# Patient Record
Sex: MALE | Race: WHITE | NOT HISPANIC OR LATINO | Employment: OTHER | URBAN - METROPOLITAN AREA
[De-identification: names, ages, dates, MRNs, and addresses within clinical notes are randomized per-mention and may not be internally consistent; named-entity substitution may affect disease eponyms.]

---

## 2022-03-01 ENCOUNTER — HOSPITAL ENCOUNTER (INPATIENT)
Facility: MEDICAL CENTER | Age: 72
LOS: 4 days | DRG: 309 | End: 2022-03-05
Attending: EMERGENCY MEDICINE | Admitting: STUDENT IN AN ORGANIZED HEALTH CARE EDUCATION/TRAINING PROGRAM
Payer: MEDICARE

## 2022-03-01 DIAGNOSIS — R00.1 BRADYCARDIA: ICD-10-CM

## 2022-03-01 DIAGNOSIS — R42 DIZZINESS: ICD-10-CM

## 2022-03-01 DIAGNOSIS — I10 PRIMARY HYPERTENSION: ICD-10-CM

## 2022-03-01 DIAGNOSIS — I10 HYPERTENSION, UNSPECIFIED TYPE: ICD-10-CM

## 2022-03-01 PROBLEM — N18.30 CKD (CHRONIC KIDNEY DISEASE) STAGE 3, GFR 30-59 ML/MIN: Status: ACTIVE | Noted: 2022-03-01

## 2022-03-01 PROBLEM — E78.5 HLD (HYPERLIPIDEMIA): Status: ACTIVE | Noted: 2022-03-01

## 2022-03-01 PROBLEM — E11.9 DM2 (DIABETES MELLITUS, TYPE 2) (HCC): Status: ACTIVE | Noted: 2022-03-01

## 2022-03-01 LAB
ALBUMIN SERPL BCP-MCNC: 4.5 G/DL (ref 3.2–4.9)
ALBUMIN/GLOB SERPL: 1.7 G/DL
ALP SERPL-CCNC: 97 U/L (ref 30–99)
ALT SERPL-CCNC: 22 U/L (ref 2–50)
ANION GAP SERPL CALC-SCNC: 14 MMOL/L (ref 7–16)
AST SERPL-CCNC: 21 U/L (ref 12–45)
BASOPHILS # BLD AUTO: 1 % (ref 0–1.8)
BASOPHILS # BLD: 0.07 K/UL (ref 0–0.12)
BILIRUB SERPL-MCNC: 0.4 MG/DL (ref 0.1–1.5)
BUN SERPL-MCNC: 34 MG/DL (ref 8–22)
CALCIUM SERPL-MCNC: 10.1 MG/DL (ref 8.5–10.5)
CHLORIDE SERPL-SCNC: 108 MMOL/L (ref 96–112)
CO2 SERPL-SCNC: 19 MMOL/L (ref 20–33)
CREAT SERPL-MCNC: 1.89 MG/DL (ref 0.5–1.4)
EOSINOPHIL # BLD AUTO: 0.6 K/UL (ref 0–0.51)
EOSINOPHIL NFR BLD: 8.9 % (ref 0–6.9)
ERYTHROCYTE [DISTWIDTH] IN BLOOD BY AUTOMATED COUNT: 47.5 FL (ref 35.9–50)
GLOBULIN SER CALC-MCNC: 2.7 G/DL (ref 1.9–3.5)
GLUCOSE SERPL-MCNC: 162 MG/DL (ref 65–99)
HCT VFR BLD AUTO: 37.3 % (ref 42–52)
HGB BLD-MCNC: 12.5 G/DL (ref 14–18)
IMM GRANULOCYTES # BLD AUTO: 0.02 K/UL (ref 0–0.11)
IMM GRANULOCYTES NFR BLD AUTO: 0.3 % (ref 0–0.9)
LYMPHOCYTES # BLD AUTO: 1.54 K/UL (ref 1–4.8)
LYMPHOCYTES NFR BLD: 22.9 % (ref 22–41)
MAGNESIUM SERPL-MCNC: 1.6 MG/DL (ref 1.5–2.5)
MCH RBC QN AUTO: 29.8 PG (ref 27–33)
MCHC RBC AUTO-ENTMCNC: 33.5 G/DL (ref 33.7–35.3)
MCV RBC AUTO: 88.8 FL (ref 81.4–97.8)
MONOCYTES # BLD AUTO: 0.66 K/UL (ref 0–0.85)
MONOCYTES NFR BLD AUTO: 9.8 % (ref 0–13.4)
NEUTROPHILS # BLD AUTO: 3.83 K/UL (ref 1.82–7.42)
NEUTROPHILS NFR BLD: 57.1 % (ref 44–72)
NRBC # BLD AUTO: 0 K/UL
NRBC BLD-RTO: 0 /100 WBC
PLATELET # BLD AUTO: 172 K/UL (ref 164–446)
PMV BLD AUTO: 12.2 FL (ref 9–12.9)
POTASSIUM SERPL-SCNC: 5.5 MMOL/L (ref 3.6–5.5)
PROT SERPL-MCNC: 7.2 G/DL (ref 6–8.2)
RBC # BLD AUTO: 4.2 M/UL (ref 4.7–6.1)
SODIUM SERPL-SCNC: 141 MMOL/L (ref 135–145)
TROPONIN T SERPL-MCNC: 34 NG/L (ref 6–19)
WBC # BLD AUTO: 6.7 K/UL (ref 4.8–10.8)

## 2022-03-01 PROCEDURE — 80053 COMPREHEN METABOLIC PANEL: CPT

## 2022-03-01 PROCEDURE — 84484 ASSAY OF TROPONIN QUANT: CPT

## 2022-03-01 PROCEDURE — 700111 HCHG RX REV CODE 636 W/ 250 OVERRIDE (IP): Performed by: EMERGENCY MEDICINE

## 2022-03-01 PROCEDURE — 36415 COLL VENOUS BLD VENIPUNCTURE: CPT

## 2022-03-01 PROCEDURE — 96374 THER/PROPH/DIAG INJ IV PUSH: CPT

## 2022-03-01 PROCEDURE — 93005 ELECTROCARDIOGRAM TRACING: CPT | Performed by: EMERGENCY MEDICINE

## 2022-03-01 PROCEDURE — 99285 EMERGENCY DEPT VISIT HI MDM: CPT

## 2022-03-01 PROCEDURE — 85025 COMPLETE CBC W/AUTO DIFF WBC: CPT

## 2022-03-01 PROCEDURE — 93005 ELECTROCARDIOGRAM TRACING: CPT

## 2022-03-01 PROCEDURE — 770020 HCHG ROOM/CARE - TELE (206)

## 2022-03-01 PROCEDURE — 99223 1ST HOSP IP/OBS HIGH 75: CPT | Performed by: INTERNAL MEDICINE

## 2022-03-01 PROCEDURE — 99223 1ST HOSP IP/OBS HIGH 75: CPT | Performed by: STUDENT IN AN ORGANIZED HEALTH CARE EDUCATION/TRAINING PROGRAM

## 2022-03-01 PROCEDURE — 83735 ASSAY OF MAGNESIUM: CPT

## 2022-03-01 RX ORDER — DEXTROSE MONOHYDRATE 25 G/50ML
50 INJECTION, SOLUTION INTRAVENOUS
Status: DISCONTINUED | OUTPATIENT
Start: 2022-03-01 | End: 2022-03-04

## 2022-03-01 RX ORDER — HYDRALAZINE HYDROCHLORIDE 20 MG/ML
10 INJECTION INTRAMUSCULAR; INTRAVENOUS ONCE
Status: COMPLETED | OUTPATIENT
Start: 2022-03-01 | End: 2022-03-01

## 2022-03-01 RX ORDER — ONDANSETRON 4 MG/1
4 TABLET, ORALLY DISINTEGRATING ORAL EVERY 4 HOURS PRN
Status: DISCONTINUED | OUTPATIENT
Start: 2022-03-01 | End: 2022-03-05 | Stop reason: HOSPADM

## 2022-03-01 RX ORDER — ONDANSETRON 2 MG/ML
4 INJECTION INTRAMUSCULAR; INTRAVENOUS EVERY 4 HOURS PRN
Status: DISCONTINUED | OUTPATIENT
Start: 2022-03-01 | End: 2022-03-05 | Stop reason: HOSPADM

## 2022-03-01 RX ORDER — POLYETHYLENE GLYCOL 3350 17 G/17G
1 POWDER, FOR SOLUTION ORAL
Status: DISCONTINUED | OUTPATIENT
Start: 2022-03-01 | End: 2022-03-05 | Stop reason: HOSPADM

## 2022-03-01 RX ORDER — ACETAMINOPHEN 325 MG/1
650 TABLET ORAL EVERY 6 HOURS PRN
Status: DISCONTINUED | OUTPATIENT
Start: 2022-03-01 | End: 2022-03-05 | Stop reason: HOSPADM

## 2022-03-01 RX ORDER — AMOXICILLIN 250 MG
2 CAPSULE ORAL 2 TIMES DAILY
Status: DISCONTINUED | OUTPATIENT
Start: 2022-03-02 | End: 2022-03-05 | Stop reason: HOSPADM

## 2022-03-01 RX ORDER — BISACODYL 10 MG
10 SUPPOSITORY, RECTAL RECTAL
Status: DISCONTINUED | OUTPATIENT
Start: 2022-03-01 | End: 2022-03-05 | Stop reason: HOSPADM

## 2022-03-01 RX ORDER — HYDRALAZINE HYDROCHLORIDE 20 MG/ML
10 INJECTION INTRAMUSCULAR; INTRAVENOUS EVERY 4 HOURS PRN
Status: DISCONTINUED | OUTPATIENT
Start: 2022-03-01 | End: 2022-03-05 | Stop reason: HOSPADM

## 2022-03-01 RX ADMIN — HYDRALAZINE HYDROCHLORIDE 10 MG: 20 INJECTION INTRAMUSCULAR; INTRAVENOUS at 22:23

## 2022-03-01 ASSESSMENT — ENCOUNTER SYMPTOMS
FEVER: 0
PALPITATIONS: 1
HEADACHES: 1
DIZZINESS: 1

## 2022-03-02 ENCOUNTER — APPOINTMENT (OUTPATIENT)
Dept: CARDIOLOGY | Facility: MEDICAL CENTER | Age: 72
DRG: 309 | End: 2022-03-02
Attending: STUDENT IN AN ORGANIZED HEALTH CARE EDUCATION/TRAINING PROGRAM
Payer: MEDICARE

## 2022-03-02 PROBLEM — Z76.89: Status: ACTIVE | Noted: 2022-03-02

## 2022-03-02 PROBLEM — E11.40 DIABETIC NEUROPATHY (HCC): Status: ACTIVE | Noted: 2022-03-02

## 2022-03-02 PROBLEM — I25.10 CORONARY ARTERY DISEASE: Status: ACTIVE | Noted: 2022-03-02

## 2022-03-02 PROBLEM — J44.9 COPD (CHRONIC OBSTRUCTIVE PULMONARY DISEASE) (HCC): Status: ACTIVE | Noted: 2022-03-02

## 2022-03-02 PROBLEM — E03.9 HYPOTHYROIDISM: Status: ACTIVE | Noted: 2022-03-02

## 2022-03-02 PROBLEM — F03.90 DEMENTIA (HCC): Status: ACTIVE | Noted: 2022-03-02

## 2022-03-02 PROBLEM — F32.A DEPRESSION: Status: ACTIVE | Noted: 2022-03-02

## 2022-03-02 PROBLEM — Z79.4 INSULIN DEPENDENT TYPE 2 DIABETES MELLITUS (HCC): Status: ACTIVE | Noted: 2022-03-01

## 2022-03-02 PROBLEM — D64.9 NORMOCYTIC ANEMIA: Status: ACTIVE | Noted: 2022-03-02

## 2022-03-02 LAB
ALBUMIN SERPL BCP-MCNC: 4.1 G/DL (ref 3.2–4.9)
ALBUMIN/GLOB SERPL: 1.5 G/DL
ALP SERPL-CCNC: 95 U/L (ref 30–99)
ALT SERPL-CCNC: 19 U/L (ref 2–50)
ANION GAP SERPL CALC-SCNC: 13 MMOL/L (ref 7–16)
AST SERPL-CCNC: 16 U/L (ref 12–45)
BASOPHILS # BLD AUTO: 1.3 % (ref 0–1.8)
BASOPHILS # BLD: 0.09 K/UL (ref 0–0.12)
BILIRUB SERPL-MCNC: 0.3 MG/DL (ref 0.1–1.5)
BUN SERPL-MCNC: 35 MG/DL (ref 8–22)
CALCIUM SERPL-MCNC: 10.1 MG/DL (ref 8.5–10.5)
CHLORIDE SERPL-SCNC: 106 MMOL/L (ref 96–112)
CHOLEST SERPL-MCNC: 180 MG/DL (ref 100–199)
CO2 SERPL-SCNC: 18 MMOL/L (ref 20–33)
CREAT SERPL-MCNC: 1.78 MG/DL (ref 0.5–1.4)
EKG IMPRESSION: NORMAL
EKG IMPRESSION: NORMAL
EOSINOPHIL # BLD AUTO: 0.59 K/UL (ref 0–0.51)
EOSINOPHIL NFR BLD: 8.8 % (ref 0–6.9)
ERYTHROCYTE [DISTWIDTH] IN BLOOD BY AUTOMATED COUNT: 47.6 FL (ref 35.9–50)
GLOBULIN SER CALC-MCNC: 2.7 G/DL (ref 1.9–3.5)
GLUCOSE BLD STRIP.AUTO-MCNC: 239 MG/DL (ref 65–99)
GLUCOSE BLD STRIP.AUTO-MCNC: 281 MG/DL (ref 65–99)
GLUCOSE BLD STRIP.AUTO-MCNC: 286 MG/DL (ref 65–99)
GLUCOSE BLD STRIP.AUTO-MCNC: 338 MG/DL (ref 65–99)
GLUCOSE SERPL-MCNC: 252 MG/DL (ref 65–99)
HCT VFR BLD AUTO: 37.6 % (ref 42–52)
HDLC SERPL-MCNC: 33 MG/DL
HGB BLD-MCNC: 12.3 G/DL (ref 14–18)
IMM GRANULOCYTES # BLD AUTO: 0.02 K/UL (ref 0–0.11)
IMM GRANULOCYTES NFR BLD AUTO: 0.3 % (ref 0–0.9)
LDLC SERPL CALC-MCNC: 91 MG/DL
LV EJECT FRACT  99904: 65
LV EJECT FRACT MOD 2C 99903: 56.57
LV EJECT FRACT MOD 4C 99902: 66.86
LV EJECT FRACT MOD BP 99901: 65.37
LYMPHOCYTES # BLD AUTO: 1.55 K/UL (ref 1–4.8)
LYMPHOCYTES NFR BLD: 23.1 % (ref 22–41)
MCH RBC QN AUTO: 29.1 PG (ref 27–33)
MCHC RBC AUTO-ENTMCNC: 32.7 G/DL (ref 33.7–35.3)
MCV RBC AUTO: 89.1 FL (ref 81.4–97.8)
MONOCYTES # BLD AUTO: 0.66 K/UL (ref 0–0.85)
MONOCYTES NFR BLD AUTO: 9.9 % (ref 0–13.4)
NEUTROPHILS # BLD AUTO: 3.79 K/UL (ref 1.82–7.42)
NEUTROPHILS NFR BLD: 56.6 % (ref 44–72)
NRBC # BLD AUTO: 0 K/UL
NRBC BLD-RTO: 0 /100 WBC
PLATELET # BLD AUTO: 178 K/UL (ref 164–446)
PMV BLD AUTO: 12.2 FL (ref 9–12.9)
POTASSIUM SERPL-SCNC: 4.6 MMOL/L (ref 3.6–5.5)
PROT SERPL-MCNC: 6.8 G/DL (ref 6–8.2)
RBC # BLD AUTO: 4.22 M/UL (ref 4.7–6.1)
SODIUM SERPL-SCNC: 137 MMOL/L (ref 135–145)
T4 FREE SERPL-MCNC: 0.66 NG/DL (ref 0.93–1.7)
TRIGL SERPL-MCNC: 281 MG/DL (ref 0–149)
TROPONIN T SERPL-MCNC: 32 NG/L (ref 6–19)
TSH SERPL DL<=0.005 MIU/L-ACNC: 18 UIU/ML (ref 0.38–5.33)
WBC # BLD AUTO: 6.7 K/UL (ref 4.8–10.8)

## 2022-03-02 PROCEDURE — 80061 LIPID PANEL: CPT

## 2022-03-02 PROCEDURE — 700102 HCHG RX REV CODE 250 W/ 637 OVERRIDE(OP): Performed by: STUDENT IN AN ORGANIZED HEALTH CARE EDUCATION/TRAINING PROGRAM

## 2022-03-02 PROCEDURE — 93010 ELECTROCARDIOGRAM REPORT: CPT | Performed by: INTERNAL MEDICINE

## 2022-03-02 PROCEDURE — 93306 TTE W/DOPPLER COMPLETE: CPT | Mod: 26 | Performed by: INTERNAL MEDICINE

## 2022-03-02 PROCEDURE — 36415 COLL VENOUS BLD VENIPUNCTURE: CPT

## 2022-03-02 PROCEDURE — 99233 SBSQ HOSP IP/OBS HIGH 50: CPT | Performed by: INTERNAL MEDICINE

## 2022-03-02 PROCEDURE — 85025 COMPLETE CBC W/AUTO DIFF WBC: CPT

## 2022-03-02 PROCEDURE — A9270 NON-COVERED ITEM OR SERVICE: HCPCS | Performed by: STUDENT IN AN ORGANIZED HEALTH CARE EDUCATION/TRAINING PROGRAM

## 2022-03-02 PROCEDURE — 93005 ELECTROCARDIOGRAM TRACING: CPT | Performed by: NURSE PRACTITIONER

## 2022-03-02 PROCEDURE — 99233 SBSQ HOSP IP/OBS HIGH 50: CPT | Mod: GC | Performed by: HOSPITALIST

## 2022-03-02 PROCEDURE — 82962 GLUCOSE BLOOD TEST: CPT

## 2022-03-02 PROCEDURE — 84439 ASSAY OF FREE THYROXINE: CPT

## 2022-03-02 PROCEDURE — 770020 HCHG ROOM/CARE - TELE (206)

## 2022-03-02 PROCEDURE — 93306 TTE W/DOPPLER COMPLETE: CPT

## 2022-03-02 PROCEDURE — A9270 NON-COVERED ITEM OR SERVICE: HCPCS | Performed by: HOSPITALIST

## 2022-03-02 PROCEDURE — A9270 NON-COVERED ITEM OR SERVICE: HCPCS | Performed by: INTERNAL MEDICINE

## 2022-03-02 PROCEDURE — 700102 HCHG RX REV CODE 250 W/ 637 OVERRIDE(OP): Performed by: HOSPITALIST

## 2022-03-02 PROCEDURE — 700102 HCHG RX REV CODE 250 W/ 637 OVERRIDE(OP): Performed by: INTERNAL MEDICINE

## 2022-03-02 PROCEDURE — 84484 ASSAY OF TROPONIN QUANT: CPT

## 2022-03-02 PROCEDURE — 80053 COMPREHEN METABOLIC PANEL: CPT

## 2022-03-02 PROCEDURE — 84443 ASSAY THYROID STIM HORMONE: CPT

## 2022-03-02 RX ORDER — ESCITALOPRAM OXALATE 10 MG/1
20 TABLET ORAL DAILY
Status: DISCONTINUED | OUTPATIENT
Start: 2022-03-03 | End: 2022-03-05 | Stop reason: HOSPADM

## 2022-03-02 RX ORDER — CARVEDILOL 6.25 MG/1
6.25 TABLET ORAL 2 TIMES DAILY WITH MEALS
Status: DISCONTINUED | OUTPATIENT
Start: 2022-03-02 | End: 2022-03-03

## 2022-03-02 RX ORDER — ATORVASTATIN CALCIUM 20 MG/1
20 TABLET, FILM COATED ORAL NIGHTLY
Status: ON HOLD | COMMUNITY
End: 2022-03-05

## 2022-03-02 RX ORDER — CHLORTHALIDONE 25 MG/1
25-50 TABLET ORAL DAILY
Status: ON HOLD | COMMUNITY
End: 2022-03-05

## 2022-03-02 RX ORDER — CELECOXIB 200 MG/1
200 CAPSULE ORAL 2 TIMES DAILY
COMMUNITY
End: 2022-09-28

## 2022-03-02 RX ORDER — GABAPENTIN 800 MG/1
400 TABLET ORAL 3 TIMES DAILY
Status: ON HOLD | COMMUNITY
End: 2022-10-06 | Stop reason: SDUPTHER

## 2022-03-02 RX ORDER — ESCITALOPRAM OXALATE 10 MG/1
20 TABLET ORAL DAILY
Status: ON HOLD | COMMUNITY
End: 2022-03-02

## 2022-03-02 RX ORDER — SULFAMETHOXAZOLE AND TRIMETHOPRIM 800; 160 MG/1; MG/1
1 TABLET ORAL 2 TIMES DAILY
Status: ON HOLD | COMMUNITY
Start: 2022-02-02 | End: 2022-03-05

## 2022-03-02 RX ORDER — ESCITALOPRAM OXALATE 20 MG/1
20 TABLET ORAL DAILY
Status: ON HOLD | COMMUNITY
End: 2022-10-06 | Stop reason: SDUPTHER

## 2022-03-02 RX ORDER — GABAPENTIN 400 MG/1
800 CAPSULE ORAL 4 TIMES DAILY
Status: DISCONTINUED | OUTPATIENT
Start: 2022-03-02 | End: 2022-03-05 | Stop reason: HOSPADM

## 2022-03-02 RX ORDER — HYDRALAZINE HYDROCHLORIDE 25 MG/1
25 TABLET, FILM COATED ORAL EVERY 8 HOURS
Status: DISCONTINUED | OUTPATIENT
Start: 2022-03-02 | End: 2022-03-02

## 2022-03-02 RX ORDER — CLONIDINE HYDROCHLORIDE 0.1 MG/1
0.1 TABLET ORAL 2 TIMES DAILY
Status: ON HOLD | COMMUNITY
End: 2022-03-05

## 2022-03-02 RX ORDER — OMEPRAZOLE 20 MG/1
20 CAPSULE, DELAYED RELEASE ORAL DAILY
COMMUNITY
End: 2024-01-04

## 2022-03-02 RX ORDER — HYDRALAZINE HYDROCHLORIDE 25 MG/1
37.5 TABLET, FILM COATED ORAL EVERY 8 HOURS
Status: DISCONTINUED | OUTPATIENT
Start: 2022-03-02 | End: 2022-03-03

## 2022-03-02 RX ORDER — LEVOTHYROXINE SODIUM 0.1 MG/1
100 TABLET ORAL
Status: DISCONTINUED | OUTPATIENT
Start: 2022-03-03 | End: 2022-03-02

## 2022-03-02 RX ORDER — CHOLECALCIFEROL (VITAMIN D3) 125 MCG
5 CAPSULE ORAL ONCE
Status: COMPLETED | OUTPATIENT
Start: 2022-03-02 | End: 2022-03-02

## 2022-03-02 RX ORDER — AMLODIPINE BESYLATE 5 MG/1
2.5 TABLET ORAL
Status: DISCONTINUED | OUTPATIENT
Start: 2022-03-02 | End: 2022-03-03

## 2022-03-02 RX ORDER — NIFEDIPINE 90 MG/1
90 TABLET, EXTENDED RELEASE ORAL DAILY
Status: ON HOLD | COMMUNITY
End: 2022-03-05

## 2022-03-02 RX ORDER — OXYCODONE AND ACETAMINOPHEN 10; 325 MG/1; MG/1
1 TABLET ORAL 3 TIMES DAILY PRN
COMMUNITY
End: 2022-09-28

## 2022-03-02 RX ORDER — LEVOTHYROXINE SODIUM 0.1 MG/1
100 TABLET ORAL
Status: ON HOLD | COMMUNITY
End: 2022-03-05

## 2022-03-02 RX ORDER — LEVOTHYROXINE SODIUM 0.07 MG/1
150 TABLET ORAL
Status: DISCONTINUED | OUTPATIENT
Start: 2022-03-03 | End: 2022-03-05 | Stop reason: HOSPADM

## 2022-03-02 RX ORDER — ATORVASTATIN CALCIUM 80 MG/1
80 TABLET, FILM COATED ORAL EVERY EVENING
Status: DISCONTINUED | OUTPATIENT
Start: 2022-03-02 | End: 2022-03-02

## 2022-03-02 RX ORDER — ATORVASTATIN CALCIUM 40 MG/1
40 TABLET, FILM COATED ORAL EVERY EVENING
Status: DISCONTINUED | OUTPATIENT
Start: 2022-03-02 | End: 2022-03-05 | Stop reason: HOSPADM

## 2022-03-02 RX ORDER — GABAPENTIN 400 MG/1
800 CAPSULE ORAL 3 TIMES DAILY
Status: ON HOLD | COMMUNITY
End: 2022-03-02

## 2022-03-02 RX ORDER — CARVEDILOL 6.25 MG/1
6.25 TABLET ORAL 2 TIMES DAILY WITH MEALS
Status: ON HOLD | COMMUNITY
End: 2022-03-05

## 2022-03-02 RX ORDER — GABAPENTIN 400 MG/1
800 CAPSULE ORAL ONCE
Status: COMPLETED | OUTPATIENT
Start: 2022-03-02 | End: 2022-03-02

## 2022-03-02 RX ADMIN — INSULIN HUMAN 2 UNITS: 100 INJECTION, SOLUTION PARENTERAL at 08:01

## 2022-03-02 RX ADMIN — GABAPENTIN 800 MG: 400 CAPSULE ORAL at 21:56

## 2022-03-02 RX ADMIN — HYDRALAZINE HYDROCHLORIDE 37.5 MG: 25 TABLET, FILM COATED ORAL at 21:56

## 2022-03-02 RX ADMIN — INSULIN HUMAN 3 UNITS: 100 INJECTION, SOLUTION PARENTERAL at 21:54

## 2022-03-02 RX ADMIN — Medication 5 MG: at 22:43

## 2022-03-02 RX ADMIN — ASPIRIN 81 MG: 81 TABLET, COATED ORAL at 10:53

## 2022-03-02 RX ADMIN — HYDRALAZINE HYDROCHLORIDE 25 MG: 25 TABLET, FILM COATED ORAL at 07:46

## 2022-03-02 RX ADMIN — GABAPENTIN 800 MG: 400 CAPSULE ORAL at 00:43

## 2022-03-02 RX ADMIN — INSULIN GLARGINE 35 UNITS: 100 INJECTION, SOLUTION SUBCUTANEOUS at 17:09

## 2022-03-02 RX ADMIN — CARVEDILOL 6.25 MG: 6.25 TABLET, FILM COATED ORAL at 17:07

## 2022-03-02 RX ADMIN — INSULIN HUMAN 4 UNITS: 100 INJECTION, SOLUTION PARENTERAL at 11:49

## 2022-03-02 RX ADMIN — HYDRALAZINE HYDROCHLORIDE 37.5 MG: 25 TABLET, FILM COATED ORAL at 15:19

## 2022-03-02 RX ADMIN — ATORVASTATIN CALCIUM 40 MG: 40 TABLET, FILM COATED ORAL at 17:07

## 2022-03-02 RX ADMIN — CARVEDILOL 6.25 MG: 6.25 TABLET, FILM COATED ORAL at 10:53

## 2022-03-02 RX ADMIN — INSULIN HUMAN 3 UNITS: 100 INJECTION, SOLUTION PARENTERAL at 17:09

## 2022-03-02 RX ADMIN — AMLODIPINE BESYLATE 2.5 MG: 5 TABLET ORAL at 10:53

## 2022-03-02 ASSESSMENT — COGNITIVE AND FUNCTIONAL STATUS - GENERAL
SUGGESTED CMS G CODE MODIFIER MOBILITY: CH
DAILY ACTIVITIY SCORE: 22
MOBILITY SCORE: 24
TOILETING: A LITTLE
SUGGESTED CMS G CODE MODIFIER DAILY ACTIVITY: CJ
HELP NEEDED FOR BATHING: A LITTLE

## 2022-03-02 ASSESSMENT — LIFESTYLE VARIABLES
ON A TYPICAL DAY WHEN YOU DRINK ALCOHOL HOW MANY DRINKS DO YOU HAVE: 0
TOTAL SCORE: 0
HAVE YOU EVER FELT YOU SHOULD CUT DOWN ON YOUR DRINKING: NO
EVER FELT BAD OR GUILTY ABOUT YOUR DRINKING: NO
CONSUMPTION TOTAL: NEGATIVE
AVERAGE NUMBER OF DAYS PER WEEK YOU HAVE A DRINK CONTAINING ALCOHOL: 0
SUBSTANCE_ABUSE: 0
TOTAL SCORE: 0
HOW MANY TIMES IN THE PAST YEAR HAVE YOU HAD 5 OR MORE DRINKS IN A DAY: 0
EVER HAD A DRINK FIRST THING IN THE MORNING TO STEADY YOUR NERVES TO GET RID OF A HANGOVER: NO
HAVE PEOPLE ANNOYED YOU BY CRITICIZING YOUR DRINKING: NO
ALCOHOL_USE: NO
TOTAL SCORE: 0
DOES PATIENT WANT TO STOP DRINKING: NO

## 2022-03-02 ASSESSMENT — PATIENT HEALTH QUESTIONNAIRE - PHQ9
3. TROUBLE FALLING OR STAYING ASLEEP OR SLEEPING TOO MUCH: NEARLY EVERY DAY
SUM OF ALL RESPONSES TO PHQ9 QUESTIONS 1 AND 2: 3
9. THOUGHTS THAT YOU WOULD BE BETTER OFF DEAD, OR OF HURTING YOURSELF: NOT AT ALL
2. FEELING DOWN, DEPRESSED, IRRITABLE, OR HOPELESS: NEARLY EVERY DAY
7. TROUBLE CONCENTRATING ON THINGS, SUCH AS READING THE NEWSPAPER OR WATCHING TELEVISION: MORE THAN HALF THE DAYS
1. LITTLE INTEREST OR PLEASURE IN DOING THINGS: NOT AT ALL
4. FEELING TIRED OR HAVING LITTLE ENERGY: NEARLY EVERY DAY
8. MOVING OR SPEAKING SO SLOWLY THAT OTHER PEOPLE COULD HAVE NOTICED. OR THE OPPOSITE, BEING SO FIGETY OR RESTLESS THAT YOU HAVE BEEN MOVING AROUND A LOT MORE THAN USUAL: NOT AT ALL
SUM OF ALL RESPONSES TO PHQ QUESTIONS 1-9: 11
6. FEELING BAD ABOUT YOURSELF - OR THAT YOU ARE A FAILURE OR HAVE LET YOURSELF OR YOUR FAMILY DOWN: NOT AL ALL
5. POOR APPETITE OR OVEREATING: NOT AT ALL

## 2022-03-02 ASSESSMENT — ENCOUNTER SYMPTOMS
HEADACHES: 1
PALPITATIONS: 0
ABDOMINAL PAIN: 0
DIZZINESS: 1
CHILLS: 0
FEVER: 0
COUGH: 0
NAUSEA: 0
VOMITING: 0
SHORTNESS OF BREATH: 0

## 2022-03-02 ASSESSMENT — PAIN DESCRIPTION - PAIN TYPE
TYPE: ACUTE PAIN

## 2022-03-02 ASSESSMENT — FIBROSIS 4 INDEX
FIB4 SCORE: 1.85
FIB4 SCORE: 1.85

## 2022-03-02 NOTE — PROGRESS NOTES
Assumed care of patient. Bedside report received from Iva SANDOVAL. Updated POC, call light within reach, fall precautions in place. Bed locked, and in lowest position. Patient instructed to call for assistance. Pt is A&Ox4. All questions answered, no further needs at this time.

## 2022-03-02 NOTE — ED PROVIDER NOTES
"ED Provider Note    Scribed for Jessy Galaviz M.D. by Madhu Barber. 3/1/2022, 7:44 PM.    Primary care provider: None noted  Means of arrival: Med Flight  History obtained from: Patient  History limited by: None    CHIEF COMPLAINT  Chief Complaint   Patient presents with   • Bradycardia   • Abnormal Labs       HPI  Sulaiman Ceja is a 71 y.o. male who presents to the Emergency Department for evaluation of bradycardia and abnormal labs. Patient states he has had dizziness over the last week. States the dizziness became severe enough that he was unable to stand up and therefore went to outside facility for further evaluation with his dizziness. He reports having headaches that feels like 5-second migraines. States he has history of chronic headaches for \"years.\"       Per review of outside medical records, patient presented to [facility] for dizziness for 3 days. At the hospital, EKG was notable for bradycardia. They were concerned about possible heart block causing his symptoms vs patient taking too much of his medication. He was also found to be hyperkalemic with potassium of 5.7. He was treated with calcium chloride, calcium gluconate, and kayexalate. Labs reviewed and troponin within normal limits. BNP mildly high at 221. Creatinine of 2, BUN of 35, potassium 5.7, LFTs within normal limits, hemoglobin 11.9, white count 6.5. Past medical history includes hypertension, COPD, mild dementia, CABG. Medications include carvedilol nifedipine, synthroid, atorvastatin, clonidine, gabapentin, lexapro, and omeprazole.     Patient reports after receiving medications at outside facilities his dizziness is now improved. States his heart rate is usually not as low as his heart rate today.  He does not believe he took any excess medication.  He reports that he does occasionally feel his heart skip a beat.  No active fever. He reports prior CABG in the 1990s. He still sees cardiology. He notes being on " "aspirin.      REVIEW OF SYSTEMS  Review of Systems   Constitutional: Negative for fever.   Respiratory: Negative for shortness of breath.    Cardiovascular: Positive for palpitations.   Gastrointestinal: Negative for abdominal pain, nausea and vomiting.   Neurological: Positive for dizziness and headaches.   All other systems reviewed and are negative.    PAST MEDICAL HISTORY   has a past medical history of CAD (coronary artery disease).    SURGICAL HISTORY  patient denies any surgical history    SOCIAL HISTORY  Social History     Tobacco Use   • Smoking status: Former Smoker     Start date: 3/1/1996   Vaping Use   • Vaping Use: Never used   Substance Use Topics   • Alcohol use: Never      Social History     Substance and Sexual Activity   Drug Use Not on file       FAMILY HISTORY  No pertinent family history reported.     CURRENT MEDICATIONS  Home Medications     Reviewed by Heather Dow (Pharmacy Tech) on 03/01/22 at 2217  Med List Status: Unable to Obtain   Medication Last Dose Status        Patient Sean Taking any Medications                       ALLERGIES  No Known Allergies    PHYSICAL EXAM  VITAL SIGNS: BP (!) 192/91   Pulse (!) 50   Temp 36.9 °C (98.5 °F) (Temporal)   Resp (!) 24   Ht 1.778 m (5' 10\")   Wt 84.4 kg (186 lb)   SpO2 95%   BMI 26.69 kg/m²   Vitals reviewed by myself.  Physical Exam  Nursing note and vitals reviewed.  Constitutional: Well-developed and well-nourished. No acute distress.   HENT: Head is normocephalic and atraumatic.  Eyes: extra-ocular movements intact  Cardiovascular: Bradycardia. Regular rhythm. No murmur heard.  Pulmonary/Chest: Breath sounds normal. No wheezes or rales.   Abdominal: Soft and non-tender. No distention.    Musculoskeletal: Extremities exhibit normal range of motion without edema or tenderness.   Neurological: Awake and alert  Skin: Skin is warm and dry. No rash.        DIAGNOSTIC STUDIES /  LABS  Labs Reviewed   CBC WITH DIFFERENTIAL - " Abnormal; Notable for the following components:       Result Value    RBC 4.20 (*)     Hemoglobin 12.5 (*)     Hematocrit 37.3 (*)     MCHC 33.5 (*)     Eosinophils 8.90 (*)     Eos (Absolute) 0.60 (*)     All other components within normal limits   COMP METABOLIC PANEL - Abnormal; Notable for the following components:    Co2 19 (*)     Glucose 162 (*)     Bun 34 (*)     Creatinine 1.89 (*)     All other components within normal limits   TROPONIN - Abnormal; Notable for the following components:    Troponin T 34 (*)     All other components within normal limits   ESTIMATED GFR - Abnormal; Notable for the following components:    GFR If  43 (*)     GFR If Non  35 (*)     All other components within normal limits   MAGNESIUM   LIPID PROFILE   COMP METABOLIC PANEL   CBC WITH DIFFERENTIAL       All labs reviewed by me.    EKG Interpretation:  Interpreted by myself    12 Lead EKG interpreted by me to show:  EKG at 7:15 PM: Sinus bradycardia, heart rate 57, occasional PVC, normal axis, intervals notable for prolonged DE of 228, , QTc 396, no acute ST-T segment changes, no evidence of acute arrhythmia or ischemia  My impression of this EKG: Does not indicate ischemia or arrhythmia at this time.      REASSESSMENT  7:44 PM Patient seen and examined at bedside. The patient presents with bradycardia and abnormal labs. Ordered EKG and labs to evaluate.    8:13 PM - I discussed the patient's case and the above findings with Dr. Manning (cardiology) who will evaluate the patient.         COURSE & MEDICAL DECISION MAKING  Nursing notes, VS, PMSFHx reviewed in chart.    Patient is a 71-year-old male who comes in for evaluation of dizziness.  At outside facility they were concerned about symptomatic bradycardia and therefore he was sent here for higher level of care.  I reviewed his labs from outside facility and EKG.  EKG appears to demonstrate some sort of junctional bigemy.  He does have some  form of chronic kidney disease and his creatinine at outside facility was 2, potassium was 5.7.  Currently he is asymptomatic and EKG demonstrates sinus bradycardia.  Current heart rate on the monitor is in the 50s.  At this time I do not believe he requires emergent pacemaker as he is asymptomatic, but given his prior arrhythmia causing his symptoms he may require medication adjustment versus possible pacemaker.  I discussed the case with cardiologist Dr. Manning who will consult on the patient.  Obtain repeat labs and repeat potassium is within normal limits.  Patient will be hospitalized for ongoing work-up and management.  Discussed the case with Dr. Byrd who has accepted patient for hospitalization.  Patient is in guarded condition.  Of note he  did become significantly hypertensive with blood pressure in the 200s systolic, likely rebound from not taking his nightly clonidine, he was treated with hydralazine to which he had good response.    DISPOSITION:  Patient will be hospitalized by Dr. Byrd in guarded condition.     FINAL IMPRESSION  1. Bradycardia    2. Dizziness    3. Hypertension, unspecified type          I, Madhu Barber (Scribe), am scribing for, and in the presence of, Jessy Galaviz M.D..    Electronically signed by: Madhu Barber (Bettyibedgar), 3/1/2022    I, Jessy Galaviz M.D. personally performed the services described in this documentation, as scribed by Madhu Barber in my presence, and it is both accurate and complete.    The note accurately reflects work and decisions made by me.  Jessy Galaviz M.D.  3/2/2022  12:24 AM

## 2022-03-02 NOTE — ASSESSMENT & PLAN NOTE
CMP (03/02/22): BUN/creatinine/EGFR 35/1.78/38  Baseline unknown, stable    -Avoid nephrotoxins  -Renally dose drugs  -Continue to monitor

## 2022-03-02 NOTE — PROGRESS NOTES
Assumed care of patient, bedside report received from Deepti ARMSTRONG RN. Updated on plan of care, call light within reach and fall precautions are in place and bed lock and in lowest position. Patient instructed to call for assistance before getting out of bed. All questions answered at this time. No other needs.

## 2022-03-02 NOTE — ASSESSMENT & PLAN NOTE
BP (03/02/22): (152-204)/() 176/71, Pulse:  [48-75] 60  Secondary to clonidine withdrawal, rebound hypertension usually last 2 to 4 days, today is day 3    -Permanently discontinued nifedipine 90 mg daily  -Permanently discontinued clonidine 0.1 mg twice daily  -Restarted carvedilol 6.25 mg twice daily  -Carvedilol 6.25 mg decreased to 3.125 mg daily (3/3/22)  -Started hydralazine 37.5 mg every 8 hours, hold for SBP less than 110  -Hydralazine 37.5 mg every 8 hours increased to 50 mg every 8 hours (3/3/22)  -Started amlodipine to 2.5 mg daily, hold for SBP less than 110  -Amlodipine 2.5 mg increased to 5 mg daily (3/3/22)  -Hydralazine 10 mg IV every 4 hours as needed for SBP greater than 180 or DBP greater than 110  -sodium restriction in diet

## 2022-03-02 NOTE — ASSESSMENT & PLAN NOTE
Lipid panel (03/02/22): Total cholesterol 180, triglycerides 281, HDL 33, LDL 91    -Changed home atorvastatin 20 mg to 40 mg daily (03/02/22)

## 2022-03-02 NOTE — ASSESSMENT & PLAN NOTE
A1c (3/3/22): 9.8  Fasting glucose 259, 12 units of SSI, uncontrolled    -Hold home meds of insulin detemir 50 units every morning, 15 units every night  -Held home insulin regular  -Started insulin glargine 35 units every evening  -Increased glargine from 35 units to 50 units (3/3/22)  -Increased glargine from 50 units to 30mg qAM AND qPM  -Started 5u aspart TID  schd   -SSI  -Hypoglycemia protocol

## 2022-03-02 NOTE — PROGRESS NOTES
CARDIOLOGY PROGRESS NOTE     Attending:  Abdiaziz Mc MD     Consulting Provider:   Jessy Galaviz MD    Resident:   Magdaleno Logan MD    PATIENT:   Sulaiman Ceja; [id number]; 1950    ID:   Sulaiman Ceja is a 71 year-old male with a past medical history of coronary artery disease (s/p CABG in the 1990s), insulin-dependent type 2 diabetes, hyperlipidemia, hypertension, chronic kidney disease (unknown stage), COPD, urinary incontinence (s/p artificial sphincter with recent replacement), and mild dementia. Presented to outside hospital with worsening exertional fatigue and dizziness over the past 2 months. Found to be in ventricular bigeminy in the outside facility and here a repeat EKG showed sinus bradycardia. Cardiology consult for bradycardia and consideration for pacemaker placement.     SUBJECTIVE:   No acute events overnight, patient denies any current chest pain or SOB. Denies any current lightheadedness at rest. States that he walked to the bathroom earlier this morning without any lightheadedness/dizziness.     OBJECTIVE:  Vitals:    03/02/22 0100 03/02/22 0128 03/02/22 0450 03/02/22 0706   BP: (!) 192/81 (!) 163/64 (!) 179/68 (!) 176/71   Pulse:   (!) 58 60   Resp:   18 16   Temp:   36.4 °C (97.6 °F) 36.8 °C (98.2 °F)   TempSrc:   Temporal Temporal   SpO2: 96% 94% 95% 94%   Weight:       Height:         No intake or output data in the 24 hours ending 03/02/22 0837    PHYSICAL EXAM:  General: No acute distress, afebrile, resting comfortably, conversational   HEENT: NC/AT. EOMI.   Cardiovascular: RRR without murmurs, rubs, heaves. Normal capillary refill   Respiratory: CTAB, no tachypnea or retractions   Abdomen: normal bowel sounds, soft, nontender, nondistended, no masses, no organomegaly   EXT:  MOSQUEDA, no edema  Skin: No erythema/lesions   Neuro: Non-focal, alert and orientated     LABS:  Recent Labs     03/01/22  1915 03/02/22  0154   WBC 6.7 6.7   RBC 4.20* 4.22*   HEMOGLOBIN 12.5* 12.3*   HEMATOCRIT  37.3* 37.6*   MCV 88.8 89.1   MCH 29.8 29.1   RDW 47.5 47.6   PLATELETCT 172 178   MPV 12.2 12.2   NEUTSPOLYS 57.10 56.60   LYMPHOCYTES 22.90 23.10   MONOCYTES 9.80 9.90   EOSINOPHILS 8.90* 8.80*   BASOPHILS 1.00 1.30     Recent Labs     03/01/22 1915 03/02/22  0154   SODIUM 141 137   POTASSIUM 5.5 4.6   CHLORIDE 108 106   CO2 19* 18*   BUN 34* 35*   CREATININE 1.89* 1.78*   CALCIUM 10.1 10.1   MAGNESIUM 1.6  --    ALBUMIN 4.5 4.1     Estimated GFR/CRCL = Estimated Creatinine Clearance: 39.3 mL/min (A) (by C-G formula based on SCr of 1.78 mg/dL (H)).  Recent Labs     03/01/22 1915 03/02/22  0154   GLUCOSE 162* 252*     Recent Labs     03/01/22 1915 03/02/22  0154   ASTSGOT 21 16   ALTSGPT 22 19   TBILIRUBIN 0.4 0.3   ALKPHOSPHAT 97 95   GLOBULIN 2.7 2.7             No results for input(s): INR, APTT, FIBRINOGEN in the last 72 hours.    Invalid input(s): DIMER      IMAGING:  EC-ECHOCARDIOGRAM COMPLETE W/O CONT    (Results Pending)       MEDS:  Current Facility-Administered Medications   Medication Last Admin   • hydrALAZINE (APRESOLINE) tablet 25 mg 25 mg at 03/02/22 0746   • atorvastatin (LIPITOR) tablet 80 mg     • aspirin EC (ECOTRIN) tablet 81 mg     • senna-docusate (PERICOLACE or SENOKOT S) 8.6-50 MG per tablet 2 Tablet      And   • polyethylene glycol/lytes (MIRALAX) PACKET 1 Packet      And   • magnesium hydroxide (MILK OF MAGNESIA) suspension 30 mL      And   • bisacodyl (DULCOLAX) suppository 10 mg     • acetaminophen (Tylenol) tablet 650 mg     • ondansetron (ZOFRAN) syringe/vial injection 4 mg     • ondansetron (ZOFRAN ODT) dispertab 4 mg     • hydrALAZINE (APRESOLINE) injection 10 mg     • insulin regular (HumuLIN R,NovoLIN R) injection 2 Units at 03/02/22 0801    And   • dextrose 50% (D50W) injection 50 mL       Studies:   EKG 3/1: sinus bradycardia, left axis deviation, prolonged ND interval at 228. Normal QTc. No ST/t-wave changes concerning for ischemia. Type 1 AV block.       Assessments:    #Sinus bradycardia   #Hyperkalemia, resolved   #CAD s/p CABG in the 1990s  #T2DM, insulin dependent   #HTN   #HLD   #CKD  #COPD    Recommendations:   -- Continue to follow patient symptoms. If continues to have symptomatic bradycardia, this could be an indication for PPM. Heart rate has improved since stopping the AV negro blocking agents. Patient does not appear to have any other reversible causes for bradycardia. Could also consider an EP study to ensure this isn't an intranodal block.   -- Continue to hold carvedilol, nifedipine, and clonidine.   -- Keep pacer pads nearby and give 0.5mg IV atropine q5 minutes if patient develops symptomatic bradycardia via ACLS protocol   -- Follow up on echo  -- Given BP of 176/71 this morning, resume home BP meds with hypertensive protocol in place   -- Monitor electrolytes closely with goal K>4 and Mg>2    Thank you for your consult. Cardiology will continue to follow. Please contact us with any questions.       Magdaleno Logan MD   PGY-2 Family Medicine Resident   [facility]     Discussed with my attending cardiologist, Dr Freed To.

## 2022-03-02 NOTE — CONSULTS
CARDIOLOGY CONSULTATION NOTE      Date of Consultation: 3/1/2022  Consulting Provider: Jessy Galaviz MD    Patient Name: Sulaiman Ceja    YOB: 1950  MRN: [id number]    Reason for Consultation:   Bradycardia    History of Present Illness:   Sulaiman Ceja is a 71 year-old gentleman with a past medical history of coronary artery disease (s/p CABG in the 1990s), insulin-dependent type 2 diabetes, hyperlipidemia, hypertension, chronic kidney disease (unknown stage), COPD, urinary incontinence (s/p artificial sphincter with recent replacement), and mild dementia.  Briefly, he presented to [facility] with worsening exertional fatigue and dizziness over the past 2 months.  There, he was found to be significantly bradycardic, although his reported blood pressures were in the hypertensive range, and mildly hyperkalemic at 5.7.  He was treated with calcium chloride and Kayexalate reportedly with conversion then to sinus bradycardia with PVCs.  He was subsequently transferred to [facility] for further evaluation and management.  His laboratories were otherwise remarkable for an undetectable troponin, a BNP of 221, and a creatinine of 2.0.  Upon transfer, his ECG only demonstrated sinus bradycardia with a PVC.  His laboratories showed an improvement in his creatinine to 1.89 and his potassium to 5.5.    On my review, he reports doing much better after converting back to sinus rhythm.  He notes that he has felt intermittently very poorly for approximately 2 months.  He has had significant exertional fatigue and dizziness as well as some resting symptoms.  The symptoms became so bad that he started using a walker.  He states that prior to about 2 months ago he was not requiring a walker for ambulation.  He reports some baseline shortness of breath that he attributes to COPD, but states this has not significantly worsened.  He denies any significant chest pain.  With regard to his coronary artery  disease, he reports undergoing a bypass surgery in the 1990s, but states that he has never required another surgery or stenting since then.  He also denies ever being told that he has heart failure, however, he does report knowing that he has some degree of kidney disease.  He denies taking a potassium supplement or using a salt substitute with potassium.  He also reports significant incontinence and denies any sensation of urinary retention.  He did recently undergo a replacement artificial sphincter surgery, but states that the sphincter has not been activated yet and currently he continues to have incontinence.    I reviewed his ECG from [facility].  This appeared to demonstrate atrial fibrillation with a slow ventricular response as there was clear irregularity in the QRS complexes.  One QRS complex appears to be a PVC, however, the other complexes appear supraventricular and are not in a clear regular pattern and there are no clear P-waves. He did also have a telemetry strip that appeared to show a junctional rhythm with PVCs in a pattern of bigeminy.  His ECG at [facility] demonstrated sinus bradycardia with a PVC and his telemetry here has shown only sinus bradycardia with frequent PVCs in a pattern of bigeminy.    Medical History:   See HPI above for relevant past medical history.    Surgical History:   1.  CABG in 1990s  2.  Recent artifical sphincter replacement    Family History:   Reviewed, noncontributory to current consultation.    Social History:   The patient is a former smoker.    Medications and Allergies:   1.  Levothyroxine 100 mcg daily  2.  Atorvastatin 20 mg daily  3.  Carvedilol 6.25 mg twice daily  4.  Celecoxib 200 mg daily  5.  Chlorthalidone 12.5 mg daily  6.  Clonidine 0.1 mg twice daily  7.  Gabapentin 800 mg 4 times daily  8.  Nifedipine 90 mg daily  9.  Omeprazole 40 mg daily  10.  Percocet as needed  11.  Levemir insulin    Review of Systems:   A pertinent review of systems was  "performed and was unremarkable except as per HPI above.    Vital Signs:   BP (!) 192/81   Pulse 60   Temp 36.9 °C (98.5 °F) (Temporal)   Resp 14   Ht 1.778 m (5' 10\")   Wt 84.4 kg (186 lb)   SpO2 91%   BMI 26.69 kg/m²   Vitals:    03/01/22 2015 03/01/22 2030 03/01/22 2045 03/01/22 2100   BP: (!) 179/88 152/75 153/73 (!) 192/81   Pulse: (!) 49 (!) 59 (!) 54 60   Resp: 17 18 15 14   Temp:       TempSrc:       SpO2: 95% 95% 93% 91%   Weight:       Height:         Body mass index is 26.69 kg/m².  Oxygen Therapy:  Pulse Oximetry: 91 %, O2 Delivery Device: None - Room Air    Physical Examination:   General: Chronically ill-appearing, but in no acute distress  Eyes: Extraocular movements intact, anicteric  HEENT: Neck full range of motion, no jugular venous distension  Pulmonary: Normal respiratory effort, clear to auscultation bilaterally  Cardiovascular: Bradycardic, intermittent ectopy, 1/6 systolic murmur  Gastrointestinal: Soft, non-tender, non-distended  Extremities: Warm and well perfused, trace bilateral lower extremity edema  Neurological: Alert and oriented, no gross focal motor deficits    Laboratories:   Estimated Creatinine Clearance: 37 mL/min (A) (by C-G formula based on SCr of 1.89 mg/dL (H)).  Recent Labs     03/01/22 1915   CREATININE 1.89*   BUN 34*   POTASSIUM 5.5   SODIUM 141   CALCIUM 10.1   CO2 19*   ALBUMIN 4.5     Recent Labs     03/01/22 1915   GLUCOSE 162*     Recent Labs     03/01/22 1915   ASTSGOT 21   ALTSGPT 22   ALKPHOSPHAT 97     Recent Labs     03/01/22 1915   WBC 6.7   HEMOGLOBIN 12.5*   PLATELETCT 172     Recent Labs     03/01/22 1915   TROPONINT 34*     Studies:   See ECG and telemetry interpretation in HPI above    Assessment and Recommendations:   # Symptomatic bradycardia, slow atrial fibrillation: His ECG appeared to show atrial fibrillation with a slow ventricular response given the significant irregularity present in the rhythm.  However, his telemetry showed a more " regular rhythm with ventricular bigeminy.  It is unclear if this was a junctional rhythm or just a very slow atrial fibrillation.  Regardless, there is a high likelihood that he will require a pacemaker.  While his abnormal rhythms may resolve with holding carvedilol and clonidine, given his history of CABG he potentially has a strong indication for a beta-blocker.    For now, he does not have an indication for an urgent temporary pacemaker.  Would continue to hold carvedilol, nifedipine, and clonidine.  He can remain with pacer pads in place and atropine on hand if he were to have unstable resting bradycardia recur.  Continue to monitor on telemetry and obtain an echocardiogram in the morning to assess his left ventricular systolic function prior to consideration for a pacemaker.  The duration of what appeared be slow atrial fibrillation is not clear at this time.  Unfortunately we do not have a longer strip to clearly confirm atrial fibrillation.  He ultimately may need to be considered for anticoagulation given an elevated [id number] score.  However, as he is in sinus rhythm now this does not appear urgent and there is a possibility he may require other urgent procedures.    # Coronary artery disease, hyperlipidemia, hypertension: He does not describe any active anginal symptoms, but he should have an echocardiogram performed prior to consideration for pacemaker to evaluate his left ventricular systolic function.  His troponin was only very mildly elevated and may simply reflect some cardiac demand from bradycardia in the setting of chronic kidney disease, but will trend overnight to ensure no significant rising pattern.  Would hold his antihypertensive medications for now unless he becomes severely hypertensive.  He may certainly have some rebound from holding clonidine in particular.  He should be continued on aspirin and atorvastatin.    # Chronic kidney disease, hyperkalemia: He seemed to improve with  treatment of hyperkalemia, although his potassium was not at the level typically associated with significant arrhythmias.  He also does not have a clear reason for becoming hyperkalemic as this would be unusual on its own given his creatinine level and he was not taking any medications clearly associated with potassium retention and he denies taking any obvious potassium supplements.  Defer further treatment of his hyperkalemia to the primary service, but would try to obtain his outside records to determine his baseline creatinine.        Andrew Manning MD, [facility]  Interventional Cardiology  [facility]  [address] [address]  Phone: [phone]  Fax: [phone]

## 2022-03-02 NOTE — PROGRESS NOTES
4 Eyes Skin Assessment Completed by LROI Enrique and LORI Jimenez.    Head WDL  Ears Redness and Blanching  Nose WDL  Mouth WDL  Neck WDL  Breast/Chest Scar  Shoulder Blades WDL  Spine WDL  (R) Arm/Elbow/Hand Scab  (L) Arm/Elbow/Hand WDL  Abdomen WDL  Groin WDL  Scrotum/Coccyx/Buttocks Redness and Blanching  (R) Leg WDL  (L) Leg WDL  (R) Heel/Foot/Toe WDL  (L) Heel/Foot/Toe WDL          Devices In Places Tele Box, Blood Pressure Cuff and Pulse Ox      Interventions In Place Pressure Redistribution Mattress    Possible Skin Injury No    Pictures Uploaded Into Epic N/A  Wound Consult Placed N/A  RN Wound Prevention Protocol Ordered No

## 2022-03-02 NOTE — H&P
Hospital Medicine History & Physical Note    Date of Service  3/1/2022    Primary Care Physician  No primary care provider on file.    Consultants      Code Status  No Order    Chief Complaint  Chief Complaint   Patient presents with   • Bradycardia   • Abnormal Labs       History of Presenting Illness  Sulaiman Ceja is a 71 y.o. male with a past medical history of coronary artery disease (s/p CABG in the 1990s), insulin-dependent type 2 diabetes, hyperlipidemia, hypothyroidism, hypertension, chronic kidney disease (unknown stage), COPD, urinary incontinence (s/p artificial sphincter with recent replacement), and mild dementia who presented 3/1/2022  for evaluation of bradycardia and abnormal labs. Patient states he has had severe dizziness over the past week causing him inability to walk as well as sharp sudden headaches.     Patient presented to [facility] with worsening exertional fatigue and dizziness over the past 2 months.  There, he was found to be significantly bradycardic, although his reported blood pressures were in the hypertensive range, and mildly hyperkalemic at 5.7.  He was treated with calcium chloride and Kayexalate reportedly with conversion then to sinus bradycardia with PVCs.  He was subsequently transferred to [facility] for further evaluation and management.  Upon transfer, his ECG only demonstrated sinus bradycardia with a PVC.  His laboratories showed an improvement in his creatinine to 1.89 and his potassium to 5.5.     Notable findings include: /139, HR 49, RR 24, Trop 34, GFR 35, glucose 162, Bun/Creatinine 34/1.89    EKG with sinus bradycardia and PVCs    I discussed the plan of care with patient.    Review of Systems  Review of Systems   Constitutional: Positive for malaise/fatigue.   Neurological: Positive for dizziness.   All other systems reviewed and are negative.      Past Medical History   has a past medical history of CAD (coronary artery disease). DM2, HLD,  HTN, COPD    Surgical History  CABG in 1990's    Family History  family history is not on file.   Family history reviewed with patient. There is no family history that is pertinent to the chief complaint.     Social History   reports that he has quit smoking. He started smoking about 26 years ago. He does not have any smokeless tobacco history on file. He reports that he does not drink alcohol.    Allergies  No Known Allergies    Medications  None       Physical Exam  Temp:  [36.9 °C (98.5 °F)] 36.9 °C (98.5 °F)  Pulse:  [48-75] 49  Resp:  [14-24] 18  BP: (152-204)/() 204/86  SpO2:  [91 %-97 %] 91 %  Blood Pressure : (!) 199/91   Temperature: 36.9 °C (98.5 °F)   Pulse: (!) 49   Respiration: 14   Pulse Oximetry: 93 %       Physical Exam     Constitutional: Resting comfortably in NAD   HENT: Normocephalic, no obvious evidence of acute trauma.  Eyes: No scleral icterus. Normal conjunctiva   Neck: Comfortable movement without any obvious restriction in the range of motion.  Cardiovascular: bradycardia  Thorax & Lungs: No respiratory distress. No wheezing, rales or rhonchi heard on ausculation.  there is no obvious chest wall tenderness. I appreciate normal air movement throughout.   Abdomen: The abdomen is not visibly distended. Upon palpation, I find it to be without tenderness.  No mass appreciated.  Skin: The exposed portions of skin reveal no obvious rash or other abnormalities.  Extremities/Musculoskeletal: no lower extremity edema with no asymmetry.  Neurologic: Alert & oriented. No focal deficits observed.   Psychiatric: Normal affect appropriate for the clinical situation.    Laboratory:  Recent Labs     03/01/22 1915   WBC 6.7   RBC 4.20*   HEMOGLOBIN 12.5*   HEMATOCRIT 37.3*   MCV 88.8   MCH 29.8   MCHC 33.5*   RDW 47.5   PLATELETCT 172   MPV 12.2     Recent Labs     03/01/22 1915   SODIUM 141   POTASSIUM 5.5   CHLORIDE 108   CO2 19*   GLUCOSE 162*   BUN 34*   CREATININE 1.89*   CALCIUM 10.1     Recent  Labs     03/01/22 1915   ALTSGPT 22   ASTSGOT 21   ALKPHOSPHAT 97   TBILIRUBIN 0.4   GLUCOSE 162*         No results for input(s): NTPROBNP in the last 72 hours.      Recent Labs     03/01/22 1915   TROPONINT 34*       Imaging:  No orders to display         Assessment/Plan:  I anticipate this patient is appropriate for observation status at this time.    Bradycardia- (present on admission)  Assessment & Plan  Admitted with telemetry  Cardiology following      HTN (hypertension)- (present on admission)  Assessment & Plan  Continue home meds  Antihypertensive prn  Admitted with telemetry    HLD (hyperlipidemia)- (present on admission)  Assessment & Plan  Lipid panel ordered to assess  Continue home meds    DM2 (diabetes mellitus, type 2) ([facility])- (present on admission)  Assessment & Plan  ISS  Accuchecks  A1C    CKD (chronic kidney disease) stage 3, GFR 30-59 ml/min ([facility])- (present on admission)  Assessment & Plan   GFR 35,  Bun/Creatinine 34/1.89  Continue to monitor      VTE prophylaxis: SCDs/TEDs

## 2022-03-02 NOTE — THERAPY
Physical Therapy Contact Note    Patient Name: Sulaiman Ceja  Age:  71 y.o., Sex:  male  Medical Record #: [id number]  Today's Date: 3/2/2022       03/02/22 0819   Interdisciplinary Plan of Care Collaboration   Collaboration Comments PT consult received.  Per chart review pt with 24/24 6 clicks score.  RN reported pt with steady mobility in room.  Prior to order cancellation spoke with pt & he stated he has no mobility concerns at home.  Acute PT eval not indicated.     Lakisha Nolasco, PT, DPT

## 2022-03-02 NOTE — CARE PLAN
The patient is Stable - Low risk of patient condition declining or worsening    Shift Goals  Clinical Goals: Monitor BP  Patient Goals: Rest/Safety  Family Goals: n/a      Problem: Knowledge Deficit - Standard  Goal: Patient and family/care givers will demonstrate understanding of plan of care, disease process/condition, diagnostic tests and medications  Outcome: Progressing  Note: Pt and family educated on POC, verbalize understanding.     Problem: Fall Risk  Goal: Patient will remain free from falls  Outcome: Progressing  Note: Pt educated on calling for assistance. All fall precautions in place including bed/chair alarm, bed locked & in lowest position. Non-skid sock in place. Call light within reach.        Progress made toward(s) clinical / shift goals:  Progressing

## 2022-03-02 NOTE — ED NOTES
Unable to obtain medrec at this time  Patient is unsure of his medications  He stated his daughter, Sadaf knows his meds  Attempted to call Sadaf, ( [phone] ) with neg answer  Patients home pharmacy ([facility]) is closed at the moment  Allergies were assessed

## 2022-03-02 NOTE — ED TRIAGE NOTES
.  Chief Complaint   Patient presents with   • Bradycardia   • Abnormal Labs       Patient presents as a Care Flight from [facility]. Original complaint dizziness. Patient had a heart rate of 35-37, which was reported as being in a 3rd degree heart block at their facility. Potassium was found to be 5.7, and hospital treated with kayexcelate and calcium gluconate. Normal troponins, normal head CT, COVID negative per [facility].     Patient arrives to me with no symptoms, bradycardic in the high 40's - 50's with frequent PVC's.

## 2022-03-02 NOTE — ASSESSMENT & PLAN NOTE
Pulse (03/02/22):  [48-75] 60  EKG (03/01/22): 57, , PVCs  TSH (03/02/22): 18 (H)  Echocardiogram (03/02/22): EF 65%, mildly dilated left atrium, mildly concentric left ventricular hypertrophy  Likely secondary to polypharmacy and hypothyroidism    -Restarted carvedilol 6.25 mg twice daily  -Carvedilol 6.25 mg decreased to 3.125 mg twice daily (3/3/22)  -Carvedilol 3.125mg decreased to 1.5625mg twice daily (3/4/22)  -Started hydralazine 37.5 mg every 8 hours, hold for SBP less than 110  -Hydralazine 37.5 mg every 8 hours increased to 50 mg every 8 hours (3/3/22)  -Hydralazine 50 mg increased to 100mg q8h  -Started amlodipine to 2.5 mg daily, hold for SBP less than 110  -Amlodipine 2.5 mg increased to 5 mg daily (3/3/22)  -Hydralazine 10 mg IV every 4 hours as needed for SBP greater than 180 or DBP greater than 110  -Increased levothyroxine 100 mcg to 150 mcg  -We will continue to manage

## 2022-03-03 LAB
ALBUMIN SERPL BCP-MCNC: 4.1 G/DL (ref 3.2–4.9)
ALBUMIN/GLOB SERPL: 1.5 G/DL
ALP SERPL-CCNC: 89 U/L (ref 30–99)
ALT SERPL-CCNC: 15 U/L (ref 2–50)
ANION GAP SERPL CALC-SCNC: 12 MMOL/L (ref 7–16)
AST SERPL-CCNC: 14 U/L (ref 12–45)
BASOPHILS # BLD AUTO: 1.2 % (ref 0–1.8)
BASOPHILS # BLD: 0.08 K/UL (ref 0–0.12)
BILIRUB SERPL-MCNC: 0.2 MG/DL (ref 0.1–1.5)
BUN SERPL-MCNC: 39 MG/DL (ref 8–22)
CALCIUM SERPL-MCNC: 9.7 MG/DL (ref 8.5–10.5)
CHLORIDE SERPL-SCNC: 106 MMOL/L (ref 96–112)
CO2 SERPL-SCNC: 23 MMOL/L (ref 20–33)
CREAT SERPL-MCNC: 1.86 MG/DL (ref 0.5–1.4)
EOSINOPHIL # BLD AUTO: 0.47 K/UL (ref 0–0.51)
EOSINOPHIL NFR BLD: 6.8 % (ref 0–6.9)
ERYTHROCYTE [DISTWIDTH] IN BLOOD BY AUTOMATED COUNT: 47.1 FL (ref 35.9–50)
EST. AVERAGE GLUCOSE BLD GHB EST-MCNC: 235 MG/DL
GLOBULIN SER CALC-MCNC: 2.7 G/DL (ref 1.9–3.5)
GLUCOSE BLD STRIP.AUTO-MCNC: 229 MG/DL (ref 65–99)
GLUCOSE BLD STRIP.AUTO-MCNC: 290 MG/DL (ref 65–99)
GLUCOSE SERPL-MCNC: 259 MG/DL (ref 65–99)
HBA1C MFR BLD: 9.8 % (ref 4–5.6)
HCT VFR BLD AUTO: 36.8 % (ref 42–52)
HGB BLD-MCNC: 12.2 G/DL (ref 14–18)
IMM GRANULOCYTES # BLD AUTO: 0.03 K/UL (ref 0–0.11)
IMM GRANULOCYTES NFR BLD AUTO: 0.4 % (ref 0–0.9)
LYMPHOCYTES # BLD AUTO: 1.68 K/UL (ref 1–4.8)
LYMPHOCYTES NFR BLD: 24.3 % (ref 22–41)
MCH RBC QN AUTO: 29.3 PG (ref 27–33)
MCHC RBC AUTO-ENTMCNC: 33.2 G/DL (ref 33.7–35.3)
MCV RBC AUTO: 88.2 FL (ref 81.4–97.8)
MONOCYTES # BLD AUTO: 0.94 K/UL (ref 0–0.85)
MONOCYTES NFR BLD AUTO: 13.6 % (ref 0–13.4)
NEUTROPHILS # BLD AUTO: 3.7 K/UL (ref 1.82–7.42)
NEUTROPHILS NFR BLD: 53.7 % (ref 44–72)
NRBC # BLD AUTO: 0 K/UL
NRBC BLD-RTO: 0 /100 WBC
PLATELET # BLD AUTO: 185 K/UL (ref 164–446)
PMV BLD AUTO: 12 FL (ref 9–12.9)
POTASSIUM SERPL-SCNC: 4.6 MMOL/L (ref 3.6–5.5)
PROT SERPL-MCNC: 6.8 G/DL (ref 6–8.2)
RBC # BLD AUTO: 4.17 M/UL (ref 4.7–6.1)
SODIUM SERPL-SCNC: 141 MMOL/L (ref 135–145)
WBC # BLD AUTO: 6.9 K/UL (ref 4.8–10.8)

## 2022-03-03 PROCEDURE — 700111 HCHG RX REV CODE 636 W/ 250 OVERRIDE (IP): Performed by: STUDENT IN AN ORGANIZED HEALTH CARE EDUCATION/TRAINING PROGRAM

## 2022-03-03 PROCEDURE — A9270 NON-COVERED ITEM OR SERVICE: HCPCS | Performed by: STUDENT IN AN ORGANIZED HEALTH CARE EDUCATION/TRAINING PROGRAM

## 2022-03-03 PROCEDURE — 36415 COLL VENOUS BLD VENIPUNCTURE: CPT

## 2022-03-03 PROCEDURE — 85025 COMPLETE CBC W/AUTO DIFF WBC: CPT

## 2022-03-03 PROCEDURE — 99233 SBSQ HOSP IP/OBS HIGH 50: CPT | Mod: GC | Performed by: HOSPITALIST

## 2022-03-03 PROCEDURE — 82962 GLUCOSE BLOOD TEST: CPT

## 2022-03-03 PROCEDURE — 700102 HCHG RX REV CODE 250 W/ 637 OVERRIDE(OP): Performed by: INTERNAL MEDICINE

## 2022-03-03 PROCEDURE — 700102 HCHG RX REV CODE 250 W/ 637 OVERRIDE(OP): Performed by: STUDENT IN AN ORGANIZED HEALTH CARE EDUCATION/TRAINING PROGRAM

## 2022-03-03 PROCEDURE — 80053 COMPREHEN METABOLIC PANEL: CPT

## 2022-03-03 PROCEDURE — A9270 NON-COVERED ITEM OR SERVICE: HCPCS | Performed by: INTERNAL MEDICINE

## 2022-03-03 PROCEDURE — 700102 HCHG RX REV CODE 250 W/ 637 OVERRIDE(OP): Performed by: HOSPITALIST

## 2022-03-03 PROCEDURE — 770020 HCHG ROOM/CARE - TELE (206)

## 2022-03-03 PROCEDURE — 99233 SBSQ HOSP IP/OBS HIGH 50: CPT | Performed by: INTERNAL MEDICINE

## 2022-03-03 PROCEDURE — A9270 NON-COVERED ITEM OR SERVICE: HCPCS | Performed by: HOSPITALIST

## 2022-03-03 PROCEDURE — 83036 HEMOGLOBIN GLYCOSYLATED A1C: CPT

## 2022-03-03 RX ORDER — HYDRALAZINE HYDROCHLORIDE 50 MG/1
50 TABLET, FILM COATED ORAL EVERY 8 HOURS
Status: DISCONTINUED | OUTPATIENT
Start: 2022-03-03 | End: 2022-03-04

## 2022-03-03 RX ORDER — HEPARIN SODIUM 5000 [USP'U]/ML
INJECTION, SOLUTION INTRAVENOUS; SUBCUTANEOUS
Status: COMPLETED
Start: 2022-03-03 | End: 2022-03-04

## 2022-03-03 RX ORDER — AMLODIPINE BESYLATE 5 MG/1
2.5 TABLET ORAL ONCE
Status: COMPLETED | OUTPATIENT
Start: 2022-03-03 | End: 2022-03-03

## 2022-03-03 RX ORDER — CARVEDILOL 3.12 MG/1
3.12 TABLET ORAL 2 TIMES DAILY WITH MEALS
Status: DISCONTINUED | OUTPATIENT
Start: 2022-03-03 | End: 2022-03-04

## 2022-03-03 RX ORDER — AMLODIPINE BESYLATE 5 MG/1
5 TABLET ORAL
Status: DISCONTINUED | OUTPATIENT
Start: 2022-03-04 | End: 2022-03-05 | Stop reason: HOSPADM

## 2022-03-03 RX ORDER — CARVEDILOL 3.12 MG/1
1.62 TABLET ORAL 2 TIMES DAILY WITH MEALS
Status: DISCONTINUED | OUTPATIENT
Start: 2022-03-03 | End: 2022-03-03

## 2022-03-03 RX ORDER — HEPARIN SODIUM 5000 [USP'U]/ML
5000 INJECTION, SOLUTION INTRAVENOUS; SUBCUTANEOUS EVERY 12 HOURS
Status: DISCONTINUED | OUTPATIENT
Start: 2022-03-03 | End: 2022-03-05 | Stop reason: HOSPADM

## 2022-03-03 RX ADMIN — AMLODIPINE BESYLATE 2.5 MG: 5 TABLET ORAL at 11:14

## 2022-03-03 RX ADMIN — HYDRALAZINE HYDROCHLORIDE 10 MG: 20 INJECTION INTRAMUSCULAR; INTRAVENOUS at 16:55

## 2022-03-03 RX ADMIN — HEPARIN SODIUM 5000 UNITS: 5000 INJECTION, SOLUTION INTRAVENOUS; SUBCUTANEOUS at 16:46

## 2022-03-03 RX ADMIN — INSULIN HUMAN 3 UNITS: 100 INJECTION, SOLUTION PARENTERAL at 12:35

## 2022-03-03 RX ADMIN — GABAPENTIN 800 MG: 400 CAPSULE ORAL at 12:38

## 2022-03-03 RX ADMIN — LEVOTHYROXINE SODIUM 150 MCG: 75 TABLET ORAL at 05:10

## 2022-03-03 RX ADMIN — AMLODIPINE BESYLATE 2.5 MG: 5 TABLET ORAL at 05:10

## 2022-03-03 RX ADMIN — INSULIN HUMAN 5 UNITS: 100 INJECTION, SOLUTION PARENTERAL at 21:16

## 2022-03-03 RX ADMIN — HYDRALAZINE HYDROCHLORIDE 37.5 MG: 25 TABLET, FILM COATED ORAL at 05:10

## 2022-03-03 RX ADMIN — ATORVASTATIN CALCIUM 40 MG: 40 TABLET, FILM COATED ORAL at 16:46

## 2022-03-03 RX ADMIN — GABAPENTIN 800 MG: 400 CAPSULE ORAL at 16:46

## 2022-03-03 RX ADMIN — INSULIN HUMAN 2 UNITS: 100 INJECTION, SOLUTION PARENTERAL at 09:10

## 2022-03-03 RX ADMIN — HYDRALAZINE HYDROCHLORIDE 50 MG: 50 TABLET, FILM COATED ORAL at 15:39

## 2022-03-03 RX ADMIN — HYDRALAZINE HYDROCHLORIDE 50 MG: 50 TABLET, FILM COATED ORAL at 21:13

## 2022-03-03 RX ADMIN — GABAPENTIN 800 MG: 400 CAPSULE ORAL at 09:22

## 2022-03-03 RX ADMIN — CARVEDILOL 6.25 MG: 6.25 TABLET, FILM COATED ORAL at 09:00

## 2022-03-03 RX ADMIN — CARVEDILOL 3.12 MG: 3.12 TABLET, FILM COATED ORAL at 16:46

## 2022-03-03 RX ADMIN — ASPIRIN 81 MG: 81 TABLET, COATED ORAL at 05:10

## 2022-03-03 RX ADMIN — ESCITALOPRAM OXALATE 20 MG: 10 TABLET ORAL at 05:10

## 2022-03-03 RX ADMIN — INSULIN HUMAN 5 UNITS: 100 INJECTION, SOLUTION PARENTERAL at 16:58

## 2022-03-03 RX ADMIN — GABAPENTIN 800 MG: 400 CAPSULE ORAL at 21:13

## 2022-03-03 ASSESSMENT — ENCOUNTER SYMPTOMS
FEVER: 0
NAUSEA: 0
SHORTNESS OF BREATH: 0
DIZZINESS: 0
ABDOMINAL PAIN: 0
CHILLS: 0
COUGH: 0
VOMITING: 0
HEADACHES: 0
PALPITATIONS: 0

## 2022-03-03 ASSESSMENT — PAIN DESCRIPTION - PAIN TYPE
TYPE: ACUTE PAIN
TYPE: ACUTE PAIN

## 2022-03-03 ASSESSMENT — FIBROSIS 4 INDEX: FIB4 SCORE: 1.39

## 2022-03-03 ASSESSMENT — LIFESTYLE VARIABLES: SUBSTANCE_ABUSE: 0

## 2022-03-03 NOTE — CARE PLAN
Problem: Knowledge Deficit - Standard  Goal: Patient and family/care givers will demonstrate understanding of plan of care, disease process/condition, diagnostic tests and medications  Outcome: Progressing     Problem: Skin Integrity  Goal: Skin integrity is maintained or improved  Outcome: Progressing     Problem: Fall Risk  Goal: Patient will remain free from falls  Outcome: Progressing  Note: Patient is a moderate fall risk. Patient has been educated but may need to be reminded. Bed alarm is on.   The patient is Stable - Low risk of patient condition declining or worsening    Shift Goals  Clinical Goals: monitor BP  Patient Goals: sleep/safety  Family Goals: n/a    Progress made toward(s) clinical / shift goals:      Patient is not progressing towards the following goals:

## 2022-03-03 NOTE — ASSESSMENT & PLAN NOTE
CBC (03/02/22): Hgb 12.3, MCV 89.1, absolute eosinophils elevated  8.80  Likely secondary to chronic kidney disease    -We will continue to monitor, consider outpatient follow-up

## 2022-03-03 NOTE — ASSESSMENT & PLAN NOTE
EKG from [facility] (3/1/22): Atrial fibrillation with slow ventricular response, per Dr. Manning, cardiology opinion  [id number] score (03/02/22): 4 (age, HTN, vascular disease, DM)  No atrial fibrillation on telemetry monitoring  Per Dr. Mc from cardiology, there is no evidence of afib    -No anticoagulation is indicated at this time  -Consider Zio patch outpatient

## 2022-03-03 NOTE — PROGRESS NOTES
Monitor Summary:   Rhythm: SB/SR; First degree, BBB  Rate: 52-71  Measurement: .20/.10/.34  Ectopy: OPVC, Trig, Big

## 2022-03-03 NOTE — PROGRESS NOTES
CARDIOLOGY PROGRESS NOTE     Attending:   Abdiaziz Mc MD     Consulting Provider:   Jessy Galaviz MD    Resident:   Magdaleno Logan MD    PATIENT:   Sulaiman Ceja; [id number]; 1950    ID:   Sulaiman Ceja is a 71 year-old male with a past medical history of coronary artery disease (s/p CABG in the 1990s), insulin-dependent type 2 diabetes, hyperlipidemia, hypertension, chronic kidney disease (unknown stage), COPD, urinary incontinence (s/p artificial sphincter with recent replacement), and mild dementia. Presented to outside hospital with worsening exertional fatigue and dizziness over the past 2 months. Found to be in ventricular bigeminy in the outside facility and here a repeat EKG showed sinus bradycardia. Cardiology consult for bradycardia and consideration for pacemaker placement.     SUBJECTIVE:   No acute events overnight, patient states he feels at his baseline. Denies any current chest pain or SOB. Patient states he took a walk around the wards today and denied any chest pain or lightheadedness while walking.    OBJECTIVE:  Vitals:    03/02/22 2350 03/03/22 0427 03/03/22 0510 03/03/22 0833   BP: (!) 175/63 (!) 173/73 (!) 171/105 (!) 179/69   Pulse: (!) 52 (!) 55  (!) 46   Resp: 16 16  15   Temp: 36.2 °C (97.1 °F) 36.9 °C (98.5 °F)  36.4 °C (97.6 °F)   TempSrc: Temporal Temporal  Temporal   SpO2: 93% 95%  96%   Weight:       Height:           Intake/Output Summary (Last 24 hours) at 3/3/2022 0847  Last data filed at 3/2/2022 2200  Gross per 24 hour   Intake 1210 ml   Output 2100 ml   Net -890 ml       PHYSICAL EXAM:  General: No acute distress, afebrile, resting comfortably, conversational   HEENT: NC/AT. EOMI.   Cardiovascular: RRR without murmurs, rubs, heaves. Normal capillary refill. Bradycardic.   Respiratory: CTAB, no tachypnea or retractions   Abdomen: normal bowel sounds, soft, nontender, nondistended, no masses, no organomegaly   EXT:  MOSQUEDA, no edema  Skin: No erythema/lesions   Neuro: Non-focal,  alert and orientated     LABS:  Recent Labs     03/01/22 1915 03/02/22 0154 03/03/22  0309   WBC 6.7 6.7 6.9   RBC 4.20* 4.22* 4.17*   HEMOGLOBIN 12.5* 12.3* 12.2*   HEMATOCRIT 37.3* 37.6* 36.8*   MCV 88.8 89.1 88.2   MCH 29.8 29.1 29.3   RDW 47.5 47.6 47.1   PLATELETCT 172 178 185   MPV 12.2 12.2 12.0   NEUTSPOLYS 57.10 56.60 53.70   LYMPHOCYTES 22.90 23.10 24.30   MONOCYTES 9.80 9.90 13.60*   EOSINOPHILS 8.90* 8.80* 6.80   BASOPHILS 1.00 1.30 1.20     Recent Labs     03/01/22 1915 03/02/22 0154 03/03/22  0309   SODIUM 141 137 141   POTASSIUM 5.5 4.6 4.6   CHLORIDE 108 106 106   CO2 19* 18* 23   BUN 34* 35* 39*   CREATININE 1.89* 1.78* 1.86*   CALCIUM 10.1 10.1 9.7   MAGNESIUM 1.6  --   --    ALBUMIN 4.5 4.1 4.1     Estimated GFR/CRCL = Estimated Creatinine Clearance: 37.6 mL/min (A) (by C-G formula based on SCr of 1.86 mg/dL (H)).  Recent Labs     03/01/22 1915 03/02/22 0154 03/03/22  0309   GLUCOSE 162* 252* 259*     Recent Labs     03/01/22 1915 03/02/22  0154 03/03/22  0309   ASTSGOT 21 16 14   ALTSGPT 22 19 15   TBILIRUBIN 0.4 0.3 0.2   ALKPHOSPHAT 97 95 89   GLOBULIN 2.7 2.7 2.7             No results for input(s): INR, APTT, FIBRINOGEN in the last 72 hours.    Invalid input(s): DIMER      IMAGING:  EC-ECHOCARDIOGRAM COMPLETE W/O CONT   Final Result          MEDS:  Current Facility-Administered Medications   Medication Last Admin   • aspirin EC (ECOTRIN) tablet 81 mg 81 mg at 03/03/22 0510   • hydrALAZINE (APRESOLINE) tablet 37.5 mg 37.5 mg at 03/03/22 0510   • amLODIPine (NORVASC) tablet 2.5 mg 2.5 mg at 03/03/22 0510   • carvedilol (COREG) tablet 6.25 mg 6.25 mg at 03/02/22 1707   • atorvastatin (LIPITOR) tablet 40 mg 40 mg at 03/02/22 1707   • insulin GLARGINE (Lantus,Semglee) injection 35 Units at 03/02/22 1709   • escitalopram (Lexapro) tablet 20 mg 20 mg at 03/03/22 0510   • gabapentin (NEURONTIN) capsule 800 mg 800 mg at 03/02/22 2156   • levothyroxine (SYNTHROID) tablet 150 mcg 150 mcg at  03/03/22 0510   • senna-docusate (PERICOLACE or SENOKOT S) 8.6-50 MG per tablet 2 Tablet      And   • polyethylene glycol/lytes (MIRALAX) PACKET 1 Packet      And   • magnesium hydroxide (MILK OF MAGNESIA) suspension 30 mL      And   • bisacodyl (DULCOLAX) suppository 10 mg     • acetaminophen (Tylenol) tablet 650 mg     • ondansetron (ZOFRAN) syringe/vial injection 4 mg     • ondansetron (ZOFRAN ODT) dispertab 4 mg     • hydrALAZINE (APRESOLINE) injection 10 mg     • insulin regular (HumuLIN R,NovoLIN R) injection 3 Units at 03/02/22 2154    And   • dextrose 50% (D50W) injection 50 mL       Assessments:   #Sinus bradycardia with trigeminy and frequent PVCs   #Hypertensive emergency   #Hyperkalemia, resolved   #CAD s/p CABG in the 1990s  #T2DM, insulin dependent   #HTN   #HLD   #CKD  #COPD     Recommendations:   -- Continue to follow patient symptoms. If continues to have symptomatic bradycardia, this could be an indication for PPM. Noted to have trigeminy overnight, no decision on PPM until patient medically stabilized.  -- Hypertensive emergency likely secondary to acute clonidine withdrawal. Rebound hypertension has been seen to peak 2-4 days after abrupt cessation of medication. Today is D3 since holding medication. Continue to HOLD clonidine.   -- BP still poorly controlled, recommend increase hydralazine to 50mg TID and monitor BP closely. Be ready to titrate down as clonidine withdrawal resolves.   -- Recommend increase norvasc to 5mg daily and continue coreg 6.25mg daily if HR allows.   -- Keep pacer pads nearby and give 0.5mg IV atropine q5 minutes if patient develops symptomatic bradycardia via ACLS protocol   -- Echo completed on 3/2 and shows EF of 65% and normal systolic function. No valvular pathology.   -- Hold ACE/ARB/HCTZ given renal dysfunction  -- Monitor electrolytes closely with goal K>4 and Mg>2     Thank you for your consult. Cardiology will continue to follow. Please contact us with any  questions.     Magdaleno Logan MD   PGY-2 Family Medicine Resident   [facility]       Discussed with my attending cardiologist, Dr Freed To.

## 2022-03-03 NOTE — PROGRESS NOTES
Date of Service: 3/3/2022  Primary Team: [facility] IM Blue Team   Attending: YOSHI Fairbanks M.D.   Senior Resident: Magayls Rossi M.D.  Intern: Artemio Feng D.O.  Contact:  [phone]    Chief Complaint:   Bradycardia    ID:  Patient is a 71 y.o. male with a past medical history of coronary artery disease (s/p CABG in the 1990s), insulin-dependent type 2 diabetes, hyperlipidemia, hypothyroidism, hypertension, chronic kidney disease (unknown stage), COPD, urinary incontinence (s/p artificial sphincter with recent replacement), and mild dementia  who was sent from [facility] presented 3/1/2022  for evaluation of bradycardia and abnormal labs who was admitted for management of bradycardia.    Interval Events:    No acute events overnight    Subjective:   Encountered patient lying in bed.  Updated patient on hospital course.  Patient states that he has been compliant with his medication and has been taking his levothyroxine every day. He states that his weakness has significantly improved. He has no acute complaints at this time.    Interval Events:  -Amlodipine 2.5 mg increased to 5 mg daily  -Hydralazine 37.5 mg every 8 hours increased to 50 mg every 8 hours  -Carvedilol 6.25 mg decreased to 3.125 mg daily  -We will continue to manage clonidine withdrawal and will down titrate as necessary as clonidine withdrawal resolves  -Glargine 35 units increased to 50 units every evening  -SCDs changed to heparin 5000 units twice daily      Consultants/Specialty:    Interventional cardiology, Dr. Andrew Manning (03/01/22): EKG from [facility] appears to show atrial fibrillation with slow ventricular response, telemetry shows more regular rhythm with ventricular bigeminy.  High likelihood of requiring pacemaker, however no urgent pacemaker is required at this time.  May resolve with holding carvedilol and clonidine.  However history of CABG is strong indicator for beta-blocker.  Continue holding nifedipine,  carvedilol, clonidine.  Continue monitoring on telemetry, echocardiogram for left ventricular systolic function prior to consideration for pacemaker.  Elevated Eren vas score, may need anticoagulation      Review of Systems:    Review of Systems   Constitutional: Negative for chills, fever and malaise/fatigue.   Respiratory: Negative for cough and shortness of breath.    Cardiovascular: Negative for chest pain and palpitations.   Gastrointestinal: Negative for abdominal pain, nausea and vomiting.   Genitourinary: Negative for dysuria.   Neurological: Negative for dizziness and headaches.   Psychiatric/Behavioral: Negative for substance abuse.   All other systems reviewed and are negative.      Objective Data:   Physical Exam:   Vitals:   Temp:  [36.1 °C (96.9 °F)-36.9 °C (98.5 °F)] 36.6 °C (97.9 °F)  Pulse:  [46-76] 48  Resp:  [15-17] 16  BP: (138-179)/() 178/85  SpO2:  [93 %-96 %] 95 %     Physical Exam  Vitals and nursing note reviewed.   Constitutional:       Appearance: Normal appearance.   HENT:      Head: Normocephalic and atraumatic.      Right Ear: External ear normal.      Left Ear: External ear normal.      Nose: Nose normal.      Mouth/Throat:      Mouth: Mucous membranes are moist.      Pharynx: Oropharynx is clear.   Eyes:      Extraocular Movements: Extraocular movements intact.      Pupils: Pupils are equal, round, and reactive to light.   Cardiovascular:      Rate and Rhythm: Regular rhythm. Bradycardia present.      Pulses: Normal pulses.      Heart sounds: Normal heart sounds.   Pulmonary:      Effort: Pulmonary effort is normal.      Breath sounds: Normal breath sounds.   Abdominal:      General: There is no distension.      Palpations: Abdomen is soft.      Tenderness: There is no abdominal tenderness. There is no guarding or rebound.   Musculoskeletal:         General: Normal range of motion.      Cervical back: Normal range of motion.   Skin:     General: Skin is warm.      Capillary  Refill: Capillary refill takes less than 2 seconds.   Neurological:      General: No focal deficit present.      Mental Status: He is alert and oriented to person, place, and time.   Psychiatric:         Mood and Affect: Mood normal.         Behavior: Behavior normal.         Thought Content: Thought content normal.         Judgment: Judgment normal.           Labs:   Recent Labs     03/01/22 1915 03/02/22 0154 03/03/22  0309   WBC 6.7 6.7 6.9   RBC 4.20* 4.22* 4.17*   HEMOGLOBIN 12.5* 12.3* 12.2*   HEMATOCRIT 37.3* 37.6* 36.8*   MCV 88.8 89.1 88.2   MCH 29.8 29.1 29.3   RDW 47.5 47.6 47.1   PLATELETCT 172 178 185   MPV 12.2 12.2 12.0   NEUTSPOLYS 57.10 56.60 53.70   LYMPHOCYTES 22.90 23.10 24.30   MONOCYTES 9.80 9.90 13.60*   EOSINOPHILS 8.90* 8.80* 6.80   BASOPHILS 1.00 1.30 1.20     Recent Labs     03/01/22 1915 03/02/22  0154 03/03/22  0309   SODIUM 141 137 141   POTASSIUM 5.5 4.6 4.6   CHLORIDE 108 106 106   CO2 19* 18* 23   GLUCOSE 162* 252* 259*   BUN 34* 35* 39*      Recent Labs     03/01/22 1915 03/02/22  0154 03/03/22  0309   ALBUMIN 4.5 4.1 4.1   TBILIRUBIN 0.4 0.3 0.2   ALKPHOSPHAT 97 95 89   TOTPROTEIN 7.2 6.8 6.8   ALTSGPT 22 19 15   ASTSGOT 21 16 14   CREATININE 1.89* 1.78* 1.86*        Imaging:   EC-ECHOCARDIOGRAM COMPLETE W/O CONT   Final Result           Assessment and Plan:    Patient is a 71 y.o. male with a past medical history of coronary artery disease (s/p CABG in the 1990s), insulin-dependent type 2 diabetes, hyperlipidemia, hypothyroidism, hypertension, chronic kidney disease (unknown stage), COPD, urinary incontinence (s/p artificial sphincter with recent replacement), and mild dementia  who presented 3/1/2022  for evaluation of bradycardia and abnormal labs who was admitted for management of bradycardia.    Problem Representation:     * Bradycardia- (present on admission)  Assessment & Plan  Pulse (03/02/22):  [48-75] 60  EKG (03/01/22): 57, , PVCs  TSH (03/02/22): 18  (H)  Echocardiogram (03/02/22): EF 65%, mildly dilated left atrium, mildly concentric left ventricular hypertrophy  Likely secondary to polypharmacy and hypothyroidism    -Restarted carvedilol 6.25 mg twice daily  -Carvedilol 6.25 mg decreased to 3.125 mg daily (3/3/22)  -Started hydralazine 37.5 mg every 8 hours, hold for SBP less than 110  -Hydralazine 37.5 mg every 8 hours increased to 50 mg every 8 hours (3/3/22)  -Started amlodipine to 2.5 mg daily, hold for SBP less than 110  -Amlodipine 2.5 mg increased to 5 mg daily (3/3/22)  -Hydralazine 10 mg IV every 4 hours as needed for SBP greater than 180 or DBP greater than 110  -Increased levothyroxine 100 mcg to 150 mcg  -We will continue to manage      Encounter for assessment of atrial fibrillation  Assessment & Plan  EKG from [facility] (3/1/22): Atrial fibrillation with slow ventricular response  [id number] score (03/02/22): 4 (age, HTN, vascular disease, DM)  No atrial fibrillation on telemetry monitoring    -No anticoagulation is indicated at this time  -Consider Zio patch outpatient    Hypothyroidism  Assessment & Plan  TSH (03/02/22): 18 (H)    -Increased levothyroxine 100 mcg to 150 mcg (3/2/22)    HTN (hypertension)- (present on admission)  Assessment & Plan  BP (03/02/22): (152-204)/() 176/71, Pulse:  [48-75] 60  Secondary to clonidine withdrawal, rebound hypertension usually last 2 to 4 days, today is day 3    -Permanently discontinued nifedipine 90 mg daily  -Permanently discontinued clonidine 0.1 mg twice daily  -Restarted carvedilol 6.25 mg twice daily  -Carvedilol 6.25 mg decreased to 3.125 mg daily (3/3/22)  -Started hydralazine 37.5 mg every 8 hours, hold for SBP less than 110  -Hydralazine 37.5 mg every 8 hours increased to 50 mg every 8 hours (3/3/22)  -Started amlodipine to 2.5 mg daily, hold for SBP less than 110  -Amlodipine 2.5 mg increased to 5 mg daily (3/3/22)  -Hydralazine 10 mg IV every 4 hours as needed for SBP greater than  180 or DBP greater than 110    Insulin dependent type 2 diabetes mellitus (HCC)- (present on admission)  Assessment & Plan  A1c (3/3/22): 9.8  Fasting glucose 259, 12 units of SSI, uncontrolled    -Hold home meds of insulin detemir 50 units every morning, 15 units every night  -Held home insulin regular  -Started insulin glargine 35 units every evening  -Increased glargine from 35 units to 50 units (3/3/22)  -SSI  -Hypoglycemia protocol    COPD (chronic obstructive pulmonary disease) ([facility])  Assessment & Plan  No PFTs on file, on room air    -Supplemental O2 for O2 saturations 88 to 92%  -Albuterol every 4 hours as needed    Depression  Assessment & Plan  Denies SI/HI    -Continue escitalopram 20 mg daily    Diabetic neuropathy ([facility])  Assessment & Plan    -Continue gabapentin 800 mg 4 times daily  -Stable    CKD (chronic kidney disease) stage 3, GFR 30-59 ml/min ([facility])- (present on admission)  Assessment & Plan  CMP (03/02/22): BUN/creatinine/EGFR 35/1.78/38  Baseline unknown, stable    -Avoid nephrotoxins  -Renally dose drugs  -Continue to monitor    Dementia ([facility])  Assessment & Plan    -Continue to monitor    Coronary artery disease  Assessment & Plan  4V CABG in 1990s  Troponin (03/02/22): 34 and 32    -Continue aspirin 81 mg daily  -Continue atorvastatin 80 mg daily  -Stable    Normocytic anemia  Assessment & Plan  CBC (03/02/22): Hgb 12.3, MCV 89.1, absolute eosinophils elevated  8.80  Likely secondary to chronic kidney disease    -We will continue to monitor, consider outpatient follow-up    HLD (hyperlipidemia)- (present on admission)  Assessment & Plan  Lipid panel (03/02/22): Total cholesterol 180, triglycerides 281, HDL 33, LDL 91    -Changed home atorvastatin 20 mg to 40 mg daily (03/02/22)      Core Measures:    Code Status: Full Code  Diet: Cardiac and consistent carb diet  IVF: None  Mullen Catheter: None  IV Lines: pIV  Antibiotics:None  GI Prophylaxis: PEG + Bisacodyl PRN  DVT Prophylaxis: Heparin 5000  units twice daily  Disposition: Inpatient    Please note that this dictation was created using voice recognition software. I have made every reasonable attempt to correct obvious errors, but there may be errors of grammar and possibly content that I did not discover before finalizing the note. If the error changes the accuracy of the document, I would appreciate it being brought to my attention.

## 2022-03-03 NOTE — PROGRESS NOTES
Assumed care of pt. Bedside report received from Iva SANDOVAL. Pt was updated on plan of care. Call light, phone and personal belongings in reach. Bed alarm on and working properly, bed in lowest position, and locked.

## 2022-03-03 NOTE — PROGRESS NOTES
Assumed care of patient, bedside report received from [place]ie day shift RN. Updated on plan of care, call light within reach and fall precautions are in place and bed lock and in lowest position. Patient instructed to call for assistance before getting out of bed. All questions answered at this time. No other needs.

## 2022-03-03 NOTE — ASSESSMENT & PLAN NOTE
4V CABG in 1990s  Troponin (03/02/22): 34 and 32    -Continue aspirin 81 mg daily  -Continue atorvastatin 80 mg daily  -Stable

## 2022-03-03 NOTE — ASSESSMENT & PLAN NOTE
No PFTs on file, on room air    -Supplemental O2 for O2 saturations 88 to 92%  -Albuterol every 4 hours as needed

## 2022-03-03 NOTE — CARE PLAN
The patient is Stable - Low risk of patient condition declining or worsening    Shift Goals  Clinical Goals: monitor BP  Patient Goals: sleep/safety  Family Goals: n/a    Progress made toward(s) clinical / shift goals:    Problem: Knowledge Deficit - Standard  Goal: Patient and family/care givers will demonstrate understanding of plan of care, disease process/condition, diagnostic tests and medications  Outcome: Progressing     Problem: Skin Integrity  Goal: Skin integrity is maintained or improved  Outcome: Progressing       Patient is not progressing towards the following goals:

## 2022-03-04 LAB
ALBUMIN SERPL BCP-MCNC: 4 G/DL (ref 3.2–4.9)
ALBUMIN/GLOB SERPL: 1.5 G/DL
ALP SERPL-CCNC: 94 U/L (ref 30–99)
ALT SERPL-CCNC: 20 U/L (ref 2–50)
ANION GAP SERPL CALC-SCNC: 16 MMOL/L (ref 7–16)
AST SERPL-CCNC: 26 U/L (ref 12–45)
BILIRUB SERPL-MCNC: 0.2 MG/DL (ref 0.1–1.5)
BUN SERPL-MCNC: 38 MG/DL (ref 8–22)
CALCIUM SERPL-MCNC: 9.7 MG/DL (ref 8.5–10.5)
CHLORIDE SERPL-SCNC: 104 MMOL/L (ref 96–112)
CO2 SERPL-SCNC: 19 MMOL/L (ref 20–33)
CREAT SERPL-MCNC: 1.63 MG/DL (ref 0.5–1.4)
GLOBULIN SER CALC-MCNC: 2.7 G/DL (ref 1.9–3.5)
GLUCOSE BLD STRIP.AUTO-MCNC: 148 MG/DL (ref 65–99)
GLUCOSE BLD STRIP.AUTO-MCNC: 190 MG/DL (ref 65–99)
GLUCOSE BLD STRIP.AUTO-MCNC: 214 MG/DL (ref 65–99)
GLUCOSE BLD STRIP.AUTO-MCNC: 338 MG/DL (ref 65–99)
GLUCOSE BLD STRIP.AUTO-MCNC: 353 MG/DL (ref 65–99)
GLUCOSE BLD STRIP.AUTO-MCNC: 366 MG/DL (ref 65–99)
GLUCOSE BLD STRIP.AUTO-MCNC: 368 MG/DL (ref 65–99)
GLUCOSE SERPL-MCNC: 313 MG/DL (ref 65–99)
POTASSIUM SERPL-SCNC: 4.1 MMOL/L (ref 3.6–5.5)
PROT SERPL-MCNC: 6.7 G/DL (ref 6–8.2)
SODIUM SERPL-SCNC: 139 MMOL/L (ref 135–145)

## 2022-03-04 PROCEDURE — 770020 HCHG ROOM/CARE - TELE (206)

## 2022-03-04 PROCEDURE — 700111 HCHG RX REV CODE 636 W/ 250 OVERRIDE (IP): Performed by: STUDENT IN AN ORGANIZED HEALTH CARE EDUCATION/TRAINING PROGRAM

## 2022-03-04 PROCEDURE — 700102 HCHG RX REV CODE 250 W/ 637 OVERRIDE(OP): Performed by: STUDENT IN AN ORGANIZED HEALTH CARE EDUCATION/TRAINING PROGRAM

## 2022-03-04 PROCEDURE — 700102 HCHG RX REV CODE 250 W/ 637 OVERRIDE(OP): Performed by: HOSPITALIST

## 2022-03-04 PROCEDURE — 99232 SBSQ HOSP IP/OBS MODERATE 35: CPT | Mod: GC | Performed by: HOSPITALIST

## 2022-03-04 PROCEDURE — A9270 NON-COVERED ITEM OR SERVICE: HCPCS | Performed by: INTERNAL MEDICINE

## 2022-03-04 PROCEDURE — 99232 SBSQ HOSP IP/OBS MODERATE 35: CPT | Performed by: INTERNAL MEDICINE

## 2022-03-04 PROCEDURE — A9270 NON-COVERED ITEM OR SERVICE: HCPCS | Performed by: STUDENT IN AN ORGANIZED HEALTH CARE EDUCATION/TRAINING PROGRAM

## 2022-03-04 PROCEDURE — A9270 NON-COVERED ITEM OR SERVICE: HCPCS | Performed by: HOSPITALIST

## 2022-03-04 PROCEDURE — 82962 GLUCOSE BLOOD TEST: CPT | Mod: 91

## 2022-03-04 PROCEDURE — 36415 COLL VENOUS BLD VENIPUNCTURE: CPT

## 2022-03-04 PROCEDURE — 80053 COMPREHEN METABOLIC PANEL: CPT

## 2022-03-04 PROCEDURE — 700102 HCHG RX REV CODE 250 W/ 637 OVERRIDE(OP): Performed by: INTERNAL MEDICINE

## 2022-03-04 PROCEDURE — 700111 HCHG RX REV CODE 636 W/ 250 OVERRIDE (IP)

## 2022-03-04 RX ORDER — HYDRALAZINE HYDROCHLORIDE 50 MG/1
100 TABLET, FILM COATED ORAL EVERY 8 HOURS
Status: DISCONTINUED | OUTPATIENT
Start: 2022-03-04 | End: 2022-03-05 | Stop reason: HOSPADM

## 2022-03-04 RX ORDER — CARVEDILOL 3.12 MG/1
1.67 TABLET ORAL 2 TIMES DAILY WITH MEALS
Status: DISCONTINUED | OUTPATIENT
Start: 2022-03-04 | End: 2022-03-05

## 2022-03-04 RX ORDER — INSULIN LISPRO 100 [IU]/ML
0.2 INJECTION, SOLUTION INTRAVENOUS; SUBCUTANEOUS
Status: DISCONTINUED | OUTPATIENT
Start: 2022-03-04 | End: 2022-03-05

## 2022-03-04 RX ORDER — HYDRALAZINE HYDROCHLORIDE 50 MG/1
50 TABLET, FILM COATED ORAL ONCE
Status: COMPLETED | OUTPATIENT
Start: 2022-03-04 | End: 2022-03-04

## 2022-03-04 RX ORDER — INSULIN LISPRO 100 [IU]/ML
2-9 INJECTION, SOLUTION INTRAVENOUS; SUBCUTANEOUS
Status: DISCONTINUED | OUTPATIENT
Start: 2022-03-04 | End: 2022-03-05

## 2022-03-04 RX ADMIN — ATORVASTATIN CALCIUM 40 MG: 40 TABLET, FILM COATED ORAL at 17:24

## 2022-03-04 RX ADMIN — GABAPENTIN 800 MG: 400 CAPSULE ORAL at 08:02

## 2022-03-04 RX ADMIN — HEPARIN SODIUM 5000 UNITS: 5000 INJECTION, SOLUTION INTRAVENOUS; SUBCUTANEOUS at 17:22

## 2022-03-04 RX ADMIN — ASPIRIN 81 MG: 81 TABLET, COATED ORAL at 05:35

## 2022-03-04 RX ADMIN — INSULIN LISPRO 5 UNITS: 100 INJECTION, SOLUTION INTRAVENOUS; SUBCUTANEOUS at 17:17

## 2022-03-04 RX ADMIN — HEPARIN SODIUM 5000 UNITS: 5000 INJECTION, SOLUTION INTRAVENOUS; SUBCUTANEOUS at 05:37

## 2022-03-04 RX ADMIN — GABAPENTIN 800 MG: 400 CAPSULE ORAL at 13:05

## 2022-03-04 RX ADMIN — INSULIN LISPRO 6 UNITS: 100 INJECTION, SOLUTION INTRAVENOUS; SUBCUTANEOUS at 13:23

## 2022-03-04 RX ADMIN — CARVEDILOL 3.12 MG: 3.12 TABLET, FILM COATED ORAL at 05:45

## 2022-03-04 RX ADMIN — HYDRALAZINE HYDROCHLORIDE 10 MG: 20 INJECTION INTRAMUSCULAR; INTRAVENOUS at 08:01

## 2022-03-04 RX ADMIN — HYDRALAZINE HYDROCHLORIDE 50 MG: 50 TABLET, FILM COATED ORAL at 05:36

## 2022-03-04 RX ADMIN — GABAPENTIN 800 MG: 400 CAPSULE ORAL at 17:24

## 2022-03-04 RX ADMIN — HYDRALAZINE HYDROCHLORIDE 50 MG: 50 TABLET, FILM COATED ORAL at 10:03

## 2022-03-04 RX ADMIN — AMLODIPINE BESYLATE 5 MG: 5 TABLET ORAL at 05:36

## 2022-03-04 RX ADMIN — INSULIN LISPRO 5 UNITS: 100 INJECTION, SOLUTION INTRAVENOUS; SUBCUTANEOUS at 13:22

## 2022-03-04 RX ADMIN — ACETAMINOPHEN 650 MG: 325 TABLET, FILM COATED ORAL at 09:15

## 2022-03-04 RX ADMIN — HYDRALAZINE HYDROCHLORIDE 100 MG: 50 TABLET, FILM COATED ORAL at 22:13

## 2022-03-04 RX ADMIN — INSULIN HUMAN 2 UNITS: 100 INJECTION, SOLUTION PARENTERAL at 08:00

## 2022-03-04 RX ADMIN — CARVEDILOL 1.56 MG: 3.12 TABLET, FILM COATED ORAL at 17:22

## 2022-03-04 RX ADMIN — HYDRALAZINE HYDROCHLORIDE 100 MG: 50 TABLET, FILM COATED ORAL at 13:04

## 2022-03-04 RX ADMIN — LEVOTHYROXINE SODIUM 150 MCG: 75 TABLET ORAL at 05:35

## 2022-03-04 RX ADMIN — INSULIN LISPRO 2 UNITS: 100 INJECTION, SOLUTION INTRAVENOUS; SUBCUTANEOUS at 22:15

## 2022-03-04 RX ADMIN — SENNOSIDES AND DOCUSATE SODIUM 2 TABLET: 50; 8.6 TABLET ORAL at 17:24

## 2022-03-04 RX ADMIN — ESCITALOPRAM OXALATE 20 MG: 10 TABLET ORAL at 05:36

## 2022-03-04 RX ADMIN — GABAPENTIN 800 MG: 400 CAPSULE ORAL at 22:13

## 2022-03-04 ASSESSMENT — ENCOUNTER SYMPTOMS
DIZZINESS: 0
ABDOMINAL PAIN: 0
HEADACHES: 0
COUGH: 0
PALPITATIONS: 0
FEVER: 0
NAUSEA: 0
SHORTNESS OF BREATH: 0
CHILLS: 0
VOMITING: 0

## 2022-03-04 ASSESSMENT — FIBROSIS 4 INDEX
FIB4 SCORE: 2.23
FIB4 SCORE: 2.23

## 2022-03-04 ASSESSMENT — LIFESTYLE VARIABLES: SUBSTANCE_ABUSE: 0

## 2022-03-04 ASSESSMENT — PAIN DESCRIPTION - PAIN TYPE: TYPE: ACUTE PAIN

## 2022-03-04 NOTE — PROGRESS NOTES
Assumed care of patient at 1905. Patient is Aox 4. Patient is resting in bed watching TV at this time. Bed alarm is on, call light within reach, hourly rounding in place, personal belongings in reach, upper bed rails up, bed in lowest and locked position. No further needs at this time.

## 2022-03-04 NOTE — PROGRESS NOTES
[facility] PROGRESS NOTE     Attending:   Abdiaziz Mc MD     Consulting Provider:   Jessy Galaviz MD    Resident:   Magdaleno Logan MD    PATIENT:   Sulaiman Ceja; [id number]; 1950    ID:   Sulaiman Ceja is a 71 year-old male with a past medical history of coronary artery disease (s/p CABG in the 1990s), insulin-dependent type 2 diabetes, hyperlipidemia, hypertension, chronic kidney disease (unknown stage), COPD, urinary incontinence (s/p artificial sphincter with recent replacement), and mild dementia. Presented to outside hospital with worsening exertional fatigue and dizziness over the past 2 months. Found to be in ventricular bigeminy in the outside facility and here a repeat EKG showed sinus bradycardia. Cardiology consult for bradycardia and consideration for pacemaker placement.    SUBJECTIVE:   No acute events overnight, patient feels at his baseline. Denies any current chest pain or SOB. Denies any lightheadedness at rest or when he stands. Mild SOB on exertion which is at the patient's baseline.     OBJECTIVE:  Vitals:    03/03/22 2342 03/04/22 0424 03/04/22 0731 03/04/22 0800   BP: 132/60 158/69 (!) 184/75    Pulse: 77 64 (!) 54 (!) 52   Resp: 16 16 16    Temp: 36.4 °C (97.5 °F) 36.4 °C (97.5 °F) 36.7 °C (98 °F)    TempSrc: Temporal Temporal Temporal    SpO2: 95% 97% 94%    Weight:       Height:           Intake/Output Summary (Last 24 hours) at 3/4/2022 0911  Last data filed at 3/4/2022 0424  Gross per 24 hour   Intake 1220 ml   Output 2225 ml   Net -1005 ml       PHYSICAL EXAM:  General: No acute distress, afebrile, resting comfortably, conversational   HEENT: NC/AT. EOMI.   Cardiovascular: RRR without murmurs, rubs, heaves. Normal capillary refill   Respiratory: CTAB, no tachypnea or retractions   Abdomen: normal bowel sounds, soft, nontender, nondistended, no masses, no organomegaly   EXT:  MOSQUEDA, no edema  Skin: No erythema/lesions   Neuro: Non-focal, alert and orientated      LABS:  Recent Labs     03/01/22 1915 03/02/22 0154 03/03/22  0309   WBC 6.7 6.7 6.9   RBC 4.20* 4.22* 4.17*   HEMOGLOBIN 12.5* 12.3* 12.2*   HEMATOCRIT 37.3* 37.6* 36.8*   MCV 88.8 89.1 88.2   MCH 29.8 29.1 29.3   RDW 47.5 47.6 47.1   PLATELETCT 172 178 185   MPV 12.2 12.2 12.0   NEUTSPOLYS 57.10 56.60 53.70   LYMPHOCYTES 22.90 23.10 24.30   MONOCYTES 9.80 9.90 13.60*   EOSINOPHILS 8.90* 8.80* 6.80   BASOPHILS 1.00 1.30 1.20     Recent Labs     03/01/22 1915 03/02/22 0154 03/03/22 0309 03/04/22  0236   SODIUM 141 137 141 139   POTASSIUM 5.5 4.6 4.6 4.1   CHLORIDE 108 106 106 104   CO2 19* 18* 23 19*   BUN 34* 35* 39* 38*   CREATININE 1.89* 1.78* 1.86* 1.63*   CALCIUM 10.1 10.1 9.7 9.7   MAGNESIUM 1.6  --   --   --    ALBUMIN 4.5 4.1 4.1 4.0     Estimated GFR/CRCL = Estimated Creatinine Clearance: 42.9 mL/min (A) (by C-G formula based on SCr of 1.63 mg/dL (H)).  Recent Labs     03/02/22 0154 03/03/22 0309 03/04/22  0236   GLUCOSE 252* 259* 313*     Recent Labs     03/02/22 0154 03/03/22 0309 03/04/22  0236   ASTSGOT 16 14 26   ALTSGPT 19 15 20   TBILIRUBIN 0.3 0.2 0.2   ALKPHOSPHAT 95 89 94   GLOBULIN 2.7 2.7 2.7             No results for input(s): INR, APTT, FIBRINOGEN in the last 72 hours.    Invalid input(s): DIMER      IMAGING:  EC-ECHOCARDIOGRAM COMPLETE W/O CONT   Final Result          MEDS:  Current Facility-Administered Medications   Medication Last Admin   • insulin LISPRO (AdmeLOG,HumaLOG) injection      And   • insulin LISPRO (AdmeLOG,HumaLOG) injection      And   • dextrose 10 % BOLUS 250 mL     • insulin GLARGINE (Lantus,Semglee) injection     • insulin GLARGINE (Lantus,Semglee) injection     • amLODIPine (NORVASC) tablet 5 mg 5 mg at 03/04/22 0536   • hydrALAZINE (APRESOLINE) tablet 50 mg 50 mg at 03/04/22 0536   • carvedilol (COREG) tablet 3.125 mg 3.125 mg at 03/04/22 0545   • heparin injection 5,000 Units 5,000 Units at 03/04/22 0537   • aspirin EC (ECOTRIN) tablet 81 mg 81 mg at  03/04/22 0535   • atorvastatin (LIPITOR) tablet 40 mg 40 mg at 03/03/22 1646   • escitalopram (Lexapro) tablet 20 mg 20 mg at 03/04/22 0536   • gabapentin (NEURONTIN) capsule 800 mg 800 mg at 03/04/22 0802   • levothyroxine (SYNTHROID) tablet 150 mcg 150 mcg at 03/04/22 0535   • senna-docusate (PERICOLACE or SENOKOT S) 8.6-50 MG per tablet 2 Tablet      And   • polyethylene glycol/lytes (MIRALAX) PACKET 1 Packet      And   • magnesium hydroxide (MILK OF MAGNESIA) suspension 30 mL      And   • bisacodyl (DULCOLAX) suppository 10 mg     • acetaminophen (Tylenol) tablet 650 mg     • ondansetron (ZOFRAN) syringe/vial injection 4 mg     • ondansetron (ZOFRAN ODT) dispertab 4 mg     • hydrALAZINE (APRESOLINE) injection 10 mg 10 mg at 03/04/22 0801       Assessments:   #Sinus bradycardia with trigeminy and frequent PVCs   #Hypertensive emergency   #Hyperkalemia, resolved   #CAD s/p CABG in the 1990s  #T2DM, insulin dependent   #HTN   #HLD   #CKD  #COPD     Recommendations:   -- Continue to follow patient symptoms. If continues to have symptomatic bradycardia, this could be an indication for PPM. Noted to have trigeminy overnight, no decision on PPM until patient medically stabilized.  -- Hypertensive emergency likely secondary to acute clonidine withdrawal. Rebound hypertension has been seen to peak 2-4 days after abrupt cessation of medication. Today is D4 since holding medication. Continue to HOLD clonidine.   -- BP still poorly controlled, recommend change hydralazine 50mg three times daily to 4 times daily and monitor BP closely. Be ready to titrate down as clonidine withdrawal resolves.   -- Recommend continue norvasc 5mg daily and coreg 3.125mg daily.   -- Keep pacer pads nearby and give 0.5mg IV atropine q5 minutes if patient develops symptomatic bradycardia via ACLS protocol   -- Echo completed on 3/2 and shows EF of 65% and normal systolic function. No valvular pathology.   -- Hold ACE/ARB/HCTZ given renal  dysfunction  -- Monitor electrolytes closely with goal K>4 and Mg>2     Thank you for your consult. Cardiology will continue to follow. Please contact us with any questions.      Magdaleno Logan MD   PGY-2 Family Medicine Resident   [facility]         Discussed with my attending cardiologist, Dr Abdiaziz Mc.

## 2022-03-04 NOTE — CARE PLAN
The patient is Watcher - Medium risk of patient condition declining or worsening    Shift Goals  Clinical Goals: monitor BP; rest      Progress made toward(s) clinical / shift goals:  Patient sleeping through night; BP trending down with last value 132/60    Patient is not progressing towards the following goals:      Problem: Knowledge Deficit - Standard  Goal: Patient and family/care givers will demonstrate understanding of plan of care, disease process/condition, diagnostic tests and medications  Outcome: Progressing     Problem: Skin Integrity  Goal: Skin integrity is maintained or improved  Outcome: Progressing     Problem: Fall Risk  Goal: Patient will remain free from falls  Outcome: Progressing

## 2022-03-05 ENCOUNTER — PHARMACY VISIT (OUTPATIENT)
Dept: PHARMACY | Facility: MEDICAL CENTER | Age: 72
End: 2022-03-05
Payer: MEDICARE

## 2022-03-05 VITALS
OXYGEN SATURATION: 94 % | DIASTOLIC BLOOD PRESSURE: 73 MMHG | WEIGHT: 179.68 LBS | SYSTOLIC BLOOD PRESSURE: 162 MMHG | RESPIRATION RATE: 18 BRPM | HEART RATE: 55 BPM | HEIGHT: 70 IN | BODY MASS INDEX: 25.72 KG/M2 | TEMPERATURE: 97.6 F

## 2022-03-05 LAB
ANION GAP SERPL CALC-SCNC: 17 MMOL/L (ref 7–16)
BUN SERPL-MCNC: 35 MG/DL (ref 8–22)
CALCIUM SERPL-MCNC: 9.9 MG/DL (ref 8.5–10.5)
CHLORIDE SERPL-SCNC: 106 MMOL/L (ref 96–112)
CO2 SERPL-SCNC: 20 MMOL/L (ref 20–33)
CREAT SERPL-MCNC: 1.61 MG/DL (ref 0.5–1.4)
GLUCOSE BLD STRIP.AUTO-MCNC: 148 MG/DL (ref 65–99)
GLUCOSE BLD STRIP.AUTO-MCNC: 165 MG/DL (ref 65–99)
GLUCOSE BLD STRIP.AUTO-MCNC: 258 MG/DL (ref 65–99)
GLUCOSE SERPL-MCNC: 94 MG/DL (ref 65–99)
POTASSIUM SERPL-SCNC: 3.9 MMOL/L (ref 3.6–5.5)
SODIUM SERPL-SCNC: 143 MMOL/L (ref 135–145)

## 2022-03-05 PROCEDURE — 700111 HCHG RX REV CODE 636 W/ 250 OVERRIDE (IP): Performed by: STUDENT IN AN ORGANIZED HEALTH CARE EDUCATION/TRAINING PROGRAM

## 2022-03-05 PROCEDURE — 700102 HCHG RX REV CODE 250 W/ 637 OVERRIDE(OP): Performed by: HOSPITALIST

## 2022-03-05 PROCEDURE — A9270 NON-COVERED ITEM OR SERVICE: HCPCS | Performed by: STUDENT IN AN ORGANIZED HEALTH CARE EDUCATION/TRAINING PROGRAM

## 2022-03-05 PROCEDURE — 36415 COLL VENOUS BLD VENIPUNCTURE: CPT

## 2022-03-05 PROCEDURE — A9270 NON-COVERED ITEM OR SERVICE: HCPCS | Performed by: HOSPITALIST

## 2022-03-05 PROCEDURE — 80048 BASIC METABOLIC PNL TOTAL CA: CPT

## 2022-03-05 PROCEDURE — 99239 HOSP IP/OBS DSCHRG MGMT >30: CPT | Mod: GC | Performed by: HOSPITALIST

## 2022-03-05 PROCEDURE — A9270 NON-COVERED ITEM OR SERVICE: HCPCS | Performed by: INTERNAL MEDICINE

## 2022-03-05 PROCEDURE — 700102 HCHG RX REV CODE 250 W/ 637 OVERRIDE(OP): Performed by: INTERNAL MEDICINE

## 2022-03-05 PROCEDURE — 82962 GLUCOSE BLOOD TEST: CPT

## 2022-03-05 PROCEDURE — 700102 HCHG RX REV CODE 250 W/ 637 OVERRIDE(OP): Performed by: STUDENT IN AN ORGANIZED HEALTH CARE EDUCATION/TRAINING PROGRAM

## 2022-03-05 PROCEDURE — RXMED WILLOW AMBULATORY MEDICATION CHARGE: Performed by: STUDENT IN AN ORGANIZED HEALTH CARE EDUCATION/TRAINING PROGRAM

## 2022-03-05 RX ORDER — CARVEDILOL 3.12 MG/1
3.12 TABLET ORAL 2 TIMES DAILY WITH MEALS
Status: DISCONTINUED | OUTPATIENT
Start: 2022-03-05 | End: 2022-03-05 | Stop reason: HOSPADM

## 2022-03-05 RX ORDER — ASPIRIN 81 MG/1
81 TABLET ORAL DAILY
Qty: 30 TABLET | Refills: 0 | Status: ON HOLD | OUTPATIENT
Start: 2022-03-06 | End: 2022-10-06 | Stop reason: SDUPTHER

## 2022-03-05 RX ORDER — INSULIN LISPRO 100 [IU]/ML
0.2 INJECTION, SOLUTION INTRAVENOUS; SUBCUTANEOUS
Status: DISCONTINUED | OUTPATIENT
Start: 2022-03-05 | End: 2022-03-05 | Stop reason: HOSPADM

## 2022-03-05 RX ORDER — LEVOTHYROXINE SODIUM 0.15 MG/1
150 TABLET ORAL
Qty: 30 TABLET | Refills: 0 | Status: SHIPPED | OUTPATIENT
Start: 2022-03-06 | End: 2022-09-28

## 2022-03-05 RX ORDER — ATORVASTATIN CALCIUM 40 MG/1
40 TABLET, FILM COATED ORAL EVERY EVENING
Qty: 30 TABLET | Refills: 0 | Status: ON HOLD | OUTPATIENT
Start: 2022-03-05 | End: 2022-10-06 | Stop reason: SDUPTHER

## 2022-03-05 RX ORDER — INSULIN LISPRO 100 [IU]/ML
2-9 INJECTION, SOLUTION INTRAVENOUS; SUBCUTANEOUS
Status: DISCONTINUED | OUTPATIENT
Start: 2022-03-05 | End: 2022-03-05 | Stop reason: HOSPADM

## 2022-03-05 RX ORDER — HYDRALAZINE HYDROCHLORIDE 100 MG/1
100 TABLET, FILM COATED ORAL 3 TIMES DAILY
Qty: 90 TABLET | Refills: 0 | Status: SHIPPED | OUTPATIENT
Start: 2022-03-05 | End: 2022-09-28

## 2022-03-05 RX ORDER — CARVEDILOL 3.12 MG/1
3.12 TABLET ORAL 2 TIMES DAILY WITH MEALS
Qty: 60 TABLET | Refills: 0 | Status: SHIPPED | OUTPATIENT
Start: 2022-03-05 | End: 2022-09-28

## 2022-03-05 RX ORDER — AMLODIPINE BESYLATE 5 MG/1
5 TABLET ORAL DAILY
Qty: 30 TABLET | Refills: 0 | Status: SHIPPED | OUTPATIENT
Start: 2022-03-06 | End: 2022-09-28

## 2022-03-05 RX ORDER — CARVEDILOL 3.12 MG/1
TABLET ORAL
Status: DISCONTINUED
Start: 2022-03-05 | End: 2022-03-05 | Stop reason: HOSPADM

## 2022-03-05 RX ADMIN — GABAPENTIN 800 MG: 400 CAPSULE ORAL at 07:45

## 2022-03-05 RX ADMIN — INSULIN LISPRO 2 UNITS: 100 INJECTION, SOLUTION INTRAVENOUS; SUBCUTANEOUS at 16:21

## 2022-03-05 RX ADMIN — INSULIN LISPRO 5 UNITS: 100 INJECTION, SOLUTION INTRAVENOUS; SUBCUTANEOUS at 07:52

## 2022-03-05 RX ADMIN — AMLODIPINE BESYLATE 5 MG: 5 TABLET ORAL at 05:39

## 2022-03-05 RX ADMIN — ESCITALOPRAM OXALATE 20 MG: 10 TABLET ORAL at 05:37

## 2022-03-05 RX ADMIN — GABAPENTIN 800 MG: 400 CAPSULE ORAL at 12:28

## 2022-03-05 RX ADMIN — INSULIN LISPRO 5 UNITS: 100 INJECTION, SOLUTION INTRAVENOUS; SUBCUTANEOUS at 11:36

## 2022-03-05 RX ADMIN — LEVOTHYROXINE SODIUM 150 MCG: 75 TABLET ORAL at 05:36

## 2022-03-05 RX ADMIN — GABAPENTIN 800 MG: 400 CAPSULE ORAL at 16:14

## 2022-03-05 RX ADMIN — ASPIRIN 81 MG: 81 TABLET, COATED ORAL at 05:37

## 2022-03-05 RX ADMIN — HEPARIN SODIUM 5000 UNITS: 5000 INJECTION, SOLUTION INTRAVENOUS; SUBCUTANEOUS at 05:35

## 2022-03-05 RX ADMIN — INSULIN LISPRO 5 UNITS: 100 INJECTION, SOLUTION INTRAVENOUS; SUBCUTANEOUS at 16:24

## 2022-03-05 RX ADMIN — INSULIN LISPRO 5 UNITS: 100 INJECTION, SOLUTION INTRAVENOUS; SUBCUTANEOUS at 11:35

## 2022-03-05 RX ADMIN — CARVEDILOL 1.56 MG: 3.12 TABLET, FILM COATED ORAL at 07:45

## 2022-03-05 RX ADMIN — HYDRALAZINE HYDROCHLORIDE 100 MG: 50 TABLET, FILM COATED ORAL at 12:28

## 2022-03-05 RX ADMIN — ATORVASTATIN CALCIUM 40 MG: 40 TABLET, FILM COATED ORAL at 16:15

## 2022-03-05 RX ADMIN — HYDRALAZINE HYDROCHLORIDE 100 MG: 50 TABLET, FILM COATED ORAL at 05:38

## 2022-03-05 ASSESSMENT — ENCOUNTER SYMPTOMS
NECK PAIN: 0
FOCAL WEAKNESS: 0
DIAPHORESIS: 0
SORE THROAT: 0
LOSS OF CONSCIOUSNESS: 0
COUGH: 0
DOUBLE VISION: 0
DEPRESSION: 0
VOMITING: 0
BLURRED VISION: 0
DIZZINESS: 0
NAUSEA: 0
FEVER: 0
PND: 0
SHORTNESS OF BREATH: 0
ABDOMINAL PAIN: 0
HEADACHES: 0

## 2022-03-05 ASSESSMENT — PAIN DESCRIPTION - PAIN TYPE: TYPE: ACUTE PAIN

## 2022-03-05 NOTE — PROGRESS NOTES
Date of Service: 3/4/2022  Primary Team: [facility] IM Blue Team   Attending: YOSHI Fairbanks M.D.   Senior Resident: Magalys Rossi M.D.  Intern: Artemio Feng D.O.  Contact:  [phone]    Chief Complaint:   Bradycardia    ID:  Patient is a 71 y.o. male with a past medical history of coronary artery disease (s/p CABG in the 1990s), insulin-dependent type 2 diabetes, hyperlipidemia, hypothyroidism, hypertension, chronic kidney disease (unknown stage), COPD, urinary incontinence (s/p artificial sphincter with recent replacement), and mild dementia  who was sent from [facility] presented 3/1/2022  for evaluation of bradycardia and abnormal labs who was admitted for management of bradycardia.    Interval Events:  Pt received dose doses of 10mg IV hydralazine overnight for high blood pressures. Still fighting high Bps systolic 170s-180s.  Some bradycardia w/ HR in the 50s predominantly    Subjective:   Sitting in chair. States dizziness has completely resolved. Feeling well. Enjoying his cheez-its...some doubt as to whether this is compliant w/ salt restriction.    Interval Events:  -Amlodipine 2.5 mg increased to 5 mg daily  -Hydralazine 37.5 mg every 8 hours increased to 50 mg every 8 hours  -Carvedilol 6.25 mg decreased to 3.125 mg decreased to 1.5625mg BID  -We will continue to manage clonidine withdrawal and will down titrate as necessary as clonidine withdrawal resolves  -Glargine 35 units increased to 50 units increased to 30u and morning and night  -SCDs changed to heparin 5000 units twice daily      Consultants/Specialty:    Interventional cardiology, Dr. Andrew Manning (03/01/22): EKG from [facility] appears to show atrial fibrillation with slow ventricular response, telemetry shows more regular rhythm with ventricular bigeminy.  High likelihood of requiring pacemaker, however no urgent pacemaker is required at this time.  May resolve with holding carvedilol and clonidine.  However history of CABG  is strong indicator for beta-blocker.  Holding nifedipine, carvedilol, clonidine.  Continue monitoring on telemetry, echocardiogram for left ventricular systolic function prior to consideration for pacemaker.  Elevated Eren vas score, may need anticoagulation    Cardiology, Dr. Mc (3/2/22): Different opinion, does not see any evidence of afib, feels Bradycardia was due to clonidine; recommendations are to restart beta blocker, discontinue clonidine permanently, and does not believe PPM is indicated at this time.       Review of Systems:    Review of Systems   Constitutional: Negative for chills, fever and malaise/fatigue.   Respiratory: Negative for cough and shortness of breath.    Cardiovascular: Negative for chest pain and palpitations.   Gastrointestinal: Negative for abdominal pain, nausea and vomiting.   Genitourinary: Negative for dysuria.   Neurological: Negative for dizziness and headaches.   Psychiatric/Behavioral: Negative for substance abuse.   All other systems reviewed and are negative.      Objective Data:   Physical Exam:   Vitals:   Temp:  [36 °C (96.8 °F)-36.7 °C (98 °F)] 36 °C (96.8 °F)  Pulse:  [48-82] 74  Resp:  [16] 16  BP: (132-184)/(60-87) 148/87  SpO2:  [94 %-97 %] 95 %     Physical Exam  Vitals and nursing note reviewed.   Constitutional:       Appearance: Normal appearance.   HENT:      Head: Normocephalic and atraumatic.      Right Ear: External ear normal.      Left Ear: External ear normal.      Nose: Nose normal.      Mouth/Throat:      Mouth: Mucous membranes are moist.      Pharynx: Oropharynx is clear.   Eyes:      Extraocular Movements: Extraocular movements intact.      Pupils: Pupils are equal, round, and reactive to light.   Cardiovascular:      Rate and Rhythm: Regular rhythm. Bradycardia present.      Pulses: Normal pulses.      Heart sounds: Normal heart sounds.   Pulmonary:      Effort: Pulmonary effort is normal.      Breath sounds: Normal breath sounds.   Abdominal:       General: There is no distension.      Palpations: Abdomen is soft.      Tenderness: There is no abdominal tenderness. There is no guarding or rebound.   Musculoskeletal:         General: Normal range of motion.      Cervical back: Normal range of motion.   Skin:     General: Skin is warm.      Capillary Refill: Capillary refill takes less than 2 seconds.   Neurological:      General: No focal deficit present.      Mental Status: He is alert and oriented to person, place, and time.   Psychiatric:         Mood and Affect: Mood normal.         Behavior: Behavior normal.         Thought Content: Thought content normal.         Judgment: Judgment normal.           Labs:   Recent Labs     03/01/22 1915 03/02/22 0154 03/03/22  0309   WBC 6.7 6.7 6.9   RBC 4.20* 4.22* 4.17*   HEMOGLOBIN 12.5* 12.3* 12.2*   HEMATOCRIT 37.3* 37.6* 36.8*   MCV 88.8 89.1 88.2   MCH 29.8 29.1 29.3   RDW 47.5 47.6 47.1   PLATELETCT 172 178 185   MPV 12.2 12.2 12.0   NEUTSPOLYS 57.10 56.60 53.70   LYMPHOCYTES 22.90 23.10 24.30   MONOCYTES 9.80 9.90 13.60*   EOSINOPHILS 8.90* 8.80* 6.80   BASOPHILS 1.00 1.30 1.20     Recent Labs     03/02/22 0154 03/03/22  0309 03/04/22  0236   SODIUM 137 141 139   POTASSIUM 4.6 4.6 4.1   CHLORIDE 106 106 104   CO2 18* 23 19*   GLUCOSE 252* 259* 313*   BUN 35* 39* 38*      Recent Labs     03/02/22 0154 03/03/22  0309 03/04/22  0236   ALBUMIN 4.1 4.1 4.0   TBILIRUBIN 0.3 0.2 0.2   ALKPHOSPHAT 95 89 94   TOTPROTEIN 6.8 6.8 6.7   ALTSGPT 19 15 20   ASTSGOT 16 14 26   CREATININE 1.78* 1.86* 1.63*        Imaging:   EC-ECHOCARDIOGRAM COMPLETE W/O CONT   Final Result           Assessment and Plan:    Patient is a 71 y.o. male with a past medical history of coronary artery disease (s/p CABG in the 1990s), insulin-dependent type 2 diabetes, hyperlipidemia, hypothyroidism, hypertension, chronic kidney disease (unknown stage), COPD, urinary incontinence (s/p artificial sphincter with recent replacement), and mild dementia   who presented 3/1/2022  for evaluation of bradycardia and abnormal labs who was admitted for management of bradycardia.    Problem Representation:     * Bradycardia- (present on admission)  Assessment & Plan  Pulse (03/02/22):  [48-75] 60  EKG (03/01/22): 57, , PVCs  TSH (03/02/22): 18 (H)  Echocardiogram (03/02/22): EF 65%, mildly dilated left atrium, mildly concentric left ventricular hypertrophy  Likely secondary to polypharmacy and hypothyroidism    -Restarted carvedilol 6.25 mg twice daily  -Carvedilol 6.25 mg decreased to 3.125 mg twice daily (3/3/22)  -Carvedilol 3.125mg decreased to 1.5625mg twice daily (3/4/22)  -Started hydralazine 37.5 mg every 8 hours, hold for SBP less than 110  -Hydralazine 37.5 mg every 8 hours increased to 50 mg every 8 hours (3/3/22)  -Hydralazine 50 mg increased to 100mg q8h  -Started amlodipine to 2.5 mg daily, hold for SBP less than 110  -Amlodipine 2.5 mg increased to 5 mg daily (3/3/22)  -Hydralazine 10 mg IV every 4 hours as needed for SBP greater than 180 or DBP greater than 110  -Increased levothyroxine 100 mcg to 150 mcg  -We will continue to manage      Hypothyroidism  Assessment & Plan  TSH (03/02/22): 18 (H)    -Increased levothyroxine 100 mcg to 150 mcg (3/2/22)    HTN (hypertension)- (present on admission)  Assessment & Plan  BP (03/02/22): (152-204)/() 176/71, Pulse:  [48-75] 60  Secondary to clonidine withdrawal, rebound hypertension usually last 2 to 4 days, today is day 3    -Permanently discontinued nifedipine 90 mg daily  -Permanently discontinued clonidine 0.1 mg twice daily  -Restarted carvedilol 6.25 mg twice daily  -Carvedilol 6.25 mg decreased to 3.125 mg daily (3/3/22)  -Started hydralazine 37.5 mg every 8 hours, hold for SBP less than 110  -Hydralazine 37.5 mg every 8 hours increased to 50 mg every 8 hours (3/3/22)  -Started amlodipine to 2.5 mg daily, hold for SBP less than 110  -Amlodipine 2.5 mg increased to 5 mg daily  (3/3/22)  -Hydralazine 10 mg IV every 4 hours as needed for SBP greater than 180 or DBP greater than 110  -sodium restriction in diet    Encounter for assessment of atrial fibrillation  Assessment & Plan  EKG from [facility] (3/1/22): Atrial fibrillation with slow ventricular response, per Dr. Manning, cardiology opinion  [id number] score (03/02/22): 4 (age, HTN, vascular disease, DM)  No atrial fibrillation on telemetry monitoring  Per Dr. Mc from cardiology, there is no evidence of afib    -No anticoagulation is indicated at this time  -Consider Zio patch outpatient    Insulin dependent type 2 diabetes mellitus (HCC)- (present on admission)  Assessment & Plan  A1c (3/3/22): 9.8  Fasting glucose 259, 12 units of SSI, uncontrolled    -Hold home meds of insulin detemir 50 units every morning, 15 units every night  -Held home insulin regular  -Started insulin glargine 35 units every evening  -Increased glargine from 35 units to 50 units (3/3/22)  -Increased glargine from 50 units to 30mg qAM AND qPM  -Started 5u aspart TID WM schd   -SSI  -Hypoglycemia protocol    CKD (chronic kidney disease) stage 3, GFR 30-59 ml/min ([facility])- (present on admission)  Assessment & Plan  CMP (03/02/22): BUN/creatinine/EGFR 35/1.78/38  Baseline unknown, stable    -Avoid nephrotoxins  -Renally dose drugs  -Continue to monitor    Coronary artery disease  Assessment & Plan  4V CABG in 1990s  Troponin (03/02/22): 34 and 32    -Continue aspirin 81 mg daily  -Continue atorvastatin 80 mg daily  -Stable    Normocytic anemia  Assessment & Plan  CBC (03/02/22): Hgb 12.3, MCV 89.1, absolute eosinophils elevated  8.80  Likely secondary to chronic kidney disease    -We will continue to monitor, consider outpatient follow-up    HLD (hyperlipidemia)- (present on admission)  Assessment & Plan  Lipid panel (03/02/22): Total cholesterol 180, triglycerides 281, HDL 33, LDL 91    -Changed home atorvastatin 20 mg to 40 mg daily (03/02/22)    COPD (chronic  obstructive pulmonary disease) ([facility])  Assessment & Plan  No PFTs on file, on room air    -Supplemental O2 for O2 saturations 88 to 92%  -Albuterol every 4 hours as needed    Dementia ([facility])  Assessment & Plan    -Continue to monitor    Depression  Assessment & Plan  Denies SI/HI    -Continue escitalopram 20 mg daily    Diabetic neuropathy ([facility])  Assessment & Plan    -Continue gabapentin 800 mg 4 times daily  -Stable      Core Measures:    Code Status: Full Code  Diet: Cardiac and consistent carb diet and sodium restricted  IVF: None  Mullen Catheter: None  IV Lines: pIV  Antibiotics:None  GI Prophylaxis: PEG + Bisacodyl PRN  DVT Prophylaxis: Heparin 5000 units twice daily  Disposition: Inpatient

## 2022-03-05 NOTE — CARE PLAN
The patient is Stable - Low risk of patient condition declining or worsening    Shift Goals  Clinical Goals: Monitor BP  Patient Goals: Rest and sleep  Family Goals: Discuss care with MD    Problem: Knowledge Deficit - Standard  Goal: Patient and family/care givers will demonstrate understanding of plan of care, disease process/condition, diagnostic tests and medications  Outcome: Progressing  Note: Pt demonstrate understanding of the care plan and is agreeable to plan of care. Family also updated during shift on a plan of care.     Problem: Fall Risk  Goal: Patient will remain free from falls  Outcome: Progressing  Note: Pt educated on fall risk and precautions that are in place.

## 2022-03-05 NOTE — DISCHARGE INSTRUCTIONS
You had slow heart rate because of some medications you were taking. Cardiology (heart experts) evaluated and determined that you do not need a pacemaker at this time    Following are your medication changes  -reduce dose of carvedilol from 6.25mg twice daily to 3.125mg twice daily  -increase dose of atorvastatin from 20mg to 40mg daily  -STOP taking chlorthalidone, clonidine, and nifedipine  -start taking an aspirin 81mg daily  -start taking amlodipine 5mg daily and hydralazine 100mg three times daily (these two medications are new medications for blood pressure)  -increase the dose of your thyroid medicine (levothyroxine) from previous 100mcg daily now to 150mcg daily. Make sure you take it at least 30 minutes before any meal, first thing in the morning.   -follow up with your primary care doctor and your cardiologist       Bradycardia  You have a slow heart rate. This is called bradycardia. At rest, the normal heart rate is between  beats per minute. A slow heart may cause weakness, dizziness, loss of consciousness, and shortness of breath. This gets worse when you are active.   The medical causes of bradycardia can include:  · Heart and thyroid problems.   · High potassium.   · Side effects of some medicines.   Well-trained athletes may have heart rates as slow as 42 beats per minute. Evaluation of bradycardia may require an electrocardiogram (ECG), blood tests, and possibly other heart studies.   Bradycardia due to heart disease can be a serious problem. Damage to the heart's electrical system may require a temporary or permanent pacemaker. Beta-blocker drugs, digoxin, and other medicines used to control blood pressure and heart rhythms will also slow the heart. These medicines may need to be used in lower doses, or be stopped, if you have problems from the bradycardia.   SEEK IMMEDIATE MEDICAL CARE IF:   · You develop fainting, extreme weakness, shortness of breath, or fever.   · You develop severe chest  or abdominal pain, repeated vomiting, or dehydration.   · You become sweaty and weak.   MAKE SURE YOU:   · Understand these instructions.   · Will watch your condition.   · Will get help right away if you are not doing well or get worse.   Document Released: 12/18/2006 Document Revised: 03/11/2013 Document Reviewed: 03/17/2010  [employer]® Patient Information ©2013 [employer].    Discharge Instructions    Discharged to home by car with relative. Discharged via wheelchair, hospital escort: Yes.  Special equipment needed: Not Applicable    Be sure to schedule a follow-up appointment with your primary care doctor or any specialists as instructed.     Discharge Plan:   Diet Plan: Discussed  Activity Level: Discussed  Confirmed Follow up Appointment: Patient to Call and Schedule Appointment  Confirmed Symptoms Management: Discussed  Medication Reconciliation Updated: Yes  Influenza Vaccine Indication: Not indicated: Previously immunized this influenza season and > 8 years of age    I understand that a diet low in cholesterol, fat, and sodium is recommended for good health. Unless I have been given specific instructions below for another diet, I accept this instruction as my diet prescription.   Other diet: Two gram sodium, diabetic    Special Instructions: None    · Is patient discharged on Warfarin / Coumadin?   No   Bradycardia, Adult  Bradycardia is a slower-than-normal heartbeat. A normal resting heart rate for an adult ranges from 60 to 100 beats per minute. With bradycardia, the resting heart rate is less than 60 beats per minute.  Bradycardia can prevent enough oxygen from reaching certain areas of your body when you are active. It can be serious if it keeps enough oxygen from reaching your brain and other parts of your body. Bradycardia is not a problem for everyone. For some healthy adults, a slow resting heart rate is normal.  What are the causes?  This condition may be caused by:  · A problem with the heart,  including:  ? A problem with the heart's electrical system, such as a heart block. With a heart block, electrical signals between the chambers of the heart are partially or completely blocked, so they are not able to work as they should.  ? A problem with the heart's natural pacemaker (sinus node).  ? Heart disease.  ? A heart attack.  ? Heart damage.  ? Lyme disease.  ? A heart infection.  ? A heart condition that is present at birth (congenital heart defect).  · Certain medicines that treat heart conditions.  · Certain conditions, such as hypothyroidism and obstructive sleep apnea.  · Problems with the balance of chemicals and other substances, like potassium, in the blood.  · Trauma.  · Radiation therapy.  What increases the risk?  You are more likely to develop this condition if you:  · Are age 65 or older.  · Have high blood pressure (hypertension), high cholesterol (hyperlipidemia), or diabetes.  · Drink heavily, use tobacco or nicotine products, or use drugs.  What are the signs or symptoms?  Symptoms of this condition include:  · Light-headedness.  · Feeling faint or fainting.  · Fatigue and weakness.  · Trouble with activity or exercise.  · Shortness of breath.  · Chest pain (angina).  · Drowsiness.  · Confusion.  · Dizziness.  How is this diagnosed?  This condition may be diagnosed based on:  · Your symptoms.  · Your medical history.  · A physical exam.  During the exam, your health care provider will listen to your heartbeat and check your pulse. To confirm the diagnosis, your health care provider may order tests, such as:  · Blood tests.  · An electrocardiogram (ECG). This test records the heart's electrical activity. The test can show how fast your heart is beating and whether the heartbeat is steady.  · A test in which you wear a portable device (event recorder or Holter monitor) to record your heart's electrical activity while you go about your day.  · An exercise test.  How is this  treated?  Treatment for this condition depends on the cause of the condition and how severe your symptoms are. Treatment may involve:  · Treatment of the underlying condition.  · Changing your medicines or how much medicine you take.  · Having a small, battery-operated device called a pacemaker implanted under the skin. When bradycardia occurs, this device can be used to increase your heart rate and help your heart beat in a regular rhythm.  Follow these instructions at home:  Lifestyle    · Manage any health conditions that contribute to bradycardia as told by your health care provider.  · Follow a heart-healthy diet. A nutrition specialist (dietitian) can help educate you about healthy food options and changes.  · Follow an exercise program that is approved by your health care provider.  · Maintain a healthy weight.  · Try to reduce or manage your stress, such as with yoga or meditation. If you need help reducing stress, ask your health care provider.  · Do not use any products that contain nicotine or tobacco, such as cigarettes, e-cigarettes, and chewing tobacco. If you need help quitting, ask your health care provider.  · Do not use illegal drugs.  · Limit alcohol intake to no more than 1 drink a day for nonpregnant women and 2 drinks a day for men. Be aware of how much alcohol is in your drink. In the [place] one drink equals one 12 oz bottle of beer (355 mL), one 5 oz glass of wine (148 mL), or one 1½ oz glass of hard liquor (44 mL).  General instructions  · Take over-the-counter and prescription medicines only as told by your health care provider.  · Keep all follow-up visits as told by your health care provider. This is important.  How is this prevented?  In some cases, bradycardia may be prevented by:  · Treating underlying medical problems.  · Stopping behaviors or medicines that can trigger the condition.  Contact a health care provider if you:  · Feel light-headed or dizzy.  · Almost faint.  · Feel weak  "or are easily fatigued during physical activity.  · Experience confusion or have memory problems.  Get help right away if:  · You faint.  · You have:  ? An irregular heartbeat (palpitations).  ? Chest pain.  ? Trouble breathing.  Summary  · Bradycardia is a slower-than-normal heartbeat. With bradycardia, the resting heart rate is less than 60 beats per minute.  · Treatment for this condition depends on the cause.  · Manage any health conditions that contribute to bradycardia as told by your health care provider.  · Do not use any products that contain nicotine or tobacco, such as cigarettes, e-cigarettes, and chewing tobacco, and limit alcohol intake.  · Keep all follow-up visits as told by your health care provider. This is important.  This information is not intended to replace advice given to you by your health care provider. Make sure you discuss any questions you have with your health care provider.  Document Released: 09/09/2003 Document Revised: 07/01/2019 Document Reviewed: 05/29/2019  [employer] Patient Education © 2020 [employer]    Hypertension, Adult  High blood pressure (hypertension) is when the force of blood pumping through the arteries is too strong. The arteries are the blood vessels that carry blood from the heart throughout the body. Hypertension forces the heart to work harder to pump blood and may cause arteries to become narrow or stiff. Untreated or uncontrolled hypertension can cause a heart attack, heart failure, a stroke, kidney disease, and other problems.  A blood pressure reading consists of a higher number over a lower number. Ideally, your blood pressure should be below 120/80. The first (\"top\") number is called the systolic pressure. It is a measure of the pressure in your arteries as your heart beats. The second (\"bottom\") number is called the diastolic pressure. It is a measure of the pressure in your arteries as the heart relaxes.  What are the causes?  The exact cause of this " condition is not known. There are some conditions that result in or are related to high blood pressure.  What increases the risk?  Some risk factors for high blood pressure are under your control. The following factors may make you more likely to develop this condition:  · Smoking.  · Having type 2 diabetes mellitus, high cholesterol, or both.  · Not getting enough exercise or physical activity.  · Being overweight.  · Having too much fat, sugar, calories, or salt (sodium) in your diet.  · Drinking too much alcohol.  Some risk factors for high blood pressure may be difficult or impossible to change. Some of these factors include:  · Having chronic kidney disease.  · Having a family history of high blood pressure.  · Age. Risk increases with age.  · Race. You may be at higher risk if you are .  · Gender. Men are at higher risk than women before age 45. After age 65, women are at higher risk than men.  · Having obstructive sleep apnea.  · Stress.  What are the signs or symptoms?  High blood pressure may not cause symptoms. Very high blood pressure (hypertensive crisis) may cause:  · Headache.  · Anxiety.  · Shortness of breath.  · Nosebleed.  · Nausea and vomiting.  · Vision changes.  · Severe chest pain.  · Seizures.  How is this diagnosed?  This condition is diagnosed by measuring your blood pressure while you are seated, with your arm resting on a flat surface, your legs uncrossed, and your feet flat on the floor. The cuff of the blood pressure monitor will be placed directly against the skin of your upper arm at the level of your heart. It should be measured at least twice using the same arm. Certain conditions can cause a difference in blood pressure between your right and left arms.  Certain factors can cause blood pressure readings to be lower or higher than normal for a short period of time:  · When your blood pressure is higher when you are in a health care provider's office than when you are  at home, this is called white coat hypertension. Most people with this condition do not need medicines.  · When your blood pressure is higher at home than when you are in a health care provider's office, this is called masked hypertension. Most people with this condition may need medicines to control blood pressure.  If you have a high blood pressure reading during one visit or you have normal blood pressure with other risk factors, you may be asked to:  · Return on a different day to have your blood pressure checked again.  · Monitor your blood pressure at home for 1 week or longer.  If you are diagnosed with hypertension, you may have other blood or imaging tests to help your health care provider understand your overall risk for other conditions.  How is this treated?  This condition is treated by making healthy lifestyle changes, such as eating healthy foods, exercising more, and reducing your alcohol intake. Your health care provider may prescribe medicine if lifestyle changes are not enough to get your blood pressure under control, and if:  · Your systolic blood pressure is above 130.  · Your diastolic blood pressure is above 80.  Your personal target blood pressure may vary depending on your medical conditions, your age, and other factors.  Follow these instructions at home:  Eating and drinking    · Eat a diet that is high in fiber and potassium, and low in sodium, added sugar, and fat. An example eating plan is called the DASH (Dietary Approaches to Stop Hypertension) diet. To eat this way:  ? Eat plenty of fresh fruits and vegetables. Try to fill one half of your plate at each meal with fruits and vegetables.  ? Eat whole grains, such as whole-wheat pasta, brown rice, or whole-grain bread. Fill about one fourth of your plate with whole grains.  ? Eat or drink low-fat dairy products, such as skim milk or low-fat yogurt.  ? Avoid fatty cuts of meat, processed or cured meats, and poultry with skin. Fill about  one fourth of your plate with lean proteins, such as fish, chicken without skin, beans, eggs, or tofu.  ? Avoid pre-made and processed foods. These tend to be higher in sodium, added sugar, and fat.  · Reduce your daily sodium intake. Most people with hypertension should eat less than 1,500 mg of sodium a day.  · Do not drink alcohol if:  ? Your health care provider tells you not to drink.  ? You are pregnant, may be pregnant, or are planning to become pregnant.  · If you drink alcohol:  ? Limit how much you use to:  § 0-1 drink a day for women.  § 0-2 drinks a day for men.  ? Be aware of how much alcohol is in your drink. In the [place] one drink equals one 12 oz bottle of beer (355 mL), one 5 oz glass of wine (148 mL), or one 1½ oz glass of hard liquor (44 mL).  Lifestyle    · Work with your health care provider to maintain a healthy body weight or to lose weight. Ask what an ideal weight is for you.  · Get at least 30 minutes of exercise most days of the week. Activities may include walking, swimming, or biking.  · Include exercise to strengthen your muscles (resistance exercise), such as Pilates or lifting weights, as part of your weekly exercise routine. Try to do these types of exercises for 30 minutes at least 3 days a week.  · Do not use any products that contain nicotine or tobacco, such as cigarettes, e-cigarettes, and chewing tobacco. If you need help quitting, ask your health care provider.  · Monitor your blood pressure at home as told by your health care provider.  · Keep all follow-up visits as told by your health care provider. This is important.  Medicines  · Take over-the-counter and prescription medicines only as told by your health care provider. Follow directions carefully. Blood pressure medicines must be taken as prescribed.  · Do not skip doses of blood pressure medicine. Doing this puts you at risk for problems and can make the medicine less effective.  · Ask your health care provider about  side effects or reactions to medicines that you should watch for.  Contact a health care provider if you:  · Think you are having a reaction to a medicine you are taking.  · Have headaches that keep coming back (recurring).  · Feel dizzy.  · Have swelling in your ankles.  · Have trouble with your vision.  Get help right away if you:  · Develop a severe headache or confusion.  · Have unusual weakness or numbness.  · Feel faint.  · Have severe pain in your chest or abdomen.  · Vomit repeatedly.  · Have trouble breathing.  Summary  · Hypertension is when the force of blood pumping through your arteries is too strong. If this condition is not controlled, it may put you at risk for serious complications.  · Your personal target blood pressure may vary depending on your medical conditions, your age, and other factors. For most people, a normal blood pressure is less than 120/80.  · Hypertension is treated with lifestyle changes, medicines, or a combination of both. Lifestyle changes include losing weight, eating a healthy, low-sodium diet, exercising more, and limiting alcohol.  This information is not intended to replace advice given to you by your health care provider. Make sure you discuss any questions you have with your health care provider.  Document Released: 12/18/2006 Document Revised: 08/28/2019 Document Reviewed: 08/28/2019  [employer] Patient Education © 2020 [employer]    Amlodipine tablets  What is this medicine?  AMLODIPINE (am LLILIAM di peen) is a calcium-channel blocker. It affects the amount of calcium found in your heart and muscle cells. This relaxes your blood vessels, which can reduce the amount of work the heart has to do. This medicine is used to lower high blood pressure. It is also used to prevent chest pain.  This medicine may be used for other purposes; ask your health care provider or pharmacist if you have questions.  COMMON BRAND NAME(S): Norvasc  What should I tell my health care provider  before I take this medicine?  They need to know if you have any of these conditions:  · heart disease  · liver disease  · an unusual or allergic reaction to amlodipine, other medicines, foods, dyes, or preservatives  · pregnant or trying to get pregnant  · breast-feeding  How should I use this medicine?  Take this medicine by mouth with a glass of water. Follow the directions on the prescription label. You can take it with or without food. If it upsets your stomach, take it with food. Take your medicine at regular intervals. Do not take it more often than directed. Do not stop taking except on your doctor's advice.  Talk to your pediatrician regarding the use of this medicine in children. While this drug may be prescribed for children as young as 6 years for selected conditions, precautions do apply.  Patients over 65 years of age may have a stronger reaction and need a smaller dose.  Overdosage: If you think you have taken too much of this medicine contact a poison control center or emergency room at once.  NOTE: This medicine is only for you. Do not share this medicine with others.  What if I miss a dose?  If you miss a dose, take it as soon as you can. If it is almost time for your next dose, take only that dose. Do not take double or extra doses.  What may interact with this medicine?  Do not take this medicine with any of the following medications:  · tranylcypromine  This medicine may also interact with the following medications:  · clarithromycin  · cyclosporine  · diltiazem  · itraconazole  · simvastatin  · tacrolimus  This list may not describe all possible interactions. Give your health care provider a list of all the medicines, herbs, non-prescription drugs, or dietary supplements you use. Also tell them if you smoke, drink alcohol, or use illegal drugs. Some items may interact with your medicine.  What should I watch for while using this medicine?  Visit your healthcare professional for regular checks on  your progress. Check your blood pressure as directed. Ask your healthcare professional what your blood pressure should be and when you should contact him or her.  Do not treat yourself for coughs, colds, or pain while you are using this medicine without asking your healthcare professional for advice. Some medicines may increase your blood pressure.  You may get dizzy. Do not drive, use machinery, or do anything that needs mental alertness until you know how this medicine affects you. Do not stand or sit up quickly, especially if you are an older patient. This reduces the risk of dizzy or fainting spells. Avoid alcoholic drinks; they can make you dizzier.  What side effects may I notice from receiving this medicine?  Side effects that you should report to your doctor or health care professional as soon as possible:  · allergic reactions like skin rash, itching or hives; swelling of the face, lips, or tongue  · fast, irregular heartbeat  · signs and symptoms of low blood pressure like dizziness; feeling faint or lightheaded, falls; unusually weak or tired  · swelling of ankles, feet, hands  Side effects that usually do not require medical attention (report these to your doctor or health care professional if they continue or are bothersome):  · dry mouth  · facial flushing  · headache  · stomach pain  · tiredness  This list may not describe all possible side effects. Call your doctor for medical advice about side effects. You may report side effects to FDA at 1-682-FDA-4421.  Where should I keep my medicine?  Keep out of the reach of children.  Store at room temperature between 59 and 86 degrees F (15 and 30 degrees C).  Throw away any unused medicine after the expiration date.  NOTE: This sheet is a summary. It may not cover all possible information. If you have questions about this medicine, talk to your doctor, pharmacist, or health care provider.  © 2020 [employer] (2019-07-12 15:07:10)    Hydralazine  tablets  What is this medicine?  HYDRALAZINE (trevor [place]) is a type of vasodilator. It relaxes blood vessels, increasing the blood and oxygen supply to your heart. This medicine is used to treat high blood pressure.  This medicine may be used for other purposes; ask your health care provider or pharmacist if you have questions.  COMMON BRAND NAME(S): Apresoline  What should I tell my health care provider before I take this medicine?  They need to know if you have any of these conditions:  · blood vessel disease  · heart disease including angina or history of heart attack  · kidney or liver disease  · systemic lupus erythematosus (SLE)  · an unusual or allergic reaction to hydralazine, tartrazine dye, other medicines, foods, dyes, or preservatives  · pregnant or trying to get pregnant  · breast-feeding  How should I use this medicine?  Take this medicine by mouth with a glass of water. Follow the directions on the prescription label. Take your doses at regular intervals. Do not take your medicine more often than directed. Do not stop taking except on the advice of your doctor or health care professional.  Talk to your pediatrician regarding the use of this medicine in children. Special care may be needed. While this drug may be prescribed for children for selected conditions, precautions do apply.  Overdosage: If you think you have taken too much of this medicine contact a poison control center or emergency room at once.  NOTE: This medicine is only for you. Do not share this medicine with others.  What if I miss a dose?  If you miss a dose, take it as soon as you can. If it is almost time for your next dose, take only that dose. Do not take double or extra doses.  What may interact with this medicine?  · medicines for high blood pressure  · medicines for mental depression  This list may not describe all possible interactions. Give your health care provider a list of all the medicines, herbs, non-prescription  drugs, or dietary supplements you use. Also tell them if you smoke, drink alcohol, or use illegal drugs. Some items may interact with your medicine.  What should I watch for while using this medicine?  Visit your doctor or health care professional for regular checks on your progress. Check your blood pressure and pulse rate regularly. Ask your doctor or health care professional what your blood pressure and pulse rate should be and when you should contact him or her.  You may get drowsy or dizzy. Do not drive, use machinery, or do anything that needs mental alertness until you know how this medicine affects you. Do not stand or sit up quickly, especially if you are an older patient. This reduces the risk of dizzy or fainting spells. Alcohol may interfere with the effect of this medicine. Avoid alcoholic drinks.  Do not treat yourself for coughs, colds, or pain while you are taking this medicine without asking your doctor or health care professional for advice. Some ingredients may increase your blood pressure.  What side effects may I notice from receiving this medicine?  Side effects that you should report to your doctor or health care professional as soon as possible:  · chest pain, or fast or irregular heartbeat  · fever, chills, or sore throat  · numbness or tingling in the hands or feet  · shortness of breath  · skin rash, redness, blisters or itching  · stiff or swollen joints  · sudden weight gain  · swelling of the feet or legs  · swollen lymph glands  · unusual weakness  Side effects that usually do not require medical attention (report to your doctor or health care professional if they continue or are bothersome):  · diarrhea, or constipation  · headache  · loss of appetite  · nausea, vomiting  This list may not describe all possible side effects. Call your doctor for medical advice about side effects. You may report side effects to FDA at 3-988-FDA-3906.  Where should I keep my medicine?  Keep out of the  reach of children.  Store at room temperature between 15 and 30 degrees C (59 and 86 degrees F). Throw away any unused medicine after the expiration date.  NOTE: This sheet is a summary. It may not cover all possible information. If you have questions about this medicine, talk to your doctor, pharmacist, or health care provider.  © 2020 Elsevier/Gold Standard (2009-05-01 15:44:58)      Aspirin and Your Heart    Aspirin is a medicine that prevents the cells in the blood that are used for clotting, called platelets, from sticking together. Aspirin can be used to help reduce the risk of blood clots, heart attacks, and other heart-related problems.  Can I take aspirin?  Your health care provider will help you determine whether it is safe and beneficial for you to take aspirin daily. Taking aspirin daily may be helpful if you:  · Have had a heart attack or chest pain.  · Are at risk for a heart attack.  · Have undergone open-heart surgery, such as coronary artery bypass surgery (CABG).  · Have had coronary angioplasty or a stent.  · Have had certain types of stroke or transient ischemic attack (TIA).  · Have peripheral artery disease (PAD).  · Have chronic heart rhythm problems such as atrial fibrillation and cannot take an anticoagulant.  · Have valve disease or have had surgery on a valve.  What are the risks?  Daily use of aspirin can cause side effects. Some of these include:  · Bleeding. Bleeding problems can be minor or serious. An example of a minor problem is a cut that does not stop bleeding. An example of a more serious problem is stomach bleeding or, rarely, bleeding into the brain. Your risk of bleeding is increased if you are also taking non-steroidal anti-inflammatory drugs (NSAIDs).  · Increased bruising.  · Upset stomach.  · An allergic reaction. People who have nasal polyps have an increased risk of developing an aspirin allergy.  General guidelines  · Take aspirin only as told by your health care  provider. Make sure that you understand how much you should take and what form you should take. The two forms of aspirin are:  ? Non-enteric-coated.This type of aspirin does not have a coating and is absorbed quickly. This type of aspirin also comes in a chewable form.  ? Enteric-coated. This type of aspirin has a coating that releases the medicine very slowly. Enteric-coated aspirin might cause less stomach upset than non-enteric-coated aspirin. This type of aspirin should not be chewed or crushed.  · Limit alcohol intake to no more than 1 drink a day for nonpregnant women and 2 drinks a day for men. Drinking alcohol increases your risk of bleeding. One drink equals 12 oz of beer, 5 oz of wine, or 1½ oz of hard liquor.  Contact a health care provider if you:  · Have unusual bleeding or bruising.  · Have stomach pain or nausea.  · Have ringing in your ears.  · Have an allergic reaction that causes:  ? Hives.  ? Itchy skin.  ? Swelling of the lips, tongue, or face.  Get help right away if you:  · Notice that your bowel movements are bloody, dark red, or black in color.  · Vomit or cough up blood.  · Have blood in your urine.  · Cough, have noisy breathing (wheeze), or feel short of breath.  · Have chest pain, especially if the pain spreads to the arms, back, neck, or jaw.  · Have a severe headache, or a headache with confusion, or dizziness.  These symptoms may represent a serious problem that is an emergency. Do not wait to see if the symptoms will go away. Get medical help right away. Call your local emergency services (911 in the [place] Do not drive yourself to the hospital.  Summary  · Aspirin can be used to help reduce the risk of blood clots, heart attacks, and other heart-related problems.  · Daily use of aspirin can increase your risk of side effects. Your health care provider will help you determine whether it is safe and beneficial for you to take aspirin daily.  · Take aspirin only as told by your health  care provider. Make sure that you understand how much you can take and what form you can take.  This information is not intended to replace advice given to you by your health care provider. Make sure you discuss any questions you have with your health care provider.  Document Released: 11/30/2009 Document Revised: 10/18/2018 Document Reviewed: 10/18/2018  [employer] Patient Education © 2020 [employer]    Depression / Suicide Risk    As you are discharged from this [facility] facility, it is important to learn how to keep safe from harming yourself.    Recognize the warning signs:  · Abrupt changes in personality, positive or negative- including increase in energy   · Giving away possessions  · Change in eating patterns- significant weight changes-  positive or negative  · Change in sleeping patterns- unable to sleep or sleeping all the time   · Unwillingness or inability to communicate  · Depression  · Unusual sadness, discouragement and loneliness  · Talk of wanting to die  · Neglect of personal appearance   · Rebelliousness- reckless behavior  · Withdrawal from people/activities they love  · Confusion- inability to concentrate     If you or a loved one observes any of these behaviors or has concerns about self-harm, here's what you can do:  · Talk about it- your feelings and reasons for harming yourself  · Remove any means that you might use to hurt yourself (examples: pills, rope, extension cords, firearm)  · Get professional help from the community (Mental Health, Substance Abuse, psychological counseling)  · Do not be alone:Call your Safe Contact- someone whom you trust who will be there for you.  · Call your local CRISIS HOTLINE 804-5472 or 876-448-8319  · Call your local Children's Mobile Crisis Response Team [place] (248) 484-3137 or www.[employer]  · Call the toll free National Suicide Prevention Hotlines   · National Suicide Prevention Lifeline 820-951-HLWF (9330)  · National Hope Line  Network 800-SUICIDE (337-8938)

## 2022-03-05 NOTE — PROGRESS NOTES
Assumed care of patient. Bedside report received from Caron SANDOVAL. Updated POC, call light within reach, fall precautions in place, bed alarm on. Bed locked, and in lowest position. Pt sleeping comfortably at this time, no further needs.

## 2022-03-05 NOTE — DISCHARGE SUMMARY
Discharge Summary    Date of Admission: 3/1/2022  Date of Discharge: 3/5/2022  Discharging Attending: YOSHI Fairbanks M.D.   Discharging Senior Resident: Dr. Dowell   Discharging Intern: Dr. Artemio Feng    CHIEF COMPLAINT ON ADMISSION  Chief Complaint   Patient presents with   • Bradycardia   • Abnormal Labs       Reason for Admission  Symptomatic bradycardia    Admission Date  3/1/2022    CODE STATUS  Full Code    HPI & HOSPITAL COURSE  This is a 71 y.o. male here with history of coronary artery disease status post CABG in the 1990s, IDDM 2, hypothyroidism on Synthroid, hypertension on chronic clonidine and beta-blockers, and CKD stage III, who presented as transfer from [facility], where he presented with 2 months of exertional fatigue and dizziness.  He was found to be significantly bradycardic in the HR 30s to 40s, as well as very hypertensive with systolic blood pressures in the 200s to 220s.  He was also found to have elevated creatinine 2.0 and mild elevated K at 5.7 which was treated with temporizing measures.  Unknown baseline creatinine although patient stated that he has a history of chronic kidney disease.  He was not in decompensated heart failure.  He was transferred to [facility] for consideration of pacemaker.  Patient's beta-blocker and clonidine were held upon admission and his hypertensive urgency was treated with IV hydralazine as well as initiation of oral hydralazine and amlodipine.  His elevated K resolved without further intervention.  His creatinine also down trended and stabilized at around 1.6, likely his baseline.  Initial cardiology evaluation suggested possibility of A. fib with slow ventricular response that then converted to sinus bradycardia with bigeminy/frequent PVCs.  Subsequent opinion per cardiology consult did not believe that this patient ever had A. fib and with discontinuation of his clonidine thought to be the culprit, felt that he  does not need a pacemaker at this time.  TTE on 3/2/2022 demonstrated mild concentric left ventricular hypertrophy and normal LVEF systolic and diastolic function.  Patient's antihypertensives were titrated and he was observed inpatient for the time period that he may have been experiencing reflex hypertension from clonidine withdrawal.  Low-dose carvedilol was restarted while inpatient and patient's heart rate was monitored on telemetry.  On day of discharge, patient's antihypertensive regimen was adjusted and he was instructed to discontinue clonidine permanently.  He was also noted to be hypothyroid with TSH 18, and his levothyroxine dose was increased.  Pt's home atorvastatin dose was increased as his LDL was 91, under target (target <70 in secondary prevention) to high-intensity atorvastatin 40mg daily. He is to follow-up with his cardiologist as well as EP referral placed here at [facility].        Therefore, he is discharged in good and stable condition to home with close outpatient follow-up.    The patient met 2-midnight criteria for an inpatient stay at the time of discharge.    Discharge Date  3/5/2022    FOLLOW UP ITEMS POST DISCHARGE  -stopped clonidine, chlorthalidone, and nifedipine  -started amlodipine 5mg daily, hydralazine 100mg TID  -reduced dose of carvedilol from 6.25mg to 3.125mg BID  -increased levothyroxine from 100mcg to 150mcg daily  -increased atorvastatin from 20mg to 40mg daily  -started on Aspirin 81mg daily, which pt should be continuing for his CAD  -all other meds to be continued as previously prescribed  -follow up with cardiology and EP referral placed  -follow up with primary care within 2 weeks for blood pressure check    REVIEW OF SYSTEMS  Review of Systems   Constitutional: Negative for diaphoresis and fever.   HENT: Negative for congestion and sore throat.    Eyes: Negative for blurred vision and double vision.   Respiratory: Negative for cough and shortness of breath.     Cardiovascular: Negative for chest pain, leg swelling and PND.   Gastrointestinal: Negative for abdominal pain, nausea and vomiting.   Genitourinary: Negative for frequency and urgency.   Musculoskeletal: Negative for neck pain.   Skin: Negative for rash.   Neurological: Negative for dizziness, focal weakness, loss of consciousness and headaches.   Psychiatric/Behavioral: Negative for depression and suicidal ideas.     PHYSICAL EXAM  Physical Exam  Constitutional:       General: He is not in acute distress.     Appearance: Normal appearance.   HENT:      Head: Normocephalic.      Right Ear: External ear normal.      Left Ear: External ear normal.      Nose: Nose normal. No congestion.      Mouth/Throat:      Mouth: Mucous membranes are moist.      Pharynx: No oropharyngeal exudate.   Eyes:      General: No scleral icterus.     Extraocular Movements: Extraocular movements intact.      Pupils: Pupils are equal, round, and reactive to light.   Neck:      Vascular: No carotid bruit.   Cardiovascular:      Rate and Rhythm: Normal rate.      Pulses: Normal pulses.      Heart sounds: Normal heart sounds. No murmur heard.     Comments: Bradycardia improved 50s-60s now  Pulmonary:      Effort: Pulmonary effort is normal. No respiratory distress.      Breath sounds: Normal breath sounds.   Abdominal:      Palpations: Abdomen is soft.      Tenderness: There is no abdominal tenderness.   Musculoskeletal:         General: No swelling. Normal range of motion.      Cervical back: Normal range of motion and neck supple. No rigidity.   Skin:     General: Skin is warm and dry.      Capillary Refill: Capillary refill takes less than 2 seconds.      Coloration: Skin is not pale.   Neurological:      General: No focal deficit present.      Mental Status: He is alert and oriented to person, place, and time.      Cranial Nerves: No cranial nerve deficit.   Psychiatric:         Mood and Affect: Mood normal.         Thought Content:  Thought content normal.         DISCHARGE DIAGNOSES  Principal Problem:    Bradycardia POA: Yes  Active Problems:    HTN (hypertension) POA: Yes    Hypothyroidism POA: Unknown    CKD (chronic kidney disease) stage 3, GFR 30-59 ml/min ([facility]) POA: Yes    Insulin dependent type 2 diabetes mellitus ([facility]) POA: Yes    Encounter for assessment of atrial fibrillation POA: Unknown    HLD (hyperlipidemia) POA: Yes    Normocytic anemia POA: Unknown    Coronary artery disease POA: Unknown    Diabetic neuropathy ([facility]) POA: Unknown    Depression POA: Unknown    Dementia ([facility]) POA: Unknown    COPD (chronic obstructive pulmonary disease) ([facility]) POA: Unknown  Resolved Problems:    * No resolved hospital problems. *      FOLLOW UP  No future appointments.  No follow-up provider specified.    MEDICATIONS ON DISCHARGE     Medication List      START taking these medications      Instructions   amLODIPine 5 MG Tabs  Start taking on: March 6, 2022  Commonly known as: NORVASC   Take 1 Tablet by mouth every day. New medication for blood pressure. Replaces nifedipine  Dose: 5 mg     aspirin 81 MG EC tablet  Start taking on: March 6, 2022   Take 1 Tablet by mouth every day. For heart health  Dose: 81 mg     hydrALAZINE 100 MG tablet  Commonly known as: APRESOLINE   Take 1 Tablet by mouth 3 times a day. New medication for blood pressure  Dose: 100 mg        CHANGE how you take these medications      Instructions   atorvastatin 40 MG Tabs  What changed:   · medication strength  · how much to take  · when to take this  · additional instructions  Commonly known as: LIPITOR   Take 1 Tablet by mouth every evening. Increased dose  Dose: 40 mg     carvedilol 3.125 MG Tabs  What changed:   · medication strength  · how much to take  · additional instructions  Commonly known as: COREG   Take 1 Tablet by mouth 2 times a day with meals. Decrease dose  Dose: 3.125 mg     levothyroxine 150 MCG Tabs  Start taking on: March 6, 2022  What changed:   · medication  strength  · how much to take  · additional instructions  Commonly known as: SYNTHROID   Take 1 Tablet by mouth every morning on an empty stomach. Increased dose  Dose: 150 mcg        CONTINUE taking these medications      Instructions   celecoxib 200 MG Caps  Commonly known as: CELEBREX   Take 200 mg by mouth 2 times a day.  Dose: 200 mg     escitalopram 20 MG tablet  Commonly known as: LEXAPRO   Take 20 mg by mouth every day.  Dose: 20 mg     gabapentin 800 MG tablet  Commonly known as: NEURONTIN   Take 800 mg by mouth 4 times a day.  Dose: 800 mg     insulin detemir 100 UNIT/ML Soln  Commonly known as: Levemir   Inject 15-50 Units under the skin 2 times a day. 50 units every morning  15 units every night  Dose: 15-50 Units     insulin regular 100 Unit/mL Soln  Commonly known as: HumuLIN R   Inject 2-20 Units under the skin 4 Times a Day,Before Meals and at Bedtime. Pt unable to define Sliding scale  Dose: 2-20 Units     omeprazole 20 MG delayed-release capsule  Commonly known as: PRILOSEC   Take 20 mg by mouth every day.  Dose: 20 mg     oxyCODONE-acetaminophen  MG Tabs  Commonly known as: PERCOCET-10   Take 1 Tablet by mouth 3 times a day as needed for Severe Pain. Indications: Pain  Dose: 1 Tablet        STOP taking these medications    chlorthalidone 25 MG Tabs  Commonly known as: HYGROTON     cloNIDine 0.1 MG Tabs  Commonly known as: CATAPRES     NIFEdipine SR 90 MG CR tablet  Commonly known as: PROCADIA-XL     sulfamethoxazole-trimethoprim 800-160 MG tablet  Commonly known as: BACTRIM DS            Allergies  No Known Allergies    DIET  Orders Placed This Encounter   Procedures   • Diet Order Diet: 2 Gram Sodium; Second Modifier: (optional): Consistent CHO (Diabetic)     Standing Status:   Standing     Number of Occurrences:   1     Order Specific Question:   Diet:     Answer:   2 Gram Sodium [7]     Order Specific Question:   Second Modifier: (optional)     Answer:   Consistent CHO (Diabetic) [4]        ACTIVITY  As tolerated.  Weight bearing as tolerated    CONSULTATIONS  Dr. Manning and Dr. Mc with Cardiology consulted.  Treatment options were discussed and plan of care agreed upon.    PROCEDURES  None    A total of 45 minutes was spent in patient care on day of discharge including chart review, patient interaction, diagnostics, therapeutics, and documentation.

## 2022-03-05 NOTE — RESPIRATORY CARE
COPD EDUCATION by COPD CLINICAL EDUCATOR  3/5/2022 at 10:21 AM by Abbie Rowley, RRT     Patient interviewed by COPD education team. Patient does not have a history or diagnosis of COPD and is a non-smoker.  Therefore, patient does not qualify for the COPD program.  Talked to patient about pulmonary testing that can be done. Recommend him to consult with his doctor about doing a Pulmonary Function Test, to get a better diagnosis for it. Per patient the only testing every done was a dr listening to his chest and said you have COPD because you use to smoke. Patient quit smoking back in 1996

## 2022-03-06 NOTE — PROGRESS NOTES
Pt has discharge orders. Pt educated on discharge instructions and new prescriptions.  Pt verbalizes understanding.  Follow up appointment made with PCP. PIV removed, monitor checked in.  Pt going home with daughter & son. All belongings with patient. Pt to be transported to car via wheelchair with this RN.

## 2022-09-28 ENCOUNTER — APPOINTMENT (OUTPATIENT)
Dept: RADIOLOGY | Facility: MEDICAL CENTER | Age: 72
DRG: 280 | End: 2022-09-28
Attending: EMERGENCY MEDICINE
Payer: MEDICARE

## 2022-09-28 ENCOUNTER — HOSPITAL ENCOUNTER (INPATIENT)
Facility: MEDICAL CENTER | Age: 72
LOS: 8 days | DRG: 280 | End: 2022-10-06
Attending: EMERGENCY MEDICINE | Admitting: STUDENT IN AN ORGANIZED HEALTH CARE EDUCATION/TRAINING PROGRAM
Payer: MEDICARE

## 2022-09-28 DIAGNOSIS — E03.9 HYPOTHYROIDISM, UNSPECIFIED TYPE: ICD-10-CM

## 2022-09-28 DIAGNOSIS — E11.43 DIABETIC AUTONOMIC NEUROPATHY ASSOCIATED WITH TYPE 2 DIABETES MELLITUS (HCC): ICD-10-CM

## 2022-09-28 DIAGNOSIS — F32.A DEPRESSION, UNSPECIFIED DEPRESSION TYPE: ICD-10-CM

## 2022-09-28 DIAGNOSIS — R44.1 VISUAL HALLUCINATIONS: ICD-10-CM

## 2022-09-28 DIAGNOSIS — F03.90 DEMENTIA WITHOUT BEHAVIORAL DISTURBANCE, PSYCHOTIC DISTURBANCE, MOOD DISTURBANCE, OR ANXIETY, UNSPECIFIED DEMENTIA SEVERITY, UNSPECIFIED DEMENTIA TYPE (HCC): ICD-10-CM

## 2022-09-28 DIAGNOSIS — I25.10 CORONARY ARTERY DISEASE INVOLVING NATIVE HEART WITHOUT ANGINA PECTORIS, UNSPECIFIED VESSEL OR LESION TYPE: ICD-10-CM

## 2022-09-28 DIAGNOSIS — I21.4 NSTEMI (NON-ST ELEVATED MYOCARDIAL INFARCTION) (HCC): ICD-10-CM

## 2022-09-28 DIAGNOSIS — I10 PRIMARY HYPERTENSION: ICD-10-CM

## 2022-09-28 DIAGNOSIS — J44.9 CHRONIC OBSTRUCTIVE PULMONARY DISEASE, UNSPECIFIED COPD TYPE (HCC): ICD-10-CM

## 2022-09-28 LAB
ALBUMIN SERPL BCP-MCNC: 4.1 G/DL (ref 3.2–4.9)
ALBUMIN/GLOB SERPL: 1.3 G/DL
ALP SERPL-CCNC: 95 U/L (ref 30–99)
ALT SERPL-CCNC: 18 U/L (ref 2–50)
ANION GAP SERPL CALC-SCNC: 13 MMOL/L (ref 7–16)
APTT PPP: 80.7 SEC (ref 24.7–36)
AST SERPL-CCNC: 15 U/L (ref 12–45)
BASOPHILS # BLD AUTO: 0.4 % (ref 0–1.8)
BASOPHILS # BLD: 0.05 K/UL (ref 0–0.12)
BILIRUB SERPL-MCNC: 0.3 MG/DL (ref 0.1–1.5)
BUN SERPL-MCNC: 32 MG/DL (ref 8–22)
CALCIUM SERPL-MCNC: 9.2 MG/DL (ref 8.5–10.5)
CHLORIDE SERPL-SCNC: 108 MMOL/L (ref 96–112)
CO2 SERPL-SCNC: 20 MMOL/L (ref 20–33)
CREAT SERPL-MCNC: 1.36 MG/DL (ref 0.5–1.4)
EKG IMPRESSION: NORMAL
EOSINOPHIL # BLD AUTO: 0.05 K/UL (ref 0–0.51)
EOSINOPHIL NFR BLD: 0.4 % (ref 0–6.9)
ERYTHROCYTE [DISTWIDTH] IN BLOOD BY AUTOMATED COUNT: 46.6 FL (ref 35.9–50)
GFR SERPLBLD CREATININE-BSD FMLA CKD-EPI: 55 ML/MIN/1.73 M 2
GLOBULIN SER CALC-MCNC: 3.1 G/DL (ref 1.9–3.5)
GLUCOSE SERPL-MCNC: 193 MG/DL (ref 65–99)
HCT VFR BLD AUTO: 38.8 % (ref 42–52)
HGB BLD-MCNC: 13.8 G/DL (ref 14–18)
IMM GRANULOCYTES # BLD AUTO: 0.07 K/UL (ref 0–0.11)
IMM GRANULOCYTES NFR BLD AUTO: 0.6 % (ref 0–0.9)
INR PPP: 1.18 (ref 0.87–1.13)
LYMPHOCYTES # BLD AUTO: 0.86 K/UL (ref 1–4.8)
LYMPHOCYTES NFR BLD: 6.9 % (ref 22–41)
MCH RBC QN AUTO: 31.4 PG (ref 27–33)
MCHC RBC AUTO-ENTMCNC: 35.6 G/DL (ref 33.7–35.3)
MCV RBC AUTO: 88.2 FL (ref 81.4–97.8)
MONOCYTES # BLD AUTO: 0.62 K/UL (ref 0–0.85)
MONOCYTES NFR BLD AUTO: 5 % (ref 0–13.4)
NEUTROPHILS # BLD AUTO: 10.77 K/UL (ref 1.82–7.42)
NEUTROPHILS NFR BLD: 86.7 % (ref 44–72)
NRBC # BLD AUTO: 0 K/UL
NRBC BLD-RTO: 0 /100 WBC
PLATELET # BLD AUTO: 287 K/UL (ref 164–446)
PMV BLD AUTO: 11.6 FL (ref 9–12.9)
POTASSIUM SERPL-SCNC: 5 MMOL/L (ref 3.6–5.5)
PROT SERPL-MCNC: 7.2 G/DL (ref 6–8.2)
PROTHROMBIN TIME: 14.8 SEC (ref 12–14.6)
RBC # BLD AUTO: 4.4 M/UL (ref 4.7–6.1)
SODIUM SERPL-SCNC: 141 MMOL/L (ref 135–145)
TROPONIN T SERPL-MCNC: 47 NG/L (ref 6–19)
UFH PPP CHRO-ACNC: 0.44 IU/ML
WBC # BLD AUTO: 12.4 K/UL (ref 4.8–10.8)

## 2022-09-28 PROCEDURE — 93005 ELECTROCARDIOGRAM TRACING: CPT | Performed by: EMERGENCY MEDICINE

## 2022-09-28 PROCEDURE — 80053 COMPREHEN METABOLIC PANEL: CPT

## 2022-09-28 PROCEDURE — 85610 PROTHROMBIN TIME: CPT

## 2022-09-28 PROCEDURE — 700111 HCHG RX REV CODE 636 W/ 250 OVERRIDE (IP): Performed by: EMERGENCY MEDICINE

## 2022-09-28 PROCEDURE — 85025 COMPLETE CBC W/AUTO DIFF WBC: CPT

## 2022-09-28 PROCEDURE — 96365 THER/PROPH/DIAG IV INF INIT: CPT

## 2022-09-28 PROCEDURE — 85730 THROMBOPLASTIN TIME PARTIAL: CPT

## 2022-09-28 PROCEDURE — 99285 EMERGENCY DEPT VISIT HI MDM: CPT

## 2022-09-28 PROCEDURE — 85520 HEPARIN ASSAY: CPT

## 2022-09-28 PROCEDURE — 84443 ASSAY THYROID STIM HORMONE: CPT

## 2022-09-28 PROCEDURE — 84484 ASSAY OF TROPONIN QUANT: CPT

## 2022-09-28 PROCEDURE — 71045 X-RAY EXAM CHEST 1 VIEW: CPT

## 2022-09-28 PROCEDURE — 36415 COLL VENOUS BLD VENIPUNCTURE: CPT

## 2022-09-28 PROCEDURE — 770020 HCHG ROOM/CARE - TELE (206)

## 2022-09-28 PROCEDURE — 99223 1ST HOSP IP/OBS HIGH 75: CPT | Mod: AI | Performed by: STUDENT IN AN ORGANIZED HEALTH CARE EDUCATION/TRAINING PROGRAM

## 2022-09-28 RX ORDER — ALBUTEROL SULFATE 90 UG/1
2 AEROSOL, METERED RESPIRATORY (INHALATION) EVERY 6 HOURS PRN
Status: ON HOLD | COMMUNITY
End: 2022-10-06 | Stop reason: SDUPTHER

## 2022-09-28 RX ORDER — CARVEDILOL 3.12 MG/1
3.12 TABLET ORAL 2 TIMES DAILY WITH MEALS
Status: DISCONTINUED | OUTPATIENT
Start: 2022-09-29 | End: 2022-09-29

## 2022-09-28 RX ORDER — NITROGLYCERIN 0.4 MG/1
0.4 TABLET SUBLINGUAL
Status: DISCONTINUED | OUTPATIENT
Start: 2022-09-28 | End: 2022-10-06 | Stop reason: HOSPADM

## 2022-09-28 RX ORDER — HEPARIN SODIUM 5000 [USP'U]/100ML
0-30 INJECTION, SOLUTION INTRAVENOUS CONTINUOUS
Status: DISCONTINUED | OUTPATIENT
Start: 2022-09-28 | End: 2022-10-03 | Stop reason: ALTCHOICE

## 2022-09-28 RX ORDER — CARVEDILOL 6.25 MG/1
6.25 TABLET ORAL 2 TIMES DAILY WITH MEALS
Status: DISCONTINUED | OUTPATIENT
Start: 2022-09-29 | End: 2022-09-28

## 2022-09-28 RX ORDER — HEPARIN SODIUM 1000 [USP'U]/ML
2000 INJECTION, SOLUTION INTRAVENOUS; SUBCUTANEOUS PRN
Status: DISCONTINUED | OUTPATIENT
Start: 2022-09-28 | End: 2022-10-03 | Stop reason: ALTCHOICE

## 2022-09-28 RX ORDER — ATORVASTATIN CALCIUM 40 MG/1
40 TABLET, FILM COATED ORAL EVERY EVENING
Status: DISCONTINUED | OUTPATIENT
Start: 2022-09-29 | End: 2022-09-28

## 2022-09-28 RX ORDER — ATORVASTATIN CALCIUM 80 MG/1
80 TABLET, FILM COATED ORAL EVERY EVENING
Status: DISCONTINUED | OUTPATIENT
Start: 2022-09-29 | End: 2022-10-06 | Stop reason: HOSPADM

## 2022-09-28 RX ORDER — CLOPIDOGREL BISULFATE 75 MG/1
75 TABLET ORAL DAILY
Status: ON HOLD | COMMUNITY
End: 2022-10-06 | Stop reason: SDUPTHER

## 2022-09-28 RX ORDER — GABAPENTIN 400 MG/1
400 CAPSULE ORAL 3 TIMES DAILY
Status: DISCONTINUED | OUTPATIENT
Start: 2022-09-29 | End: 2022-10-05

## 2022-09-28 RX ORDER — ALBUTEROL SULFATE 90 UG/1
2 AEROSOL, METERED RESPIRATORY (INHALATION) EVERY 6 HOURS PRN
Status: DISCONTINUED | OUTPATIENT
Start: 2022-09-28 | End: 2022-10-06 | Stop reason: HOSPADM

## 2022-09-28 RX ORDER — LEVOTHYROXINE SODIUM 0.1 MG/1
100 TABLET ORAL
Status: DISCONTINUED | OUTPATIENT
Start: 2022-09-29 | End: 2022-10-06 | Stop reason: HOSPADM

## 2022-09-28 RX ORDER — NIFEDIPINE 30 MG/1
90 TABLET, EXTENDED RELEASE ORAL DAILY
Status: DISCONTINUED | OUTPATIENT
Start: 2022-09-29 | End: 2022-10-06 | Stop reason: HOSPADM

## 2022-09-28 RX ORDER — LISINOPRIL 10 MG/1
10 TABLET ORAL DAILY
Status: DISCONTINUED | OUTPATIENT
Start: 2022-09-29 | End: 2022-09-29

## 2022-09-28 RX ORDER — LISINOPRIL 10 MG/1
10 TABLET ORAL DAILY
Status: ON HOLD | COMMUNITY
End: 2022-10-06

## 2022-09-28 RX ORDER — CLOPIDOGREL BISULFATE 75 MG/1
75 TABLET ORAL DAILY
Status: DISCONTINUED | OUTPATIENT
Start: 2022-09-29 | End: 2022-10-06 | Stop reason: HOSPADM

## 2022-09-28 RX ORDER — ESCITALOPRAM OXALATE 10 MG/1
20 TABLET ORAL DAILY
Status: DISCONTINUED | OUTPATIENT
Start: 2022-09-29 | End: 2022-10-06 | Stop reason: HOSPADM

## 2022-09-28 RX ORDER — OMEPRAZOLE 20 MG/1
20 CAPSULE, DELAYED RELEASE ORAL DAILY
Status: DISCONTINUED | OUTPATIENT
Start: 2022-09-29 | End: 2022-10-06 | Stop reason: HOSPADM

## 2022-09-28 RX ORDER — LEVOTHYROXINE SODIUM 0.1 MG/1
100 TABLET ORAL
Status: ON HOLD | COMMUNITY
End: 2022-10-06 | Stop reason: SDUPTHER

## 2022-09-28 RX ORDER — NIFEDIPINE 90 MG/1
90 TABLET, EXTENDED RELEASE ORAL DAILY
Status: ON HOLD | COMMUNITY
End: 2022-10-06 | Stop reason: SDUPTHER

## 2022-09-28 RX ADMIN — HEPARIN SODIUM 12 UNITS/KG/HR: 5000 INJECTION, SOLUTION INTRAVENOUS at 23:02

## 2022-09-28 ASSESSMENT — FIBROSIS 4 INDEX: FIB4 SCORE: 2.26

## 2022-09-29 ENCOUNTER — APPOINTMENT (OUTPATIENT)
Dept: CARDIOLOGY | Facility: MEDICAL CENTER | Age: 72
DRG: 280 | End: 2022-09-29
Attending: INTERNAL MEDICINE
Payer: MEDICARE

## 2022-09-29 LAB
CHOLEST SERPL-MCNC: 152 MG/DL (ref 100–199)
EKG IMPRESSION: NORMAL
EST. AVERAGE GLUCOSE BLD GHB EST-MCNC: 174 MG/DL
GLUCOSE BLD STRIP.AUTO-MCNC: 103 MG/DL (ref 65–99)
GLUCOSE BLD STRIP.AUTO-MCNC: 105 MG/DL (ref 65–99)
GLUCOSE BLD STRIP.AUTO-MCNC: 111 MG/DL (ref 65–99)
GLUCOSE BLD STRIP.AUTO-MCNC: 127 MG/DL (ref 65–99)
GLUCOSE BLD STRIP.AUTO-MCNC: 83 MG/DL (ref 65–99)
HBA1C MFR BLD: 7.7 % (ref 4–5.6)
HDLC SERPL-MCNC: 35 MG/DL
LDLC SERPL CALC-MCNC: 87 MG/DL
MAGNESIUM SERPL-MCNC: 1.8 MG/DL (ref 1.5–2.5)
NT-PROBNP SERPL IA-MCNC: 1193 PG/ML (ref 0–125)
TRIGL SERPL-MCNC: 148 MG/DL (ref 0–149)
TROPONIN T SERPL-MCNC: 61 NG/L (ref 6–19)
TSH SERPL DL<=0.005 MIU/L-ACNC: 0.45 UIU/ML (ref 0.38–5.33)
UFH PPP CHRO-ACNC: 0.43 IU/ML
UFH PPP CHRO-ACNC: 0.44 IU/ML

## 2022-09-29 PROCEDURE — 85520 HEPARIN ASSAY: CPT

## 2022-09-29 PROCEDURE — A9270 NON-COVERED ITEM OR SERVICE: HCPCS

## 2022-09-29 PROCEDURE — A9270 NON-COVERED ITEM OR SERVICE: HCPCS | Performed by: INTERNAL MEDICINE

## 2022-09-29 PROCEDURE — 700102 HCHG RX REV CODE 250 W/ 637 OVERRIDE(OP): Performed by: STUDENT IN AN ORGANIZED HEALTH CARE EDUCATION/TRAINING PROGRAM

## 2022-09-29 PROCEDURE — A9270 NON-COVERED ITEM OR SERVICE: HCPCS | Performed by: STUDENT IN AN ORGANIZED HEALTH CARE EDUCATION/TRAINING PROGRAM

## 2022-09-29 PROCEDURE — 700102 HCHG RX REV CODE 250 W/ 637 OVERRIDE(OP)

## 2022-09-29 PROCEDURE — 770020 HCHG ROOM/CARE - TELE (206)

## 2022-09-29 PROCEDURE — 80061 LIPID PANEL: CPT

## 2022-09-29 PROCEDURE — 700102 HCHG RX REV CODE 250 W/ 637 OVERRIDE(OP): Performed by: INTERNAL MEDICINE

## 2022-09-29 PROCEDURE — 36415 COLL VENOUS BLD VENIPUNCTURE: CPT

## 2022-09-29 PROCEDURE — 93005 ELECTROCARDIOGRAM TRACING: CPT | Performed by: STUDENT IN AN ORGANIZED HEALTH CARE EDUCATION/TRAINING PROGRAM

## 2022-09-29 PROCEDURE — 84484 ASSAY OF TROPONIN QUANT: CPT

## 2022-09-29 PROCEDURE — 83880 ASSAY OF NATRIURETIC PEPTIDE: CPT

## 2022-09-29 PROCEDURE — 96366 THER/PROPH/DIAG IV INF ADDON: CPT

## 2022-09-29 PROCEDURE — 99233 SBSQ HOSP IP/OBS HIGH 50: CPT | Performed by: STUDENT IN AN ORGANIZED HEALTH CARE EDUCATION/TRAINING PROGRAM

## 2022-09-29 PROCEDURE — 82962 GLUCOSE BLOOD TEST: CPT

## 2022-09-29 PROCEDURE — 83036 HEMOGLOBIN GLYCOSYLATED A1C: CPT

## 2022-09-29 PROCEDURE — 83735 ASSAY OF MAGNESIUM: CPT

## 2022-09-29 PROCEDURE — 93005 ELECTROCARDIOGRAM TRACING: CPT | Performed by: INTERNAL MEDICINE

## 2022-09-29 PROCEDURE — 99223 1ST HOSP IP/OBS HIGH 75: CPT | Performed by: INTERNAL MEDICINE

## 2022-09-29 RX ORDER — ATORVASTATIN CALCIUM 80 MG/1
TABLET, FILM COATED ORAL
Status: COMPLETED
Start: 2022-09-29 | End: 2022-09-29

## 2022-09-29 RX ORDER — LISINOPRIL 10 MG/1
10 TABLET ORAL TWICE DAILY
Status: DISCONTINUED | OUTPATIENT
Start: 2022-09-29 | End: 2022-09-30

## 2022-09-29 RX ORDER — DEXTROSE MONOHYDRATE 25 G/50ML
25 INJECTION, SOLUTION INTRAVENOUS
Status: DISCONTINUED | OUTPATIENT
Start: 2022-09-29 | End: 2022-10-06 | Stop reason: HOSPADM

## 2022-09-29 RX ADMIN — LEVOTHYROXINE SODIUM 100 MCG: 0.1 TABLET ORAL at 06:37

## 2022-09-29 RX ADMIN — ATORVASTATIN CALCIUM 80 MG: 80 TABLET, FILM COATED ORAL at 16:34

## 2022-09-29 RX ADMIN — CARVEDILOL 3.12 MG: 3.12 TABLET, FILM COATED ORAL at 08:01

## 2022-09-29 RX ADMIN — CARVEDILOL 3.12 MG: 3.12 TABLET, FILM COATED ORAL at 16:34

## 2022-09-29 RX ADMIN — GABAPENTIN 400 MG: 400 CAPSULE ORAL at 15:16

## 2022-09-29 RX ADMIN — LISINOPRIL 10 MG: 10 TABLET ORAL at 17:37

## 2022-09-29 RX ADMIN — GABAPENTIN 400 MG: 400 CAPSULE ORAL at 09:18

## 2022-09-29 RX ADMIN — ESCITALOPRAM OXALATE 20 MG: 10 TABLET ORAL at 06:36

## 2022-09-29 RX ADMIN — INSULIN GLARGINE-YFGN 20 UNITS: 100 INJECTION, SOLUTION SUBCUTANEOUS at 17:38

## 2022-09-29 RX ADMIN — OMEPRAZOLE 20 MG: 20 CAPSULE, DELAYED RELEASE ORAL at 06:37

## 2022-09-29 RX ADMIN — LISINOPRIL 10 MG: 10 TABLET ORAL at 06:37

## 2022-09-29 RX ADMIN — ASPIRIN 81 MG: 81 TABLET, COATED ORAL at 06:36

## 2022-09-29 RX ADMIN — GABAPENTIN 400 MG: 400 CAPSULE ORAL at 20:13

## 2022-09-29 ASSESSMENT — ENCOUNTER SYMPTOMS
BACK PAIN: 0
EYES NEGATIVE: 1
SHORTNESS OF BREATH: 1
WEAKNESS: 0
CONSTIPATION: 0
MYALGIAS: 0
CHILLS: 0
INSOMNIA: 0
SINUS PAIN: 0
NERVOUS/ANXIOUS: 0
VOMITING: 0
PALPITATIONS: 0
HALLUCINATIONS: 0
COUGH: 1
PND: 0
BLURRED VISION: 0
PHOTOPHOBIA: 0
NECK PAIN: 0
BLOOD IN STOOL: 0
SHORTNESS OF BREATH: 0
ABDOMINAL PAIN: 0
FOCAL WEAKNESS: 0
MEMORY LOSS: 1
ORTHOPNEA: 0
TINGLING: 0
HEADACHES: 0
NAUSEA: 0
FEVER: 0
SPEECH CHANGE: 0
WEIGHT LOSS: 0
SPUTUM PRODUCTION: 0
COUGH: 0
WHEEZING: 0
TREMORS: 0
HEMOPTYSIS: 0
DIZZINESS: 0
LOSS OF CONSCIOUSNESS: 0
CLAUDICATION: 0
DEPRESSION: 0
EYE PAIN: 0
SORE THROAT: 0
EYE REDNESS: 0
STRIDOR: 0
SEIZURES: 0
HEARTBURN: 0
SENSORY CHANGE: 0

## 2022-09-29 ASSESSMENT — PATIENT HEALTH QUESTIONNAIRE - PHQ9
2. FEELING DOWN, DEPRESSED, IRRITABLE, OR HOPELESS: NOT AT ALL
SUM OF ALL RESPONSES TO PHQ9 QUESTIONS 1 AND 2: 0
1. LITTLE INTEREST OR PLEASURE IN DOING THINGS: NOT AT ALL

## 2022-09-29 ASSESSMENT — COGNITIVE AND FUNCTIONAL STATUS - GENERAL
MOVING TO AND FROM BED TO CHAIR: A LITTLE
CLIMB 3 TO 5 STEPS WITH RAILING: A LOT
TURNING FROM BACK TO SIDE WHILE IN FLAT BAD: A LITTLE
SUGGESTED CMS G CODE MODIFIER MOBILITY: CK
DRESSING REGULAR LOWER BODY CLOTHING: A LITTLE
MOBILITY SCORE: 17
DAILY ACTIVITIY SCORE: 23
MOVING FROM LYING ON BACK TO SITTING ON SIDE OF FLAT BED: A LITTLE
SUGGESTED CMS G CODE MODIFIER DAILY ACTIVITY: CI
STANDING UP FROM CHAIR USING ARMS: A LITTLE
WALKING IN HOSPITAL ROOM: A LITTLE

## 2022-09-29 ASSESSMENT — LIFESTYLE VARIABLES
DOES PATIENT WANT TO STOP DRINKING: NO
ALCOHOL_USE: NO
CONSUMPTION TOTAL: NEGATIVE
EVER HAD A DRINK FIRST THING IN THE MORNING TO STEADY YOUR NERVES TO GET RID OF A HANGOVER: NO
EVER FELT BAD OR GUILTY ABOUT YOUR DRINKING: NO
ON A TYPICAL DAY WHEN YOU DRINK ALCOHOL HOW MANY DRINKS DO YOU HAVE: 0
HAVE PEOPLE ANNOYED YOU BY CRITICIZING YOUR DRINKING: NO
TOTAL SCORE: 0
HAVE YOU EVER FELT YOU SHOULD CUT DOWN ON YOUR DRINKING: NO
SUBSTANCE_ABUSE: 0
TOTAL SCORE: 0
HOW MANY TIMES IN THE PAST YEAR HAVE YOU HAD 5 OR MORE DRINKS IN A DAY: 0
TOTAL SCORE: 0
AVERAGE NUMBER OF DAYS PER WEEK YOU HAVE A DRINK CONTAINING ALCOHOL: 0

## 2022-09-29 ASSESSMENT — FIBROSIS 4 INDEX
FIB4 SCORE: 0.89
FIB4 SCORE: 0.89

## 2022-09-29 NOTE — ED NOTES
Pt resting in bed, pt airway patent, rr even and unlabored, denies any chest pain at this time. Pt aox3.

## 2022-09-29 NOTE — PROGRESS NOTES
Reason for Consult:  Asked by Dr Ady Shaw M.D. to see this patient with NSTEMI    CC:   Chief Complaint   Patient presents with    Chest Pain     BIB EMS from [facility]. Pt was about to have cataract surgery today. Pt was hypertensive, they gave pt an antihypertensive then he developed severe chest pain with an elevated troponin.        HPI:  Mr. Sulaiman Ceja is a 72 year old male who presented to an outside facility with chest pain. Patient has a history of CABG in 1990s (post stent), diabetes type 2, hypothyroidism, hypertension and CKD stage III.   Patient has had two days of chest pain and a sore throat. He fees as if he had chest tightness and rates the pain as a 8/10. He was given some medication at the outside facility that he states helped. He also has been having shortness of breath. He also has a headache.   In outside facility, patient was found to have elevated troponin and no ST elevation on ECG. He was transferred in anticipation of cardiac catherization.   Today, troponin was 67. Patient overall states that he is feeling well, but has some chest pain. Patient's answers were at times tangential. He does have  a history of dementia.     Medications / Drug list prior to admission:  No current facility-administered medications on file prior to encounter.     Current Outpatient Medications on File Prior to Encounter   Medication Sig Dispense Refill    levothyroxine (SYNTHROID) 100 MCG Tab Take 100 mcg by mouth every morning on an empty stomach.      clopidogrel (PLAVIX) 75 MG Tab Take 75 mg by mouth every day.      lisinopril (PRINIVIL) 10 MG Tab Take 10 mg by mouth every day.      NIFEdipine SR (PROCADIA-XL) 90 MG CR tablet Take 90 mg by mouth every day.      Vitamin D3 5000 Unit (125 mcg) Tab Take 5,000 Units by mouth every day.      albuterol 108 (90 Base) MCG/ACT Aero Soln inhalation aerosol Inhale 2 Puffs every 6 hours as needed for Shortness of Breath.      atorvastatin (LIPITOR) 40  MG Tab Take 1 Tablet by mouth every evening. Increased dose 30 Tablet 0    aspirin 81 MG EC tablet Take 1 Tablet by mouth every day. For heart health 30 Tablet 0    escitalopram (LEXAPRO) 20 MG tablet Take 20 mg by mouth every day.      insulin detemir (LEVEMIR) 100 UNIT/ML Solution Inject 15-50 Units under the skin 2 times a day. 50 units every morning  15 units every night      insulin regular (HUMULIN R) 100 Unit/mL Solution Inject 2-20 Units under the skin 4 Times a Day,Before Meals and at Bedtime. Pt unable to define Sliding scale      omeprazole (PRILOSEC) 20 MG delayed-release capsule Take 20 mg by mouth every day.      gabapentin (NEURONTIN) 800 MG tablet Take 400 mg by mouth 3 times a day. Half tablet = 400 mg         Current list of administered Medications:    Current Facility-Administered Medications:     insulin regular (HumuLIN R,NovoLIN R) injection, 1-6 Units, Subcutaneous, Q6HRS **AND** POC blood glucose manual result, , , Q6H **AND** NOTIFY MD and PharmD, , , Once **AND** Administer 20 grams of glucose (approximately 8 ounces of fruit juice) every 15 minutes PRN FSBG less than 70 mg/dL, , , PRN **AND** dextrose 50% (D50W) injection 25 g, 25 g, Intravenous, Q15 MIN PRN, Padilla Morrison M.D.    insulin GLARGINE (Lantus,Semglee) injection, 20 Units, Subcutaneous, Q EVENING, Padilla Morrison M.D.    heparin infusion 25,000 units in 500 mL 0.45% NACL, 0-30 Units/kg/hr, Intravenous, Continuous, Lidia Alcala DJACKIE, Last Rate: 19.6 mL/hr at 09/28/22 2302, 12 Units/kg/hr at 09/28/22 2302    heparin injection 2,000 Units, 2,000 Units, Intravenous, PRN, JEANNA Willis.OFreeman    albuterol inhaler 2 Puff, 2 Puff, Inhalation, Q6HRS PRN, Iam Hsieh M.D.    aspirin EC (ECOTRIN) tablet 81 mg, 81 mg, Oral, DAILY, Iam Hsieh M.D., 81 mg at 09/29/22 0636    clopidogrel (PLAVIX) tablet 75 mg, 75 mg, Oral, DAILY, Iam Hsieh M.D.    escitalopram (Lexapro) tablet 20 mg, 20 mg, Oral, DAILY, Iam BELLE  MATTHEW Hsieh, 20 mg at 09/29/22 0636    gabapentin (NEURONTIN) capsule 400 mg, 400 mg, Oral, TID, Iam Hsieh M.D., 400 mg at 09/29/22 0918    levothyroxine (SYNTHROID) tablet 100 mcg, 100 mcg, Oral, AM ES, Iam Hsieh M.D., 100 mcg at 09/29/22 0637    lisinopril (PRINIVIL) tablet 10 mg, 10 mg, Oral, DAILY, Iam Hsieh M.D., 10 mg at 09/29/22 0637    omeprazole (PRILOSEC) capsule 20 mg, 20 mg, Oral, DAILY, Iam Hsieh M.D., 20 mg at 09/29/22 0637    NIFEdipine SR (PROCARDIA-XL) tablet 90 mg, 90 mg, Oral, DAILY, Iam Hsieh M.D.    nitroglycerin (NITROSTAT) tablet 0.4 mg, 0.4 mg, Sublingual, Q5 MIN PRN, Iam Hsieh M.D.    carvedilol (COREG) tablet 3.125 mg, 3.125 mg, Oral, BID WITH MEALS, Padilla Morrison M.D., 3.125 mg at 09/29/22 0801    atorvastatin (LIPITOR) tablet 80 mg, 80 mg, Oral, Q EVENING, Padilla Morrison M.D.    Current Outpatient Medications:     levothyroxine (SYNTHROID) 100 MCG Tab, Take 100 mcg by mouth every morning on an empty stomach., Disp: , Rfl:     clopidogrel (PLAVIX) 75 MG Tab, Take 75 mg by mouth every day., Disp: , Rfl:     lisinopril (PRINIVIL) 10 MG Tab, Take 10 mg by mouth every day., Disp: , Rfl:     NIFEdipine SR (PROCADIA-XL) 90 MG CR tablet, Take 90 mg by mouth every day., Disp: , Rfl:     Vitamin D3 5000 Unit (125 mcg) Tab, Take 5,000 Units by mouth every day., Disp: , Rfl:     albuterol 108 (90 Base) MCG/ACT Aero Soln inhalation aerosol, Inhale 2 Puffs every 6 hours as needed for Shortness of Breath., Disp: , Rfl:     atorvastatin (LIPITOR) 40 MG Tab, Take 1 Tablet by mouth every evening. Increased dose, Disp: 30 Tablet, Rfl: 0    aspirin 81 MG EC tablet, Take 1 Tablet by mouth every day. For heart health, Disp: 30 Tablet, Rfl: 0    escitalopram (LEXAPRO) 20 MG tablet, Take 20 mg by mouth every day., Disp: , Rfl:     insulin detemir (LEVEMIR) 100 UNIT/ML Solution, Inject 15-50 Units under the skin 2 times a day. 50 units every morning  15 units every night, Disp: , Rfl:     insulin regular (HUMULIN R) 100 Unit/mL Solution, Inject 2-20 Units under the skin 4 Times a Day,Before Meals and at Bedtime. Pt unable to define Sliding scale, Disp: , Rfl:     omeprazole (PRILOSEC) 20 MG delayed-release capsule, Take 20 mg by mouth every day., Disp: , Rfl:     gabapentin (NEURONTIN) 800 MG tablet, Take 400 mg by mouth 3 times a day. Half tablet = 400 mg, Disp: , Rfl:     Past Medical History:   Diagnosis Date    CAD (coronary artery disease)     CABG 90's       History reviewed. No pertinent surgical history.    History reviewed. No pertinent family history.  Patient family history was personally reviewed, no pertinent family history to current presentation    Social History     Socioeconomic History    Marital status: Single     Spouse name: Not on file    Number of children: Not on file    Years of education: Not on file    Highest education level: Not on file   Occupational History    Not on file   Tobacco Use    Smoking status: Former     Types: Cigarettes     Start date: 3/1/1996    Smokeless tobacco: Never   Vaping Use    Vaping Use: Never used   Substance and Sexual Activity    Alcohol use: Never    Drug use: Never    Sexual activity: Not on file   Other Topics Concern    Not on file   Social History Narrative    Not on file     Social Determinants of Health     Financial Resource Strain: Not on file   Food Insecurity: Not on file   Transportation Needs: Not on file   Physical Activity: Not on file   Stress: Not on file   Social Connections: Not on file   Intimate Partner Violence: Not on file   Housing Stability: Not on file       ALLERGIES:  No Known Allergies    Review of systems:  A complete review of symptoms was reviewed with patient. This is reviewed in H&P and PMH. ALL OTHERS reviewed and negative    Physical exam:  Patient Vitals for the past 24 hrs:   BP Temp Temp src Pulse Resp SpO2 Height Weight   09/29/22 1000 (!) 171/77 -- -- 72 16 93 %  "-- --   09/29/22 0900 (!) 167/76 -- -- 73 16 93 % -- --   09/29/22 0800 (!) 169/73 -- -- 70 18 93 % -- --   09/29/22 0701 (!) 168/77 -- -- (!) 56 14 96 % -- --   09/29/22 0652 -- -- -- (!) 53 -- 97 % -- --   09/29/22 0600 (!) 179/80 -- -- 78 -- 95 % -- --   09/29/22 0301 -- -- -- -- 14 -- -- --   09/29/22 0300 (!) 152/71 -- -- 74 -- 96 % -- --   09/29/22 0201 -- -- -- -- 16 -- -- --   09/29/22 0200 (!) 155/82 -- -- 69 -- 96 % -- --   09/29/22 0100 (!) 163/72 -- -- 73 16 97 % -- --   09/29/22 0030 -- -- -- 77 16 96 % -- --   09/29/22 0000 (!) 179/81 -- -- (!) 54 19 97 % -- --   09/28/22 2335 (!) 170/94 -- -- 68 20 96 % -- --   09/28/22 2250 (!) 179/81 -- -- 80 15 95 % -- --   09/28/22 2117 (!) 186/86 -- -- 93 16 97 % -- --   09/28/22 2114 -- 36.9 °C (98.5 °F) Temporal -- -- -- 1.753 m (5' 9\") 81.6 kg (180 lb)     General: No acute distress, sitting in bed  EYES: no jaundice  HEENT: OP clear   Neck: No JVD.   CVS:  RRR. S1 + S2. No M/R/G. No edema.  Resp: CTAB. No wheezing or crackles/rhonchi.  Abdomen: Soft, NT, ND,  Skin: Grossly nothing acute no obvious rashes  Neurological: Alert, Moves all extremities, no cranial nerve defects on limited exam  Extremities: Pulse 2+ in b/l LE. No cyanosis.     Data:  Laboratory studies personally reviewed by me:  Recent Results (from the past 24 hour(s))   CBC with Differential    Collection Time: 09/28/22  9:17 PM   Result Value Ref Range    WBC 12.4 (H) 4.8 - 10.8 K/uL    RBC 4.40 (L) 4.70 - 6.10 M/uL    Hemoglobin 13.8 (L) 14.0 - 18.0 g/dL    Hematocrit 38.8 (L) 42.0 - 52.0 %    MCV 88.2 81.4 - 97.8 fL    MCH 31.4 27.0 - 33.0 pg    MCHC 35.6 (H) 33.7 - 35.3 g/dL    RDW 46.6 35.9 - 50.0 fL    Platelet Count 287 164 - 446 K/uL    MPV 11.6 9.0 - 12.9 fL    Neutrophils-Polys 86.70 (H) 44.00 - 72.00 %    Lymphocytes 6.90 (L) 22.00 - 41.00 %    Monocytes 5.00 0.00 - 13.40 %    Eosinophils 0.40 0.00 - 6.90 %    Basophils 0.40 0.00 - 1.80 %    Immature Granulocytes 0.60 0.00 - 0.90 % "    Nucleated RBC 0.00 /100 WBC    Neutrophils (Absolute) 10.77 (H) 1.82 - 7.42 K/uL    Lymphs (Absolute) 0.86 (L) 1.00 - 4.80 K/uL    Monos (Absolute) 0.62 0.00 - 0.85 K/uL    Eos (Absolute) 0.05 0.00 - 0.51 K/uL    Baso (Absolute) 0.05 0.00 - 0.12 K/uL    Immature Granulocytes (abs) 0.07 0.00 - 0.11 K/uL    NRBC (Absolute) 0.00 K/uL   Complete Metabolic Panel (CMP)    Collection Time: 09/28/22  9:17 PM   Result Value Ref Range    Sodium 141 135 - 145 mmol/L    Potassium 5.0 3.6 - 5.5 mmol/L    Chloride 108 96 - 112 mmol/L    Co2 20 20 - 33 mmol/L    Anion Gap 13.0 7.0 - 16.0    Glucose 193 (H) 65 - 99 mg/dL    Bun 32 (H) 8 - 22 mg/dL    Creatinine 1.36 0.50 - 1.40 mg/dL    Calcium 9.2 8.5 - 10.5 mg/dL    AST(SGOT) 15 12 - 45 U/L    ALT(SGPT) 18 2 - 50 U/L    Alkaline Phosphatase 95 30 - 99 U/L    Total Bilirubin 0.3 0.1 - 1.5 mg/dL    Albumin 4.1 3.2 - 4.9 g/dL    Total Protein 7.2 6.0 - 8.2 g/dL    Globulin 3.1 1.9 - 3.5 g/dL    A-G Ratio 1.3 g/dL   Troponin    Collection Time: 09/28/22  9:17 PM   Result Value Ref Range    Troponin T 47 (H) 6 - 19 ng/L   ESTIMATED GFR    Collection Time: 09/28/22  9:17 PM   Result Value Ref Range    GFR (CKD-EPI) 55 (A) >60 mL/min/1.73 m 2   aPTT    Collection Time: 09/28/22  9:17 PM   Result Value Ref Range    APTT 80.7 (H) 24.7 - 36.0 sec   Prothrombin Time    Collection Time: 09/28/22  9:17 PM   Result Value Ref Range    PT 14.8 (H) 12.0 - 14.6 sec    INR 1.18 (H) 0.87 - 1.13   Heparin Xa (Unfractionated)    Collection Time: 09/28/22  9:17 PM   Result Value Ref Range    Heparin Xa (UFH) 0.44 IU/mL   TSH WITH REFLEX TO FT4    Collection Time: 09/28/22  9:17 PM   Result Value Ref Range    TSH 0.450 0.380 - 5.330 uIU/mL   EKG    Collection Time: 09/28/22  9:45 PM   Result Value Ref Range    Report       [facility]    Test Date:  2022-09-28  Pt Name:    VEAN ADRIEN               Department: ER  MRN:        [id number]                      Room:        GR 25  Gender:     Male                         Technician: [id number]  :        [id number]                   Requested By:ER TRIAGE PROTOCOL  Order #:    [id number]                    Reading MD: Lidia Alcala    Measurements  Intervals                                Axis  Rate:       79                           P:          -27  NJ:         237                          QRS:        -46  QRSD:       100                          T:          26  QT:         422  QTc:        484    Interpretive Statements  Sinus rhythm  Ventricular trigeminy  Prolonged NJ interval  Probable left atrial enlargement  Abnormal R-wave progression, late transition  Borderline prolonged QT interval  Compared to ECG 2022 10:16:26  First degree AV block now present  Left anterior fascicular block no longer present  Electronically Sig padmini On 2022 21:57:12 PDT by Lidia Alcala     POCT glucose device results    Collection Time: 22  1:17 AM   Result Value Ref Range    POC Glucose, Blood 127 (H) 65 - 99 mg/dL   Troponin in four (4) hours    Collection Time: 22  2:26 AM   Result Value Ref Range    Troponin T 61 (H) 6 - 19 ng/L   Lipid Profile (Tomorrow AM)    Collection Time: 22  2:26 AM   Result Value Ref Range    Cholesterol,Tot 152 100 - 199 mg/dL    Triglycerides 148 0 - 149 mg/dL    HDL 35 (A) >=40 mg/dL    LDL 87 <100 mg/dL   MAGNESIUM    Collection Time: 22  2:26 AM   Result Value Ref Range    Magnesium 1.8 1.5 - 2.5 mg/dL   proBrain Natriuretic Peptide, NT    Collection Time: 22  2:26 AM   Result Value Ref Range    NT-proBNP 1193 (H) 0 - 125 pg/mL   Heparin Anti-Xa    Collection Time: 22  5:00 AM   Result Value Ref Range    Heparin Xa (UFH) 0.43 IU/mL   HEMOGLOBIN A1C    Collection Time: 22  5:00 AM   Result Value Ref Range    Glycohemoglobin 7.7 (H) 4.0 - 5.6 %    Est Avg Glucose 174 mg/dL   POCT glucose device results    Collection Time: 22  6:36 AM   Result Value Ref Range    POC  Glucose, Blood 105 (H) 65 - 99 mg/dL         All pertinent features of laboratory and imaging reviewed including primary images where applicable      Principal Problem:    NSTEMI (non-ST elevated myocardial infarction) ([facility]) POA: Yes  Active Problems:    Bradycardia POA: Yes    CKD (chronic kidney disease) stage 3, GFR 30-59 ml/min ([facility]) POA: Yes    Insulin dependent type 2 diabetes mellitus ([facility]) POA: Yes    HLD (hyperlipidemia) POA: Yes    HTN (hypertension) POA: Yes    Normocytic anemia POA: Yes    Diabetic neuropathy ([facility]) POA: Yes    Depression POA: Yes    Dementia ([facility]) POA: Yes    COPD (chronic obstructive pulmonary disease) ([facility]) POA: Yes    Hypothyroidism POA: Yes  Resolved Problems:    * No resolved hospital problems. *      Assessment / Plan:   Mr. Sulaiman Ceja is a 72 year old male with a past medical history of CABG in 1990, with stents, dementia IDDM2, hypothyroidism and hypertension. He was transferred from an outside facility with elevated troponins and ECG without ST elevation. Patient will tentatively have left heart catherization today.     #NSTEMI  #History of CABG (1990s)  #CAD    Plan:  -Trend troponin  -Left heart catherization  -Asprin 81 mg  -Atorvastatin 80 mg  -Carvedilol 3.125 mg  -Lisinopril 10 mg  -Nifedipine 90 mg  -Nitroglycerine 0.4 mg  -Clopidogrel 75 mg  -Heparin gtt              I personally discussed his case with Dr. Darwin Wesley    It is my pleasure to participate in the care of Mr. Ceja.  Please do not hesitate to contact me with questions or concerns.    Sarina Romero MD, PGY2  Cardiologist [facility]

## 2022-09-29 NOTE — ED TRIAGE NOTES
"Chief Complaint   Patient presents with    Chest Pain     BIB EMS from [facility]. Pt was about to have cataract surgery today. Pt was hypertensive, they gave pt an antihypertensive then he developed severe chest pain with an elevated troponin.      Hx of CAD, DM and dementia    BP (!) 186/86   Pulse 93   Temp 36.9 °C (98.5 °F) (Temporal)   Resp 16   Ht 1.753 m (5' 9\")   Wt 81.6 kg (180 lb)   SpO2 97%   BMI 26.58 kg/m²     "

## 2022-09-29 NOTE — ED NOTES
Med rec completed per patients daughter, Tricia, on landline at   [phone]  Allergies reviewed  No PO antibiotics in the last 30 days

## 2022-09-29 NOTE — H&P
[facility] Internal Medicine History & Physical Note    Date of Service  9/28/2022    [facility] Team: [facility]   Attending: Lidia Alcala D.o.  Senior Resident: Dr. Morrison  Contact Number: [phone]    Primary Care Physician  No primary care provider on file.    Consultants  Cardiology    Code Status  Prior    Chief Complaint  Chief Complaint   Patient presents with    Chest Pain     BIB EMS from [facility]. Pt was about to have cataract surgery today. Pt was hypertensive, they gave pt an antihypertensive then he developed severe chest pain with an elevated troponin.        History of Presenting Illness (HPI):  Sulaiman Ceja is a 72 y.o.  male here with history of coronary artery disease status post CABG in the 1990s w/stents, dementia, IDDM 2, hypothyroidism on Synthroid, hypertension on chronic clonidine and beta-blockers, and CKD stage III, who presented from [facility] where he is found to have an elevated troponin in the 40s, baseline, no other new known cardiologist recommended transfer for cardiac cath, with [facility] planning for catheterization in the morning.  Patient states that he has had 2 days of worsening chest pain lasting 2 to 3 hours at a time described as a pressure radiating sometimes near arm into esophagus which has been like his previous attacks, at rest and on exertion, no lower extremity edema, no recent viral illnesses.  Patient was given morphine and started on a heparin drip, on aspirin and Plavix as well as high intensity statin and Zetia at baseline.EKG without concern for ST elevations, trigeminy, similar to previous, patient hemodynamically stable blood pressure in the 170s, with beta-blocker addition and down into 150s, no major electrolyte abnormalities on exam, creatinine at baseline, CBC with mild leukocytosis of 12.4 thousand likely secondary to stress chronic mild anemia of 13.8 normocytic.  INR mildly elevated at 1.18.    I discussed the plan of care with patient.    Review of  "Systems  Review of Systems   Constitutional:  Negative for chills, fever, malaise/fatigue and weight loss.   HENT:  Negative for congestion, ear pain, hearing loss, nosebleeds, sinus pain, sore throat and tinnitus.    Eyes:  Negative for blurred vision, photophobia, pain and redness.   Respiratory:  Negative for cough, hemoptysis, sputum production, shortness of breath, wheezing and stridor.    Cardiovascular:  Positive for chest pain. Negative for palpitations, orthopnea, claudication, leg swelling and PND.   Gastrointestinal:  Negative for abdominal pain, blood in stool, constipation, heartburn, nausea and vomiting.   Genitourinary:  Negative for dysuria, frequency, hematuria and urgency.   Musculoskeletal:  Negative for back pain, joint pain, myalgias and neck pain.   Skin:  Negative for itching and rash.   Neurological:  Negative for dizziness, tingling, tremors, sensory change, speech change, focal weakness, seizures, loss of consciousness, weakness and headaches.   Psychiatric/Behavioral:  Positive for memory loss. Negative for depression, hallucinations, substance abuse and suicidal ideas. The patient is not nervous/anxious and does not have insomnia.    12 point review systems negative except for as above.  Past Medical History   has a past medical history of CAD (coronary artery disease).    Surgical History  CABG in 1990    Family History  family history is not on file.   Family history reviewed with patient.     Social History  Tobacco: Greater than 30-pack-year history, quit greater than 15 years ago  Alcohol: Denies any alcohol use  Recreational drugs (illegal or prescription): Denies any recreational drug use  Employment: Retired former   Living Situation: Lives in [facility] with his daughter in law, who helps patient \"remember stuff good.\"  Recent Travel: Denies recent travel  Primary Care Provider: Reviewed hilario  Other (stressors, spirituality, exposures): Denies    Allergies  No Known " Allergies    Medications  Prior to Admission Medications   Prescriptions Last Dose Informant Patient Reported? Taking?   NIFEdipine SR (PROCADIA-XL) 90 MG CR tablet 9/27/2022 at AM Family Member Yes Yes   Sig: Take 90 mg by mouth every day.   Vitamin D3 5000 Unit (125 mcg) Tab 9/27/2022 at AM Family Member Yes Yes   Sig: Take 5,000 Units by mouth every day.   albuterol 108 (90 Base) MCG/ACT Aero Soln inhalation aerosol 9/27/2022 at PM Family Member Yes Yes   Sig: Inhale 2 Puffs every 6 hours as needed for Shortness of Breath.   aspirin 81 MG EC tablet 9/27/2022 at AM Family Member No No   Sig: Take 1 Tablet by mouth every day. For heart health   atorvastatin (LIPITOR) 40 MG Tab 9/27/2022 at PM Family Member No No   Sig: Take 1 Tablet by mouth every evening. Increased dose   clopidogrel (PLAVIX) 75 MG Tab 9/27/2022 at AM Family Member Yes Yes   Sig: Take 75 mg by mouth every day.   escitalopram (LEXAPRO) 20 MG tablet 9/27/2022 at AM Family Member Yes No   Sig: Take 20 mg by mouth every day.   gabapentin (NEURONTIN) 800 MG tablet 9/27/2022 at AM Family Member Yes No   Sig: Take 400 mg by mouth 3 times a day. Half tablet = 400 mg   insulin detemir (LEVEMIR) 100 UNIT/ML Solution 9/27/2022 at PM Family Member Yes No   Sig: Inject 15-50 Units under the skin 2 times a day. 50 units every morning  15 units every night   insulin regular (HUMULIN R) 100 Unit/mL Solution 9/27/2022 at PM Family Member Yes No   Sig: Inject 2-20 Units under the skin 4 Times a Day,Before Meals and at Bedtime. Pt unable to define Sliding scale   levothyroxine (SYNTHROID) 100 MCG Tab 9/27/2022 at AM Family Member Yes Yes   Sig: Take 100 mcg by mouth every morning on an empty stomach.   lisinopril (PRINIVIL) 10 MG Tab 9/27/2022 at AM Family Member Yes Yes   Sig: Take 10 mg by mouth every day.   omeprazole (PRILOSEC) 20 MG delayed-release capsule 9/27/2022 at AM Family Member Yes No   Sig: Take 20 mg by mouth every day.      Facility-Administered  Medications: None       Physical Exam  Temp:  [36.9 °C (98.5 °F)] 36.9 °C (98.5 °F)  Pulse:  [80-93] 80  Resp:  [15-16] 15  BP: (179-186)/(81-86) 179/81  SpO2:  [95 %-97 %] 95 %  Blood Pressure : (!) 179/81   Temperature: 36.9 °C (98.5 °F)   Pulse: 80   Respiration: 15   Pulse Oximetry: 95 %       Physical Exam  General: Well developed, well nourished male, in no distress.  HEENT: NC/AT, PERRL, EOMI, no scleral icterus or conjunctival pallor, fair dentition/denture in, no nasal discharge or oral erythema or exudates.   Neck: Supple, No cervical or supraclavicular LAD  CV:RRR, no murmurs gallops or Rubs, no JVD  Pulm: LCAB, no crackles, rales, rhonchi, or wheezing  GI: Normal bowel sounds, abdomen soft, nontender, nondistended to deep or light palpation in all 4 quadrants, no HSM.  MSK: Radial and dorsalis pedis pulses 2+ and equal bilaterally, respectively.  Strength 5 out of 5 in upper and lower extremities.  No lower extremity edema  Neuro: Patient is alert and oriented x3, no focal deficits  Psych: Appropriate mood and affect     Laboratory:  Recent Labs     09/28/22 2117   WBC 12.4*   RBC 4.40*   HEMOGLOBIN 13.8*   HEMATOCRIT 38.8*   MCV 88.2   MCH 31.4   MCHC 35.6*   RDW 46.6   PLATELETCT 287   MPV 11.6     Recent Labs     09/28/22 2117   SODIUM 141   POTASSIUM 5.0   CHLORIDE 108   CO2 20   GLUCOSE 193*   BUN 32*   CREATININE 1.36   CALCIUM 9.2     Recent Labs     09/28/22 2117   ALTSGPT 18   ASTSGOT 15   ALKPHOSPHAT 95   TBILIRUBIN 0.3   GLUCOSE 193*     Recent Labs     09/28/22 2117   APTT 80.7*   INR 1.18*     No results for input(s): NTPROBNP in the last 72 hours.      Recent Labs     09/28/22 2117   TROPONINT 47*       Imaging:  DX-CHEST-PORTABLE (1 VIEW)   Final Result         1.  No acute cardiopulmonary disease.          EKG: Tachycardia, tachypnea no Giovanny we do not have time to get into this patient's x-ray, sinus rhythm with bigeminy, no new ST elevation changes or depressions concerning for  STEMI, wide QRS present at baseline with multiple PVCs    Assessment/Plan:  Problem Representation:   I anticipate this patient will require at least two midnights for appropriate medical management, necessitating inpatient admission because NSTEMI      * NSTEMI (non-ST elevated myocardial infarction) (HCC)- (present on admission)  Assessment & Plan  Chest pain and pressure, concerning for unstable angina versus NSTEMI type I, troponins mildly elevated but clearly above baseline and increasing, currently on heparin drip without chest pain  - Continue goal-directed therapy  - Continue home blood pressure medications including beta-blocker calcium channel blocker, ACE/ARB  - Continue heparin drip, plan for catheterization in the morning, n.p.o. at midnight  - Trend troponins repeat EKGs  - Aspirin, statin, Plavix, low-dose beta-blocker  - Day team to discuss case with cardiology for catheterization, no current signs of heart failure NT proBNP ordered.    Dementia (HCC)- (present on admission)  Assessment & Plan  Patient states she has a history of dementia and that his daughter-in-law as mentioned in chart Tricia Ceja helps make his medical decisions for him, alert and oriented x3 otherwise decently reliable historian.    Depression- (present on admission)  Assessment & Plan  Patient has a history of depression well-controlled without SI or HI  - Continue home meds    Diabetic neuropathy (HCC)- (present on admission)  Assessment & Plan  Patient with a high history of diabetic neuropathy on gabapentin  - Continue home dose gabapentin    Normocytic anemia- (present on admission)  Assessment & Plan  Patient with normocytic anemia, very mild, likely secondary to chronic kidney disease  - Plan to monitor on CBC, follow-up as outpatient    HTN (hypertension)- (present on admission)  Assessment & Plan  History of hypertension, with elevated troponin admit hypertensive urgency however likely secondary to ACS as discussed  above  - Continue home antihypertensives  - Start carvedilol unclear why patient office patient does not know but was on discharge in 3/2022, low concern for bradycardia at this time.    HLD (hyperlipidemia)- (present on admission)  Assessment & Plan  - Continue statin and Zetia combo    Insulin dependent type 2 diabetes mellitus (HCC)- (present on admission)  Assessment & Plan  Patient with a history of poorly controlled type 2 diabetes complicated by on long-term insulin use with 50 units in the morning and 15 units in the evening.  -Hold home medications  - Glargine 20 units nightly  Sliding scale insulin n.p.o.  - Continue to maximize glycemic control to minimize cardiovascular risk  - Hemoglobin A1c repeat last of greater than 9 approximately 6 months ago.,  Repeat    CKD (chronic kidney disease) stage 3, GFR 30-59 ml/min ([facility])- (present on admission)  Assessment & Plan  Patient with a history of CKD stage IIIa, creatinine doing well, EGFR of 59 currently, at baseline  - Avoid nephrotoxic medications    Bradycardia- (present on admission)  Assessment & Plan  History of previous admission for symptomatic bradycardia, will be judicious with beta-blockers as discussed above.      N.p.o. at midnight for cath in a.m.  VTE prophylaxis: therapeutic anticoagulation with heparin  CODE STATUS: Full code   Statement Selected

## 2022-09-29 NOTE — ASSESSMENT & PLAN NOTE
Patient with normocytic anemia, very mild, likely secondary to chronic kidney disease  Following intermittently  No melena or hematochezia

## 2022-09-29 NOTE — PROGRESS NOTES
Cath lab called, regarding procedure being postponed until tomorrow, Dr. BRIDGES notified, diet ordered.

## 2022-09-29 NOTE — PROGRESS NOTES
Hospital Medicine Daily Progress Note    Date of Service  9/29/2022    Chief Complaint  Sulaiman Ceja is a 72 y.o. male admitted 9/28/2022 with chest pain    Hospital Course  A 72-year-old man with h/o CAD, s/p CABG in the 1990s w/stents, dementia, IDDM 2, hypothyroidism on Synthroid, hypertension on chronic clonidine and beta-blockers, and CKD stage III presented from [facility] where he is found to have an elevated troponin in the 40s, baseline, no other new known cardiologist recommended transfer for cardiac cath, with [facility] cardiologist planning for catheterization in the morning.  Patient states that he has had 2 days of worsening chest pain lasting 2 to 3 hours at a time described as a pressure radiating sometimes near arm into esophagus which has been like his previous attacks, at rest and on exertion, no lower extremity edema, no recent viral illnesses.  Patient was given morphine and started on a heparin drip, on aspirin and Plavix as well as high intensity statin and Zetia at baseline.EKG without concern for ST elevations, trigeminy, similar to previous, patient hemodynamically stable blood pressure in the 170s, with beta-blocker addition and down into 150s, no major electrolyte abnormalities on exam, creatinine at baseline, CBC with mild leukocytosis of 12.4 thousand likely secondary to stress chronic mild anemia of 13.8 normocytic.  INR mildly elevated at 1.18.    Interval Problem Update  Patient denies any chest pain this morning. Reports cough, SOB.   Afebrile, bradycardic, HR 56, BP elevated.  HbA1C 7.7  Troponin 47->61.  Cardiology recommendations appreciated: aspirin and heparin gtt; continue plavix for now; high intensity statin; continue carvedilol and titrate lisinopril for better BP control gola 120/80; C today.    I have discussed this patient's plan of care and discharge plan at IDT rounds today with Case Management, Nursing, Nursing leadership, and other members of the IDT  team.    Consultants/Specialty  cardiology    Code Status  Prior    Disposition  Patient is not medically cleared for discharge.   Anticipate discharge to  TBD .  I have placed the appropriate orders for post-discharge needs.    Review of Systems  Review of Systems   Constitutional:  Positive for malaise/fatigue. Negative for chills and fever.   HENT: Negative.     Eyes: Negative.    Respiratory:  Positive for cough and shortness of breath.    Cardiovascular:  Negative for chest pain and leg swelling.   Gastrointestinal:  Negative for abdominal pain, nausea and vomiting.   Genitourinary:  Negative for dysuria.   Musculoskeletal:  Negative for myalgias.   Skin:  Negative for rash.   Neurological:  Negative for focal weakness.      Physical Exam  Temp:  [36.9 °C (98.5 °F)] 36.9 °C (98.5 °F)  Pulse:  [53-93] 72  Resp:  [14-20] 16  BP: (152-186)/(71-94) 171/77  SpO2:  [93 %-97 %] 93 %    Physical Exam  Vitals and nursing note reviewed.   Constitutional:       Appearance: He is ill-appearing.   HENT:      Head: Normocephalic and atraumatic.      Nose: Nose normal.      Mouth/Throat:      Mouth: Mucous membranes are moist.      Pharynx: Oropharynx is clear.   Eyes:      Conjunctiva/sclera: Conjunctivae normal.      Pupils: Pupils are equal, round, and reactive to light.   Cardiovascular:      Rate and Rhythm: Regular rhythm. Bradycardia present.   Pulmonary:      Effort: Pulmonary effort is normal. No respiratory distress.      Breath sounds: No wheezing or rales.   Abdominal:      General: Abdomen is flat. Bowel sounds are normal. There is no distension.      Tenderness: There is no abdominal tenderness. There is no guarding.   Musculoskeletal:         General: Normal range of motion.      Cervical back: Normal range of motion.   Skin:     General: Skin is warm.   Neurological:      General: No focal deficit present.      Mental Status: He is alert and oriented to person, place, and time.       Fluids  No intake or  output data in the 24 hours ending 09/29/22 1132    Laboratory  Recent Labs     09/28/22 2117   WBC 12.4*   RBC 4.40*   HEMOGLOBIN 13.8*   HEMATOCRIT 38.8*   MCV 88.2   MCH 31.4   MCHC 35.6*   RDW 46.6   PLATELETCT 287   MPV 11.6     Recent Labs     09/28/22 2117   SODIUM 141   POTASSIUM 5.0   CHLORIDE 108   CO2 20   GLUCOSE 193*   BUN 32*   CREATININE 1.36   CALCIUM 9.2     Recent Labs     09/28/22 2117   APTT 80.7*   INR 1.18*         Recent Labs     09/29/22 0226   TRIGLYCERIDE 148   HDL 35*   LDL 87       Imaging  DX-CHEST-PORTABLE (1 VIEW)   Final Result         1.  No acute cardiopulmonary disease.      CL-LEFT HEART CATHETERIZATION WITH POSSIBLE INTERVENTION    (Results Pending)        Assessment/Plan  * NSTEMI (non-ST elevated myocardial infarction) (HCC)- (present on admission)  Assessment & Plan  Chest pain and pressure, concerning for unstable angina versus NSTEMI type I, troponins mildly elevated but clearly above baseline and increasing, currently on heparin drip without chest pain  - Continue goal-directed therapy  - Continue home blood pressure medications including beta-blocker calcium channel blocker, ACE/ARB  - Continue heparin drip  - Aspirin, statin, Plavix, low-dose beta-blocker  - Cardiology consulted, plan cardiac cath today    Dementia (HCC)- (present on admission)  Assessment & Plan  Patient states she has a history of dementia and that his daughter-in-law as mentioned in chart Tricia Ceja helps make his medical decisions for him, alert and oriented x3 otherwise decently reliable historian.    Assessment & Plan  Patient states she has a history of dementia and that his daughter-in-law as mentioned in chart Tricia Ceja helps make his medical decisions for him, alert and oriented x3 otherwise decently reliable historian.    Depression- (present on admission)  Assessment & Plan    Patient has a history of depression well-controlled without SI or HI  - Continue home meds    Diabetic  neuropathy (HCC)- (present on admission)  Assessment & Plan  Patient with a high history of diabetic neuropathy on gabapentin  - Continue home dose gabapentin    Normocytic anemia- (present on admission)  Assessment & Plan  Patient with normocytic anemia, very mild, likely secondary to chronic kidney disease  - Plan to monitor on CBC, follow-up as outpatient    HTN (hypertension)- (present on admission)  Assessment & Plan  History of hypertension, with elevated troponin admit hypertensive urgency however likely secondary to ACS as discussed above  - Continue home antihypertensives  - Start carvedilol unclear why patient office patient does not know but was on discharge in 3/2022, low concern for bradycardia at this time.    HLD (hyperlipidemia)- (present on admission)  Assessment & Plan  - Continue statin and Zetia combo    Insulin dependent type 2 diabetes mellitus (HCC)- (present on admission)  Assessment & Plan  Patient with a history of poorly controlled type 2 diabetes complicated by on long-term insulin use with 50 units in the morning and 15 units in the evening.  -Hold home medications  - Glargine 20 units nightly  Sliding scale insulin n.p.o.  - Continue to maximize glycemic control to minimize cardiovascular risk  - Hemoglobin A1c repeat last of greater than 9 approximately 6 months ago.,  Repeat    CKD (chronic kidney disease) stage 3, GFR 30-59 ml/min ([facility])- (present on admission)  Assessment & Plan  Patient with a history of CKD stage IIIa, creatinine doing well, EGFR of 59 currently, at baseline  - Avoid nephrotoxic medications    Bradycardia- (present on admission)  Assessment & Plan  History of previous admission for symptomatic bradycardia, will be judicious with beta-blockers as discussed above.       VTE prophylaxis: therapeutic anticoagulation with heparin infusion    I have performed a physical exam and reviewed and updated ROS and Plan today (9/29/2022). In review of yesterday's note  (9/28/2022), there are no changes except as documented above.

## 2022-09-29 NOTE — HOSPITAL COURSE
A 72-year-old man with h/o CAD, s/p CABG in the 1990s w/stents, dementia, IDDM 2, hypothyroidism on Synthroid, hypertension on chronic clonidine and beta-blockers, and CKD stage III presented from [facility] where he is found to have an elevated troponin in the 40s, baseline, no other new known cardiologist recommended transfer for cardiac cath, with [facility] cardiologist planning for catheterization in the morning.  Patient states that he has had 2 days of worsening chest pain lasting 2 to 3 hours at a time described as a pressure radiating sometimes near arm into esophagus which has been like his previous attacks, at rest and on exertion, no lower extremity edema, no recent viral illnesses.  Patient was given morphine and started on a heparin drip, on aspirin and Plavix as well as high intensity statin and Zetia at baseline.EKG without concern for ST elevations, trigeminy, similar to previous, patient hemodynamically stable blood pressure in the 170s, with beta-blocker addition and down into 150s, no major electrolyte abnormalities on exam, creatinine at baseline, CBC with mild leukocytosis of 12.4 thousand likely secondary to stress chronic mild anemia of 13.8 normocytic.  INR mildly elevated at 1.18.

## 2022-09-29 NOTE — ASSESSMENT & PLAN NOTE
Patient states she has a history of dementia and that his daughter-in-law as mentioned in chart Tricia Ceja helps make his medical decisions for him, alert and oriented x3 otherwise decently reliable historian.

## 2022-09-29 NOTE — ASSESSMENT & PLAN NOTE
History of hypertension, with elevated troponin admit hypertensive urgency however likely secondary to ACS as discussed above  Home regimen: Nifedipine 90 mg daily, lisinopril 10 mg daily  Inpatient regimen nifedipine 90 mg daily, lisinopril 10 mg twice daily, Imdur 30 mg daily, Coreg 3.125 mg twice daily  Hypertensive today, restarted lisinopril today

## 2022-09-29 NOTE — ASSESSMENT & PLAN NOTE
Patient has a history of depression well-controlled without SI or HI     Continue home meds    Supportive care

## 2022-09-29 NOTE — ASSESSMENT & PLAN NOTE
History of previous admission for symptomatic bradycardia, will be judicious with beta-blockers as discussed above.

## 2022-09-29 NOTE — ASSESSMENT & PLAN NOTE
History of hypertension, with elevated troponin admit hypertensive urgency however likely secondary to ACS as discussed above  - Continue home antihypertensives  - Start carvedilol unclear why patient office patient does not know but was on discharge in 3/2022, low concern for bradycardia at this time.

## 2022-09-29 NOTE — ASSESSMENT & PLAN NOTE
Patient with a history of poorly controlled type 2 diabetes complicated by on long-term insulin use with 50 units in the morning and 15 units in the evening.  -Hold home medications  - Glargine 20 units nightly  Sliding scale insulin n.p.o.  - Continue to maximize glycemic control to minimize cardiovascular risk  - Hemoglobin A1c repeat last of greater than 9 approximately 6 months ago.,  Repeat

## 2022-09-29 NOTE — CONSULTS
Reason for Consult:  Asked by Dr Ady Shaw M.D. to see this patient with chest pain.    CC:   Chief Complaint   Patient presents with    Chest Pain     BIB EMS from [facility]. Pt was about to have cataract surgery today. Pt was hypertensive, they gave pt an antihypertensive then he developed severe chest pain with an elevated troponin.        HPI:     72 year old man with PMH CAD, CABG 1980s in [place], HLD, HTN, CKD, DM2, former smoker, copd presents with anginal chest pain found nstemi. Describes yesterday chest pressure radiating to neck. No other associated symptoms. Doesn't recall symptoms from prior revascularization periods. Follows cardiology in [facility]. Denies toxic social habits. Presenting symptoms now resolved. Improved following opiate and nitroglycerin. Found mild troponin elevation and transferred for [facility].     Medications / Drug list prior to admission:  No current facility-administered medications on file prior to encounter.     Current Outpatient Medications on File Prior to Encounter   Medication Sig Dispense Refill    levothyroxine (SYNTHROID) 100 MCG Tab Take 100 mcg by mouth every morning on an empty stomach.      clopidogrel (PLAVIX) 75 MG Tab Take 75 mg by mouth every day.      lisinopril (PRINIVIL) 10 MG Tab Take 10 mg by mouth every day.      NIFEdipine SR (PROCADIA-XL) 90 MG CR tablet Take 90 mg by mouth every day.      Vitamin D3 5000 Unit (125 mcg) Tab Take 5,000 Units by mouth every day.      albuterol 108 (90 Base) MCG/ACT Aero Soln inhalation aerosol Inhale 2 Puffs every 6 hours as needed for Shortness of Breath.      atorvastatin (LIPITOR) 40 MG Tab Take 1 Tablet by mouth every evening. Increased dose 30 Tablet 0    aspirin 81 MG EC tablet Take 1 Tablet by mouth every day. For heart health 30 Tablet 0    escitalopram (LEXAPRO) 20 MG tablet Take 20 mg by mouth every day.      insulin detemir (LEVEMIR) 100 UNIT/ML Solution Inject 15-50 Units under the skin 2 times a  day. 50 units every morning  15 units every night      insulin regular (HUMULIN R) 100 Unit/mL Solution Inject 2-20 Units under the skin 4 Times a Day,Before Meals and at Bedtime. Pt unable to define Sliding scale      omeprazole (PRILOSEC) 20 MG delayed-release capsule Take 20 mg by mouth every day.      gabapentin (NEURONTIN) 800 MG tablet Take 400 mg by mouth 3 times a day. Half tablet = 400 mg         Current list of administered Medications:    Current Facility-Administered Medications:     insulin regular (HumuLIN R,NovoLIN R) injection, 1-6 Units, Subcutaneous, Q6HRS **AND** POC blood glucose manual result, , , Q6H **AND** NOTIFY MD and PharmD, , , Once **AND** Administer 20 grams of glucose (approximately 8 ounces of fruit juice) every 15 minutes PRN FSBG less than 70 mg/dL, , , PRN **AND** dextrose 50% (D50W) injection 25 g, 25 g, Intravenous, Q15 MIN PRN, Padilla Morrison M.D.    insulin GLARGINE (Lantus,Semglee) injection, 20 Units, Subcutaneous, Q EVENING, Padilla Morrison M.D.    heparin infusion 25,000 units in 500 mL 0.45% NACL, 0-30 Units/kg/hr, Intravenous, Continuous, Lidia Alcala D.O., Last Rate: 19.6 mL/hr at 09/28/22 2302, 12 Units/kg/hr at 09/28/22 2302    heparin injection 2,000 Units, 2,000 Units, Intravenous, PRN, Lidia Alcala D.O.    albuterol inhaler 2 Puff, 2 Puff, Inhalation, Q6HRS PRN, Iam Hsieh M.D.    aspirin EC (ECOTRIN) tablet 81 mg, 81 mg, Oral, DAILY, Iam Hsieh M.D., 81 mg at 09/29/22 0636    clopidogrel (PLAVIX) tablet 75 mg, 75 mg, Oral, DAILY, Iam Hsieh M.D.    escitalopram (Lexapro) tablet 20 mg, 20 mg, Oral, DAILY, Iam Hsieh M.D., 20 mg at 09/29/22 0636    gabapentin (NEURONTIN) capsule 400 mg, 400 mg, Oral, TID, Iam Hsieh M.D.    levothyroxine (SYNTHROID) tablet 100 mcg, 100 mcg, Oral, AM ES, Iam Hsieh M.D., 100 mcg at 09/29/22 0637    lisinopril (PRINIVIL) tablet 10 mg, 10 mg, Oral, DAILY, Iam Hsieh M.D., 10  mg at 09/29/22 0637    omeprazole (PRILOSEC) capsule 20 mg, 20 mg, Oral, DAILY, Iam Hsieh M.D., 20 mg at 09/29/22 0637    NIFEdipine SR (PROCARDIA-XL) tablet 90 mg, 90 mg, Oral, DAILY, Iam Hsieh M.D.    nitroglycerin (NITROSTAT) tablet 0.4 mg, 0.4 mg, Sublingual, Q5 MIN PRN, Iam Hsieh M.D.    carvedilol (COREG) tablet 3.125 mg, 3.125 mg, Oral, BID WITH MEALS, Padilla Morrison M.D., 3.125 mg at 09/29/22 0801    atorvastatin (LIPITOR) tablet 80 mg, 80 mg, Oral, Q EVENING, Padilla Morrison M.D.    Current Outpatient Medications:     levothyroxine (SYNTHROID) 100 MCG Tab, Take 100 mcg by mouth every morning on an empty stomach., Disp: , Rfl:     clopidogrel (PLAVIX) 75 MG Tab, Take 75 mg by mouth every day., Disp: , Rfl:     lisinopril (PRINIVIL) 10 MG Tab, Take 10 mg by mouth every day., Disp: , Rfl:     NIFEdipine SR (PROCADIA-XL) 90 MG CR tablet, Take 90 mg by mouth every day., Disp: , Rfl:     Vitamin D3 5000 Unit (125 mcg) Tab, Take 5,000 Units by mouth every day., Disp: , Rfl:     albuterol 108 (90 Base) MCG/ACT Aero Soln inhalation aerosol, Inhale 2 Puffs every 6 hours as needed for Shortness of Breath., Disp: , Rfl:     atorvastatin (LIPITOR) 40 MG Tab, Take 1 Tablet by mouth every evening. Increased dose, Disp: 30 Tablet, Rfl: 0    aspirin 81 MG EC tablet, Take 1 Tablet by mouth every day. For heart health, Disp: 30 Tablet, Rfl: 0    escitalopram (LEXAPRO) 20 MG tablet, Take 20 mg by mouth every day., Disp: , Rfl:     insulin detemir (LEVEMIR) 100 UNIT/ML Solution, Inject 15-50 Units under the skin 2 times a day. 50 units every morning 15 units every night, Disp: , Rfl:     insulin regular (HUMULIN R) 100 Unit/mL Solution, Inject 2-20 Units under the skin 4 Times a Day,Before Meals and at Bedtime. Pt unable to define Sliding scale, Disp: , Rfl:     omeprazole (PRILOSEC) 20 MG delayed-release capsule, Take 20 mg by mouth every day., Disp: , Rfl:     gabapentin (NEURONTIN) 800 MG  tablet, Take 400 mg by mouth 3 times a day. Half tablet = 400 mg, Disp: , Rfl:     Past Medical History:   Diagnosis Date    CAD (coronary artery disease)     CABG 90's       History reviewed. No pertinent surgical history.    History reviewed. No pertinent family history.  Patient family history was personally reviewed, no pertinent family history to current presentation    Social History     Socioeconomic History    Marital status: Single     Spouse name: Not on file    Number of children: Not on file    Years of education: Not on file    Highest education level: Not on file   Occupational History    Not on file   Tobacco Use    Smoking status: Former     Types: Cigarettes     Start date: 3/1/1996    Smokeless tobacco: Never   Vaping Use    Vaping Use: Never used   Substance and Sexual Activity    Alcohol use: Never    Drug use: Never    Sexual activity: Not on file   Other Topics Concern    Not on file   Social History Narrative    Not on file     Social Determinants of Health     Financial Resource Strain: Not on file   Food Insecurity: Not on file   Transportation Needs: Not on file   Physical Activity: Not on file   Stress: Not on file   Social Connections: Not on file   Intimate Partner Violence: Not on file   Housing Stability: Not on file       ALLERGIES:  No Known Allergies    Review of systems:  A complete review of symptoms was reviewed with patient. This is reviewed in H&P and PMH. ALL OTHERS reviewed and negative    Physical exam:  Patient Vitals for the past 24 hrs:   BP Temp Temp src Pulse Resp SpO2 Height Weight   09/29/22 0800 (!) 169/73 -- -- 70 18 93 % -- --   09/29/22 0701 (!) 168/77 -- -- (!) 56 14 96 % -- --   09/29/22 0652 -- -- -- (!) 53 -- 97 % -- --   09/29/22 0600 (!) 179/80 -- -- 78 -- 95 % -- --   09/29/22 0301 -- -- -- -- 14 -- -- --   09/29/22 0300 (!) 152/71 -- -- 74 -- 96 % -- --   09/29/22 0201 -- -- -- -- 16 -- -- --   09/29/22 0200 (!) 155/82 -- -- 69 -- 96 % -- --   09/29/22  "0100 (!) 163/72 -- -- 73 16 97 % -- --   09/29/22 0030 -- -- -- 77 16 96 % -- --   09/29/22 0000 (!) 179/81 -- -- (!) 54 19 97 % -- --   09/28/22 2335 (!) 170/94 -- -- 68 20 96 % -- --   09/28/22 2250 (!) 179/81 -- -- 80 15 95 % -- --   09/28/22 2117 (!) 186/86 -- -- 93 16 97 % -- --   09/28/22 2114 -- 36.9 °C (98.5 °F) Temporal -- -- -- 1.753 m (5' 9\") 81.6 kg (180 lb)     General: No acute distress.   EYES: no jaundice  HEENT: OP clear   Neck: No bruits No JVD.   CVS:  RRR. S1 + S2. No M/R/G. No edema.  Resp: CTAB. No wheezing or crackles/rhonchi.  Abdomen: Soft, NT, ND,  Skin: Grossly nothing acute no obvious rashes  Neurological: Alert, Moves all extremities, no cranial nerve defects on limited exam  Extremities: Pulse 2+ in b/l LE. No cyanosis.     Data:  Laboratory studies personally reviewed by me:  Recent Results (from the past 24 hour(s))   CBC with Differential    Collection Time: 09/28/22  9:17 PM   Result Value Ref Range    WBC 12.4 (H) 4.8 - 10.8 K/uL    RBC 4.40 (L) 4.70 - 6.10 M/uL    Hemoglobin 13.8 (L) 14.0 - 18.0 g/dL    Hematocrit 38.8 (L) 42.0 - 52.0 %    MCV 88.2 81.4 - 97.8 fL    MCH 31.4 27.0 - 33.0 pg    MCHC 35.6 (H) 33.7 - 35.3 g/dL    RDW 46.6 35.9 - 50.0 fL    Platelet Count 287 164 - 446 K/uL    MPV 11.6 9.0 - 12.9 fL    Neutrophils-Polys 86.70 (H) 44.00 - 72.00 %    Lymphocytes 6.90 (L) 22.00 - 41.00 %    Monocytes 5.00 0.00 - 13.40 %    Eosinophils 0.40 0.00 - 6.90 %    Basophils 0.40 0.00 - 1.80 %    Immature Granulocytes 0.60 0.00 - 0.90 %    Nucleated RBC 0.00 /100 WBC    Neutrophils (Absolute) 10.77 (H) 1.82 - 7.42 K/uL    Lymphs (Absolute) 0.86 (L) 1.00 - 4.80 K/uL    Monos (Absolute) 0.62 0.00 - 0.85 K/uL    Eos (Absolute) 0.05 0.00 - 0.51 K/uL    Baso (Absolute) 0.05 0.00 - 0.12 K/uL    Immature Granulocytes (abs) 0.07 0.00 - 0.11 K/uL    NRBC (Absolute) 0.00 K/uL   Complete Metabolic Panel (CMP)    Collection Time: 09/28/22  9:17 PM   Result Value Ref Range    Sodium 141 135 - " 145 mmol/L    Potassium 5.0 3.6 - 5.5 mmol/L    Chloride 108 96 - 112 mmol/L    Co2 20 20 - 33 mmol/L    Anion Gap 13.0 7.0 - 16.0    Glucose 193 (H) 65 - 99 mg/dL    Bun 32 (H) 8 - 22 mg/dL    Creatinine 1.36 0.50 - 1.40 mg/dL    Calcium 9.2 8.5 - 10.5 mg/dL    AST(SGOT) 15 12 - 45 U/L    ALT(SGPT) 18 2 - 50 U/L    Alkaline Phosphatase 95 30 - 99 U/L    Total Bilirubin 0.3 0.1 - 1.5 mg/dL    Albumin 4.1 3.2 - 4.9 g/dL    Total Protein 7.2 6.0 - 8.2 g/dL    Globulin 3.1 1.9 - 3.5 g/dL    A-G Ratio 1.3 g/dL   Troponin    Collection Time: 09/28/22  9:17 PM   Result Value Ref Range    Troponin T 47 (H) 6 - 19 ng/L   ESTIMATED GFR    Collection Time: 09/28/22  9:17 PM   Result Value Ref Range    GFR (CKD-EPI) 55 (A) >60 mL/min/1.73 m 2   aPTT    Collection Time: 09/28/22  9:17 PM   Result Value Ref Range    APTT 80.7 (H) 24.7 - 36.0 sec   Prothrombin Time    Collection Time: 09/28/22  9:17 PM   Result Value Ref Range    PT 14.8 (H) 12.0 - 14.6 sec    INR 1.18 (H) 0.87 - 1.13   Heparin Xa (Unfractionated)    Collection Time: 09/28/22  9:17 PM   Result Value Ref Range    Heparin Xa (UFH) 0.44 IU/mL   TSH WITH REFLEX TO FT4    Collection Time: 09/28/22  9:17 PM   Result Value Ref Range    TSH 0.450 0.380 - 5.330 uIU/mL   POCT glucose device results    Collection Time: 09/29/22  1:17 AM   Result Value Ref Range    POC Glucose, Blood 127 (H) 65 - 99 mg/dL   Troponin in four (4) hours    Collection Time: 09/29/22  2:26 AM   Result Value Ref Range    Troponin T 61 (H) 6 - 19 ng/L   Lipid Profile (Tomorrow AM)    Collection Time: 09/29/22  2:26 AM   Result Value Ref Range    Cholesterol,Tot 152 100 - 199 mg/dL    Triglycerides 148 0 - 149 mg/dL    HDL 35 (A) >=40 mg/dL    LDL 87 <100 mg/dL   MAGNESIUM    Collection Time: 09/29/22  2:26 AM   Result Value Ref Range    Magnesium 1.8 1.5 - 2.5 mg/dL   proBrain Natriuretic Peptide, NT    Collection Time: 09/29/22  2:26 AM   Result Value Ref Range    NT-proBNP 1193 (H) 0 - 125 pg/mL    Heparin Anti-Xa    Collection Time: 09/29/22  5:00 AM   Result Value Ref Range    Heparin Xa (UFH) 0.43 IU/mL   HEMOGLOBIN A1C    Collection Time: 09/29/22  5:00 AM   Result Value Ref Range    Glycohemoglobin 7.7 (H) 4.0 - 5.6 %    Est Avg Glucose 174 mg/dL   POCT glucose device results    Collection Time: 09/29/22  6:36 AM   Result Value Ref Range    POC Glucose, Blood 105 (H) 65 - 99 mg/dL       EKG 3/2/22 sinus, PVCs    All pertinent features of laboratory and imaging reviewed including primary images where applicable    TTE 3/2/22  CONCLUSIONS  Mild concentric left ventricular hypertrophy. Normal left ventricular   size, systolic, and diastolic function.  Normal right ventricular size and systolic function.  Mildly dilated left atrium.  No pericardial effusion.    Principal Problem:    NSTEMI (non-ST elevated myocardial infarction) ([facility]) POA: Yes  Active Problems:    Bradycardia POA: Yes    CKD (chronic kidney disease) stage 3, GFR 30-59 ml/min ([facility]) POA: Yes    Insulin dependent type 2 diabetes mellitus ([facility]) POA: Yes    HLD (hyperlipidemia) POA: Yes    HTN (hypertension) POA: Yes    Normocytic anemia POA: Yes    Diabetic neuropathy ([facility]) POA: Yes    Depression POA: Yes    Dementia ([facility]) POA: Yes    COPD (chronic obstructive pulmonary disease) ([facility]) POA: Yes    Hypothyroidism POA: Yes  Resolved Problems:    * No resolved hospital problems. *      Assessment / Plan:  72 year old man with PMH CAD, CABG 1980s in [place], HLD, HTN, CKD, DM2, former smoker, copd presents with anginal chest pain found nstemi. Rika score 101.    -aspirin and heparin gtt. Can continue plavix for now.  -high intensity statin  -continue carvedilol and titrate lisinopril for better BP control gola 120/80  -[facility] today    I personally discussed his case with Dr Shaw.    It is my pleasure to participate in the care of Mr. Ceja.  Please do not hesitate to contact me with questions or concerns.    Darwin Wesley MD  Cardiologist [facility]  [facility]

## 2022-09-29 NOTE — ASSESSMENT & PLAN NOTE
Patient with a history of CKD stage IIIa, baseline creatinine ~1.3  Acute on chronic  Avoid nephrotoxic medications  Improving  Bladder scan  Since improving will add back acei

## 2022-09-29 NOTE — PROGRESS NOTES
Assumed care of pt. Bedside report received from Alethea SANDOVAL. Pt was updated on plan of care. Call light, phone and personal belongings in reach. Bed alarm on and working properly, bed in lowest position, and locked.

## 2022-09-29 NOTE — ASSESSMENT & PLAN NOTE
Chest pain and pressure, concerning for unstable angina versus NSTEMI type I, troponins mildly elevated but clearly above baseline and increasing, currently on heparin drip without chest pain  - Continue goal-directed therapy  - Continue home blood pressure medications including beta-blocker calcium channel blocker, ACE/ARB  - Continue heparin drip, plan for catheterization in the morning, n.p.o. at midnight  - Trend troponins repeat EKGs  - Aspirin, statin, Plavix, low-dose beta-blocker  - Day team to discuss case with cardiology for catheterization, no current signs of heart failure NT proBNP ordered.

## 2022-09-29 NOTE — ASSESSMENT & PLAN NOTE
Chest pain and pressure, concerning for unstable angina versus NSTEMI type I, troponins mildly elevated but clearly above baseline   He remains chest pain free  Cardiology decided no cath as repeat echo stable, he will follow up as outpatient  Continue, awaiting repeat echo to make decision about cath home blood pressure medications including beta-blocker calcium channel blocker, ACE/ARB    Aspirin, statin, Plavix, low-dose beta-blocker

## 2022-09-29 NOTE — ASSESSMENT & PLAN NOTE
Patient states he has a history of dementia and that his daughter-in-law as mentioned in chart Tricia Ceja helps make his medical decisions for him, alert and oriented x3 otherwise decently reliable historian.    Interventions to minimize the risk of delirium.   -do not disturb patient (vitals or lab draws) between the hours of 10 PM and 6 AM.  -frequent reorientation  -avoid sedatives  -up in chair for meals  -watch for constipation  -remove all unnecessary lines (central lines, peripheral IVs, feeding tubes, reyna catheters)

## 2022-09-29 NOTE — ED NOTES
Provided patient's daughter, Triciarosalino. Informed Hospitalist by [employer] that patient's daughter would like him to call. Daughter reports she has pertinent information to provide.

## 2022-09-29 NOTE — ASSESSMENT & PLAN NOTE
Patient with a history of CKD stage IIIa, creatinine doing well, EGFR of 59 currently, at baseline  - Avoid nephrotoxic medications

## 2022-09-29 NOTE — PROGRESS NOTES
4 Eyes Skin Assessment Completed by LORI Hamilton and LORI Hester.    Head WDL  Ears WDL  Nose WDL  Mouth WDL  Neck WDL  Breast/Chest WDL  Shoulder Blades WDL  Spine WDL, old incisions from surgery   (R) Arm/Elbow/Hand WDL  (L) Arm/Elbow/Hand WDL  Abdomen WDL  Groin WDL, pt has penis pump for urination due to previous bladder cancer   Scrotum/Coccyx/Buttocks Redness and Non-Blanching (see photo)   (R) Leg WDL  (L) Leg WDL  (R) Heel/Foot/Toe WDL  (L) Heel/Foot/Toe WDL      sacrum      Devices In Places Tele Box, Blood Pressure Cuff, and Pulse Ox      Interventions In Place Pillows and Pressure Redistribution Mattress    Possible Skin Injury No    Pictures Uploaded Into Epic N/A  Wound Consult Placed N/A  RN Wound Prevention Protocol Ordered No

## 2022-09-29 NOTE — ED NOTES
Pt resting, airway patent, rr even and unlabored, equal chest rise and fall. Nad noted.   Call light within reach, rails up times two.

## 2022-09-29 NOTE — ASSESSMENT & PLAN NOTE
Patient with normocytic anemia, very mild, likely secondary to chronic kidney disease  - Plan to monitor on CBC, follow-up as outpatient

## 2022-09-29 NOTE — ASSESSMENT & PLAN NOTE
Patient with a history of poorly controlled type 2 diabetes complicated by on long-term insulin use with 50 units in the morning and 15 units in the evening.  Glargine 20 units nightly  Sliding scale insulin  Hypoglycemic protocol

## 2022-09-29 NOTE — PROGRESS NOTES
72 year old male with Hx CABG, unstable angina, sent for coronary angiogram.  Chest pain free and comfortable per ERP. Vitals stable, HTN.  EKG not available to review, no significant st changes per report.  Npo after mid night, for coronary angiogram in am.  Will add to cardiology patient list, Full consult in am.

## 2022-09-29 NOTE — ED PROVIDER NOTES
ED Provider Note    CHIEF COMPLAINT  Chest pain    HPI  Sulaiman Ceja is a 72 y.o. male who presents to the emergency department for evaluation of chest pain.  The patient states that he started having chest pressure and throat pressure around noon today.  He states that it feels similarly to when he has required cardiac interventions in the past.  Specifically he has a history of bypass and stent placement.  He denies any alleviating or exacerbating factors.  He denies shortness of breath, nausea, vomiting, or diaphoresis.  He initially presented to [facility] where he was noted to have an elevated troponin.  The provider at [facility] discussed the case with the patient's cardiologist, Dr. Mohsen, who recommended transfer for cardiac cath.  A CT of the chest was performed and did not reveal any evidence of pulmonary emboli.  He has been given morphine and was started on a heparin drip.  He is currently denying any discomfort.    REVIEW OF SYSTEMS  See HPI for further details. All other systems are negative.     PAST MEDICAL HISTORY   has a past medical history of CAD (coronary artery disease).    SOCIAL HISTORY  Social History     Tobacco Use    Smoking status: Former     Types: Cigarettes     Start date: 3/1/1996    Smokeless tobacco: Never   Vaping Use    Vaping Use: Never used   Substance and Sexual Activity    Alcohol use: Never    Drug use: Never    Sexual activity: Not on file       SURGICAL HISTORY  patient denies any surgical history    CURRENT MEDICATIONS  Home Medications       Reviewed by Heather Dow (Pharmacy Tech) on 09/28/22 at 2311  Med List Status: Complete     Medication Last Dose Status   albuterol 108 (90 Base) MCG/ACT Aero Soln inhalation aerosol 9/27/2022 Active   aspirin 81 MG EC tablet 9/27/2022 Active   atorvastatin (LIPITOR) 40 MG Tab 9/27/2022 Active   clopidogrel (PLAVIX) 75 MG Tab 9/27/2022 Active   escitalopram (LEXAPRO) 20 MG tablet 9/27/2022 Active   gabapentin  "(NEURONTIN) 800 MG tablet 9/27/2022 Active   insulin detemir (LEVEMIR) 100 UNIT/ML Solution 9/27/2022 Active   insulin regular (HUMULIN R) 100 Unit/mL Solution 9/27/2022 Active   levothyroxine (SYNTHROID) 100 MCG Tab 9/27/2022 Active   lisinopril (PRINIVIL) 10 MG Tab 9/27/2022 Active   NIFEdipine SR (PROCADIA-XL) 90 MG CR tablet 9/27/2022 Active   omeprazole (PRILOSEC) 20 MG delayed-release capsule 9/27/2022 Active   Vitamin D3 5000 Unit (125 mcg) Tab 9/27/2022 Active                    ALLERGIES  No Known Allergies    PHYSICAL EXAM  VITAL SIGNS: BP (!) 170/94   Pulse 68   Temp 36.9 °C (98.5 °F) (Temporal)   Resp 20   Ht 1.753 m (5' 9\")   Wt 81.6 kg (180 lb)   SpO2 96%   BMI 26.58 kg/m²    Constitutional: Alert and in no apparent distress.  HENT: Normocephalic atraumatic. Bilateral external ears normal. Nose normal. Mucous membranes are dry.  Eyes: Pupils are equal and reactive. Conjunctiva normal. Non-icteric sclera.   Neck: Normal range of motion without tenderness. Supple. No meningeal signs.  Cardiovascular: Regular rate and irregular rhythm. No murmurs, gallops or rubs.  Thorax & Lungs: Breath sounds are clear to auscultation bilaterally. No wheezing, rhonchi or rales.  Well-healed sternotomy scar noted.  Abdomen: Soft, nontender and nondistended. No peritoneal signs noted.  Skin: Warm and dry. No rashes are noted.  Back: No bony tenderness, No CVA tenderness.   Extremities: 2+ peripheral pulses. Cap refill is less than 2 seconds. No edema, cyanosis, or clubbing.  Musculoskeletal: Good range of motion in all major joints. No tenderness to palpation or major deformities noted.   Neurologic: Alert and oriented ×3. The patient moves all 4 extremities and follows commands.  Psychiatric: Affect is normal. Judgment appears to be intact.      DIAGNOSTIC STUDIES / PROCEDURES    LABS  Results for orders placed or performed during the hospital encounter of 09/28/22   CBC with Differential   Result Value Ref Range "    WBC 12.4 (H) 4.8 - 10.8 K/uL    RBC 4.40 (L) 4.70 - 6.10 M/uL    Hemoglobin 13.8 (L) 14.0 - 18.0 g/dL    Hematocrit 38.8 (L) 42.0 - 52.0 %    MCV 88.2 81.4 - 97.8 fL    MCH 31.4 27.0 - 33.0 pg    MCHC 35.6 (H) 33.7 - 35.3 g/dL    RDW 46.6 35.9 - 50.0 fL    Platelet Count 287 164 - 446 K/uL    MPV 11.6 9.0 - 12.9 fL    Neutrophils-Polys 86.70 (H) 44.00 - 72.00 %    Lymphocytes 6.90 (L) 22.00 - 41.00 %    Monocytes 5.00 0.00 - 13.40 %    Eosinophils 0.40 0.00 - 6.90 %    Basophils 0.40 0.00 - 1.80 %    Immature Granulocytes 0.60 0.00 - 0.90 %    Nucleated RBC 0.00 /100 WBC    Neutrophils (Absolute) 10.77 (H) 1.82 - 7.42 K/uL    Lymphs (Absolute) 0.86 (L) 1.00 - 4.80 K/uL    Monos (Absolute) 0.62 0.00 - 0.85 K/uL    Eos (Absolute) 0.05 0.00 - 0.51 K/uL    Baso (Absolute) 0.05 0.00 - 0.12 K/uL    Immature Granulocytes (abs) 0.07 0.00 - 0.11 K/uL    NRBC (Absolute) 0.00 K/uL   Complete Metabolic Panel (CMP)   Result Value Ref Range    Sodium 141 135 - 145 mmol/L    Potassium 5.0 3.6 - 5.5 mmol/L    Chloride 108 96 - 112 mmol/L    Co2 20 20 - 33 mmol/L    Anion Gap 13.0 7.0 - 16.0    Glucose 193 (H) 65 - 99 mg/dL    Bun 32 (H) 8 - 22 mg/dL    Creatinine 1.36 0.50 - 1.40 mg/dL    Calcium 9.2 8.5 - 10.5 mg/dL    AST(SGOT) 15 12 - 45 U/L    ALT(SGPT) 18 2 - 50 U/L    Alkaline Phosphatase 95 30 - 99 U/L    Total Bilirubin 0.3 0.1 - 1.5 mg/dL    Albumin 4.1 3.2 - 4.9 g/dL    Total Protein 7.2 6.0 - 8.2 g/dL    Globulin 3.1 1.9 - 3.5 g/dL    A-G Ratio 1.3 g/dL   Troponin   Result Value Ref Range    Troponin T 47 (H) 6 - 19 ng/L   ESTIMATED GFR   Result Value Ref Range    GFR (CKD-EPI) 55 (A) >60 mL/min/1.73 m 2   aPTT   Result Value Ref Range    APTT 80.7 (H) 24.7 - 36.0 sec   Prothrombin Time   Result Value Ref Range    PT 14.8 (H) 12.0 - 14.6 sec    INR 1.18 (H) 0.87 - 1.13   Heparin Xa (Unfractionated)   Result Value Ref Range    Heparin Xa (UFH) 0.44 IU/mL     RADIOLOGY  DX-CHEST-PORTABLE (1 VIEW)   Final Result          1.  No acute cardiopulmonary disease.        COURSE & MEDICAL DECISION MAKING  Pertinent Labs & Imaging studies reviewed. (See chart for details)    This is a 72-year-old male presenting to the ED for evaluation of chest pain.  On initial evaluation, the patient did not appear to be in any acute distress.  His vital signs were notable for blood pressure of 186/86.  The remainder his vital signs were normal.  Physical exam was overall reassuring.  He denied any active chest pain at this point.    His EKG did not show any evidence of acute ischemia.   It did show ventricular trigeminy but was similar to his previous EKG with no evidence of acute ischemia.    Chest x-ray was clear.  CBC and CMP were overall reassuring.  His first troponin here was 47.    11:05 PM - I discussed the case with Dr Ram, cardiology. He agreed with the plan for heparin at this time and plans to take the patient for a cath in the morning unless he develops worsening pain or EKG changes.     11:19 PM - I discussed the case with Dr Morrison, [facility] resident. He agreed with the plan and accepted the patient.     FINAL IMPRESSION  1. NSTEMI (non-ST elevated myocardial infarction) (HCC)    2. Coronary artery disease involving native heart without angina pectoris, unspecified vessel or lesion type      -ADMIT-    Electronically signed by: Lidia Alcala D.O., 9/28/2022 10:04 PM

## 2022-09-30 ENCOUNTER — TELEPHONE (OUTPATIENT)
Dept: INTERNAL MEDICINE | Facility: OTHER | Age: 72
End: 2022-09-30
Payer: MEDICARE

## 2022-09-30 LAB
ALBUMIN SERPL BCP-MCNC: 3.8 G/DL (ref 3.2–4.9)
ALBUMIN/GLOB SERPL: 1.2 G/DL
ALP SERPL-CCNC: 84 U/L (ref 30–99)
ALT SERPL-CCNC: 16 U/L (ref 2–50)
ANION GAP SERPL CALC-SCNC: 11 MMOL/L (ref 7–16)
AST SERPL-CCNC: 19 U/L (ref 12–45)
BILIRUB SERPL-MCNC: 0.2 MG/DL (ref 0.1–1.5)
BUN SERPL-MCNC: 34 MG/DL (ref 8–22)
CALCIUM SERPL-MCNC: 9.3 MG/DL (ref 8.5–10.5)
CHLORIDE SERPL-SCNC: 108 MMOL/L (ref 96–112)
CO2 SERPL-SCNC: 21 MMOL/L (ref 20–33)
CREAT SERPL-MCNC: 1.45 MG/DL (ref 0.5–1.4)
EKG IMPRESSION: NORMAL
ERYTHROCYTE [DISTWIDTH] IN BLOOD BY AUTOMATED COUNT: 47.6 FL (ref 35.9–50)
GFR SERPLBLD CREATININE-BSD FMLA CKD-EPI: 51 ML/MIN/1.73 M 2
GLOBULIN SER CALC-MCNC: 3.1 G/DL (ref 1.9–3.5)
GLUCOSE BLD STRIP.AUTO-MCNC: 104 MG/DL (ref 65–99)
GLUCOSE BLD STRIP.AUTO-MCNC: 164 MG/DL (ref 65–99)
GLUCOSE BLD STRIP.AUTO-MCNC: 188 MG/DL (ref 65–99)
GLUCOSE BLD STRIP.AUTO-MCNC: 256 MG/DL (ref 65–99)
GLUCOSE BLD STRIP.AUTO-MCNC: 63 MG/DL (ref 65–99)
GLUCOSE SERPL-MCNC: 124 MG/DL (ref 65–99)
HCT VFR BLD AUTO: 35.4 % (ref 42–52)
HGB BLD-MCNC: 11.7 G/DL (ref 14–18)
MAGNESIUM SERPL-MCNC: 1.8 MG/DL (ref 1.5–2.5)
MCH RBC QN AUTO: 29.5 PG (ref 27–33)
MCHC RBC AUTO-ENTMCNC: 33.1 G/DL (ref 33.7–35.3)
MCV RBC AUTO: 89.2 FL (ref 81.4–97.8)
PLATELET # BLD AUTO: 248 K/UL (ref 164–446)
PMV BLD AUTO: 11.4 FL (ref 9–12.9)
POTASSIUM SERPL-SCNC: 4 MMOL/L (ref 3.6–5.5)
PROT SERPL-MCNC: 6.9 G/DL (ref 6–8.2)
RBC # BLD AUTO: 3.97 M/UL (ref 4.7–6.1)
SODIUM SERPL-SCNC: 140 MMOL/L (ref 135–145)
TROPONIN T SERPL-MCNC: 105 NG/L (ref 6–19)
UFH PPP CHRO-ACNC: 0.15 IU/ML
UFH PPP CHRO-ACNC: 0.56 IU/ML
WBC # BLD AUTO: 8.9 K/UL (ref 4.8–10.8)

## 2022-09-30 PROCEDURE — 84484 ASSAY OF TROPONIN QUANT: CPT

## 2022-09-30 PROCEDURE — 99233 SBSQ HOSP IP/OBS HIGH 50: CPT | Performed by: HOSPITALIST

## 2022-09-30 PROCEDURE — 36415 COLL VENOUS BLD VENIPUNCTURE: CPT

## 2022-09-30 PROCEDURE — A9270 NON-COVERED ITEM OR SERVICE: HCPCS | Performed by: INTERNAL MEDICINE

## 2022-09-30 PROCEDURE — 700102 HCHG RX REV CODE 250 W/ 637 OVERRIDE(OP): Performed by: NURSE PRACTITIONER

## 2022-09-30 PROCEDURE — 700111 HCHG RX REV CODE 636 W/ 250 OVERRIDE (IP): Performed by: EMERGENCY MEDICINE

## 2022-09-30 PROCEDURE — 85027 COMPLETE CBC AUTOMATED: CPT

## 2022-09-30 PROCEDURE — 80053 COMPREHEN METABOLIC PANEL: CPT

## 2022-09-30 PROCEDURE — 700102 HCHG RX REV CODE 250 W/ 637 OVERRIDE(OP): Performed by: STUDENT IN AN ORGANIZED HEALTH CARE EDUCATION/TRAINING PROGRAM

## 2022-09-30 PROCEDURE — 700111 HCHG RX REV CODE 636 W/ 250 OVERRIDE (IP)

## 2022-09-30 PROCEDURE — 93005 ELECTROCARDIOGRAM TRACING: CPT

## 2022-09-30 PROCEDURE — 85520 HEPARIN ASSAY: CPT

## 2022-09-30 PROCEDURE — 700102 HCHG RX REV CODE 250 W/ 637 OVERRIDE(OP): Performed by: INTERNAL MEDICINE

## 2022-09-30 PROCEDURE — 770020 HCHG ROOM/CARE - TELE (206)

## 2022-09-30 PROCEDURE — 83735 ASSAY OF MAGNESIUM: CPT

## 2022-09-30 PROCEDURE — 99233 SBSQ HOSP IP/OBS HIGH 50: CPT | Mod: GC | Performed by: INTERNAL MEDICINE

## 2022-09-30 PROCEDURE — 82962 GLUCOSE BLOOD TEST: CPT | Mod: 91

## 2022-09-30 PROCEDURE — A9270 NON-COVERED ITEM OR SERVICE: HCPCS | Performed by: NURSE PRACTITIONER

## 2022-09-30 PROCEDURE — A9270 NON-COVERED ITEM OR SERVICE: HCPCS | Performed by: STUDENT IN AN ORGANIZED HEALTH CARE EDUCATION/TRAINING PROGRAM

## 2022-09-30 PROCEDURE — 93010 ELECTROCARDIOGRAM REPORT: CPT | Performed by: INTERNAL MEDICINE

## 2022-09-30 RX ORDER — CARVEDILOL 3.12 MG/1
3.12 TABLET ORAL 2 TIMES DAILY WITH MEALS
Status: DISCONTINUED | OUTPATIENT
Start: 2022-09-30 | End: 2022-10-06 | Stop reason: HOSPADM

## 2022-09-30 RX ORDER — ACETAMINOPHEN 325 MG/1
650 TABLET ORAL ONCE
Status: COMPLETED | OUTPATIENT
Start: 2022-09-30 | End: 2022-09-30

## 2022-09-30 RX ORDER — LABETALOL HYDROCHLORIDE 5 MG/ML
10 INJECTION, SOLUTION INTRAVENOUS EVERY 6 HOURS PRN
Status: DISCONTINUED | OUTPATIENT
Start: 2022-09-30 | End: 2022-09-30

## 2022-09-30 RX ORDER — HYDRALAZINE HYDROCHLORIDE 25 MG/1
25 TABLET, FILM COATED ORAL EVERY 8 HOURS
Status: DISCONTINUED | OUTPATIENT
Start: 2022-09-30 | End: 2022-09-30

## 2022-09-30 RX ORDER — LISINOPRIL 10 MG/1
10 TABLET ORAL ONCE
Status: COMPLETED | OUTPATIENT
Start: 2022-09-30 | End: 2022-09-30

## 2022-09-30 RX ORDER — LISINOPRIL 20 MG/1
20 TABLET ORAL TWICE DAILY
Status: DISCONTINUED | OUTPATIENT
Start: 2022-09-30 | End: 2022-10-05

## 2022-09-30 RX ORDER — HYDRALAZINE HYDROCHLORIDE 20 MG/ML
10 INJECTION INTRAMUSCULAR; INTRAVENOUS EVERY 6 HOURS PRN
Status: DISCONTINUED | OUTPATIENT
Start: 2022-09-30 | End: 2022-10-06 | Stop reason: HOSPADM

## 2022-09-30 RX ORDER — ISOSORBIDE MONONITRATE 30 MG/1
30 TABLET, EXTENDED RELEASE ORAL
Status: DISCONTINUED | OUTPATIENT
Start: 2022-09-30 | End: 2022-10-05

## 2022-09-30 RX ADMIN — OMEPRAZOLE 20 MG: 20 CAPSULE, DELAYED RELEASE ORAL at 06:51

## 2022-09-30 RX ADMIN — INSULIN HUMAN 1 UNITS: 100 INJECTION, SOLUTION PARENTERAL at 17:12

## 2022-09-30 RX ADMIN — GABAPENTIN 400 MG: 400 CAPSULE ORAL at 21:02

## 2022-09-30 RX ADMIN — ACETAMINOPHEN 650 MG: 325 TABLET, FILM COATED ORAL at 22:30

## 2022-09-30 RX ADMIN — ISOSORBIDE MONONITRATE 30 MG: 30 TABLET, EXTENDED RELEASE ORAL at 17:10

## 2022-09-30 RX ADMIN — HEPARIN SODIUM 12 UNITS/KG/HR: 5000 INJECTION, SOLUTION INTRAVENOUS at 00:41

## 2022-09-30 RX ADMIN — HYDRALAZINE HYDROCHLORIDE 10 MG: 20 INJECTION INTRAMUSCULAR; INTRAVENOUS at 21:06

## 2022-09-30 RX ADMIN — LISINOPRIL 20 MG: 20 TABLET ORAL at 17:10

## 2022-09-30 RX ADMIN — ATORVASTATIN CALCIUM 80 MG: 80 TABLET, FILM COATED ORAL at 16:20

## 2022-09-30 RX ADMIN — NIFEDIPINE 90 MG: 30 TABLET, FILM COATED, EXTENDED RELEASE ORAL at 06:52

## 2022-09-30 RX ADMIN — GABAPENTIN 400 MG: 400 CAPSULE ORAL at 16:20

## 2022-09-30 RX ADMIN — HEPARIN SODIUM 2000 UNITS: 1000 INJECTION, SOLUTION INTRAVENOUS; SUBCUTANEOUS at 13:24

## 2022-09-30 RX ADMIN — LISINOPRIL 10 MG: 10 TABLET ORAL at 06:51

## 2022-09-30 RX ADMIN — INSULIN HUMAN 3 UNITS: 100 INJECTION, SOLUTION PARENTERAL at 13:32

## 2022-09-30 RX ADMIN — LEVOTHYROXINE SODIUM 100 MCG: 0.1 TABLET ORAL at 06:51

## 2022-09-30 RX ADMIN — ASPIRIN 81 MG: 81 TABLET, COATED ORAL at 06:51

## 2022-09-30 RX ADMIN — GABAPENTIN 400 MG: 400 CAPSULE ORAL at 11:33

## 2022-09-30 RX ADMIN — CLOPIDOGREL BISULFATE 75 MG: 75 TABLET ORAL at 06:51

## 2022-09-30 RX ADMIN — INSULIN HUMAN 1 UNITS: 100 INJECTION, SOLUTION PARENTERAL at 00:35

## 2022-09-30 RX ADMIN — LISINOPRIL 10 MG: 10 TABLET ORAL at 11:33

## 2022-09-30 RX ADMIN — INSULIN GLARGINE-YFGN 20 UNITS: 100 INJECTION, SOLUTION SUBCUTANEOUS at 17:14

## 2022-09-30 RX ADMIN — ESCITALOPRAM OXALATE 20 MG: 10 TABLET ORAL at 06:51

## 2022-09-30 ASSESSMENT — ENCOUNTER SYMPTOMS
NAUSEA: 0
EYES NEGATIVE: 1
CHILLS: 0
COUGH: 0
MYALGIAS: 0
FOCAL WEAKNESS: 0
VOMITING: 0
SHORTNESS OF BREATH: 0
ABDOMINAL PAIN: 0
FEVER: 0
SPUTUM PRODUCTION: 0

## 2022-09-30 ASSESSMENT — PAIN DESCRIPTION - PAIN TYPE: TYPE: ACUTE PAIN

## 2022-09-30 NOTE — PROGRESS NOTES
Assumed care of pt. Bedside report received from Matt SANDOVAL. Pt was updated on plan of care. Call light, phone and personal belongings in reach. Bed alarm on and working properly, bed in lowest position, and locked.

## 2022-09-30 NOTE — PROGRESS NOTES
Hospital Medicine Daily Progress Note    Date of Service  9/30/2022    Chief Complaint  Sulaiman Ceja is a 72 y.o. male admitted 9/28/2022 with chest pain    Hospital Course  Patient is a 72-year-old man with h/o CAD, s/p CABG in the 1990s w/stents, dementia, IDDM 2, hypothyroidism on Synthroid, hypertension on chronic clonidine and beta-blockers, and CKD stage III. He presented to [facility] as a transfer from [facility] where he was found to have an elevated troponin in the 40s. He was transfered to [facility] for cardiology consultation and a cardiac cath. Patient stated that he had 2 days of worsening chest pain lasting 2 to 3 hours at a time described as a pressure radiating sometimes near arm into esophagus which has been like his previous attacks, at rest and on exertion, no lower extremity edema, no recent viral illnesses.  Patient was given morphine and started on a heparin drip, on aspirin and Plavix as well as high intensity statin and Zetia at baseline.EKG without concern for ST elevations, trigeminy, similar to previous, patient hemodynamically stable blood pressure in the 170s, with beta-blocker addition and down into 150s, no major electrolyte abnormalities on exam, creatinine at baseline, CBC with mild leukocytosis of 12.4 thousand likely secondary to stress chronic mild anemia of 13.8 normocytic.  INR mildly elevated at 1.18.      Interval Problem Update  Axox3, poor recall. He denies pain, no sob. No nausea or vomiitng. Stable on room air.   Cath was postponed - cardiology is waiting to review his previous records from [place]  He remains on a heparin drip. ROS otherwise negative.     I have discussed this patient's plan of care and discharge plan at IDT rounds today with Case Management, Nursing, Nursing leadership, and other members of the IDT team.    Consultants/Specialty  cardiology    Code Status  Prior    Disposition  Patient is not medically cleared for discharge.   Anticipate discharge to  D  .  I have placed the appropriate orders for post-discharge needs.    Review of Systems  Review of Systems   Constitutional:  Positive for malaise/fatigue. Negative for chills and fever.   HENT: Negative.     Eyes: Negative.    Respiratory:  Negative for cough, sputum production and shortness of breath.    Cardiovascular:  Negative for chest pain and leg swelling.   Gastrointestinal:  Negative for abdominal pain, nausea and vomiting.   Genitourinary:  Negative for dysuria.   Musculoskeletal:  Negative for myalgias.   Skin:  Negative for rash.   Neurological:  Negative for focal weakness.      Physical Exam  Temp:  [36.4 °C (97.5 °F)-36.8 °C (98.2 °F)] 36.4 °C (97.5 °F)  Pulse:  [48-60] 48  Resp:  [16-18] 18  BP: (154-189)/() 178/72  SpO2:  [92 %-95 %] 95 %    Physical Exam  Vitals and nursing note reviewed.   Constitutional:       Appearance: He is not ill-appearing.   HENT:      Head: Normocephalic and atraumatic.      Nose: Nose normal.      Mouth/Throat:      Mouth: Mucous membranes are moist.      Pharynx: Oropharynx is clear.   Eyes:      Conjunctiva/sclera: Conjunctivae normal.      Pupils: Pupils are equal, round, and reactive to light.   Cardiovascular:      Rate and Rhythm: Regular rhythm.   Pulmonary:      Effort: Pulmonary effort is normal. No respiratory distress.      Breath sounds: No wheezing or rales.   Abdominal:      General: Abdomen is flat. Bowel sounds are normal. There is no distension.      Tenderness: There is no abdominal tenderness. There is no guarding.   Musculoskeletal:         General: Normal range of motion.      Cervical back: Normal range of motion.   Skin:     General: Skin is warm.   Neurological:      General: No focal deficit present.      Mental Status: He is alert and oriented to person, place, and time.       Fluids    Intake/Output Summary (Last 24 hours) at 9/30/2022 1301  Last data filed at 9/30/2022 1134  Gross per 24 hour   Intake 550 ml   Output 650 ml   Net -100 ml        Laboratory  Recent Labs     09/28/22 2117 09/30/22 0201   WBC 12.4* 8.9   RBC 4.40* 3.97*   HEMOGLOBIN 13.8* 11.7*   HEMATOCRIT 38.8* 35.4*   MCV 88.2 89.2   MCH 31.4 29.5   MCHC 35.6* 33.1*   RDW 46.6 47.6   PLATELETCT 287 248   MPV 11.6 11.4     Recent Labs     09/28/22 2117 09/30/22 0201   SODIUM 141 140   POTASSIUM 5.0 4.0   CHLORIDE 108 108   CO2 20 21   GLUCOSE 193* 124*   BUN 32* 34*   CREATININE 1.36 1.45*   CALCIUM 9.2 9.3     Recent Labs     09/28/22 2117   APTT 80.7*   INR 1.18*         Recent Labs     09/29/22 0226   TRIGLYCERIDE 148   HDL 35*   LDL 87       Imaging  DX-CHEST-PORTABLE (1 VIEW)   Final Result         1.  No acute cardiopulmonary disease.           Assessment/Plan  * NSTEMI (non-ST elevated myocardial infarction) (HCC)- (present on admission)  Assessment & Plan  Chest pain and pressure, concerning for unstable angina versus NSTEMI type I, troponins mildly elevated but clearly above baseline   Chest pain free  Cardiology following, awaiting previous cardiac records to review  Continue home blood pressure medications including beta-blocker calcium channel blocker, ACE/ARB  Continue heparin drip  Aspirin, statin, Plavix, low-dose beta-blocker      Dementia (HCC)- (present on admission)  Assessment & Plan  Patient states she has a history of dementia and that his daughter-in-law as mentioned in chart Tricia Ceja helps make his medical decisions for him, alert and oriented x3 otherwise decently reliable historian.    Assessment & Plan  Patient states she has a history of dementia and that his daughter-in-law as mentioned in chart Tricia Ceja helps make his medical decisions for him, alert and oriented x3 otherwise decently reliable historian.    Depression- (present on admission)  Assessment & Plan    Patient has a history of depression well-controlled without SI or HI     Continue home meds    Supportive care    Diabetic neuropathy (HCC)- (present on admission)  Assessment &  Plan  Patient with a high history of diabetic neuropathy on gabapentin  Continue home dose gabapentin    Normocytic anemia- (present on admission)  Assessment & Plan  Patient with normocytic anemia, very mild, likely secondary to chronic kidney disease  - Plan to monitor on CBC, follow-up as outpatient    HTN (hypertension)- (present on admission)  Assessment & Plan  History of hypertension, with elevated troponin admit hypertensive urgency however likely secondary to ACS as discussed above  Continue current meds as tolerated    HLD (hyperlipidemia)- (present on admission)  Assessment & Plan  Continue statin and Zetia combo    Insulin dependent type 2 diabetes mellitus (HCC)- (present on admission)  Assessment & Plan  Patient with a history of poorly controlled type 2 diabetes complicated by on long-term insulin use with 50 units in the morning and 15 units in the evening.  Glargine 20 units nightly  Sliding scale insulin  Hypoglycemic protocol    CKD (chronic kidney disease) stage 3, GFR 30-59 ml/min ([facility])- (present on admission)  Assessment & Plan  Patient with a history of CKD stage IIIa, creatinine doing well, EGFR of 59 currently, at baseline  Avoid nephrotoxic medications    Bradycardia- (present on admission)  Assessment & Plan  History of previous admission for symptomatic bradycardia, will be judicious with beta-blockers as discussed above.       VTE prophylaxis: therapeutic anticoagulation with heparin infusion    I have performed a physical exam and reviewed and updated ROS and Plan today (9/30/2022). In review of yesterday's note (9/29/2022), there are no changes except as documented above.

## 2022-09-30 NOTE — DISCHARGE PLANNING
Case Management Discharge Planning    Admission Date: 9/28/2022  GMLOS: 1.8  ALOS: 2    6-Clicks ADL Score: 23  6-Clicks Mobility Score: 17  PT and/or OT Eval ordered: Yes  Post-acute Referrals Ordered: No  Post-acute Choice Obtained: No  Has referral(s) been sent to post-acute provider:  NA      Anticipated Discharge Dispo:  Home    DME Needed: No    Action(s) Taken: Pt pending medical/cardiolgy clearance. Pt transferred from [facility]. Pt is from [place], [place]. Pt scheduled for heart cath today. Pt on room air. Case management needs unlikely, pending clinical course.    Escalations Completed: None    Medically Clear: No    Next Steps: f/u with medical team and pt to discuss dc needs and barriers.    Barriers to Discharge: Medical clearance and Pending Procedures

## 2022-09-30 NOTE — THERAPY
Physical Therapy Contact Note    PT cardiac rehab consult received, pt awaiting [facility]; will follow post as appropriate for accurate assessment of cardiac rehab phase I needs;    Kierra BRUCE, PT, DPT/Swathi,  [phone]

## 2022-09-30 NOTE — PROGRESS NOTES
Hypoglycemia Intervention    Hypoglycemia protocol intervention:  Blood glucose 63 at 0715.  Intervention: 8oz apple juice given   Repeat blood glucose 104 at 0745.  Intervention:    Additional interventions needed: NO interventions needed

## 2022-09-30 NOTE — DIETARY
Nutrition Services: Cardiac Education Consult   Day 2 of admit.  Sulaiman Ceja is a 72 y.o. male with admitting DX of NSTEMI (non-ST elevated myocardial infarction) ([facility]) [I21.4]    RD able to visit pt at bedside to provide cardiac education. RD discussed limiting sodium intake, saturated fat intake and sugar intake. In discussion, pt shared that he does not shop or prepare his own meals. RD provided handout reinforcing topics discussed and encouraged pt to share the handout with his son and daughter in law who cook and shop. Pt demonstrated some readiness for change.  RD able to answer all questions to patient's satisfaction.     No other education needs identified at this time. Consider referral to outpatient nutrition services for continuation of education as indicated or per pt preferences.     Please re-consult RD as indicated.

## 2022-09-30 NOTE — PROGRESS NOTES
Reason for Consult:  Asked by Dr Ady Shaw M.D. to see this patient with NSTEMI    CC:   Chief Complaint   Patient presents with    Chest Pain     BIB EMS from [facility]. Pt was about to have cataract surgery today. Pt was hypertensive, they gave pt an antihypertensive then he developed severe chest pain with an elevated troponin.        HPI:  Mr. Sulaiman Ceja is a 72 year old male who presented to an outside facility with chest pain. Patient has a history of CABG in 1990s (post stent), diabetes type 2, hypothyroidism, hypertension and CKD stage III.   Patient has had two days of chest pain and a sore throat. He fees as if he had chest tightness and rates the pain as a 8/10. He was given some medication at the outside facility that he states helped. He also has been having shortness of breath. He also has a headache.   In outside facility, patient was found to have elevated troponin and no ST elevation on ECG. He was transferred in anticipation of cardiac catherization.   Today, troponin was 67. Patient overall states that he is feeling well, but has some chest pain. Patient's answers were at times tangential. He does have  a history of dementia.     Interval History:  Patient denies any chest pain, no throat pain. No other symptoms currently. Discussed patient's medical history with daughter. Recent admission at [facility] in [place] in May 2022. 5/12 had a left heart catheter and ECHO, as per daughter no confirmed obstruction at that time. Currently attempting to get full records from that stay.   Patient has been scheduled to get a pacemaker locally, but daughter would rather he have it in [place] if possible.   Patient has bladder implant, history of hydronephrosis.     Medications / Drug list prior to admission:  No current facility-administered medications on file prior to encounter.     Current Outpatient Medications on File Prior to Encounter   Medication Sig Dispense Refill     levothyroxine (SYNTHROID) 100 MCG Tab Take 100 mcg by mouth every morning on an empty stomach.      clopidogrel (PLAVIX) 75 MG Tab Take 75 mg by mouth every day.      lisinopril (PRINIVIL) 10 MG Tab Take 10 mg by mouth every day.      NIFEdipine SR (PROCADIA-XL) 90 MG CR tablet Take 90 mg by mouth every day.      Vitamin D3 5000 Unit (125 mcg) Tab Take 5,000 Units by mouth every day.      albuterol 108 (90 Base) MCG/ACT Aero Soln inhalation aerosol Inhale 2 Puffs every 6 hours as needed for Shortness of Breath.      atorvastatin (LIPITOR) 40 MG Tab Take 1 Tablet by mouth every evening. Increased dose 30 Tablet 0    aspirin 81 MG EC tablet Take 1 Tablet by mouth every day. For heart health 30 Tablet 0    escitalopram (LEXAPRO) 20 MG tablet Take 20 mg by mouth every day.      insulin detemir (LEVEMIR) 100 UNIT/ML Solution Inject 15-50 Units under the skin 2 times a day. 50 units every morning  15 units every night      insulin regular (HUMULIN R) 100 Unit/mL Solution Inject 2-20 Units under the skin 4 Times a Day,Before Meals and at Bedtime. Pt unable to define Sliding scale      omeprazole (PRILOSEC) 20 MG delayed-release capsule Take 20 mg by mouth every day.      gabapentin (NEURONTIN) 800 MG tablet Take 400 mg by mouth 3 times a day. Half tablet = 400 mg         Current list of administered Medications:    Current Facility-Administered Medications:     hydrALAZINE (APRESOLINE) injection 10 mg, 10 mg, Intravenous, Q6HRS PRN, Rukhsana Adams A.P.R.NFreeman    insulin regular (HumuLIN R,NovoLIN R) injection, 1-6 Units, Subcutaneous, Q6HRS, 1 Units at 09/30/22 0035 **AND** POC blood glucose manual result, , , Q6H **AND** NOTIFY MD and PharmD, , , Once **AND** Administer 20 grams of glucose (approximately 8 ounces of fruit juice) every 15 minutes PRN FSBG less than 70 mg/dL, , , PRN **AND** dextrose 50% (D50W) injection 25 g, 25 g, Intravenous, Q15 MIN PRN, Cutter J Tamiko, M.D.    insulin GLARGINE (Lantus,Semglee) injection,  20 Units, Subcutaneous, Q EVENING, Padilla Morrison M.D., 20 Units at 09/29/22 1738    lisinopril (PRINIVIL) tablet 10 mg, 10 mg, Oral, TWICE DAILY, Darwin Wesley M.D., 10 mg at 09/30/22 0651    heparin infusion 25,000 units in 500 mL 0.45% NACL, 0-30 Units/kg/hr, Intravenous, Continuous, Lidia Alcala D.O., Last Rate: 19.6 mL/hr at 09/30/22 0729, 12 Units/kg/hr at 09/30/22 0729    heparin injection 2,000 Units, 2,000 Units, Intravenous, PRN, Lidia Alcala D.O.    albuterol inhaler 2 Puff, 2 Puff, Inhalation, Q6HRS PRN, Iam Hsieh M.D.    aspirin EC (ECOTRIN) tablet 81 mg, 81 mg, Oral, DAILY, Iam Hsieh M.D., 81 mg at 09/30/22 0651    clopidogrel (PLAVIX) tablet 75 mg, 75 mg, Oral, DAILY, Iam Hsieh M.D., 75 mg at 09/30/22 0651    escitalopram (Lexapro) tablet 20 mg, 20 mg, Oral, DAILY, Iam Hsieh M.D., 20 mg at 09/30/22 0651    gabapentin (NEURONTIN) capsule 400 mg, 400 mg, Oral, TID, Iam Hsieh M.D., 400 mg at 09/29/22 2013    levothyroxine (SYNTHROID) tablet 100 mcg, 100 mcg, Oral, AM ES, Iam Hsieh M.D., 100 mcg at 09/30/22 0651    omeprazole (PRILOSEC) capsule 20 mg, 20 mg, Oral, DAILY, Iam Hsieh M.D., 20 mg at 09/30/22 0651    NIFEdipine SR (PROCARDIA-XL) tablet 90 mg, 90 mg, Oral, DAILY, Iam Hsieh M.D., 90 mg at 09/30/22 0652    nitroglycerin (NITROSTAT) tablet 0.4 mg, 0.4 mg, Sublingual, Q5 MIN PRN, Iam Hsieh M.D.    atorvastatin (LIPITOR) tablet 80 mg, 80 mg, Oral, Q EVENING, Padilla Morrison M.D., 80 mg at 09/29/22 6964    Past Medical History:   Diagnosis Date    CAD (coronary artery disease)     CABG 90's       History reviewed. No pertinent surgical history.    History reviewed. No pertinent family history.  Patient family history was personally reviewed, no pertinent family history to current presentation    Social History     Socioeconomic History    Marital status: Single     Spouse name: Not on file    Number of children:  "Not on file    Years of education: Not on file    Highest education level: Not on file   Occupational History    Not on file   Tobacco Use    Smoking status: Former     Types: Cigarettes     Start date: 3/1/1996    Smokeless tobacco: Never   Vaping Use    Vaping Use: Never used   Substance and Sexual Activity    Alcohol use: Never    Drug use: Never    Sexual activity: Not on file   Other Topics Concern    Not on file   Social History Narrative    Not on file     Social Determinants of Health     Financial Resource Strain: Not on file   Food Insecurity: Not on file   Transportation Needs: Not on file   Physical Activity: Not on file   Stress: Not on file   Social Connections: Not on file   Intimate Partner Violence: Not on file   Housing Stability: Not on file       ALLERGIES:  No Known Allergies    Review of systems:  A complete review of symptoms was reviewed with patient. This is reviewed in H&P and PMH. ALL OTHERS reviewed and negative    Physical exam:  Patient Vitals for the past 24 hrs:   BP Temp Temp src Pulse Resp SpO2 Height Weight   09/29/22 1000 (!) 171/77 -- -- 72 16 93 % -- --   09/29/22 0900 (!) 167/76 -- -- 73 16 93 % -- --   09/29/22 0800 (!) 169/73 -- -- 70 18 93 % -- --   09/29/22 0701 (!) 168/77 -- -- (!) 56 14 96 % -- --   09/29/22 0652 -- -- -- (!) 53 -- 97 % -- --   09/29/22 0600 (!) 179/80 -- -- 78 -- 95 % -- --   09/29/22 0301 -- -- -- -- 14 -- -- --   09/29/22 0300 (!) 152/71 -- -- 74 -- 96 % -- --   09/29/22 0201 -- -- -- -- 16 -- -- --   09/29/22 0200 (!) 155/82 -- -- 69 -- 96 % -- --   09/29/22 0100 (!) 163/72 -- -- 73 16 97 % -- --   09/29/22 0030 -- -- -- 77 16 96 % -- --   09/29/22 0000 (!) 179/81 -- -- (!) 54 19 97 % -- --   09/28/22 2335 (!) 170/94 -- -- 68 20 96 % -- --   09/28/22 2250 (!) 179/81 -- -- 80 15 95 % -- --   09/28/22 2117 (!) 186/86 -- -- 93 16 97 % -- --   09/28/22 2114 -- 36.9 °C (98.5 °F) Temporal -- -- -- 1.753 m (5' 9\") 81.6 kg (180 lb)     General: No acute " distress, sitting in bed  EYES: no jaundice  HEENT: OP clear   Neck: No JVD.   CVS:  RRR. S1 + S2. No M/R/G. No edema.  Resp: CTAB. No wheezing or crackles/rhonchi.  Abdomen: Soft, NT, ND,  Skin: Grossly nothing acute no obvious rashes  Neurological: Alert, Moves all extremities, no cranial nerve defects on limited exam  Extremities: Pulse 2+ in b/l LE. No cyanosis.     Data:  Laboratory studies personally reviewed by me:  Recent Results (from the past 24 hour(s))   Heparin Anti-Xa    Collection Time: 09/29/22 11:50 AM   Result Value Ref Range    Heparin Xa (UFH) 0.44 IU/mL   POCT glucose device results    Collection Time: 09/29/22 12:16 PM   Result Value Ref Range    POC Glucose, Blood 111 (H) 65 - 99 mg/dL   POCT glucose device results    Collection Time: 09/29/22  3:44 PM   Result Value Ref Range    POC Glucose, Blood 103 (H) 65 - 99 mg/dL   POCT glucose device results    Collection Time: 09/29/22  5:29 PM   Result Value Ref Range    POC Glucose, Blood 83 65 - 99 mg/dL   POCT glucose device results    Collection Time: 09/30/22 12:31 AM   Result Value Ref Range    POC Glucose, Blood 164 (H) 65 - 99 mg/dL   CBC WITHOUT DIFFERENTIAL    Collection Time: 09/30/22  2:01 AM   Result Value Ref Range    WBC 8.9 4.8 - 10.8 K/uL    RBC 3.97 (L) 4.70 - 6.10 M/uL    Hemoglobin 11.7 (L) 14.0 - 18.0 g/dL    Hematocrit 35.4 (L) 42.0 - 52.0 %    MCV 89.2 81.4 - 97.8 fL    MCH 29.5 27.0 - 33.0 pg    MCHC 33.1 (L) 33.7 - 35.3 g/dL    RDW 47.6 35.9 - 50.0 fL    Platelet Count 248 164 - 446 K/uL    MPV 11.4 9.0 - 12.9 fL   Comp Metabolic Panel    Collection Time: 09/30/22  2:01 AM   Result Value Ref Range    Sodium 140 135 - 145 mmol/L    Potassium 4.0 3.6 - 5.5 mmol/L    Chloride 108 96 - 112 mmol/L    Co2 21 20 - 33 mmol/L    Anion Gap 11.0 7.0 - 16.0    Glucose 124 (H) 65 - 99 mg/dL    Bun 34 (H) 8 - 22 mg/dL    Creatinine 1.45 (H) 0.50 - 1.40 mg/dL    Calcium 9.3 8.5 - 10.5 mg/dL    AST(SGOT) 19 12 - 45 U/L    ALT(SGPT) 16 2 - 50  U/L    Alkaline Phosphatase 84 30 - 99 U/L    Total Bilirubin 0.2 0.1 - 1.5 mg/dL    Albumin 3.8 3.2 - 4.9 g/dL    Total Protein 6.9 6.0 - 8.2 g/dL    Globulin 3.1 1.9 - 3.5 g/dL    A-G Ratio 1.2 g/dL   ESTIMATED GFR    Collection Time: 09/30/22  2:01 AM   Result Value Ref Range    GFR (CKD-EPI) 51 (A) >60 mL/min/1.73 m 2   MAGNESIUM    Collection Time: 09/30/22  2:01 AM   Result Value Ref Range    Magnesium 1.8 1.5 - 2.5 mg/dL   POCT glucose device results    Collection Time: 09/30/22  6:57 AM   Result Value Ref Range    POC Glucose, Blood 63 (L) 65 - 99 mg/dL   POCT glucose device results    Collection Time: 09/30/22  7:44 AM   Result Value Ref Range    POC Glucose, Blood 104 (H) 65 - 99 mg/dL         All pertinent features of laboratory and imaging reviewed including primary images where applicable      Principal Problem:    NSTEMI (non-ST elevated myocardial infarction) (HCC) POA: Yes  Active Problems:    Bradycardia POA: Yes    CKD (chronic kidney disease) stage 3, GFR 30-59 ml/min ([facility]) POA: Yes    Insulin dependent type 2 diabetes mellitus (HCC) POA: Yes    HLD (hyperlipidemia) POA: Yes    HTN (hypertension) POA: Yes    Normocytic anemia POA: Yes    Diabetic neuropathy (HCC) POA: Yes    Depression POA: Yes    Dementia (HCC) POA: Yes    COPD (chronic obstructive pulmonary disease) ([facility]) POA: Yes    Hypothyroidism POA: Yes  Resolved Problems:    * No resolved hospital problems. *      Assessment / Plan:   Mr. Sulaiman Ceja is a 72 year old male with a past medical history of CABG in 1990, with stents, dementia IDDM2, hypothyroidism and hypertension. He was transferred from an outside facility with elevated troponins and ECG without ST elevation. Awaiting records, to decide if left heart catheterization is needed today.     #NSTEMI  #History of CABG (1990s)  #CAD    Plan:  -Asprin 81 mg  -Atorvastatin 80 mg  -Carvedilol 3.125 mg  -Lisinopril 10 mg  -Nifedipine 90 mg  -Nitroglycerine 0.4 mg  -Clopidogrel 75  mg  -Heparin gtt              I personally discussed his case with Dr. Darwin Wesley    It is my pleasure to participate in the care of Mr. Ceja.  Please do not hesitate to contact me with questions or concerns.    Sarina Romero MD, PGY2  Cardiologist [facility]

## 2022-09-30 NOTE — TELEPHONE ENCOUNTER
Sarina Romero M.D.  Unr  [facility]; Markos Yuan Ass't 1 minute ago (3:36 PM)     HJ  Called patient's daughter to discuss father's care. Was able to obtain Zio patch results. Currently attempting to retrieve other results from [facility] in [place], [place].

## 2022-09-30 NOTE — CARE PLAN
Problem: Knowledge Deficit - Standard  Goal: Patient and family/care givers will demonstrate understanding of plan of care, disease process/condition, diagnostic tests and medications  Outcome: Progressing     Problem: Communication  Goal: The ability to communicate needs accurately and effectively will improve  Outcome: Progressing     Problem: Hemodynamics  Goal: Patient's hemodynamics, fluid balance and neurologic status will be stable or improve  Outcome: Progressing   The patient is Stable - Low risk of patient condition declining or worsening    Shift Goals  Clinical Goals: pt will use call bell when getting up, will rest, and be prepared mentally for heart cath possible tomorow.  Patient Goals: rest    Progress made toward(s) clinical / shift goals:  pt is mentally prepared for procedure if done tomorrow. Resting well and has used call bell since asked to do so.    Patient is not progressing towards the following goals:

## 2022-09-30 NOTE — TELEPHONE ENCOUNTER
Caller Name: Tricia (daughter)  Call Back Number: [phone]    How would the patient prefer to be contacted with a response: Phone call OK to leave a detailed message    Patient daughter  called stating she is returning 's call, she is having a hard time getting medical records that were requested by the provider, but that pt should be able sign since his dementia is only short term.       is aware and will be contacting daughter

## 2022-10-01 PROBLEM — N39.0 UTI (URINARY TRACT INFECTION): Status: ACTIVE | Noted: 2022-10-01

## 2022-10-01 LAB
ANION GAP SERPL CALC-SCNC: 11 MMOL/L (ref 7–16)
APPEARANCE UR: ABNORMAL
BACTERIA #/AREA URNS HPF: ABNORMAL /HPF
BASOPHILS # BLD AUTO: 0.5 % (ref 0–1.8)
BASOPHILS # BLD: 0.06 K/UL (ref 0–0.12)
BILIRUB UR QL STRIP.AUTO: NEGATIVE
BUN SERPL-MCNC: 35 MG/DL (ref 8–22)
CALCIUM SERPL-MCNC: 9.3 MG/DL (ref 8.5–10.5)
CHLORIDE SERPL-SCNC: 107 MMOL/L (ref 96–112)
CO2 SERPL-SCNC: 19 MMOL/L (ref 20–33)
COLOR UR: YELLOW
CREAT SERPL-MCNC: 1.8 MG/DL (ref 0.5–1.4)
EOSINOPHIL # BLD AUTO: 0.02 K/UL (ref 0–0.51)
EOSINOPHIL NFR BLD: 0.2 % (ref 0–6.9)
EPI CELLS #/AREA URNS HPF: NEGATIVE /HPF
ERYTHROCYTE [DISTWIDTH] IN BLOOD BY AUTOMATED COUNT: 47.7 FL (ref 35.9–50)
GFR SERPLBLD CREATININE-BSD FMLA CKD-EPI: 39 ML/MIN/1.73 M 2
GLUCOSE BLD STRIP.AUTO-MCNC: 151 MG/DL (ref 65–99)
GLUCOSE BLD STRIP.AUTO-MCNC: 180 MG/DL (ref 65–99)
GLUCOSE BLD STRIP.AUTO-MCNC: 188 MG/DL (ref 65–99)
GLUCOSE BLD STRIP.AUTO-MCNC: 198 MG/DL (ref 65–99)
GLUCOSE BLD STRIP.AUTO-MCNC: 201 MG/DL (ref 65–99)
GLUCOSE SERPL-MCNC: 190 MG/DL (ref 65–99)
GLUCOSE UR STRIP.AUTO-MCNC: NEGATIVE MG/DL
HCT VFR BLD AUTO: 35.1 % (ref 42–52)
HGB BLD-MCNC: 11.9 G/DL (ref 14–18)
IMM GRANULOCYTES # BLD AUTO: 0.05 K/UL (ref 0–0.11)
IMM GRANULOCYTES NFR BLD AUTO: 0.4 % (ref 0–0.9)
KETONES UR STRIP.AUTO-MCNC: NEGATIVE MG/DL
LEUKOCYTE ESTERASE UR QL STRIP.AUTO: ABNORMAL
LYMPHOCYTES # BLD AUTO: 0.84 K/UL (ref 1–4.8)
LYMPHOCYTES NFR BLD: 7.2 % (ref 22–41)
MCH RBC QN AUTO: 30 PG (ref 27–33)
MCHC RBC AUTO-ENTMCNC: 33.9 G/DL (ref 33.7–35.3)
MCV RBC AUTO: 88.4 FL (ref 81.4–97.8)
MICRO URNS: ABNORMAL
MONOCYTES # BLD AUTO: 1.13 K/UL (ref 0–0.85)
MONOCYTES NFR BLD AUTO: 9.7 % (ref 0–13.4)
NEUTROPHILS # BLD AUTO: 9.53 K/UL (ref 1.82–7.42)
NEUTROPHILS NFR BLD: 82 % (ref 44–72)
NITRITE UR QL STRIP.AUTO: NEGATIVE
NRBC # BLD AUTO: 0 K/UL
NRBC BLD-RTO: 0 /100 WBC
PH UR STRIP.AUTO: 5.5 [PH] (ref 5–8)
PLATELET # BLD AUTO: 227 K/UL (ref 164–446)
PMV BLD AUTO: 11.2 FL (ref 9–12.9)
POTASSIUM SERPL-SCNC: 4.4 MMOL/L (ref 3.6–5.5)
PROCALCITONIN SERPL-MCNC: 0.21 NG/ML
PROT UR QL STRIP: 100 MG/DL
RBC # BLD AUTO: 3.97 M/UL (ref 4.7–6.1)
RBC # URNS HPF: ABNORMAL /HPF
RBC UR QL AUTO: ABNORMAL
RENAL EPI CELLS #/AREA URNS HPF: NEGATIVE /HPF
SODIUM SERPL-SCNC: 137 MMOL/L (ref 135–145)
SP GR UR STRIP.AUTO: 1.01
UFH PPP CHRO-ACNC: 0.33 IU/ML
UROBILINOGEN UR STRIP.AUTO-MCNC: 0.2 MG/DL
WBC # BLD AUTO: 11.6 K/UL (ref 4.8–10.8)
WBC #/AREA URNS HPF: ABNORMAL /HPF

## 2022-10-01 PROCEDURE — 700111 HCHG RX REV CODE 636 W/ 250 OVERRIDE (IP): Performed by: EMERGENCY MEDICINE

## 2022-10-01 PROCEDURE — A9270 NON-COVERED ITEM OR SERVICE: HCPCS

## 2022-10-01 PROCEDURE — A9270 NON-COVERED ITEM OR SERVICE: HCPCS | Performed by: NURSE PRACTITIONER

## 2022-10-01 PROCEDURE — 84145 PROCALCITONIN (PCT): CPT

## 2022-10-01 PROCEDURE — 700102 HCHG RX REV CODE 250 W/ 637 OVERRIDE(OP): Performed by: STUDENT IN AN ORGANIZED HEALTH CARE EDUCATION/TRAINING PROGRAM

## 2022-10-01 PROCEDURE — 80048 BASIC METABOLIC PNL TOTAL CA: CPT

## 2022-10-01 PROCEDURE — A9270 NON-COVERED ITEM OR SERVICE: HCPCS | Performed by: INTERNAL MEDICINE

## 2022-10-01 PROCEDURE — 700102 HCHG RX REV CODE 250 W/ 637 OVERRIDE(OP): Performed by: NURSE PRACTITIONER

## 2022-10-01 PROCEDURE — 700102 HCHG RX REV CODE 250 W/ 637 OVERRIDE(OP)

## 2022-10-01 PROCEDURE — 700111 HCHG RX REV CODE 636 W/ 250 OVERRIDE (IP)

## 2022-10-01 PROCEDURE — 85520 HEPARIN ASSAY: CPT

## 2022-10-01 PROCEDURE — A9270 NON-COVERED ITEM OR SERVICE: HCPCS | Performed by: STUDENT IN AN ORGANIZED HEALTH CARE EDUCATION/TRAINING PROGRAM

## 2022-10-01 PROCEDURE — 770020 HCHG ROOM/CARE - TELE (206)

## 2022-10-01 PROCEDURE — 82962 GLUCOSE BLOOD TEST: CPT | Mod: 91

## 2022-10-01 PROCEDURE — 81001 URINALYSIS AUTO W/SCOPE: CPT

## 2022-10-01 PROCEDURE — 700102 HCHG RX REV CODE 250 W/ 637 OVERRIDE(OP): Performed by: INTERNAL MEDICINE

## 2022-10-01 PROCEDURE — 99232 SBSQ HOSP IP/OBS MODERATE 35: CPT | Performed by: HOSPITALIST

## 2022-10-01 PROCEDURE — 36415 COLL VENOUS BLD VENIPUNCTURE: CPT

## 2022-10-01 PROCEDURE — 85025 COMPLETE CBC W/AUTO DIFF WBC: CPT

## 2022-10-01 PROCEDURE — 302146: Performed by: HOSPITALIST

## 2022-10-01 RX ORDER — HYDROCODONE BITARTRATE AND ACETAMINOPHEN 5; 325 MG/1; MG/1
1 TABLET ORAL ONCE
Status: COMPLETED | OUTPATIENT
Start: 2022-10-01 | End: 2022-10-01

## 2022-10-01 RX ORDER — OXYCODONE HYDROCHLORIDE 5 MG/1
5 TABLET ORAL EVERY 4 HOURS PRN
Status: DISCONTINUED | OUTPATIENT
Start: 2022-10-01 | End: 2022-10-06 | Stop reason: HOSPADM

## 2022-10-01 RX ORDER — HYDROMORPHONE HYDROCHLORIDE 1 MG/ML
0.5 INJECTION, SOLUTION INTRAMUSCULAR; INTRAVENOUS; SUBCUTANEOUS ONCE
Status: COMPLETED | OUTPATIENT
Start: 2022-10-01 | End: 2022-10-01

## 2022-10-01 RX ADMIN — CARVEDILOL 3.12 MG: 3.12 TABLET, FILM COATED ORAL at 16:49

## 2022-10-01 RX ADMIN — ISOSORBIDE MONONITRATE 30 MG: 30 TABLET, EXTENDED RELEASE ORAL at 06:33

## 2022-10-01 RX ADMIN — ASPIRIN 81 MG: 81 TABLET, COATED ORAL at 06:34

## 2022-10-01 RX ADMIN — INSULIN GLARGINE-YFGN 20 UNITS: 100 INJECTION, SOLUTION SUBCUTANEOUS at 16:52

## 2022-10-01 RX ADMIN — INSULIN HUMAN 1 UNITS: 100 INJECTION, SOLUTION PARENTERAL at 07:07

## 2022-10-01 RX ADMIN — INSULIN HUMAN 1 UNITS: 100 INJECTION, SOLUTION PARENTERAL at 12:14

## 2022-10-01 RX ADMIN — CARVEDILOL 3.12 MG: 3.12 TABLET, FILM COATED ORAL at 07:56

## 2022-10-01 RX ADMIN — LISINOPRIL 20 MG: 20 TABLET ORAL at 06:33

## 2022-10-01 RX ADMIN — CLOPIDOGREL BISULFATE 75 MG: 75 TABLET ORAL at 06:33

## 2022-10-01 RX ADMIN — NIFEDIPINE 90 MG: 30 TABLET, FILM COATED, EXTENDED RELEASE ORAL at 06:32

## 2022-10-01 RX ADMIN — INSULIN HUMAN 1 UNITS: 100 INJECTION, SOLUTION PARENTERAL at 23:05

## 2022-10-01 RX ADMIN — GABAPENTIN 400 MG: 400 CAPSULE ORAL at 20:23

## 2022-10-01 RX ADMIN — CEFTRIAXONE SODIUM 1 G: 10 INJECTION, POWDER, FOR SOLUTION INTRAVENOUS at 07:56

## 2022-10-01 RX ADMIN — HEPARIN SODIUM 14 UNITS/KG/HR: 5000 INJECTION, SOLUTION INTRAVENOUS at 07:24

## 2022-10-01 RX ADMIN — INSULIN HUMAN 2 UNITS: 100 INJECTION, SOLUTION PARENTERAL at 16:51

## 2022-10-01 RX ADMIN — GABAPENTIN 400 MG: 400 CAPSULE ORAL at 07:56

## 2022-10-01 RX ADMIN — OXYCODONE 5 MG: 5 TABLET ORAL at 06:34

## 2022-10-01 RX ADMIN — OXYCODONE 5 MG: 5 TABLET ORAL at 21:19

## 2022-10-01 RX ADMIN — GABAPENTIN 400 MG: 400 CAPSULE ORAL at 16:49

## 2022-10-01 RX ADMIN — LEVOTHYROXINE SODIUM 100 MCG: 0.1 TABLET ORAL at 06:33

## 2022-10-01 RX ADMIN — LISINOPRIL 20 MG: 20 TABLET ORAL at 16:49

## 2022-10-01 RX ADMIN — ESCITALOPRAM OXALATE 20 MG: 10 TABLET ORAL at 06:33

## 2022-10-01 RX ADMIN — HYDROMORPHONE HYDROCHLORIDE 0.5 MG: 1 INJECTION, SOLUTION INTRAMUSCULAR; INTRAVENOUS; SUBCUTANEOUS at 23:01

## 2022-10-01 RX ADMIN — ATORVASTATIN CALCIUM 80 MG: 80 TABLET, FILM COATED ORAL at 16:49

## 2022-10-01 RX ADMIN — INSULIN HUMAN 1 UNITS: 100 INJECTION, SOLUTION PARENTERAL at 00:42

## 2022-10-01 RX ADMIN — HYDROCODONE BITARTRATE AND ACETAMINOPHEN 1 TABLET: 5; 325 TABLET ORAL at 00:42

## 2022-10-01 RX ADMIN — OMEPRAZOLE 20 MG: 20 CAPSULE, DELAYED RELEASE ORAL at 06:33

## 2022-10-01 ASSESSMENT — PAIN DESCRIPTION - PAIN TYPE
TYPE: ACUTE PAIN

## 2022-10-01 ASSESSMENT — ENCOUNTER SYMPTOMS
FEVER: 0
COUGH: 0
EYES NEGATIVE: 1
SHORTNESS OF BREATH: 0
SPUTUM PRODUCTION: 0
VOMITING: 0
ABDOMINAL PAIN: 0
FOCAL WEAKNESS: 0
CHILLS: 0
NAUSEA: 0
MYALGIAS: 0

## 2022-10-01 ASSESSMENT — FIBROSIS 4 INDEX: FIB4 SCORE: 1.51

## 2022-10-01 NOTE — PROGRESS NOTES
Hospital Medicine Daily Progress Note    Date of Service  10/1/2022    Chief Complaint  Sulaiamn Ceja is a 72 y.o. male admitted 9/28/2022 with chest pain    Hospital Course  Patient is a 72-year-old man with h/o CAD, s/p CABG in the 1990s w/stents, dementia, IDDM 2, hypothyroidism on Synthroid, hypertension on chronic clonidine and beta-blockers, and CKD stage III. He presented to [facility] as a transfer from [facility] where he was found to have an elevated troponin in the 40s. He was transfered to [facility] for cardiology consultation and a cardiac cath. Patient stated that he had 2 days of worsening chest pain lasting 2 to 3 hours at a time described as a pressure radiating sometimes near arm into esophagus which has been like his previous attacks, at rest and on exertion, no lower extremity edema, no recent viral illnesses.  Patient was given morphine and started on a heparin drip, on aspirin and Plavix as well as high intensity statin and Zetia at baseline.EKG without concern for ST elevations, trigeminy, similar to previous, patient hemodynamically stable blood pressure in the 170s, with beta-blocker addition and down into 150s, no major electrolyte abnormalities on exam, creatinine at baseline, CBC with mild leukocytosis of 12.4 thousand likely secondary to stress chronic mild anemia of 13.8 normocytic.  INR mildly elevated at 1.18.      Interval Problem Update  Axox3, poor recall. Dysuria this am, found to have a uti and started on abx, he denies dysuria now, no flank pain, no n/v. Denies chest pain, no sob.     Awaiting cardiology plan. Stable on room air. ROS otherwise negative.     I have discussed this patient's plan of care and discharge plan at IDT rounds today with Case Management, Nursing, Nursing leadership, and other members of the IDT team.    Consultants/Specialty  cardiology    Code Status  Prior    Disposition  Patient is not medically cleared for discharge.   Anticipate discharge to  TBD .  I have  placed the appropriate orders for post-discharge needs.    Review of Systems  Review of Systems   Constitutional:  Positive for malaise/fatigue. Negative for chills and fever.   HENT: Negative.     Eyes: Negative.    Respiratory:  Negative for cough, sputum production and shortness of breath.    Cardiovascular:  Negative for chest pain and leg swelling.   Gastrointestinal:  Negative for abdominal pain, nausea and vomiting.   Genitourinary:  Negative for dysuria.   Musculoskeletal:  Negative for myalgias.   Skin:  Negative for rash.   Neurological:  Negative for focal weakness.      Physical Exam  Temp:  [36.8 °C (98.2 °F)-37.2 °C (98.9 °F)] 37.1 °C (98.8 °F)  Pulse:  [50-86] 86  Resp:  [16-18] 16  BP: (103-182)/(38-80) 103/80  SpO2:  [92 %-98 %] 98 %    Physical Exam  Vitals and nursing note reviewed.   Constitutional:       Appearance: He is not ill-appearing.   HENT:      Head: Normocephalic and atraumatic.      Nose: Nose normal.      Mouth/Throat:      Mouth: Mucous membranes are moist.      Pharynx: Oropharynx is clear.   Eyes:      Conjunctiva/sclera: Conjunctivae normal.      Pupils: Pupils are equal, round, and reactive to light.   Cardiovascular:      Rate and Rhythm: Regular rhythm.   Pulmonary:      Effort: Pulmonary effort is normal. No respiratory distress.      Breath sounds: No wheezing or rales.   Abdominal:      General: Abdomen is flat. Bowel sounds are normal. There is no distension.      Tenderness: There is no abdominal tenderness. There is no guarding.   Musculoskeletal:         General: Normal range of motion.      Cervical back: Normal range of motion.   Skin:     General: Skin is warm.   Neurological:      General: No focal deficit present.      Mental Status: He is alert and oriented to person, place, and time.       Fluids    Intake/Output Summary (Last 24 hours) at 10/1/2022 1317  Last data filed at 10/1/2022 0900  Gross per 24 hour   Intake 610 ml   Output 1800 ml   Net -1190 ml        Laboratory  Recent Labs     09/28/22  2117 09/30/22  0201 10/01/22  0316   WBC 12.4* 8.9 11.6*   RBC 4.40* 3.97* 3.97*   HEMOGLOBIN 13.8* 11.7* 11.9*   HEMATOCRIT 38.8* 35.4* 35.1*   MCV 88.2 89.2 88.4   MCH 31.4 29.5 30.0   MCHC 35.6* 33.1* 33.9   RDW 46.6 47.6 47.7   PLATELETCT 287 248 227   MPV 11.6 11.4 11.2     Recent Labs     09/28/22  2117 09/30/22  0201 10/01/22  0316   SODIUM 141 140 137   POTASSIUM 5.0 4.0 4.4   CHLORIDE 108 108 107   CO2 20 21 19*   GLUCOSE 193* 124* 190*   BUN 32* 34* 35*   CREATININE 1.36 1.45* 1.80*   CALCIUM 9.2 9.3 9.3     Recent Labs     09/28/22 2117   APTT 80.7*   INR 1.18*         Recent Labs     09/29/22 0226   TRIGLYCERIDE 148   HDL 35*   LDL 87       Imaging  DX-CHEST-PORTABLE (1 VIEW)   Final Result         1.  No acute cardiopulmonary disease.      EC-ECHOCARDIOGRAM COMPLETE W/O CONT    (Results Pending)        Assessment/Plan  * NSTEMI (non-ST elevated myocardial infarction) ([facility])- (present on admission)  Assessment & Plan  Chest pain and pressure, concerning for unstable angina versus NSTEMI type I, troponins mildly elevated but clearly above baseline   He remains chest pain free  Cardiology following, awaiting previous cardiac records to review  Continue home blood pressure medications including beta-blocker calcium channel blocker, ACE/ARB  Continue heparin drip  Aspirin, statin, Plavix, low-dose beta-blocker      UTI (urinary tract infection)  Assessment & Plan  Cultures pending  Ceftriaxone  following    Dementia (HCC)- (present on admission)  Assessment & Plan  Patient states she has a history of dementia and that his daughter-in-law as mentioned in chart Tricia Ceja helps make his medical decisions for him, alert and oriented x3 otherwise decently reliable historian.    Assessment & Plan  Patient states she has a history of dementia and that his daughter-in-law as mentioned in chart Tricia Ceja helps make his medical decisions for him, alert and  oriented x3 otherwise decently reliable historian.    Depression- (present on admission)  Assessment & Plan    Patient has a history of depression well-controlled without SI or HI     Continue home meds    Supportive care    Diabetic neuropathy (HCC)- (present on admission)  Assessment & Plan  Patient with a high history of diabetic neuropathy on gabapentin  Continue home dose gabapentin    Normocytic anemia- (present on admission)  Assessment & Plan  Patient with normocytic anemia, very mild, likely secondary to chronic kidney disease  Following intermittently  No melena or hematochezia    HTN (hypertension)- (present on admission)  Assessment & Plan  History of hypertension, with elevated troponin admit hypertensive urgency however likely secondary to ACS as discussed above  Continue current meds as tolerated    HLD (hyperlipidemia)- (present on admission)  Assessment & Plan  Continue statin and Zetia combo    Insulin dependent type 2 diabetes mellitus (HCC)- (present on admission)  Assessment & Plan  Patient with a history of poorly controlled type 2 diabetes complicated by on long-term insulin use with 50 units in the morning and 15 units in the evening.  Glargine 20 units nightly  Sliding scale insulin  Hypoglycemic protocol    CKD (chronic kidney disease) stage 3, GFR 30-59 ml/min ([facility])- (present on admission)  Assessment & Plan  Patient with a history of CKD stage IIIa, creatinine doing well, EGFR of 59 currently, at baseline  Some fluctuation  following  Avoid nephrotoxic medications    Bradycardia- (present on admission)  Assessment & Plan  History of previous admission for symptomatic bradycardia, will be judicious with beta-blockers as discussed above.       VTE prophylaxis: therapeutic anticoagulation with heparin infusion    I have performed a physical exam and reviewed and updated ROS and Plan today (10/1/2022). In review of yesterday's note (9/30/2022), there are no changes except as documented  above.

## 2022-10-01 NOTE — PROGRESS NOTES
NOC HOSPITALIST CROSS COVER    Urinalysis, urine culture, Pro-Paramjit, one-time dose of Norco ordered by cross cover APRN.  Results were not completed prior to shift change, thus I was given signout to follow-up on urinalysis results.  The patient had been complaining of significant bladder pain and CVA tenderness.  Urinalysis findings indicate turbid character, moderate occult blood positive protein, large leuk esterase, packed WBCs, and a moderate amount of bacteria.  Pro-Paramjit was negative at 0.21.  The patient is also exhibiting acute kidney injury with a rise in BUN/creatinine to 35/1.80 from 34/1.45 yesterday. He has been afebrile, although he continues to remain hypertensive.      Vitals:    10/01/22 0311   BP: (!) 159/71   Pulse: 73   Resp: 18   Temp: 36.8 °C (98.2 °F)   SpO2: 92%          Plan:  #Cystitis versus pyelonephritis  -Given his CVA pain and tenderness, there is some concern for possible pyelonephritis  -Ceftriaxone 1 g IV daily  -Consider gentle IV hydration  -Oxycodone 5 mg every 4 hours as needed        -----------------------------------------------------------------------------------------------------------    Electronically signed by:  LEE ANN Best, [facility]  Hospitalist Services

## 2022-10-01 NOTE — PROGRESS NOTES
Assumed care at 0700. Bedside report received from Matt. Patient's chart and MAR reviewed. Pt denies pain at this time. Pt is A & O 3 disoriented to time. Patient was updated on plan of care for the day. Questions answered and concerns addressed.  Pt denies any additional needs at this time. White board updated. Call light, phone and personal belongings within reach.

## 2022-10-01 NOTE — ASSESSMENT & PLAN NOTE
Cultures never sent but UA c/f infection  Ceftriaxone  Switched to cefdinir on 10/4 however then patient developed new visual hallucinations, can cause insomnia unsure patient slept last night we will switch back to ceftriaxone  Improving

## 2022-10-01 NOTE — PROGRESS NOTES
Monitor Summary:    SB-SR 54-73    rare bigem; occasional trigem; frequent PVC    .17/.09/.44

## 2022-10-01 NOTE — RESPIRATORY CARE
COPD EDUCATION by COPD CLINICAL EDUCATOR  9/30/2022 at 5:38 PM by Yvonne Prajapati, RRT     Patient reviewed by COPD education team. Patient does not have a history or diagnosis of COPD.Therefore, patient does not qualify for the COPD program.

## 2022-10-01 NOTE — CARE PLAN
The patient is Stable - Low risk of patient condition declining or worsening    Shift Goals  Clinical Goals: possible cath in the next few days  Patient Goals: eat    Progress made toward(s) clinical / shift goals:  Pt is mentally ready for the procedure and is waiting patiently for the process to play out.    Patient is not progressing towards the following goals:      Problem: Knowledge Deficit - Standard  Goal: Patient and family/care givers will demonstrate understanding of plan of care, disease process/condition, diagnostic tests and medications  Outcome: Progressing     Problem: Communication  Goal: The ability to communicate needs accurately and effectively will improve  Outcome: Progressing     Problem: Fall Risk  Goal: Patient will remain free from falls  Outcome: Progressing

## 2022-10-01 NOTE — CARE PLAN
Problem: Knowledge Deficit - Standard  Goal: Patient and family/care givers will demonstrate understanding of plan of care, disease process/condition, diagnostic tests and medications  Outcome: Progressing   The patient is Watcher - Medium risk of patient condition declining or worsening    Shift Goals  Clinical Goals: improve mobiltity, treat infection,monitor urine output, Echocardiogram  Patient Goals: mobilize, eat good meals    Progress made toward(s) clinical / shift goals:  pt up to chair for lunch, treating infection w/ Ax, ordered alternative meals per pt preference     Patient is not progressing towards the following goals: Pt has fatigue, waiting for echocardiogram

## 2022-10-02 ENCOUNTER — APPOINTMENT (OUTPATIENT)
Dept: CARDIOLOGY | Facility: MEDICAL CENTER | Age: 72
DRG: 280 | End: 2022-10-02
Attending: INTERNAL MEDICINE
Payer: MEDICARE

## 2022-10-02 LAB
ANION GAP SERPL CALC-SCNC: 13 MMOL/L (ref 7–16)
BASOPHILS # BLD AUTO: 0.4 % (ref 0–1.8)
BASOPHILS # BLD: 0.06 K/UL (ref 0–0.12)
BUN SERPL-MCNC: 41 MG/DL (ref 8–22)
CALCIUM SERPL-MCNC: 9 MG/DL (ref 8.5–10.5)
CHLORIDE SERPL-SCNC: 100 MMOL/L (ref 96–112)
CO2 SERPL-SCNC: 20 MMOL/L (ref 20–33)
CREAT SERPL-MCNC: 1.96 MG/DL (ref 0.5–1.4)
EOSINOPHIL # BLD AUTO: 0.22 K/UL (ref 0–0.51)
EOSINOPHIL NFR BLD: 1.6 % (ref 0–6.9)
ERYTHROCYTE [DISTWIDTH] IN BLOOD BY AUTOMATED COUNT: 48.1 FL (ref 35.9–50)
GFR SERPLBLD CREATININE-BSD FMLA CKD-EPI: 36 ML/MIN/1.73 M 2
GLUCOSE BLD STRIP.AUTO-MCNC: 103 MG/DL (ref 65–99)
GLUCOSE BLD STRIP.AUTO-MCNC: 171 MG/DL (ref 65–99)
GLUCOSE SERPL-MCNC: 89 MG/DL (ref 65–99)
HCT VFR BLD AUTO: 36.5 % (ref 42–52)
HGB BLD-MCNC: 12 G/DL (ref 14–18)
IMM GRANULOCYTES # BLD AUTO: 0.06 K/UL (ref 0–0.11)
IMM GRANULOCYTES NFR BLD AUTO: 0.4 % (ref 0–0.9)
LV EJECT FRACT  99904: 65
LV EJECT FRACT MOD 2C 99903: 41.49
LV EJECT FRACT MOD 4C 99902: 75.73
LV EJECT FRACT MOD BP 99901: 60.7
LYMPHOCYTES # BLD AUTO: 1.02 K/UL (ref 1–4.8)
LYMPHOCYTES NFR BLD: 7.2 % (ref 22–41)
MCH RBC QN AUTO: 29.3 PG (ref 27–33)
MCHC RBC AUTO-ENTMCNC: 32.9 G/DL (ref 33.7–35.3)
MCV RBC AUTO: 89 FL (ref 81.4–97.8)
MONOCYTES # BLD AUTO: 1.42 K/UL (ref 0–0.85)
MONOCYTES NFR BLD AUTO: 10 % (ref 0–13.4)
NEUTROPHILS # BLD AUTO: 11.38 K/UL (ref 1.82–7.42)
NEUTROPHILS NFR BLD: 80.4 % (ref 44–72)
NRBC # BLD AUTO: 0 K/UL
NRBC BLD-RTO: 0 /100 WBC
PLATELET # BLD AUTO: 238 K/UL (ref 164–446)
PMV BLD AUTO: 11.5 FL (ref 9–12.9)
POTASSIUM SERPL-SCNC: 4 MMOL/L (ref 3.6–5.5)
RBC # BLD AUTO: 4.1 M/UL (ref 4.7–6.1)
SODIUM SERPL-SCNC: 133 MMOL/L (ref 135–145)
UFH PPP CHRO-ACNC: 0.43 IU/ML
WBC # BLD AUTO: 14.2 K/UL (ref 4.8–10.8)

## 2022-10-02 PROCEDURE — 700102 HCHG RX REV CODE 250 W/ 637 OVERRIDE(OP): Performed by: HOSPITALIST

## 2022-10-02 PROCEDURE — 700102 HCHG RX REV CODE 250 W/ 637 OVERRIDE(OP)

## 2022-10-02 PROCEDURE — 82962 GLUCOSE BLOOD TEST: CPT | Mod: 91

## 2022-10-02 PROCEDURE — A9270 NON-COVERED ITEM OR SERVICE: HCPCS | Performed by: STUDENT IN AN ORGANIZED HEALTH CARE EDUCATION/TRAINING PROGRAM

## 2022-10-02 PROCEDURE — 770020 HCHG ROOM/CARE - TELE (206)

## 2022-10-02 PROCEDURE — 700111 HCHG RX REV CODE 636 W/ 250 OVERRIDE (IP)

## 2022-10-02 PROCEDURE — A9270 NON-COVERED ITEM OR SERVICE: HCPCS

## 2022-10-02 PROCEDURE — 700102 HCHG RX REV CODE 250 W/ 637 OVERRIDE(OP): Performed by: INTERNAL MEDICINE

## 2022-10-02 PROCEDURE — 93306 TTE W/DOPPLER COMPLETE: CPT | Mod: 26 | Performed by: INTERNAL MEDICINE

## 2022-10-02 PROCEDURE — A9270 NON-COVERED ITEM OR SERVICE: HCPCS | Performed by: INTERNAL MEDICINE

## 2022-10-02 PROCEDURE — 700102 HCHG RX REV CODE 250 W/ 637 OVERRIDE(OP): Performed by: STUDENT IN AN ORGANIZED HEALTH CARE EDUCATION/TRAINING PROGRAM

## 2022-10-02 PROCEDURE — 85520 HEPARIN ASSAY: CPT

## 2022-10-02 PROCEDURE — 97602 WOUND(S) CARE NON-SELECTIVE: CPT

## 2022-10-02 PROCEDURE — 80048 BASIC METABOLIC PNL TOTAL CA: CPT

## 2022-10-02 PROCEDURE — 93306 TTE W/DOPPLER COMPLETE: CPT

## 2022-10-02 PROCEDURE — 99233 SBSQ HOSP IP/OBS HIGH 50: CPT | Performed by: HOSPITALIST

## 2022-10-02 PROCEDURE — 85025 COMPLETE CBC W/AUTO DIFF WBC: CPT

## 2022-10-02 PROCEDURE — A9270 NON-COVERED ITEM OR SERVICE: HCPCS | Performed by: HOSPITALIST

## 2022-10-02 PROCEDURE — 700111 HCHG RX REV CODE 636 W/ 250 OVERRIDE (IP): Performed by: EMERGENCY MEDICINE

## 2022-10-02 PROCEDURE — 36415 COLL VENOUS BLD VENIPUNCTURE: CPT

## 2022-10-02 RX ORDER — HYDROMORPHONE HYDROCHLORIDE 1 MG/ML
0.5 INJECTION, SOLUTION INTRAMUSCULAR; INTRAVENOUS; SUBCUTANEOUS ONCE
Status: COMPLETED | OUTPATIENT
Start: 2022-10-02 | End: 2022-10-02

## 2022-10-02 RX ORDER — ACETAMINOPHEN 325 MG/1
650 TABLET ORAL EVERY 6 HOURS PRN
Status: DISCONTINUED | OUTPATIENT
Start: 2022-10-02 | End: 2022-10-06 | Stop reason: HOSPADM

## 2022-10-02 RX ADMIN — LISINOPRIL 20 MG: 20 TABLET ORAL at 16:40

## 2022-10-02 RX ADMIN — INSULIN GLARGINE-YFGN 20 UNITS: 100 INJECTION, SOLUTION SUBCUTANEOUS at 16:38

## 2022-10-02 RX ADMIN — LISINOPRIL 20 MG: 20 TABLET ORAL at 05:08

## 2022-10-02 RX ADMIN — OXYCODONE 5 MG: 5 TABLET ORAL at 15:05

## 2022-10-02 RX ADMIN — ISOSORBIDE MONONITRATE 30 MG: 30 TABLET, EXTENDED RELEASE ORAL at 05:07

## 2022-10-02 RX ADMIN — OXYCODONE 5 MG: 5 TABLET ORAL at 09:57

## 2022-10-02 RX ADMIN — NIFEDIPINE 90 MG: 30 TABLET, FILM COATED, EXTENDED RELEASE ORAL at 05:08

## 2022-10-02 RX ADMIN — ACETAMINOPHEN 650 MG: 325 TABLET, FILM COATED ORAL at 13:32

## 2022-10-02 RX ADMIN — GABAPENTIN 400 MG: 400 CAPSULE ORAL at 15:05

## 2022-10-02 RX ADMIN — OXYCODONE 5 MG: 5 TABLET ORAL at 23:41

## 2022-10-02 RX ADMIN — ATORVASTATIN CALCIUM 80 MG: 80 TABLET, FILM COATED ORAL at 16:40

## 2022-10-02 RX ADMIN — HEPARIN SODIUM 14 UNITS/KG/HR: 5000 INJECTION, SOLUTION INTRAVENOUS at 03:51

## 2022-10-02 RX ADMIN — CEFTRIAXONE SODIUM 1 G: 10 INJECTION, POWDER, FOR SOLUTION INTRAVENOUS at 05:06

## 2022-10-02 RX ADMIN — OMEPRAZOLE 20 MG: 20 CAPSULE, DELAYED RELEASE ORAL at 05:08

## 2022-10-02 RX ADMIN — LEVOTHYROXINE SODIUM 100 MCG: 0.1 TABLET ORAL at 05:08

## 2022-10-02 RX ADMIN — GABAPENTIN 400 MG: 400 CAPSULE ORAL at 08:54

## 2022-10-02 RX ADMIN — CARVEDILOL 3.12 MG: 3.12 TABLET, FILM COATED ORAL at 16:40

## 2022-10-02 RX ADMIN — ACETAMINOPHEN 650 MG: 325 TABLET, FILM COATED ORAL at 20:05

## 2022-10-02 RX ADMIN — CLOPIDOGREL BISULFATE 75 MG: 75 TABLET ORAL at 05:08

## 2022-10-02 RX ADMIN — OXYCODONE 5 MG: 5 TABLET ORAL at 20:06

## 2022-10-02 RX ADMIN — HYDROMORPHONE HYDROCHLORIDE 0.5 MG: 1 INJECTION, SOLUTION INTRAMUSCULAR; INTRAVENOUS; SUBCUTANEOUS at 05:01

## 2022-10-02 RX ADMIN — INSULIN HUMAN 1 UNITS: 100 INJECTION, SOLUTION PARENTERAL at 12:09

## 2022-10-02 RX ADMIN — CARVEDILOL 3.12 MG: 3.12 TABLET, FILM COATED ORAL at 07:37

## 2022-10-02 RX ADMIN — OXYCODONE 5 MG: 5 TABLET ORAL at 03:18

## 2022-10-02 RX ADMIN — GABAPENTIN 400 MG: 400 CAPSULE ORAL at 20:06

## 2022-10-02 RX ADMIN — ASPIRIN 81 MG: 81 TABLET, COATED ORAL at 05:07

## 2022-10-02 RX ADMIN — INSULIN HUMAN 1 UNITS: 100 INJECTION, SOLUTION PARENTERAL at 16:38

## 2022-10-02 RX ADMIN — ESCITALOPRAM OXALATE 20 MG: 10 TABLET ORAL at 05:07

## 2022-10-02 ASSESSMENT — PAIN DESCRIPTION - PAIN TYPE
TYPE: ACUTE PAIN
TYPE: ACUTE PAIN

## 2022-10-02 ASSESSMENT — ENCOUNTER SYMPTOMS
FOCAL WEAKNESS: 0
SHORTNESS OF BREATH: 0
FEVER: 0
EYES NEGATIVE: 1
SPUTUM PRODUCTION: 0
ABDOMINAL PAIN: 0
FLANK PAIN: 0
VOMITING: 0
COUGH: 0
MYALGIAS: 0
CHILLS: 0
NAUSEA: 0

## 2022-10-02 ASSESSMENT — FIBROSIS 4 INDEX: FIB4 SCORE: 1.44

## 2022-10-02 NOTE — CARE PLAN
The patient is Stable - Low risk of patient condition declining or worsening    Shift Goals  Clinical Goals: Monitor renal function, ABX for uti  Patient Goals: rest, pain management  Family Goals: MORGAN    Progress made toward(s) clinical / shift goals:  Pt receiving ABX (rocephin) for UTI, pt's pain medication ordered Is adequate for controlling his current pain levels, pt's UO exceeds 30 ml/hr at this time, pt is taking PO fluids appropriately to help flush his kidneys, pt is able to communicate when he is in pain    Patient is not progressing towards the following goals:      Problem: Communication  Goal: The ability to communicate needs accurately and effectively will improve  Outcome: Progressing     Problem: Urinary Elimination  Goal: Establish and maintain regular urinary output  Outcome: Progressing

## 2022-10-02 NOTE — PROGRESS NOTES
Hospital Medicine Daily Progress Note    Date of Service  10/2/2022    Chief Complaint  Sulaiman Ceja is a 72 y.o. male admitted 9/28/2022 with chest pain    Hospital Course  Patient is a 72-year-old man with h/o CAD, s/p CABG in the 1990s w/stents, dementia, IDDM 2, hypothyroidism on Synthroid, hypertension on chronic clonidine and beta-blockers, and CKD stage III. He presented to [facility] as a transfer from [facility] where he was found to have an elevated troponin in the 40s. He was transfered to [facility] for cardiology consultation and a cardiac cath. Patient stated that he had 2 days of worsening chest pain lasting 2 to 3 hours at a time described as a pressure radiating sometimes near arm into esophagus which has been like his previous attacks, at rest and on exertion, no lower extremity edema, no recent viral illnesses.  Patient was given morphine and started on a heparin drip, on aspirin and Plavix as well as high intensity statin and Zetia at baseline.EKG without concern for ST elevations, trigeminy, similar to previous, patient hemodynamically stable blood pressure in the 170s, with beta-blocker addition and down into 150s, no major electrolyte abnormalities on exam, creatinine at baseline, CBC with mild leukocytosis of 12.4 thousand likely secondary to stress chronic mild anemia of 13.8 normocytic.  INR mildly elevated at 1.18.      Interval Problem Update  He remains axox3 with poor recall. Ongoing dysuria this am, cultures pending, no flank pain. No cp, no sob.  I spoke with cardiology - they are repeating echo today and if no change from previous they are not planning to proceed with cath. ROS otherwise negative    I have discussed this patient's plan of care and discharge plan at IDT rounds today with Case Management, Nursing, Nursing leadership, and other members of the IDT team.    Consultants/Specialty  cardiology    Code Status  Prior    Disposition  Patient is not medically cleared for discharge.    Anticipate discharge to  TBD .  I have placed the appropriate orders for post-discharge needs.    Review of Systems  Review of Systems   Constitutional:  Positive for malaise/fatigue. Negative for chills and fever.   HENT: Negative.     Eyes: Negative.    Respiratory:  Negative for cough, sputum production and shortness of breath.    Cardiovascular:  Negative for chest pain and leg swelling.   Gastrointestinal:  Negative for abdominal pain, nausea and vomiting.   Genitourinary:  Positive for dysuria, frequency and urgency. Negative for flank pain and hematuria.   Musculoskeletal:  Negative for myalgias.   Skin:  Negative for rash.   Neurological:  Negative for focal weakness.      Physical Exam  Temp:  [36.6 °C (97.9 °F)-37.4 °C (99.3 °F)] 37 °C (98.6 °F)  Pulse:  [56-86] 58  Resp:  [16-18] 17  BP: (131-151)/(51-83) 151/56  SpO2:  [91 %-94 %] 91 %    Physical Exam  Vitals and nursing note reviewed.   Constitutional:       Appearance: He is not ill-appearing.   HENT:      Head: Normocephalic and atraumatic.      Nose: Nose normal.      Mouth/Throat:      Mouth: Mucous membranes are moist.      Pharynx: Oropharynx is clear.   Eyes:      Conjunctiva/sclera: Conjunctivae normal.      Pupils: Pupils are equal, round, and reactive to light.   Cardiovascular:      Rate and Rhythm: Regular rhythm.   Pulmonary:      Effort: Pulmonary effort is normal. No respiratory distress.      Breath sounds: No wheezing or rales.   Abdominal:      General: Abdomen is flat. Bowel sounds are normal. There is no distension.      Tenderness: There is no abdominal tenderness. There is no guarding.   Musculoskeletal:         General: Normal range of motion.      Cervical back: Normal range of motion.   Skin:     General: Skin is warm.   Neurological:      General: No focal deficit present.      Mental Status: He is alert and oriented to person, place, and time.       Fluids    Intake/Output Summary (Last 24 hours) at 10/2/2022 1300  Last  data filed at 10/2/2022 0730  Gross per 24 hour   Intake 480 ml   Output 575 ml   Net -95 ml       Laboratory  Recent Labs     09/30/22  0201 10/01/22  0316 10/02/22  0259   WBC 8.9 11.6* 14.2*   RBC 3.97* 3.97* 4.10*   HEMOGLOBIN 11.7* 11.9* 12.0*   HEMATOCRIT 35.4* 35.1* 36.5*   MCV 89.2 88.4 89.0   MCH 29.5 30.0 29.3   MCHC 33.1* 33.9 32.9*   RDW 47.6 47.7 48.1   PLATELETCT 248 227 238   MPV 11.4 11.2 11.5     Recent Labs     09/30/22  0201 10/01/22  0316 10/02/22  0259   SODIUM 140 137 133*   POTASSIUM 4.0 4.4 4.0   CHLORIDE 108 107 100   CO2 21 19* 20   GLUCOSE 124* 190* 89   BUN 34* 35* 41*   CREATININE 1.45* 1.80* 1.96*   CALCIUM 9.3 9.3 9.0                       Imaging  EC-ECHOCARDIOGRAM COMPLETE W/O CONT         DX-CHEST-PORTABLE (1 VIEW)   Final Result         1.  No acute cardiopulmonary disease.           Assessment/Plan  * NSTEMI (non-ST elevated myocardial infarction) (HCC)- (present on admission)  Assessment & Plan  Chest pain and pressure, concerning for unstable angina versus NSTEMI type I, troponins mildly elevated but clearly above baseline   He remains chest pain free  Cardiology following, awaiting repeat echo to make decision about cath  Continue home blood pressure medications including beta-blocker calcium channel blocker, ACE/ARB  Continue heparin drip  Aspirin, statin, Plavix, low-dose beta-blocker      UTI (urinary tract infection)  Assessment & Plan  Cultures pending  Continue ceftriaxone  Following  Ongoing symptoms  Continue supportive care    Dementia (HCC)- (present on admission)  Assessment & Plan  Patient states she has a history of dementia and that his daughter-in-law as mentioned in chart Tricia Ceja helps make his medical decisions for him, alert and oriented x3 otherwise decently reliable historian.    Assessment & Plan  Patient states she has a history of dementia and that his daughter-in-law as mentioned in chart Tricia Ceja helps make his medical decisions for him,  alert and oriented x3 otherwise decently reliable historian.    Depression- (present on admission)  Assessment & Plan    Patient has a history of depression well-controlled without SI or HI     Continue home meds    Supportive care    Diabetic neuropathy (HCC)- (present on admission)  Assessment & Plan  Patient with a high history of diabetic neuropathy on gabapentin  Continue home dose gabapentin    Normocytic anemia- (present on admission)  Assessment & Plan  Patient with normocytic anemia, very mild, likely secondary to chronic kidney disease  Following intermittently  No melena or hematochezia    HTN (hypertension)- (present on admission)  Assessment & Plan  History of hypertension, with elevated troponin admit hypertensive urgency however likely secondary to ACS as discussed above  Continue current meds as tolerated    HLD (hyperlipidemia)- (present on admission)  Assessment & Plan  Continue statin and Zetia combo    Insulin dependent type 2 diabetes mellitus (HCC)- (present on admission)  Assessment & Plan  Patient with a history of poorly controlled type 2 diabetes complicated by on long-term insulin use with 50 units in the morning and 15 units in the evening.  Glargine 20 units nightly  Sliding scale insulin  Hypoglycemic protocol    CKD (chronic kidney disease) stage 3, GFR 30-59 ml/min ([facility])- (present on admission)  Assessment & Plan  Patient with a history of CKD stage IIIa  Continues to fluctuate some  Creat increased some overnight  Continue to follow  Avoid nephrotoxic medications    Bradycardia- (present on admission)  Assessment & Plan  History of previous admission for symptomatic bradycardia, will be judicious with beta-blockers as discussed above.       VTE prophylaxis: therapeutic anticoagulation with heparin infusion    I have performed a physical exam and reviewed and updated ROS and Plan today (10/2/2022). In review of yesterday's note (10/1/2022), there are no changes except as documented  above.

## 2022-10-02 NOTE — PROGRESS NOTES
Bedside report received from AM RN. Patient A&O x 3, disoriented to time. Pt does not complain of pain at this time. POC discussed with patient. Patient verbalizes understanding. Call light and belongings within reach. Bed locked in lowest position, fall precautions in place.

## 2022-10-02 NOTE — PROGRESS NOTES
4 Eyes Skin Assessment Completed by Mary Lou RN and , RN.    Head WDL  Ears WDL  Nose WDL  Mouth WDL  Neck WDL  Breast/Chest WDL  Shoulder Blades WDL  Spine WDL  (R) Arm/Elbow/Hand WDL  (L) Arm/Elbow/Hand WDL  Abdomen WDL  Groin WDL  Scrotum/Coccyx/Buttocks Redness and Non-Blanching  (R) Leg WDL  (L) Leg WDL  (R) Heel/Foot/Toe WDL  (L) Heel/Foot/Toe WDL          Devices In Places Tele Box      Interventions In Place Sacral Mepilex, Waffle Overlay, TAP System, Pillows, Q2 Turns, Heels Loaded W/Pillows, and Pressure Redistribution Mattress    Possible Skin Injury Yes    Pictures Uploaded Into Epic No, needs to be completed  Wound Consult Placed Yes  RN Wound Prevention Protocol Ordered Yes

## 2022-10-02 NOTE — PROGRESS NOTES
Bedside report received from night RN, pt care assumed, assessment completed. Pt is A&O2-3- disoriented to time and situation but was able to redirect self and give correct information on why he was here, pain 0, SB 59 on the monitor. Updated on POC, questions answered. Bed in lowest, locked position, treaded socks on, call light and belongings within reach.

## 2022-10-03 LAB
ANION GAP SERPL CALC-SCNC: 15 MMOL/L (ref 7–16)
BASOPHILS # BLD AUTO: 0.4 % (ref 0–1.8)
BASOPHILS # BLD: 0.05 K/UL (ref 0–0.12)
BUN SERPL-MCNC: 44 MG/DL (ref 8–22)
CALCIUM SERPL-MCNC: 8.7 MG/DL (ref 8.5–10.5)
CHLORIDE SERPL-SCNC: 99 MMOL/L (ref 96–112)
CO2 SERPL-SCNC: 18 MMOL/L (ref 20–33)
CREAT SERPL-MCNC: 2.29 MG/DL (ref 0.5–1.4)
EKG IMPRESSION: NORMAL
EOSINOPHIL # BLD AUTO: 0.47 K/UL (ref 0–0.51)
EOSINOPHIL NFR BLD: 3.9 % (ref 0–6.9)
ERYTHROCYTE [DISTWIDTH] IN BLOOD BY AUTOMATED COUNT: 47.8 FL (ref 35.9–50)
GFR SERPLBLD CREATININE-BSD FMLA CKD-EPI: 30 ML/MIN/1.73 M 2
GLUCOSE BLD STRIP.AUTO-MCNC: 119 MG/DL (ref 65–99)
GLUCOSE BLD STRIP.AUTO-MCNC: 137 MG/DL (ref 65–99)
GLUCOSE BLD STRIP.AUTO-MCNC: 138 MG/DL (ref 65–99)
GLUCOSE BLD STRIP.AUTO-MCNC: 161 MG/DL (ref 65–99)
GLUCOSE BLD STRIP.AUTO-MCNC: 166 MG/DL (ref 65–99)
GLUCOSE BLD STRIP.AUTO-MCNC: 171 MG/DL (ref 65–99)
GLUCOSE SERPL-MCNC: 128 MG/DL (ref 65–99)
HCT VFR BLD AUTO: 32.6 % (ref 42–52)
HGB BLD-MCNC: 11.1 G/DL (ref 14–18)
IMM GRANULOCYTES # BLD AUTO: 0.06 K/UL (ref 0–0.11)
IMM GRANULOCYTES NFR BLD AUTO: 0.5 % (ref 0–0.9)
LYMPHOCYTES # BLD AUTO: 0.86 K/UL (ref 1–4.8)
LYMPHOCYTES NFR BLD: 7.2 % (ref 22–41)
MCH RBC QN AUTO: 30.2 PG (ref 27–33)
MCHC RBC AUTO-ENTMCNC: 34 G/DL (ref 33.7–35.3)
MCV RBC AUTO: 88.6 FL (ref 81.4–97.8)
MONOCYTES # BLD AUTO: 1.06 K/UL (ref 0–0.85)
MONOCYTES NFR BLD AUTO: 8.9 % (ref 0–13.4)
NEUTROPHILS # BLD AUTO: 9.44 K/UL (ref 1.82–7.42)
NEUTROPHILS NFR BLD: 79.1 % (ref 44–72)
NRBC # BLD AUTO: 0 K/UL
NRBC BLD-RTO: 0 /100 WBC
PLATELET # BLD AUTO: 205 K/UL (ref 164–446)
PMV BLD AUTO: 11.6 FL (ref 9–12.9)
POTASSIUM SERPL-SCNC: 4 MMOL/L (ref 3.6–5.5)
RBC # BLD AUTO: 3.68 M/UL (ref 4.7–6.1)
SODIUM SERPL-SCNC: 132 MMOL/L (ref 135–145)
UFH PPP CHRO-ACNC: 0.12 IU/ML
UFH PPP CHRO-ACNC: 0.43 IU/ML
WBC # BLD AUTO: 11.9 K/UL (ref 4.8–10.8)

## 2022-10-03 PROCEDURE — A9270 NON-COVERED ITEM OR SERVICE: HCPCS | Performed by: INTERNAL MEDICINE

## 2022-10-03 PROCEDURE — A9270 NON-COVERED ITEM OR SERVICE: HCPCS | Performed by: HOSPITALIST

## 2022-10-03 PROCEDURE — A9270 NON-COVERED ITEM OR SERVICE: HCPCS | Performed by: STUDENT IN AN ORGANIZED HEALTH CARE EDUCATION/TRAINING PROGRAM

## 2022-10-03 PROCEDURE — 99232 SBSQ HOSP IP/OBS MODERATE 35: CPT | Performed by: HOSPITALIST

## 2022-10-03 PROCEDURE — 80048 BASIC METABOLIC PNL TOTAL CA: CPT

## 2022-10-03 PROCEDURE — 770020 HCHG ROOM/CARE - TELE (206)

## 2022-10-03 PROCEDURE — 700102 HCHG RX REV CODE 250 W/ 637 OVERRIDE(OP)

## 2022-10-03 PROCEDURE — 82962 GLUCOSE BLOOD TEST: CPT | Mod: 91

## 2022-10-03 PROCEDURE — 36415 COLL VENOUS BLD VENIPUNCTURE: CPT

## 2022-10-03 PROCEDURE — 700102 HCHG RX REV CODE 250 W/ 637 OVERRIDE(OP): Performed by: STUDENT IN AN ORGANIZED HEALTH CARE EDUCATION/TRAINING PROGRAM

## 2022-10-03 PROCEDURE — 700102 HCHG RX REV CODE 250 W/ 637 OVERRIDE(OP): Performed by: INTERNAL MEDICINE

## 2022-10-03 PROCEDURE — 700111 HCHG RX REV CODE 636 W/ 250 OVERRIDE (IP): Performed by: EMERGENCY MEDICINE

## 2022-10-03 PROCEDURE — 85520 HEPARIN ASSAY: CPT

## 2022-10-03 PROCEDURE — 700102 HCHG RX REV CODE 250 W/ 637 OVERRIDE(OP): Performed by: HOSPITALIST

## 2022-10-03 PROCEDURE — 85025 COMPLETE CBC W/AUTO DIFF WBC: CPT

## 2022-10-03 PROCEDURE — 93010 ELECTROCARDIOGRAM REPORT: CPT | Performed by: INTERNAL MEDICINE

## 2022-10-03 PROCEDURE — A9270 NON-COVERED ITEM OR SERVICE: HCPCS

## 2022-10-03 PROCEDURE — 700111 HCHG RX REV CODE 636 W/ 250 OVERRIDE (IP)

## 2022-10-03 PROCEDURE — 93005 ELECTROCARDIOGRAM TRACING: CPT

## 2022-10-03 PROCEDURE — 97162 PT EVAL MOD COMPLEX 30 MIN: CPT

## 2022-10-03 RX ORDER — HYDROMORPHONE HYDROCHLORIDE 1 MG/ML
0.25 INJECTION, SOLUTION INTRAMUSCULAR; INTRAVENOUS; SUBCUTANEOUS ONCE
Status: COMPLETED | OUTPATIENT
Start: 2022-10-03 | End: 2022-10-03

## 2022-10-03 RX ADMIN — INSULIN HUMAN 1 UNITS: 100 INJECTION, SOLUTION PARENTERAL at 16:38

## 2022-10-03 RX ADMIN — CEFTRIAXONE SODIUM 1 G: 10 INJECTION, POWDER, FOR SOLUTION INTRAVENOUS at 04:20

## 2022-10-03 RX ADMIN — HEPARIN SODIUM 14 UNITS/KG/HR: 5000 INJECTION, SOLUTION INTRAVENOUS at 00:59

## 2022-10-03 RX ADMIN — OMEPRAZOLE 20 MG: 20 CAPSULE, DELAYED RELEASE ORAL at 04:02

## 2022-10-03 RX ADMIN — ISOSORBIDE MONONITRATE 30 MG: 30 TABLET, EXTENDED RELEASE ORAL at 04:02

## 2022-10-03 RX ADMIN — GABAPENTIN 400 MG: 400 CAPSULE ORAL at 16:34

## 2022-10-03 RX ADMIN — GABAPENTIN 400 MG: 400 CAPSULE ORAL at 20:37

## 2022-10-03 RX ADMIN — OXYCODONE 5 MG: 5 TABLET ORAL at 20:36

## 2022-10-03 RX ADMIN — ESCITALOPRAM OXALATE 20 MG: 10 TABLET ORAL at 04:02

## 2022-10-03 RX ADMIN — ATORVASTATIN CALCIUM 80 MG: 80 TABLET, FILM COATED ORAL at 16:34

## 2022-10-03 RX ADMIN — OXYCODONE 5 MG: 5 TABLET ORAL at 03:49

## 2022-10-03 RX ADMIN — ACETAMINOPHEN 650 MG: 325 TABLET, FILM COATED ORAL at 01:27

## 2022-10-03 RX ADMIN — LEVOTHYROXINE SODIUM 100 MCG: 0.1 TABLET ORAL at 04:03

## 2022-10-03 RX ADMIN — HYDROMORPHONE HYDROCHLORIDE 0.25 MG: 1 INJECTION, SOLUTION INTRAMUSCULAR; INTRAVENOUS; SUBCUTANEOUS at 05:33

## 2022-10-03 RX ADMIN — GABAPENTIN 400 MG: 400 CAPSULE ORAL at 08:05

## 2022-10-03 RX ADMIN — HEPARIN SODIUM 2000 UNITS: 1000 INJECTION, SOLUTION INTRAVENOUS; SUBCUTANEOUS at 04:27

## 2022-10-03 RX ADMIN — NIFEDIPINE 90 MG: 30 TABLET, FILM COATED, EXTENDED RELEASE ORAL at 04:02

## 2022-10-03 RX ADMIN — ASPIRIN 81 MG: 81 TABLET, COATED ORAL at 04:02

## 2022-10-03 RX ADMIN — CLOPIDOGREL BISULFATE 75 MG: 75 TABLET ORAL at 04:02

## 2022-10-03 RX ADMIN — INSULIN GLARGINE-YFGN 20 UNITS: 100 INJECTION, SOLUTION SUBCUTANEOUS at 16:37

## 2022-10-03 RX ADMIN — LISINOPRIL 20 MG: 20 TABLET ORAL at 04:02

## 2022-10-03 RX ADMIN — INSULIN HUMAN 1 UNITS: 100 INJECTION, SOLUTION PARENTERAL at 23:36

## 2022-10-03 ASSESSMENT — COGNITIVE AND FUNCTIONAL STATUS - GENERAL
MOBILITY SCORE: 24
SUGGESTED CMS G CODE MODIFIER MOBILITY: CH

## 2022-10-03 ASSESSMENT — ENCOUNTER SYMPTOMS
MYALGIAS: 0
FEVER: 0
FOCAL WEAKNESS: 0
SHORTNESS OF BREATH: 0
EYES NEGATIVE: 1
SPUTUM PRODUCTION: 0
CHILLS: 0
COUGH: 0
FLANK PAIN: 0
ABDOMINAL PAIN: 0
NAUSEA: 0
VOMITING: 0

## 2022-10-03 ASSESSMENT — GAIT ASSESSMENTS
DEVIATION: BRADYKINETIC;DECREASED TOE OFF
ASSISTIVE DEVICE: FRONT WHEEL WALKER
GAIT LEVEL OF ASSIST: SUPERVISED
DISTANCE (FEET): 300

## 2022-10-03 ASSESSMENT — PAIN DESCRIPTION - PAIN TYPE
TYPE: ACUTE PAIN
TYPE: ACUTE PAIN

## 2022-10-03 ASSESSMENT — FIBROSIS 4 INDEX: FIB4 SCORE: 1.67

## 2022-10-03 NOTE — THERAPY
Physical Therapy   Initial Evaluation     Patient Name: Sulaiman Ceja  Age:  72 y.o., Sex:  male  Medical Record #: [id number]  Today's Date: 10/3/2022     Precautions  Precautions: Other (See Comments)  Comments: Standard precautions    Assessment  Patient is 72 y.o. male presenting for NSTEMI and chest pain w/ a PMH of CAD s/p CABG, dementia, DMII, HTN and CKDIII.  He lives alone in a trailer on his son and RUBEN's property reporting they are both available during the day to assist him if needed (see flowsheet for home set up and PLOF).  Currently, the pt presents w/ decreased activity tolerance, otherwise near baseline functional independence.  He demo'd bed mobility spv w/ HOB flat and no rails, and STS EOB w/ FWW spv.  He was able to demo gait 300' (200', seated rest, 100') using FWW spv via slow gadiel and limited toe clearance, no LOB observed and distance limited by fatigue.  Recommend pt dc home w/ HH for home safety and further PT needs given near baseline functional independence demo'd, and support available at home.  Will not follow, please re consult should there be a change in the pt's condition.    Plan    Recommend Physical Therapy for Evaluation only     DC Equipment Recommendations: None (pt owns 4WW and FWW)  Discharge Recommendations: Recommend home health for continued physical therapy services       Subjective/Objective       10/03/22 1023   Prior Living Situation   Prior Services Home-Independent   Housing / Facility Other (Comments)  (Trailer)   Steps Into Home 1   Steps In Home 0   Equipment Owned 4-Wheel Walker;Front-Wheel Walker;Single Point Cane   Lives with - Patient's Self Care Capacity Adult Children   Comments pt lives alone in a trailer, however lives on his son and RUBEN's property, and reports both are home during the day to assist him if needed.   Prior Level of Functional Mobility   Bed Mobility Independent   Transfer Status Independent   Ambulation Independent   Distance Ambulation  (Feet)   (community distances)   Assistive Devices Used 4-Wheel Walker   Stairs Independent   History of Falls   History of Falls No   Date of Last Fall   (pt denies)   Cognition    Cognition / Consciousness WDL   Level of Consciousness Alert   Comments pt pleasant and cooperative   Passive ROM Lower Body   Passive ROM Lower Body WDL   Active ROM Lower Body    Active ROM Lower Body  WDL   Comments observed during functional mobility   Strength Lower Body   Lower Body Strength  WDL   Comments as above   Sensation Lower Body   Lower Extremity Sensation   WDL   Comments pt denies numbness/tingling in LE's   Coordination Lower Body    Coordination Lower Body  WDL   Balance Assessment   Sitting Balance (Static) Fair +   Sitting Balance (Dynamic) Fair +   Standing Balance (Static) Fair   Standing Balance (Dynamic) Fair   Weight Shift Sitting Good   Weight Shift Standing Fair   Comments stand w/ FWW   Gait Analysis   Gait Level Of Assist Supervised   Assistive Device Front Wheel Walker   Distance (Feet) 300   # of Times Distance was Traveled 2  (200'x1, seated rest, 100'x1)   Deviation Bradykinetic;Decreased Toe Off   Weight Bearing Status no restrictions   Vision Deficits Impacting Mobility denies   Comments distance limited by fatigue   Bed Mobility    Supine to Sit Supervised   Sit to Supine Supervised   Scooting Supervised   Comments HOB flat, no rails   Functional Mobility   Sit to Stand Supervised   Bed, Chair, Wheelchair Transfer Supervised   Transfer Method Stand Step   Mobility STS EOB w/ FWW, EOB<>hallway   Activity Tolerance   Sitting in Chair 5min   Sitting Edge of Bed 6min   Standing 12min   Edema / Skin Assessment   Edema / Skin  Not Assessed   Education Group   Education Provided Role of Physical Therapist   Role of Physical Therapist Patient Response Patient;Acceptance;Explanation;Demonstration;Action Demonstration   Additional Comments pt receptive of edu provided   Problem List    Problems Decreased  Activity Tolerance   Interdisciplinary Plan of Care Collaboration   IDT Collaboration with  Nursing   Patient Position at End of Therapy In Bed;Bed Alarm On;Call Light within Reach;Tray Table within Reach;Phone within Reach   Collaboration Comments regarding outcome of tx session   Session Information   Date / Session Number  10/3- Eval only

## 2022-10-03 NOTE — CARE PLAN
The patient is Watcher - Medium risk of patient condition declining or worsening    Shift Goals  Clinical Goals: Monitor renal fx, heparin gtt, abx  Patient Goals: pain management  Family Goals: NA    Progress made toward(s) clinical / shift goals:  Pt's hemodynamic values have been stable this shift, pt's neuro status is at baseline dementia and pleasantly confused, pt has remained free from falls    Patient is not progressing towards the following goals: pt's urine is dark and concentrated, pt experiences excruciating pain when urinating, pt complains of urgency and frequency      Problem: Hemodynamics  Goal: Patient's hemodynamics, fluid balance and neurologic status will be stable or improve  Outcome: Not Progressing     Problem: Urinary Elimination  Goal: Establish and maintain regular urinary output  Outcome: Not Progressing     Problem: Fall Risk  Goal: Patient will remain free from falls  Outcome: Progressing

## 2022-10-03 NOTE — THERAPY
Physical Therapy Contact Note     Patient Name: Sulaiman Ceja  Age:  72 y.o., Sex:  male  Medical Record #: [id number]  Today's Date: 10/3/2022    Pt awaiting possible heart cath pending results of echo.  Will hold for definitive POC prior to initiating cardiac rehab evaluation/edu.    Jairo Fitzgerald, PT, DPT [id number]

## 2022-10-03 NOTE — PROGRESS NOTES
Assumed care of patient, received bedside report from NOC RN. Patient is A&O X 4. Pain 6/10, mid lower back. Vital signs stable overnight, on RA. On tele monitor, SB 49. POC discussed with patient and he verbalized understanding. Call light within reach and fall precautions in place. Bed locked and in lowest position.

## 2022-10-03 NOTE — CARE PLAN
The patient is Stable - Low risk of patient condition declining or worsening    Shift Goals  Clinical Goals: Monitor kidney & UO  Patient Goals: Rest  Family Goals: NA    Progress made toward(s) clinical / shift goals:        Problem: Knowledge Deficit - Standard  Goal: Patient and family/care givers will demonstrate understanding of plan of care, disease process/condition, diagnostic tests and medications  Outcome: Progressing  Note: Patient verbally demonstrates understanding of POC and disease process      Problem: Fall Risk  Goal: Patient will remain free from falls  Outcome: Progressing  Note: All fall precautions in place and patient educated to use the call light before ambulation

## 2022-10-03 NOTE — DISCHARGE PLANNING
Case Management Discharge Planning    Admission Date: 9/28/2022  GMLOS: 1.8  ALOS: 5    6-Clicks ADL Score: 23  6-Clicks Mobility Score: 17  PT and/or OT Eval ordered: Yes  Post-acute Referrals Ordered: No  Post-acute Choice Obtained: No  Has referral(s) been sent to post-acute provider:  No      Anticipated Discharge Dispo: Discharge Disposition: Discharged to home/self care (01)    DME Needed: No    Action(s) Taken: Discussed pt in IDT rounds. Pt's six clicks are 23/17 and is pending PT/OT recs. Pt lives in [place] and may be able to get [facility] if needed. Pt has good family support if HHC is not available to pt. RN confirmed that pt's son will be able to provide transportation home upon DC.    Escalations Completed: None    Medically Clear: No    Next Steps: No DC needs identified at this time    Barriers to Discharge: Medical clearance    Is the patient up for discharge tomorrow: No

## 2022-10-03 NOTE — PROGRESS NOTES
Monitor Summary:   Rhythm: SR/SB  Rate: 50-78  Measurement: .18/.11/.42  Ectopy: Down to 49

## 2022-10-03 NOTE — THERAPY
Missed Therapy     Patient Name: Sulaiman Ceja  Age:  72 y.o., Sex:  male  Medical Record #: [id number]  Today's Date: 10/3/2022    OT consult rec'd. Pt pending follow up echo to determine if heart cath is needed. Will hold OT eval for definitive POC and as appropriate/able.

## 2022-10-03 NOTE — PROGRESS NOTES
Hospital Medicine Daily Progress Note    Date of Service  10/3/2022    Chief Complaint  Sulaiman Ceja is a 72 y.o. male admitted 9/28/2022 with chest pain    Hospital Course  Patient is a 72-year-old man with h/o CAD, s/p CABG in the 1990s w/stents, dementia, IDDM 2, hypothyroidism on Synthroid, hypertension on chronic clonidine and beta-blockers, and CKD stage III. He presented to [facility] as a transfer from [facility] where he was found to have an elevated troponin in the 40s. He was transfered to [facility] for cardiology consultation and a cardiac cath. Patient stated that he had 2 days of worsening chest pain lasting 2 to 3 hours at a time described as a pressure radiating sometimes near arm into esophagus which has been like his previous attacks, at rest and on exertion, no lower extremity edema, no recent viral illnesses.  Patient was given morphine and started on a heparin drip, on aspirin and Plavix as well as high intensity statin and Zetia at baseline.EKG without concern for ST elevations, trigeminy, similar to previous, patient hemodynamically stable blood pressure in the 170s, with beta-blocker addition and down into 150s, no major electrolyte abnormalities on exam, creatinine at baseline, CBC with mild leukocytosis of 12.4 thousand likely secondary to stress chronic mild anemia of 13.8 normocytic.  INR mildly elevated at 1.18.      Interval Problem Update  He remains axox3 with poor recall.  He reports dysuria is improving, urine still appears concentrated, oral intake encouraged. Cultures pending, will old ace and check bladder scan  Discussed with cardiology -  no plan for cath. I updated his daughter. ROS otherwise negative.     I have discussed this patient's plan of care and discharge plan at IDT rounds today with Case Management, Nursing, Nursing leadership, and other members of the IDT team.    Consultants/Specialty  cardiology    Code Status  Prior    Disposition  Patient is not medically cleared  for discharge.   Anticipate discharge to  TBD .  I have placed the appropriate orders for post-discharge needs.    Review of Systems  Review of Systems   Constitutional:  Positive for malaise/fatigue. Negative for chills and fever.   HENT: Negative.     Eyes: Negative.    Respiratory:  Negative for cough, sputum production and shortness of breath.    Cardiovascular:  Negative for chest pain and leg swelling.   Gastrointestinal:  Negative for abdominal pain, nausea and vomiting.   Genitourinary:  Positive for dysuria, frequency and urgency. Negative for flank pain and hematuria.   Musculoskeletal:  Negative for myalgias.   Skin:  Negative for rash.   Neurological:  Negative for focal weakness.      Physical Exam  Temp:  [36.8 °C (98.2 °F)-37.3 °C (99.1 °F)] 36.8 °C (98.2 °F)  Pulse:  [51-68] 55  Resp:  [17-18] 17  BP: (115-163)/(55-60) 136/60  SpO2:  [90 %-95 %] 95 %    Physical Exam  Vitals and nursing note reviewed.   Constitutional:       Appearance: He is not ill-appearing.   HENT:      Head: Normocephalic and atraumatic.      Nose: Nose normal.      Mouth/Throat:      Mouth: Mucous membranes are moist.      Pharynx: Oropharynx is clear.   Eyes:      Conjunctiva/sclera: Conjunctivae normal.      Pupils: Pupils are equal, round, and reactive to light.   Cardiovascular:      Rate and Rhythm: Regular rhythm.   Pulmonary:      Effort: Pulmonary effort is normal. No respiratory distress.      Breath sounds: No wheezing or rales.   Abdominal:      General: Abdomen is flat. Bowel sounds are normal. There is no distension.      Tenderness: There is no abdominal tenderness. There is no guarding.   Musculoskeletal:         General: Normal range of motion.      Cervical back: Normal range of motion.   Skin:     General: Skin is warm.   Neurological:      General: No focal deficit present.      Mental Status: He is alert and oriented to person, place, and time.       Fluids    Intake/Output Summary (Last 24 hours) at  10/3/2022 1436  Last data filed at 10/3/2022 1000  Gross per 24 hour   Intake 860 ml   Output 675 ml   Net 185 ml       Laboratory  Recent Labs     10/01/22  0316 10/02/22  0259 10/03/22  0305   WBC 11.6* 14.2* 11.9*   RBC 3.97* 4.10* 3.68*   HEMOGLOBIN 11.9* 12.0* 11.1*   HEMATOCRIT 35.1* 36.5* 32.6*   MCV 88.4 89.0 88.6   MCH 30.0 29.3 30.2   MCHC 33.9 32.9* 34.0   RDW 47.7 48.1 47.8   PLATELETCT 227 238 205   MPV 11.2 11.5 11.6     Recent Labs     10/01/22  0316 10/02/22  0259 10/03/22  0305   SODIUM 137 133* 132*   POTASSIUM 4.4 4.0 4.0   CHLORIDE 107 100 99   CO2 19* 20 18*   GLUCOSE 190* 89 128*   BUN 35* 41* 44*   CREATININE 1.80* 1.96* 2.29*   CALCIUM 9.3 9.0 8.7                       Imaging  EC-ECHOCARDIOGRAM COMPLETE W/O CONT   Final Result      DX-CHEST-PORTABLE (1 VIEW)   Final Result         1.  No acute cardiopulmonary disease.           Assessment/Plan  * NSTEMI (non-ST elevated myocardial infarction) (HCC)- (present on admission)  Assessment & Plan  Chest pain and pressure, concerning for unstable angina versus NSTEMI type I, troponins mildly elevated but clearly above baseline   He remains chest pain free  Cardiology decided no cath as repeat echo stable, he will follow up as outpatient  Continue, awaiting repeat echo to make decision about cath home blood pressure medications including beta-blocker calcium channel blocker, ACE/ARB    Aspirin, statin, Plavix, low-dose beta-blocker      UTI (urinary tract infection)  Assessment & Plan  Cultures still pending  Continue ceftriaxone  Following  Ongoing symptoms  Continue supportive care    Dementia (HCC)- (present on admission)  Assessment & Plan  Patient states she has a history of dementia and that his daughter-in-law as mentioned in chart Tricia Ceja helps make his medical decisions for him, alert and oriented x3 otherwise decently reliable historian.    Assessment & Plan  Patient states she has a history of dementia and that his daughter-in-law  as mentioned in chart Tricia Ceja helps make his medical decisions for him, alert and oriented x3 otherwise decently reliable historian.    Depression- (present on admission)  Assessment & Plan    Patient has a history of depression well-controlled without SI or HI     Continue home meds    Supportive care    Diabetic neuropathy (HCC)- (present on admission)  Assessment & Plan  Patient with a high history of diabetic neuropathy on gabapentin  Continue home dose gabapentin    Normocytic anemia- (present on admission)  Assessment & Plan  Patient with normocytic anemia, very mild, likely secondary to chronic kidney disease  Following intermittently  No melena or hematochezia    HTN (hypertension)- (present on admission)  Assessment & Plan  History of hypertension, with elevated troponin admit hypertensive urgency however likely secondary to ACS as discussed above  Continue current meds as tolerated    HLD (hyperlipidemia)- (present on admission)  Assessment & Plan  Continue statin and Zetia combo    Insulin dependent type 2 diabetes mellitus (HCC)- (present on admission)  Assessment & Plan  Patient with a history of poorly controlled type 2 diabetes complicated by on long-term insulin use with 50 units in the morning and 15 units in the evening.  Glargine 20 units nightly  Sliding scale insulin  Hypoglycemic protocol    CKD (chronic kidney disease) stage 3, GFR 30-59 ml/min ([facility])- (present on admission)  Assessment & Plan  Patient with a history of CKD stage IIIa  Acute on chronic  Will hold ace  Check bladder scan, monitor for retention  Continue to follow  Continue to follow  Avoid nephrotoxic medications    Bradycardia- (present on admission)  Assessment & Plan  History of previous admission for symptomatic bradycardia, will be judicious with beta-blockers as discussed above.       VTE prophylaxis: therapeutic anticoagulation with heparin infusion    I have performed a physical exam and reviewed and updated ROS  and Plan today (10/3/2022). In review of yesterday's note (10/2/2022), there are no changes except as documented above.

## 2022-10-03 NOTE — PROGRESS NOTES
Pt in pain 8/10 in lower abdomen and penis 30 min after oxy 5 admin. Pt also sustaining trigeminal Pvcs at this time. On-call provider notified.

## 2022-10-03 NOTE — WOUND TEAM
[facility]  Inpatient Services  Wound and Skin Care Brief Evaluation    Admission Date: 9/28/2022     Last order of IP CONSULT TO WOUND CARE was found on 10/1/2022 from Hospital Encounter on 9/28/2022     HPI, PMH, SH: Reviewed    Chief Complaint   Patient presents with    Chest Pain     BIB EMS from [facility]. Pt was about to have cataract surgery today. Pt was hypertensive, they gave pt an antihypertensive then he developed severe chest pain with an elevated troponin.      Diagnosis: NSTEMI (non-ST elevated myocardial infarction) (HCC) [I21.4]    Unit where seen by Wound Team: T828/02     Wound consult placed regarding sacrum/coccyx. Chart and images reviewed. This discussed with bedside RN, rAun. This RN in to assess patient. Pt pleasant and agreeable. Patient was able to turn to his right side without assistance. Non-selectively debrided with NS/wound cleanser and gauze OR moist warm washcloth and no rinse foam soap. No pressure injuries or advanced wound care needs identified. Mepilex sacral replaced. Wound consult completed. No further follow up unless indicated and consulted.               RSKIN:   CURRENTLY IN PLACE (X), APPLIED THIS VISIT (A), ORDERED (O):   Q shift Toñito:  X  Q shift pressure point assessments:  X    Surface/Positioning   Pressure redistribution mattress     x       Low Airloss          Bariatric foam      Bariatric FIDEL     Waffle cushion        Waffle Overlay   x       Reposition q 2 hours      TAPs Turning system     Z Delmer Pillow     Offloading/Redistribution   Sacral Mepilex (Silicone dressing)   x  Heel Mepilex (Silicone dressing)         Heel float boots (Prevalon boot)             Float Heels off Bed with Pillows   x        Respiratory NA, room air  Silicone O2 tubing         Gray Foam Ear protectors     Cannula fixation Device (Tender )          High flow offloading Clip    Elastic head band offloading device      Anchorfast                                                          Trach with Optifoam split foam             Containment/Moisture Prevention urinal    Rectal tube or BMS    Purwick/Condom Cath        Mullen Catheter    Barrier wipes           Barrier paste       Antifungal tx      Interdry        Mobilization NA      Up to chair        Ambulate      PT/OT      Nutrition       Dietician        Diabetes Education      PO  x   TF     TPN     NPO   # days     Other

## 2022-10-03 NOTE — CARE PLAN
The patient is Stable - Low risk of patient condition declining or worsening    Shift Goals  Clinical Goals: monitor I&O, monitor renal fx, abx, echo  Patient Goals: pain management  Family Goals: MORGAN    Progress made toward(s) clinical / shift goals:      Problem: Fall Risk  Goal: Patient will remain free from falls  Outcome: Progressing   Smart zandra fall scale utilized. Bed alarm in place. Pt is a one person assist with a front wheel walker. Educated pt and family to call for appropriate assistance prior to ambulating.    Problem: Pain - Standard  Goal: Alleviation of pain or a reduction in pain to the patient’s comfort goal  Outcome: Progressing   Q4 pain assessments in place. Pt educated on pain scale. RN aware of pt's goal pain. PRN medication orders followed.      Patient is not progressing towards the following goals:      Problem: Urinary Elimination  Goal: Establish and maintain regular urinary output  Outcome: Not Progressing   Pt has difficulty with urination. Pt urine dark brown and unable to see through. MD aware. Pt having increasing pain with urination despite pain medication. MD aware.

## 2022-10-03 NOTE — PROGRESS NOTES
Monitor Summary    Rhythm: SB/SR 50-86 with sustained trigeminy    Ectopy: R PVC    Intervals: 14/10/44

## 2022-10-03 NOTE — PROGRESS NOTES
Bedside report received from AM RN. Patient A&O x 3, disoriented to time and forgetful at time during conversation. Complains of pain 6/10 in lower abdomen and penis that is worse during urination. Pt urine is currently very concentrated and dark. POC discussed with patient. Patient verbalizes understanding. Call light and belongings within reach. Bed locked in lowest position, alarm and fall precautions in place.

## 2022-10-04 LAB
ANION GAP SERPL CALC-SCNC: 13 MMOL/L (ref 7–16)
BASOPHILS # BLD AUTO: 0.5 % (ref 0–1.8)
BASOPHILS # BLD: 0.05 K/UL (ref 0–0.12)
BUN SERPL-MCNC: 44 MG/DL (ref 8–22)
CALCIUM SERPL-MCNC: 9.2 MG/DL (ref 8.5–10.5)
CHLORIDE SERPL-SCNC: 104 MMOL/L (ref 96–112)
CO2 SERPL-SCNC: 19 MMOL/L (ref 20–33)
CREAT SERPL-MCNC: 1.9 MG/DL (ref 0.5–1.4)
EKG IMPRESSION: NORMAL
EOSINOPHIL # BLD AUTO: 0.4 K/UL (ref 0–0.51)
EOSINOPHIL NFR BLD: 3.9 % (ref 0–6.9)
ERYTHROCYTE [DISTWIDTH] IN BLOOD BY AUTOMATED COUNT: 46.9 FL (ref 35.9–50)
GFR SERPLBLD CREATININE-BSD FMLA CKD-EPI: 37 ML/MIN/1.73 M 2
GLUCOSE BLD STRIP.AUTO-MCNC: 149 MG/DL (ref 65–99)
GLUCOSE BLD STRIP.AUTO-MCNC: 157 MG/DL (ref 65–99)
GLUCOSE BLD STRIP.AUTO-MCNC: 169 MG/DL (ref 65–99)
GLUCOSE SERPL-MCNC: 154 MG/DL (ref 65–99)
HCT VFR BLD AUTO: 31.7 % (ref 42–52)
HGB BLD-MCNC: 10.8 G/DL (ref 14–18)
IMM GRANULOCYTES # BLD AUTO: 0.05 K/UL (ref 0–0.11)
IMM GRANULOCYTES NFR BLD AUTO: 0.5 % (ref 0–0.9)
LYMPHOCYTES # BLD AUTO: 0.85 K/UL (ref 1–4.8)
LYMPHOCYTES NFR BLD: 8.2 % (ref 22–41)
MCH RBC QN AUTO: 29.8 PG (ref 27–33)
MCHC RBC AUTO-ENTMCNC: 34.1 G/DL (ref 33.7–35.3)
MCV RBC AUTO: 87.6 FL (ref 81.4–97.8)
MONOCYTES # BLD AUTO: 0.87 K/UL (ref 0–0.85)
MONOCYTES NFR BLD AUTO: 8.4 % (ref 0–13.4)
NEUTROPHILS # BLD AUTO: 8.09 K/UL (ref 1.82–7.42)
NEUTROPHILS NFR BLD: 78.5 % (ref 44–72)
NRBC # BLD AUTO: 0 K/UL
NRBC BLD-RTO: 0 /100 WBC
PLATELET # BLD AUTO: 258 K/UL (ref 164–446)
PMV BLD AUTO: 11.4 FL (ref 9–12.9)
POTASSIUM SERPL-SCNC: 4.2 MMOL/L (ref 3.6–5.5)
RBC # BLD AUTO: 3.62 M/UL (ref 4.7–6.1)
SODIUM SERPL-SCNC: 136 MMOL/L (ref 135–145)
WBC # BLD AUTO: 10.3 K/UL (ref 4.8–10.8)

## 2022-10-04 PROCEDURE — 97535 SELF CARE MNGMENT TRAINING: CPT

## 2022-10-04 PROCEDURE — A9270 NON-COVERED ITEM OR SERVICE: HCPCS | Performed by: INTERNAL MEDICINE

## 2022-10-04 PROCEDURE — 97165 OT EVAL LOW COMPLEX 30 MIN: CPT

## 2022-10-04 PROCEDURE — 700102 HCHG RX REV CODE 250 W/ 637 OVERRIDE(OP): Performed by: STUDENT IN AN ORGANIZED HEALTH CARE EDUCATION/TRAINING PROGRAM

## 2022-10-04 PROCEDURE — 85025 COMPLETE CBC W/AUTO DIFF WBC: CPT

## 2022-10-04 PROCEDURE — A9270 NON-COVERED ITEM OR SERVICE: HCPCS | Performed by: STUDENT IN AN ORGANIZED HEALTH CARE EDUCATION/TRAINING PROGRAM

## 2022-10-04 PROCEDURE — A9270 NON-COVERED ITEM OR SERVICE: HCPCS

## 2022-10-04 PROCEDURE — 36415 COLL VENOUS BLD VENIPUNCTURE: CPT

## 2022-10-04 PROCEDURE — 700102 HCHG RX REV CODE 250 W/ 637 OVERRIDE(OP): Performed by: INTERNAL MEDICINE

## 2022-10-04 PROCEDURE — 93010 ELECTROCARDIOGRAM REPORT: CPT | Performed by: INTERNAL MEDICINE

## 2022-10-04 PROCEDURE — 80048 BASIC METABOLIC PNL TOTAL CA: CPT

## 2022-10-04 PROCEDURE — 94760 N-INVAS EAR/PLS OXIMETRY 1: CPT

## 2022-10-04 PROCEDURE — 770001 HCHG ROOM/CARE - MED/SURG/GYN PRIV*

## 2022-10-04 PROCEDURE — 82962 GLUCOSE BLOOD TEST: CPT

## 2022-10-04 PROCEDURE — 700111 HCHG RX REV CODE 636 W/ 250 OVERRIDE (IP): Performed by: INTERNAL MEDICINE

## 2022-10-04 PROCEDURE — 93005 ELECTROCARDIOGRAM TRACING: CPT | Performed by: INTERNAL MEDICINE

## 2022-10-04 PROCEDURE — 99232 SBSQ HOSP IP/OBS MODERATE 35: CPT | Performed by: INTERNAL MEDICINE

## 2022-10-04 PROCEDURE — 700102 HCHG RX REV CODE 250 W/ 637 OVERRIDE(OP)

## 2022-10-04 PROCEDURE — 700111 HCHG RX REV CODE 636 W/ 250 OVERRIDE (IP)

## 2022-10-04 RX ORDER — HEPARIN SODIUM 5000 [USP'U]/ML
5000 INJECTION, SOLUTION INTRAVENOUS; SUBCUTANEOUS EVERY 8 HOURS
Status: DISCONTINUED | OUTPATIENT
Start: 2022-10-04 | End: 2022-10-06 | Stop reason: HOSPADM

## 2022-10-04 RX ORDER — CALCIUM CARBONATE 500 MG/1
1000 TABLET, CHEWABLE ORAL 2 TIMES DAILY PRN
Status: DISCONTINUED | OUTPATIENT
Start: 2022-10-04 | End: 2022-10-06 | Stop reason: HOSPADM

## 2022-10-04 RX ORDER — CEFDINIR 300 MG/1
300 CAPSULE ORAL EVERY 12 HOURS
Status: DISCONTINUED | OUTPATIENT
Start: 2022-10-05 | End: 2022-10-05

## 2022-10-04 RX ADMIN — CLOPIDOGREL BISULFATE 75 MG: 75 TABLET ORAL at 04:54

## 2022-10-04 RX ADMIN — LEVOTHYROXINE SODIUM 100 MCG: 0.1 TABLET ORAL at 04:54

## 2022-10-04 RX ADMIN — CALCIUM CARBONATE 1000 MG: 500 TABLET, CHEWABLE ORAL at 14:45

## 2022-10-04 RX ADMIN — OXYCODONE 5 MG: 5 TABLET ORAL at 19:41

## 2022-10-04 RX ADMIN — ATORVASTATIN CALCIUM 80 MG: 80 TABLET, FILM COATED ORAL at 16:27

## 2022-10-04 RX ADMIN — ASPIRIN 81 MG: 81 TABLET, COATED ORAL at 04:53

## 2022-10-04 RX ADMIN — INSULIN GLARGINE-YFGN 20 UNITS: 100 INJECTION, SOLUTION SUBCUTANEOUS at 16:30

## 2022-10-04 RX ADMIN — OXYCODONE 5 MG: 5 TABLET ORAL at 04:53

## 2022-10-04 RX ADMIN — OMEPRAZOLE 20 MG: 20 CAPSULE, DELAYED RELEASE ORAL at 04:54

## 2022-10-04 RX ADMIN — ESCITALOPRAM OXALATE 20 MG: 10 TABLET ORAL at 04:53

## 2022-10-04 RX ADMIN — HEPARIN SODIUM 5000 UNITS: 5000 INJECTION, SOLUTION INTRAVENOUS; SUBCUTANEOUS at 21:14

## 2022-10-04 RX ADMIN — GABAPENTIN 400 MG: 400 CAPSULE ORAL at 21:14

## 2022-10-04 RX ADMIN — GABAPENTIN 400 MG: 400 CAPSULE ORAL at 14:11

## 2022-10-04 RX ADMIN — OXYCODONE 5 MG: 5 TABLET ORAL at 14:16

## 2022-10-04 RX ADMIN — ISOSORBIDE MONONITRATE 30 MG: 30 TABLET, EXTENDED RELEASE ORAL at 04:53

## 2022-10-04 RX ADMIN — INSULIN HUMAN 1 UNITS: 100 INJECTION, SOLUTION PARENTERAL at 16:31

## 2022-10-04 RX ADMIN — NIFEDIPINE 90 MG: 30 TABLET, FILM COATED, EXTENDED RELEASE ORAL at 04:54

## 2022-10-04 RX ADMIN — HEPARIN SODIUM 5000 UNITS: 5000 INJECTION, SOLUTION INTRAVENOUS; SUBCUTANEOUS at 17:40

## 2022-10-04 RX ADMIN — CEFTRIAXONE SODIUM 1 G: 10 INJECTION, POWDER, FOR SOLUTION INTRAVENOUS at 05:06

## 2022-10-04 RX ADMIN — GABAPENTIN 400 MG: 400 CAPSULE ORAL at 08:00

## 2022-10-04 ASSESSMENT — ENCOUNTER SYMPTOMS
VOMITING: 0
FOCAL WEAKNESS: 0
NAUSEA: 0
SHORTNESS OF BREATH: 0
CHILLS: 0
FEVER: 0
MYALGIAS: 0
SPUTUM PRODUCTION: 0
COUGH: 0
ABDOMINAL PAIN: 0
FLANK PAIN: 0
EYES NEGATIVE: 1

## 2022-10-04 ASSESSMENT — COGNITIVE AND FUNCTIONAL STATUS - GENERAL
DRESSING REGULAR LOWER BODY CLOTHING: A LITTLE
HELP NEEDED FOR BATHING: A LITTLE
DAILY ACTIVITIY SCORE: 21
SUGGESTED CMS G CODE MODIFIER DAILY ACTIVITY: CJ
DRESSING REGULAR UPPER BODY CLOTHING: A LITTLE

## 2022-10-04 ASSESSMENT — PAIN DESCRIPTION - PAIN TYPE
TYPE: ACUTE PAIN

## 2022-10-04 ASSESSMENT — ACTIVITIES OF DAILY LIVING (ADL): TOILETING: INDEPENDENT

## 2022-10-04 ASSESSMENT — FIBROSIS 4 INDEX: FIB4 SCORE: 1.33

## 2022-10-04 NOTE — THERAPY
Occupational Therapy   Initial Evaluation     Patient Name: Sulaiman Ceja  Age:  72 y.o., Sex:  male  Medical Record #: [id number]  Today's Date: 10/4/2022     Precautions  Precautions: Fall Risk  Comments: Standard precautions    Assessment  Patient is 72 y.o. male admitted for CP, found to have NSTEMI, UTI, anemia, and  bradycardia; possible LHC?. PMHX of CAD, s/p CABG s/p/stents, dementia, IDDM 2, neuropathy, depression, hypertension, and CKD. Completed ADLs/txfs with SPV and functional ambulation w/FWW and SBA; appears to be near functional baseline. Educated pt on energy conservation techniques, adaptive techniques for safety with ADLs, importance of changing briefs and staying dry for skin integrity, home safety, and importance of continued OOB activity. Pt reports no concerns about returning home. Reports lives in [facility] and [place] [facility] in his trailer. Reports goes into home to use shower/toileting. Reports both son/RUBEN work closeby, but he is alone during they day while they work. Patient will not be actively followed for occupational therapy services at this time, however may be seen if requested by physician for 1 more visit within 30 days to address any discharge or equipment needs.     Plan    Recommend Occupational Therapy  d/c needs only      DC Equipment Recommendations: Tub Transfer Bench  Discharge Recommendations: Recommend home health for continued occupational therapy services     Objective     10/04/22 1135   Prior Living Situation   Prior Services Home-Independent   Housing / Facility Other (Comments)  (trailer)   Steps Into Home 1   Bathroom Set up Bathtub / Shower Combination;Shower Chair;Grab Bars   Equipment Owned 4-Wheel Walker;Front-Wheel Walker;Single Point Cane;Scooter;Tub / Shower Seat;Grab Bar(s) In Tub / Shower;Grab Bar(s) By Toilet   Lives with - Patient's Self Care Capacity Adult Children   Comments Reports lives in [facility] and [facility] in his trailer. Reports goes into home to use  shower/toileting. Reports both son/DIL work closeby, but he is alone during they day while they work.   Prior Level of ADL Function   Self Feeding Independent   Grooming / Hygiene Independent   Bathing Independent   Dressing Independent   Toileting Independent   Prior Level of IADL Function   Medication Management Requires Assist   Laundry Dependent   Kitchen Mobility Dependent   Finances Independent   Home Management Dependent   Shopping Dependent   Prior Level Of Mobility Independent With Device in Home   Driving / Transportation Relatives / Others Provide Transportation   Precautions   Precautions Fall Risk   Vitals   O2 Delivery Device None - Room Air   Pain 0 - 10 Group   Therapist Pain Assessment Post Activity Pain Same as Prior to Activity;During Activity;Nurse Notified  (no c/o pain)   Cognition    Cognition / Consciousness X   Level of Consciousness Alert   New Learning Impaired   Attention Impaired   Comments pleasant and cooperative; tangential req redirection   Passive ROM Upper Body   Passive ROM Upper Body WDL   Active ROM Upper Body   Active ROM Upper Body  WDL   Strength Upper Body   Upper Body Strength  WDL   Comments functional for ADLs   Balance Assessment   Sitting Balance (Static) Good   Sitting Balance (Dynamic) Fair +   Standing Balance (Static) Fair   Standing Balance (Dynamic) Fair   Weight Shift Sitting Good   Weight Shift Standing Fair   Comments w/FWW   Bed Mobility    Scooting Supervised   Comments found and left in chair   ADL Assessment   Grooming Supervision;Standing  (washing hands)   Upper Body Dressing Minimal Assist  (gown)   Lower Body Dressing Supervision  (socks)   Toileting Supervision  (seated; incontinent req cleanup, reports this is baseline)   Comments Educated pt on energy conservation techniques, adaptive techniques for safety with ADLs, importance of changing briefs and staying dry for skin integrity, home safety, and importance of continued OOB activity.   Functional  Mobility   Sit to Stand Supervised   Bed, Chair, Wheelchair Transfer Supervised   Toilet Transfers Supervised   Transfer Method Stand Step   Mobility w/FWW in room and hallway   Edema / Skin Assessment   Edema / Skin  Not Assessed   Activity Tolerance   Sitting in Chair found and left in chair   Comments no c/o fatigue or pain   Education Group   Education Provided Energy Conservation;Home Safety;Role of Occupational Therapist;Activities of Daily Living;Adaptive Equipment;Pathology of bedrest   Role of Occupational Therapist Patient Response Patient;Acceptance;Explanation;Verbal Demonstration;Reinforcement Needed   Energy Conservation Patient Response Patient;Acceptance;Explanation;Verbal Demonstration;Reinforcement Needed   Home Safety Patient Response Patient;Acceptance;Explanation;Verbal Demonstration;Reinforcement Needed   ADL Patient Response Patient;Acceptance;Explanation;Action Demonstration;Reinforcement Needed   Adaptive Equipment Patient Response Patient;Acceptance;Explanation;Verbal Demonstration;Reinforcement Needed   Pathology of Bedrest Patient Response Patient;Acceptance;Explanation;Verbal Demonstration;Reinforcement Needed

## 2022-10-04 NOTE — CARE PLAN
The patient is Stable - Low risk of patient condition declining or worsening    Shift Goals  Clinical Goals: Monitor UO and pain  Patient Goals: Rest  Family Goals: MORGAN    Progress made toward(s) clinical / shift goals:        Problem: Knowledge Deficit - Standard  Goal: Patient and family/care givers will demonstrate understanding of plan of care, disease process/condition, diagnostic tests and medications  Outcome: Progressing  Note: Patient verbally demonstrates understanding of POC and disease process      Problem: Fall Risk  Goal: Patient will remain free from falls  Outcome: Progressing  Note: All fall precautions in place and patient educated to use the call light before ambulation

## 2022-10-04 NOTE — PROGRESS NOTES
Bedside report received from AM RN. Patient A&O x 3 to person, place, and situation, disoriented to time. Pt does not complain of pain at this time. POC discussed with patient. Patient verbalizes understanding. Call light and belongings within reach. Bed locked in lowest position, alarm and fall precautions in place.

## 2022-10-04 NOTE — PROGRESS NOTES
Patient reported to this RN that he was having some pain and burning in his chest. MD Lai notified and STAT EKG ordered. Tums was ordered and given to the patient.

## 2022-10-04 NOTE — DISCHARGE PLANNING
Care Transition Team Assessment    LSW met with pt at bedside to complete assessment. Pt's physical address is [address] [place] [address]. Pt lives alone in a trailer on his son and RUBEN's property. Pt is independent with ADLs and IADLs at baseline. Pt has multiple walkers and power scooter he uses. Pt's son and DIL are good support for him.    Pt is retired, no financial, SA, or MH concerns. Pt does not have an advance directives. AD packet given to pt. Pt's son will provide transportation home upon DC. LSW spoke with pt about HHC. Pt reported he does not want HHC at this time, however will follow up with his PCP if he changes his mind.    Information Source  Orientation Level: Oriented to place, Oriented to time, Oriented to person, Disoriented to situation  Information Given By: Patient  Informant's Name: Sulaiman Ceja  Who is responsible for making decisions for patient? : Patient    Readmission Evaluation  Is this a readmission?: No    Elopement Risk  Legal Hold: No  Ambulatory or Self Mobile in Wheelchair: Yes  Disoriented: No  Psychiatric Symptoms: None  History of Wandering: No  Elopement this Admit: No  Vocalizing Wanting to Leave: No  Displays Behaviors, Body Language Wanting to Leave: No-Not at Risk for Elopement  Elopement Risk: Not at Risk for Elopement    Interdisciplinary Discharge Planning  Lives with - Patient's Self Care Capacity: Adult Children  Patient or legal guardian wants to designate a caregiver: No  Housing / Facility: Other (Comments) (Trailer)  Prior Services: Home-Independent  Durable Medical Equipment: Walker, Other - Specify (scooter, cane)    Discharge Preparedness  What is your plan after discharge?: Home with help  What are your discharge supports?: Child  Prior Functional Level: Ambulatory, Independent with Activities of Daily Living, Independent with Medication Management, Uses Cane, Uses Walker  Difficulity with ADLs: None  Difficulity with IADLs: None    Functional  Assesment  Prior Functional Level: Ambulatory, Independent with Activities of Daily Living, Independent with Medication Management, Uses Cane, Uses Walker    Finances  Financial Barriers to Discharge: No  Prescription Coverage: Yes    Vision / Hearing Impairment  Vision Impairment : No  Hearing Impairment : No    Advance Directive  Advance Directive?: None  Advance Directive offered?: AD Booklet given    Domestic Abuse  Have you ever been the victim of abuse or violence?: No  Physical Abuse or Sexual Abuse: No  Verbal Abuse or Emotional Abuse: No  Possible Abuse/Neglect Reported to:: Not Applicable    Psychological Assessment  History of Substance Abuse: None  History of Psychiatric Problems: No  Non-compliant with Treatment: No  Newly Diagnosed Illness: No    Discharge Risks or Barriers  Discharge risks or barriers?: No    Anticipated Discharge Information  Discharge Disposition: Discharged to home/self care (01)

## 2022-10-04 NOTE — PROGRESS NOTES
Assumed care of patient, received bedside report from NOC RN. Patient is A&O X 3, disoriented to time. Pain 0/10. Vital signs stable overnight, on RA. On tele monitor, SB 52. POC discussed with patient and he verbalized understanding. Call light within reach and fall precautions in place. Bed locked and in lowest position.

## 2022-10-04 NOTE — CARE PLAN
The patient is Stable - Low risk of patient condition declining or worsening    Shift Goals  Clinical Goals: monitor kidneys, UO, and mentation  Patient Goals: rest, pain control  Family Goals: MORGAN    Progress made toward(s) clinical / shift goals:  UO has increased since day shift, pt's Vs are WNL, pt does not complain of pain at this time, Pt at baseline mentation    Patient is not progressing towards the following goals:      Problem: Hemodynamics  Goal: Patient's hemodynamics, fluid balance and neurologic status will be stable or improve  Outcome: Progressing     Problem: Urinary Elimination  Goal: Establish and maintain regular urinary output  Outcome: Progressing

## 2022-10-04 NOTE — PROGRESS NOTES
Monitor Summary:  Rhythm: SB  Rate: 51-57  Ectopy: (F) PVC, (O) PAC, (F) Trigem  Measurement:  .19/.17/.41

## 2022-10-04 NOTE — PROGRESS NOTES
Hospital Medicine Daily Progress Note    Date of Service  10/4/2022    Chief Complaint  Sulaiman Ceja is a 72 y.o. male admitted 9/28/2022 with chest pain    Hospital Course  Patient is a 72-year-old man with h/o CAD, s/p CABG in the 1990s w/stents, dementia, IDDM 2, hypothyroidism on Synthroid, hypertension on chronic clonidine and beta-blockers, and CKD stage III. He presented to [facility] as a transfer from [facility] where he was found to have an elevated troponin in the 40s. He was transfered to [facility] for cardiology consultation and a cardiac cath. Patient stated that he had 2 days of worsening chest pain lasting 2 to 3 hours at a time described as a pressure radiating sometimes near arm into esophagus which has been like his previous attacks, at rest and on exertion, no lower extremity edema, no recent viral illnesses.  Patient was given morphine and started on a heparin drip, on aspirin and Plavix as well as high intensity statin and Zetia at baseline.EKG without concern for ST elevations, trigeminy, similar to previous, patient hemodynamically stable blood pressure in the 170s, with beta-blocker addition and down into 150s, no major electrolyte abnormalities on exam, creatinine at baseline, CBC with mild leukocytosis of 12.4 thousand likely secondary to stress chronic mild anemia of 13.8 normocytic.  INR mildly elevated at 1.18.      Interval Problem Update  - only complaint today is about the food  - otherwise doing well  - Cr improving 2.29-->1.9  - changing CTX-->cefdinir, no cx data was sent    I have discussed this patient's plan of care and discharge plan at IDT rounds today with Case Management, Nursing, Nursing leadership, and other members of the IDT team.    Consultants/Specialty  cardiology    Code Status  Prior    Disposition  Patient is not medically cleared for discharge.   Anticipate discharge to  TBD .  I have placed the appropriate orders for post-discharge needs.    Review of Systems  Review  of Systems   Constitutional:  Negative for chills, fever and malaise/fatigue.   HENT: Negative.     Eyes: Negative.    Respiratory:  Negative for cough, sputum production and shortness of breath.    Cardiovascular:  Negative for chest pain and leg swelling.   Gastrointestinal:  Negative for abdominal pain, nausea and vomiting.   Genitourinary:  Negative for dysuria, flank pain, frequency, hematuria and urgency.   Musculoskeletal:  Negative for myalgias.   Skin:  Negative for rash.   Neurological:  Negative for focal weakness.      Physical Exam  Temp:  [36.5 °C (97.7 °F)-37 °C (98.6 °F)] 36.5 °C (97.7 °F)  Pulse:  [51-60] 51  Resp:  [16-18] 16  BP: (130-151)/(53-63) 137/62  SpO2:  [90 %-92 %] 92 %    Physical Exam  Vitals and nursing note reviewed.   Constitutional:       Appearance: He is not ill-appearing.   HENT:      Head: Normocephalic and atraumatic.      Nose: Nose normal.      Mouth/Throat:      Mouth: Mucous membranes are moist.      Pharynx: Oropharynx is clear.   Eyes:      Conjunctiva/sclera: Conjunctivae normal.      Pupils: Pupils are equal, round, and reactive to light.   Cardiovascular:      Rate and Rhythm: Normal rate and regular rhythm.      Heart sounds: No murmur heard.    No friction rub. No gallop.   Pulmonary:      Effort: Pulmonary effort is normal. No respiratory distress.      Breath sounds: No wheezing or rales.   Abdominal:      General: Abdomen is flat. Bowel sounds are normal. There is no distension.      Tenderness: There is no abdominal tenderness. There is no guarding.   Musculoskeletal:      Cervical back: Normal range of motion.      Right lower leg: No edema.      Left lower leg: No edema.   Skin:     General: Skin is warm and dry.   Neurological:      Mental Status: He is alert and oriented to person, place, and time.       Fluids    Intake/Output Summary (Last 24 hours) at 10/4/2022 1201  Last data filed at 10/4/2022 0900  Gross per 24 hour   Intake 1320 ml   Output 1700 ml    Net -380 ml         Laboratory  Recent Labs     10/02/22  0259 10/03/22  0305 10/04/22  0051   WBC 14.2* 11.9* 10.3   RBC 4.10* 3.68* 3.62*   HEMOGLOBIN 12.0* 11.1* 10.8*   HEMATOCRIT 36.5* 32.6* 31.7*   MCV 89.0 88.6 87.6   MCH 29.3 30.2 29.8   MCHC 32.9* 34.0 34.1   RDW 48.1 47.8 46.9   PLATELETCT 238 205 258   MPV 11.5 11.6 11.4       Recent Labs     10/02/22  0259 10/03/22  0305 10/04/22  0051   SODIUM 133* 132* 136   POTASSIUM 4.0 4.0 4.2   CHLORIDE 100 99 104   CO2 20 18* 19*   GLUCOSE 89 128* 154*   BUN 41* 44* 44*   CREATININE 1.96* 2.29* 1.90*   CALCIUM 9.0 8.7 9.2                         Imaging  EC-ECHOCARDIOGRAM COMPLETE W/O CONT   Final Result      DX-CHEST-PORTABLE (1 VIEW)   Final Result         1.  No acute cardiopulmonary disease.             Assessment/Plan  * NSTEMI (non-ST elevated myocardial infarction) (HCC)- (present on admission)  Assessment & Plan  Chest pain and pressure, concerning for unstable angina versus NSTEMI type I, troponins mildly elevated but clearly above baseline   He remains chest pain free  Cardiology decided no cath as repeat echo stable, he will follow up as outpatient  Continue, awaiting repeat echo to make decision about cath home blood pressure medications including beta-blocker calcium channel blocker, ACE/ARB    Aspirin, statin, Plavix, low-dose beta-blocker      UTI (urinary tract infection)  Assessment & Plan  Cultures never sent but UA c/f infection  Ceftriaxone-->cefdinir to complete course  Improving    Dementia (HCC)- (present on admission)  Assessment & Plan  Patient states he has a history of dementia and that his daughter-in-law as mentioned in chart Tricia Ceja helps make his medical decisions for him, alert and oriented x3 otherwise decently reliable historian.    Assessment & Plan  Patient states she has a history of dementia and that his daughter-in-law as mentioned in chart Tricia Ceja helps make his medical decisions for him, alert and oriented  x3 otherwise decently reliable historian.    Depression- (present on admission)  Assessment & Plan    Patient has a history of depression well-controlled without SI or HI     Continue home meds    Supportive care    Diabetic neuropathy (HCC)- (present on admission)  Assessment & Plan  Patient with a high history of diabetic neuropathy on gabapentin  Continue home dose gabapentin    Normocytic anemia- (present on admission)  Assessment & Plan  Patient with normocytic anemia, very mild, likely secondary to chronic kidney disease  Following intermittently  No melena or hematochezia    HTN (hypertension)- (present on admission)  Assessment & Plan  History of hypertension, with elevated troponin admit hypertensive urgency however likely secondary to ACS as discussed above  Continue current meds as tolerated    HLD (hyperlipidemia)- (present on admission)  Assessment & Plan  Continue statin and Zetia combo    Insulin dependent type 2 diabetes mellitus (HCC)- (present on admission)  Assessment & Plan  Patient with a history of poorly controlled type 2 diabetes complicated by on long-term insulin use with 50 units in the morning and 15 units in the evening.  Glargine 20 units nightly  Sliding scale insulin  Hypoglycemic protocol    CKD (chronic kidney disease) stage 3, GFR 30-59 ml/min ([facility])- (present on admission)  Assessment & Plan  Patient with a history of CKD stage IIIa, baseline creatinine ~1.3  Acute on chronic  Will hold ace  Check bladder scan, monitor for retention  Continue to follow  Avoid nephrotoxic medications  Improving    Bradycardia- (present on admission)  Assessment & Plan  History of previous admission for symptomatic bradycardia, will be judicious with beta-blockers as discussed above.       VTE prophylaxis: heparin ppx    I have performed a physical exam and reviewed and updated ROS and Plan today (10/4/2022). In review of yesterday's note (10/3/2022), there are no changes except as documented  above.

## 2022-10-05 ENCOUNTER — APPOINTMENT (OUTPATIENT)
Dept: RADIOLOGY | Facility: MEDICAL CENTER | Age: 72
DRG: 280 | End: 2022-10-05
Attending: INTERNAL MEDICINE
Payer: MEDICARE

## 2022-10-05 PROBLEM — R44.1 VISUAL HALLUCINATIONS: Status: ACTIVE | Noted: 2022-10-05

## 2022-10-05 PROBLEM — R00.1 BRADYCARDIA: Status: RESOLVED | Noted: 2022-03-01 | Resolved: 2022-10-05

## 2022-10-05 LAB
ALBUMIN SERPL BCP-MCNC: 3.5 G/DL (ref 3.2–4.9)
ALP SERPL-CCNC: 92 U/L (ref 30–99)
ALT SERPL-CCNC: 18 U/L (ref 2–50)
ANION GAP SERPL CALC-SCNC: 14 MMOL/L (ref 7–16)
AST SERPL-CCNC: 30 U/L (ref 12–45)
BASOPHILS # BLD AUTO: 0.9 % (ref 0–1.8)
BASOPHILS # BLD: 0.08 K/UL (ref 0–0.12)
BILIRUB CONJ SERPL-MCNC: <0.2 MG/DL (ref 0.1–0.5)
BILIRUB INDIRECT SERPL-MCNC: NORMAL MG/DL (ref 0–1)
BILIRUB SERPL-MCNC: 0.2 MG/DL (ref 0.1–1.5)
BUN SERPL-MCNC: 41 MG/DL (ref 8–22)
CALCIUM SERPL-MCNC: 9.7 MG/DL (ref 8.5–10.5)
CHLORIDE SERPL-SCNC: 108 MMOL/L (ref 96–112)
CO2 SERPL-SCNC: 20 MMOL/L (ref 20–33)
CREAT SERPL-MCNC: 1.69 MG/DL (ref 0.5–1.4)
EOSINOPHIL # BLD AUTO: 0.55 K/UL (ref 0–0.51)
EOSINOPHIL NFR BLD: 6.5 % (ref 0–6.9)
ERYTHROCYTE [DISTWIDTH] IN BLOOD BY AUTOMATED COUNT: 49.1 FL (ref 35.9–50)
GFR SERPLBLD CREATININE-BSD FMLA CKD-EPI: 43 ML/MIN/1.73 M 2
GLUCOSE BLD STRIP.AUTO-MCNC: 118 MG/DL (ref 65–99)
GLUCOSE BLD STRIP.AUTO-MCNC: 154 MG/DL (ref 65–99)
GLUCOSE BLD STRIP.AUTO-MCNC: 157 MG/DL (ref 65–99)
GLUCOSE BLD STRIP.AUTO-MCNC: 165 MG/DL (ref 65–99)
GLUCOSE BLD STRIP.AUTO-MCNC: 98 MG/DL (ref 65–99)
GLUCOSE SERPL-MCNC: 85 MG/DL (ref 65–99)
HCT VFR BLD AUTO: 34 % (ref 42–52)
HGB BLD-MCNC: 11.4 G/DL (ref 14–18)
IMM GRANULOCYTES # BLD AUTO: 0.03 K/UL (ref 0–0.11)
IMM GRANULOCYTES NFR BLD AUTO: 0.4 % (ref 0–0.9)
LYMPHOCYTES # BLD AUTO: 1.31 K/UL (ref 1–4.8)
LYMPHOCYTES NFR BLD: 15.4 % (ref 22–41)
MCH RBC QN AUTO: 30 PG (ref 27–33)
MCHC RBC AUTO-ENTMCNC: 33.5 G/DL (ref 33.7–35.3)
MCV RBC AUTO: 89.5 FL (ref 81.4–97.8)
MONOCYTES # BLD AUTO: 1.06 K/UL (ref 0–0.85)
MONOCYTES NFR BLD AUTO: 12.5 % (ref 0–13.4)
NEUTROPHILS # BLD AUTO: 5.48 K/UL (ref 1.82–7.42)
NEUTROPHILS NFR BLD: 64.3 % (ref 44–72)
NRBC # BLD AUTO: 0 K/UL
NRBC BLD-RTO: 0 /100 WBC
PLATELET # BLD AUTO: 291 K/UL (ref 164–446)
PMV BLD AUTO: 11.3 FL (ref 9–12.9)
POTASSIUM SERPL-SCNC: 5.1 MMOL/L (ref 3.6–5.5)
PROT SERPL-MCNC: 7.6 G/DL (ref 6–8.2)
RBC # BLD AUTO: 3.8 M/UL (ref 4.7–6.1)
SODIUM SERPL-SCNC: 142 MMOL/L (ref 135–145)
WBC # BLD AUTO: 8.5 K/UL (ref 4.8–10.8)

## 2022-10-05 PROCEDURE — 70450 CT HEAD/BRAIN W/O DYE: CPT

## 2022-10-05 PROCEDURE — A9270 NON-COVERED ITEM OR SERVICE: HCPCS | Performed by: INTERNAL MEDICINE

## 2022-10-05 PROCEDURE — 700102 HCHG RX REV CODE 250 W/ 637 OVERRIDE(OP)

## 2022-10-05 PROCEDURE — 700102 HCHG RX REV CODE 250 W/ 637 OVERRIDE(OP): Performed by: STUDENT IN AN ORGANIZED HEALTH CARE EDUCATION/TRAINING PROGRAM

## 2022-10-05 PROCEDURE — 700102 HCHG RX REV CODE 250 W/ 637 OVERRIDE(OP): Performed by: INTERNAL MEDICINE

## 2022-10-05 PROCEDURE — 700111 HCHG RX REV CODE 636 W/ 250 OVERRIDE (IP): Performed by: INTERNAL MEDICINE

## 2022-10-05 PROCEDURE — A9270 NON-COVERED ITEM OR SERVICE: HCPCS | Performed by: STUDENT IN AN ORGANIZED HEALTH CARE EDUCATION/TRAINING PROGRAM

## 2022-10-05 PROCEDURE — A9270 NON-COVERED ITEM OR SERVICE: HCPCS

## 2022-10-05 PROCEDURE — 80048 BASIC METABOLIC PNL TOTAL CA: CPT

## 2022-10-05 PROCEDURE — 80076 HEPATIC FUNCTION PANEL: CPT

## 2022-10-05 PROCEDURE — 82962 GLUCOSE BLOOD TEST: CPT

## 2022-10-05 PROCEDURE — 51798 US URINE CAPACITY MEASURE: CPT

## 2022-10-05 PROCEDURE — 99233 SBSQ HOSP IP/OBS HIGH 50: CPT | Performed by: INTERNAL MEDICINE

## 2022-10-05 PROCEDURE — 770001 HCHG ROOM/CARE - MED/SURG/GYN PRIV*

## 2022-10-05 PROCEDURE — 85025 COMPLETE CBC W/AUTO DIFF WBC: CPT

## 2022-10-05 PROCEDURE — 36415 COLL VENOUS BLD VENIPUNCTURE: CPT

## 2022-10-05 RX ORDER — CHOLECALCIFEROL (VITAMIN D3) 125 MCG
CAPSULE ORAL
Status: COMPLETED
Start: 2022-10-05 | End: 2022-10-05

## 2022-10-05 RX ORDER — RISPERIDONE 0.5 MG/1
0.5 TABLET ORAL 2 TIMES DAILY
Status: DISCONTINUED | OUTPATIENT
Start: 2022-10-05 | End: 2022-10-06 | Stop reason: HOSPADM

## 2022-10-05 RX ORDER — CEFDINIR 300 MG/1
300 CAPSULE ORAL EVERY 12 HOURS
Qty: 10 CAPSULE | Refills: 0 | Status: CANCELLED | OUTPATIENT
Start: 2022-10-05 | End: 2022-10-10

## 2022-10-05 RX ORDER — ISOSORBIDE MONONITRATE 30 MG/1
30 TABLET, EXTENDED RELEASE ORAL DAILY
Qty: 30 TABLET | Refills: 0 | Status: CANCELLED | OUTPATIENT
Start: 2022-10-06

## 2022-10-05 RX ORDER — LISINOPRIL 10 MG/1
10 TABLET ORAL TWICE DAILY
Status: DISCONTINUED | OUTPATIENT
Start: 2022-10-05 | End: 2022-10-06 | Stop reason: HOSPADM

## 2022-10-05 RX ORDER — ISOSORBIDE MONONITRATE 30 MG/1
30 TABLET, EXTENDED RELEASE ORAL
Status: DISCONTINUED | OUTPATIENT
Start: 2022-10-06 | End: 2022-10-06 | Stop reason: HOSPADM

## 2022-10-05 RX ORDER — GABAPENTIN 300 MG/1
300 CAPSULE ORAL 3 TIMES DAILY
Status: DISCONTINUED | OUTPATIENT
Start: 2022-10-05 | End: 2022-10-05

## 2022-10-05 RX ORDER — GABAPENTIN 100 MG/1
CAPSULE ORAL
Status: COMPLETED
Start: 2022-10-05 | End: 2022-10-05

## 2022-10-05 RX ORDER — HALOPERIDOL 5 MG/ML
2 INJECTION INTRAMUSCULAR EVERY 6 HOURS PRN
Status: DISCONTINUED | OUTPATIENT
Start: 2022-10-05 | End: 2022-10-06 | Stop reason: HOSPADM

## 2022-10-05 RX ORDER — QUETIAPINE FUMARATE 25 MG/1
25 TABLET, FILM COATED ORAL ONCE
Status: COMPLETED | OUTPATIENT
Start: 2022-10-05 | End: 2022-10-05

## 2022-10-05 RX ORDER — GABAPENTIN 100 MG/1
200 CAPSULE ORAL 3 TIMES DAILY
Status: DISCONTINUED | OUTPATIENT
Start: 2022-10-05 | End: 2022-10-06 | Stop reason: HOSPADM

## 2022-10-05 RX ORDER — QUETIAPINE FUMARATE 25 MG/1
25 TABLET, FILM COATED ORAL NIGHTLY
Status: DISCONTINUED | OUTPATIENT
Start: 2022-10-05 | End: 2022-10-05

## 2022-10-05 RX ORDER — CHOLECALCIFEROL (VITAMIN D3) 125 MCG
5 CAPSULE ORAL NIGHTLY
Status: DISCONTINUED | OUTPATIENT
Start: 2022-10-05 | End: 2022-10-06 | Stop reason: HOSPADM

## 2022-10-05 RX ORDER — ISOSORBIDE MONONITRATE 30 MG/1
60 TABLET, EXTENDED RELEASE ORAL
Status: DISCONTINUED | OUTPATIENT
Start: 2022-10-06 | End: 2022-10-05

## 2022-10-05 RX ADMIN — NIFEDIPINE 90 MG: 30 TABLET, FILM COATED, EXTENDED RELEASE ORAL at 05:03

## 2022-10-05 RX ADMIN — INSULIN HUMAN 1 UNITS: 100 INJECTION, SOLUTION PARENTERAL at 23:23

## 2022-10-05 RX ADMIN — CEFDINIR 300 MG: 300 CAPSULE ORAL at 05:03

## 2022-10-05 RX ADMIN — RISPERIDONE 0.5 MG: 0.5 TABLET ORAL at 17:37

## 2022-10-05 RX ADMIN — CARVEDILOL 3.12 MG: 3.12 TABLET, FILM COATED ORAL at 08:12

## 2022-10-05 RX ADMIN — ASPIRIN 81 MG: 81 TABLET, COATED ORAL at 05:04

## 2022-10-05 RX ADMIN — Medication 5 MG: at 19:37

## 2022-10-05 RX ADMIN — INSULIN HUMAN 1 UNITS: 100 INJECTION, SOLUTION PARENTERAL at 17:38

## 2022-10-05 RX ADMIN — CLOPIDOGREL BISULFATE 75 MG: 75 TABLET ORAL at 05:04

## 2022-10-05 RX ADMIN — ATORVASTATIN CALCIUM 80 MG: 80 TABLET, FILM COATED ORAL at 17:37

## 2022-10-05 RX ADMIN — ISOSORBIDE MONONITRATE 30 MG: 30 TABLET, EXTENDED RELEASE ORAL at 05:04

## 2022-10-05 RX ADMIN — GABAPENTIN 200 MG: 100 CAPSULE ORAL at 19:38

## 2022-10-05 RX ADMIN — HEPARIN SODIUM 5000 UNITS: 5000 INJECTION, SOLUTION INTRAVENOUS; SUBCUTANEOUS at 05:04

## 2022-10-05 RX ADMIN — CEFTRIAXONE SODIUM 2 G: 10 INJECTION, POWDER, FOR SOLUTION INTRAVENOUS at 17:37

## 2022-10-05 RX ADMIN — GABAPENTIN 400 MG: 400 CAPSULE ORAL at 08:12

## 2022-10-05 RX ADMIN — INSULIN HUMAN 1 UNITS: 100 INJECTION, SOLUTION PARENTERAL at 11:49

## 2022-10-05 RX ADMIN — ESCITALOPRAM OXALATE 20 MG: 10 TABLET ORAL at 05:04

## 2022-10-05 RX ADMIN — HEPARIN SODIUM 5000 UNITS: 5000 INJECTION, SOLUTION INTRAVENOUS; SUBCUTANEOUS at 19:40

## 2022-10-05 RX ADMIN — LISINOPRIL 10 MG: 10 TABLET ORAL at 17:37

## 2022-10-05 RX ADMIN — HALOPERIDOL LACTATE 2 MG: 5 INJECTION, SOLUTION INTRAMUSCULAR at 23:21

## 2022-10-05 RX ADMIN — QUETIAPINE FUMARATE 25 MG: 25 TABLET ORAL at 11:53

## 2022-10-05 RX ADMIN — OXYCODONE 5 MG: 5 TABLET ORAL at 03:51

## 2022-10-05 RX ADMIN — INSULIN GLARGINE-YFGN 20 UNITS: 100 INJECTION, SOLUTION SUBCUTANEOUS at 17:37

## 2022-10-05 RX ADMIN — CARVEDILOL 3.12 MG: 3.12 TABLET, FILM COATED ORAL at 17:37

## 2022-10-05 RX ADMIN — LEVOTHYROXINE SODIUM 100 MCG: 0.1 TABLET ORAL at 05:04

## 2022-10-05 RX ADMIN — OMEPRAZOLE 20 MG: 20 CAPSULE, DELAYED RELEASE ORAL at 05:04

## 2022-10-05 ASSESSMENT — ENCOUNTER SYMPTOMS
HALLUCINATIONS: 1
VOMITING: 0
FLANK PAIN: 0
CHILLS: 0
FEVER: 0
FOCAL WEAKNESS: 0
COUGH: 0
SPUTUM PRODUCTION: 0
SHORTNESS OF BREATH: 0
NAUSEA: 0
MYALGIAS: 0
EYES NEGATIVE: 1
ABDOMINAL PAIN: 0

## 2022-10-05 ASSESSMENT — PAIN DESCRIPTION - PAIN TYPE: TYPE: ACUTE PAIN

## 2022-10-05 NOTE — PROGRESS NOTES
Hospital Medicine Daily Progress Note    Date of Service  10/5/2022    Chief Complaint  Sulaiman Ceja is a 72 y.o. male admitted 9/28/2022 with chest pain    Hospital Course  Patient is a 72-year-old man with h/o CAD, s/p CABG in the 1990s w/stents, dementia, IDDM 2, hypothyroidism on Synthroid, hypertension on chronic clonidine and beta-blockers, and CKD stage III. He presented to [facility] as a transfer from [facility] where he was found to have an elevated troponin in the 40s. He was transfered to [facility] for cardiology consultation and a cardiac cath. Patient stated that he had 2 days of worsening chest pain lasting 2 to 3 hours at a time described as a pressure radiating sometimes near arm into esophagus which has been like his previous attacks, at rest and on exertion, no lower extremity edema, no recent viral illnesses.  Patient was given morphine and started on a heparin drip, on aspirin and Plavix as well as high intensity statin and Zetia at baseline.EKG without concern for ST elevations, trigeminy, similar to previous, patient hemodynamically stable blood pressure in the 170s, with beta-blocker addition and down into 150s, no major electrolyte abnormalities on exam, creatinine at baseline, CBC with mild leukocytosis of 12.4 thousand likely secondary to stress chronic mild anemia of 13.8 normocytic.  INR mildly elevated at 1.18.      Interval Problem Update  - Cr improving  - visual hallucinations today, impulsive, started seroquel however made him more impulsive, anxious, will trial risperidone  - hypetensive today 160s, adding back lisinopril  - called family to discuss his VH and they are driving up to see him, asked code status since wasn't done on admission and they confirmed DNAR/DNI.     I have discussed this patient's plan of care and discharge plan at IDT rounds today with Case Management, Nursing, Nursing leadership, and other members of the IDT team.    Consultants/Specialty  cardiology    Code  Status  Full Code    Disposition  Patient is not medically cleared for discharge.   Anticipate discharge to  TBD .  I have placed the appropriate orders for post-discharge needs.    Review of Systems  Review of Systems   Constitutional:  Negative for chills, fever and malaise/fatigue.   HENT: Negative.     Eyes: Negative.    Respiratory:  Negative for cough, sputum production and shortness of breath.    Cardiovascular:  Negative for chest pain and leg swelling.   Gastrointestinal:  Negative for abdominal pain, nausea and vomiting.   Genitourinary:  Negative for dysuria, flank pain, frequency, hematuria and urgency.   Musculoskeletal:  Negative for myalgias.   Skin:  Negative for rash.   Neurological:  Negative for focal weakness.   Psychiatric/Behavioral:  Positive for hallucinations.       Physical Exam  Temp:  [36.5 °C (97.7 °F)-37.1 °C (98.8 °F)] 36.9 °C (98.4 °F)  Pulse:  [51-69] 69  Resp:  [16] 16  BP: (103-181)/(62-79) 163/76  SpO2:  [90 %-92 %] 92 %    Physical Exam  Vitals and nursing note reviewed.   Constitutional:       Appearance: He is not ill-appearing.   HENT:      Head: Normocephalic and atraumatic.      Nose: Nose normal.      Mouth/Throat:      Mouth: Mucous membranes are moist.      Pharynx: Oropharynx is clear.   Eyes:      Conjunctiva/sclera: Conjunctivae normal.      Pupils: Pupils are equal, round, and reactive to light.   Cardiovascular:      Rate and Rhythm: Normal rate and regular rhythm.      Heart sounds: No murmur heard.    No friction rub. No gallop.   Pulmonary:      Effort: Pulmonary effort is normal. No respiratory distress.      Breath sounds: No wheezing or rales.   Abdominal:      General: Abdomen is flat. Bowel sounds are normal. There is no distension.      Tenderness: There is no abdominal tenderness. There is no guarding.   Musculoskeletal:      Cervical back: Normal range of motion.      Right lower leg: No edema.      Left lower leg: No edema.   Skin:     General: Skin is  warm and dry.   Neurological:      Mental Status: He is alert and oriented to person, place, and time.   Psychiatric:         Attention and Perception: He perceives visual hallucinations.         Behavior: Behavior is not agitated, aggressive, hyperactive or combative.       Fluids    Intake/Output Summary (Last 24 hours) at 10/5/2022 1015  Last data filed at 10/4/2022 2133  Gross per 24 hour   Intake 480 ml   Output 550 ml   Net -70 ml         Laboratory  Recent Labs     10/03/22  0305 10/04/22  0051   WBC 11.9* 10.3   RBC 3.68* 3.62*   HEMOGLOBIN 11.1* 10.8*   HEMATOCRIT 32.6* 31.7*   MCV 88.6 87.6   MCH 30.2 29.8   MCHC 34.0 34.1   RDW 47.8 46.9   PLATELETCT 205 258   MPV 11.6 11.4       Recent Labs     10/03/22  0305 10/04/22  0051 10/05/22  0205   SODIUM 132* 136 142   POTASSIUM 4.0 4.2 5.1   CHLORIDE 99 104 108   CO2 18* 19* 20   GLUCOSE 128* 154* 85   BUN 44* 44* 41*   CREATININE 2.29* 1.90* 1.69*   CALCIUM 8.7 9.2 9.7                         Imaging  EC-ECHOCARDIOGRAM COMPLETE W/O CONT   Final Result      DX-CHEST-PORTABLE (1 VIEW)   Final Result         1.  No acute cardiopulmonary disease.      CT-HEAD W/O    (Results Pending)          Assessment/Plan  * NSTEMI (non-ST elevated myocardial infarction) ([facility])- (present on admission)  Assessment & Plan  Chest pain and pressure, concerning for unstable angina versus NSTEMI type I, troponins mildly elevated but clearly above baseline   He remains chest pain free  Cardiology decided no cath as repeat echo stable, he will follow up as outpatient  Continue, awaiting repeat echo to make decision about cath home blood pressure medications including beta-blocker calcium channel blocker, ACE/ARB    Aspirin, statin, Plavix, low-dose beta-blocker      Visual hallucinations  Assessment & Plan  Patient developed new visual hallucinations  Still oriented x3  Only new medications are cefdinir which we switched back to ceftriaxone  Has been getting same pain medication  including oxycodone and gabapentin  Renal function actually improving  No other area of infection or metabolic/toxic etiology  CT head reviewed; negative  CBC normal, no indication of infection  Likely delirium however unsure what is causing it  Will start risperidone 0.5 twice daily  Melatonin     UTI (urinary tract infection)  Assessment & Plan  Cultures never sent but UA c/f infection  Ceftriaxone  Switched to cefdinir on 10/4 however then patient developed new visual hallucinations, can cause insomnia unsure patient slept last night we will switch back to ceftriaxone  Improving    Dementia (HCC)- (present on admission)  Assessment & Plan  Patient states he has a history of dementia and that his daughter-in-law as mentioned in chart Tricia Ceja helps make his medical decisions for him, alert and oriented x3 otherwise decently reliable historian.    Interventions to minimize the risk of delirium.   -do not disturb patient (vitals or lab draws) between the hours of 10 PM and 6 AM.  -frequent reorientation  -avoid sedatives  -up in chair for meals  -watch for constipation  -remove all unnecessary lines (central lines, peripheral IVs, feeding tubes, reyna catheters)        Assessment & Plan  Patient states she has a history of dementia and that his daughter-in-law as mentioned in chart Tricia Ceja helps make his medical decisions for him, alert and oriented x3 otherwise decently reliable historian.    Depression- (present on admission)  Assessment & Plan    Patient has a history of depression well-controlled without SI or HI     Continue home meds    Supportive care    Diabetic neuropathy (HCC)- (present on admission)  Assessment & Plan  Patient with a high history of diabetic neuropathy on gabapentin      Normocytic anemia- (present on admission)  Assessment & Plan  Patient with normocytic anemia, very mild, likely secondary to chronic kidney disease  Following intermittently  No melena or hematochezia    HTN  (hypertension)- (present on admission)  Assessment & Plan  History of hypertension, with elevated troponin admit hypertensive urgency however likely secondary to ACS as discussed above  Home regimen: Nifedipine 90 mg daily, lisinopril 10 mg daily  Inpatient regimen nifedipine 90 mg daily, lisinopril 10 mg twice daily, Imdur 30 mg daily, Coreg 3.125 mg twice daily  Hypertensive today, restarted lisinopril today    HLD (hyperlipidemia)- (present on admission)  Assessment & Plan  Continue statin and Zetia combo    Insulin dependent type 2 diabetes mellitus (HCC)- (present on admission)  Assessment & Plan  Patient with a history of poorly controlled type 2 diabetes complicated by on long-term insulin use with 50 units in the morning and 15 units in the evening.  Glargine 20 units nightly  Sliding scale insulin  Hypoglycemic protocol    CKD (chronic kidney disease) stage 3, GFR 30-59 ml/min ([facility])- (present on admission)  Assessment & Plan  Patient with a history of CKD stage IIIa, baseline creatinine ~1.3  Acute on chronic  Avoid nephrotoxic medications  Improving  Bladder scan  Since improving will add back acei       VTE prophylaxis: heparin ppx    I have performed a physical exam and reviewed and updated ROS and Plan today (10/5/2022). In review of yesterday's note (10/4/2022), there are no changes except as documented above.

## 2022-10-05 NOTE — PROGRESS NOTES
Bedside report received from day RN, pt care assumed, assessment completed. Pt is A&O3, pain 6/10, medical. Updated on POC, questions answered. Bed in lowest, locked position, treaded socks on, call light and belongings within reach. Fall precautions in place.

## 2022-10-05 NOTE — DISCHARGE INSTRUCTIONS
Discharge Instructions per Carrol Lai M.D.    Mr. Ceja,    You were admitted with chest pain and evaluated by cardiology.  Because of your recent left heart catheterization they decided not to move forward with another cath procedure.  They recommended medical management including 3 new heart medications: Carvedilol/Coreg (blood pressure medication), isosorbide mononitrate SR/Imdur (blood pressure medication), and nitroglycerin (medication taken as needed for chest pain).  Your lisinopril was increased to 10 mg twice daily.  Your blood pressure was still mildly elevated, please check your blood pressure daily and write down the value.  You were also found to have a urinary tract infection were started on IV antibiotics,  you may have had a reaction to the oral version cefdinir.  You had some delirium complicating your dementia and were started on risperidone and melatonin.  Your sugars were mildly low so your insulin (Lantus) was decreased to 15 units nightly + sliding scale insulin.  Please follow-up with your primary care physician within 1 week as well as your cardiologist as soon as possible.    DIET: Cardiac    ACTIVITY: As tolerated      Return to ER if you develop worsening chest pain, shortness of breath, lightheadedness/dizziness, fevers or any other concerning symptoms.

## 2022-10-05 NOTE — CARE PLAN
Problem: Knowledge Deficit - Standard  Goal: Patient and family/care givers will demonstrate understanding of plan of care, disease process/condition, diagnostic tests and medications  Outcome: Progressing     Problem: Communication  Goal: The ability to communicate needs accurately and effectively will improve  Outcome: Progressing     Problem: Hemodynamics  Goal: Patient's hemodynamics, fluid balance and neurologic status will be stable or improve  Outcome: Progressing     Problem: Urinary Elimination  Goal: Establish and maintain regular urinary output  Outcome: Progressing   The patient is Stable - Low risk of patient condition declining or worsening    Shift Goals  Clinical Goals: safety, pain control  Patient Goals: Rest  Family Goals: MORGAN    Progress made toward(s) clinical / shift goals:  Progressing    Patient is not progressing towards the following goals:

## 2022-10-06 ENCOUNTER — PHARMACY VISIT (OUTPATIENT)
Dept: PHARMACY | Facility: MEDICAL CENTER | Age: 72
End: 2022-10-06
Payer: MEDICARE

## 2022-10-06 VITALS
HEART RATE: 60 BPM | TEMPERATURE: 97.9 F | WEIGHT: 177.47 LBS | BODY MASS INDEX: 26.29 KG/M2 | HEIGHT: 69 IN | OXYGEN SATURATION: 93 % | DIASTOLIC BLOOD PRESSURE: 65 MMHG | SYSTOLIC BLOOD PRESSURE: 160 MMHG | RESPIRATION RATE: 16 BRPM

## 2022-10-06 PROBLEM — R44.1 VISUAL HALLUCINATIONS: Status: RESOLVED | Noted: 2022-10-05 | Resolved: 2022-10-06

## 2022-10-06 LAB
ANION GAP SERPL CALC-SCNC: 15 MMOL/L (ref 7–16)
BUN SERPL-MCNC: 40 MG/DL (ref 8–22)
CALCIUM SERPL-MCNC: 9.7 MG/DL (ref 8.5–10.5)
CHLORIDE SERPL-SCNC: 108 MMOL/L (ref 96–112)
CO2 SERPL-SCNC: 18 MMOL/L (ref 20–33)
CREAT SERPL-MCNC: 1.75 MG/DL (ref 0.5–1.4)
ERYTHROCYTE [DISTWIDTH] IN BLOOD BY AUTOMATED COUNT: 48.7 FL (ref 35.9–50)
GFR SERPLBLD CREATININE-BSD FMLA CKD-EPI: 41 ML/MIN/1.73 M 2
GLUCOSE BLD STRIP.AUTO-MCNC: 170 MG/DL (ref 65–99)
GLUCOSE BLD STRIP.AUTO-MCNC: 220 MG/DL (ref 65–99)
GLUCOSE BLD STRIP.AUTO-MCNC: 62 MG/DL (ref 65–99)
GLUCOSE BLD STRIP.AUTO-MCNC: 75 MG/DL (ref 65–99)
GLUCOSE BLD STRIP.AUTO-MCNC: 99 MG/DL (ref 65–99)
GLUCOSE SERPL-MCNC: 158 MG/DL (ref 65–99)
HCT VFR BLD AUTO: 35.1 % (ref 42–52)
HGB BLD-MCNC: 11.8 G/DL (ref 14–18)
MAGNESIUM SERPL-MCNC: 1.8 MG/DL (ref 1.5–2.5)
MCH RBC QN AUTO: 30 PG (ref 27–33)
MCHC RBC AUTO-ENTMCNC: 33.6 G/DL (ref 33.7–35.3)
MCV RBC AUTO: 89.3 FL (ref 81.4–97.8)
PLATELET # BLD AUTO: 333 K/UL (ref 164–446)
PMV BLD AUTO: 10.6 FL (ref 9–12.9)
POTASSIUM SERPL-SCNC: 4.4 MMOL/L (ref 3.6–5.5)
RBC # BLD AUTO: 3.93 M/UL (ref 4.7–6.1)
SODIUM SERPL-SCNC: 141 MMOL/L (ref 135–145)
WBC # BLD AUTO: 8 K/UL (ref 4.8–10.8)

## 2022-10-06 PROCEDURE — 36415 COLL VENOUS BLD VENIPUNCTURE: CPT

## 2022-10-06 PROCEDURE — 99239 HOSP IP/OBS DSCHRG MGMT >30: CPT | Performed by: INTERNAL MEDICINE

## 2022-10-06 PROCEDURE — 83735 ASSAY OF MAGNESIUM: CPT

## 2022-10-06 PROCEDURE — A9270 NON-COVERED ITEM OR SERVICE: HCPCS

## 2022-10-06 PROCEDURE — 700111 HCHG RX REV CODE 636 W/ 250 OVERRIDE (IP)

## 2022-10-06 PROCEDURE — 85027 COMPLETE CBC AUTOMATED: CPT

## 2022-10-06 PROCEDURE — A9270 NON-COVERED ITEM OR SERVICE: HCPCS | Performed by: STUDENT IN AN ORGANIZED HEALTH CARE EDUCATION/TRAINING PROGRAM

## 2022-10-06 PROCEDURE — 700102 HCHG RX REV CODE 250 W/ 637 OVERRIDE(OP): Performed by: STUDENT IN AN ORGANIZED HEALTH CARE EDUCATION/TRAINING PROGRAM

## 2022-10-06 PROCEDURE — A9270 NON-COVERED ITEM OR SERVICE: HCPCS | Performed by: INTERNAL MEDICINE

## 2022-10-06 PROCEDURE — 80048 BASIC METABOLIC PNL TOTAL CA: CPT

## 2022-10-06 PROCEDURE — 700102 HCHG RX REV CODE 250 W/ 637 OVERRIDE(OP)

## 2022-10-06 PROCEDURE — 700111 HCHG RX REV CODE 636 W/ 250 OVERRIDE (IP): Performed by: INTERNAL MEDICINE

## 2022-10-06 PROCEDURE — 82962 GLUCOSE BLOOD TEST: CPT | Mod: 91

## 2022-10-06 PROCEDURE — 700102 HCHG RX REV CODE 250 W/ 637 OVERRIDE(OP): Performed by: INTERNAL MEDICINE

## 2022-10-06 PROCEDURE — RXMED WILLOW AMBULATORY MEDICATION CHARGE: Performed by: INTERNAL MEDICINE

## 2022-10-06 RX ORDER — NITROGLYCERIN 0.4 MG/1
0.4 TABLET SUBLINGUAL PRN
Qty: 25 TABLET | Refills: 0 | Status: SHIPPED | OUTPATIENT
Start: 2022-10-06 | End: 2024-01-04

## 2022-10-06 RX ORDER — ISOSORBIDE MONONITRATE 30 MG/1
TABLET, EXTENDED RELEASE ORAL
Status: COMPLETED
Start: 2022-10-06 | End: 2022-10-06

## 2022-10-06 RX ORDER — CHOLECALCIFEROL (VITAMIN D3) 125 MCG
5 CAPSULE ORAL NIGHTLY
Qty: 30 TABLET | Refills: 0 | Status: SHIPPED | OUTPATIENT
Start: 2022-10-06 | End: 2024-01-04

## 2022-10-06 RX ORDER — ISOSORBIDE MONONITRATE 30 MG/1
30 TABLET, EXTENDED RELEASE ORAL DAILY
Qty: 30 TABLET | Refills: 0 | Status: ON HOLD | OUTPATIENT
Start: 2022-10-07 | End: 2022-11-25

## 2022-10-06 RX ORDER — GABAPENTIN 300 MG/1
300 CAPSULE ORAL 2 TIMES DAILY
Qty: 60 CAPSULE | Refills: 0 | Status: SHIPPED | OUTPATIENT
Start: 2022-10-06 | End: 2024-01-04

## 2022-10-06 RX ORDER — ALBUTEROL SULFATE 90 UG/1
2 AEROSOL, METERED RESPIRATORY (INHALATION) EVERY 6 HOURS PRN
Qty: 8.5 G | Refills: 0 | Status: SHIPPED | OUTPATIENT
Start: 2022-10-06 | End: 2024-01-04

## 2022-10-06 RX ORDER — ATORVASTATIN CALCIUM 80 MG/1
80 TABLET, FILM COATED ORAL EVERY EVENING
Qty: 30 TABLET | Refills: 0 | Status: SHIPPED | OUTPATIENT
Start: 2022-10-06 | End: 2024-01-04

## 2022-10-06 RX ORDER — CARVEDILOL 3.12 MG/1
3.12 TABLET ORAL 2 TIMES DAILY WITH MEALS
Qty: 60 TABLET | Refills: 0 | Status: ON HOLD | OUTPATIENT
Start: 2022-10-06 | End: 2022-12-02

## 2022-10-06 RX ORDER — NIFEDIPINE 90 MG/1
90 TABLET, EXTENDED RELEASE ORAL DAILY
Qty: 30 TABLET | Refills: 0 | Status: SHIPPED | OUTPATIENT
Start: 2022-10-06 | End: 2024-01-04

## 2022-10-06 RX ORDER — ASPIRIN 81 MG/1
81 TABLET ORAL DAILY
Qty: 30 TABLET | Refills: 0 | Status: SHIPPED | OUTPATIENT
Start: 2022-10-06 | End: 2024-01-04

## 2022-10-06 RX ORDER — CLOPIDOGREL BISULFATE 75 MG/1
75 TABLET ORAL DAILY
Qty: 30 TABLET | Refills: 0 | Status: SHIPPED | OUTPATIENT
Start: 2022-10-06 | End: 2024-01-04

## 2022-10-06 RX ORDER — LISINOPRIL 10 MG/1
10 TABLET ORAL 2 TIMES DAILY
Qty: 60 TABLET | Refills: 0 | Status: ON HOLD | OUTPATIENT
Start: 2022-10-06 | End: 2022-12-02

## 2022-10-06 RX ORDER — ESCITALOPRAM OXALATE 20 MG/1
20 TABLET ORAL DAILY
Qty: 30 TABLET | Refills: 0 | Status: SHIPPED | OUTPATIENT
Start: 2022-10-06 | End: 2024-01-04

## 2022-10-06 RX ORDER — RISPERIDONE 0.25 MG/1
0.25 TABLET ORAL 2 TIMES DAILY
Qty: 60 TABLET | Refills: 0 | Status: ON HOLD | OUTPATIENT
Start: 2022-10-06 | End: 2022-11-25

## 2022-10-06 RX ORDER — LEVOTHYROXINE SODIUM 0.1 MG/1
100 TABLET ORAL
Qty: 30 TABLET | Refills: 0 | Status: ON HOLD | OUTPATIENT
Start: 2022-10-06 | End: 2022-12-02

## 2022-10-06 RX ADMIN — HEPARIN SODIUM 5000 UNITS: 5000 INJECTION, SOLUTION INTRAVENOUS; SUBCUTANEOUS at 14:38

## 2022-10-06 RX ADMIN — OMEPRAZOLE 20 MG: 20 CAPSULE, DELAYED RELEASE ORAL at 04:32

## 2022-10-06 RX ADMIN — CARVEDILOL 3.12 MG: 3.12 TABLET, FILM COATED ORAL at 06:38

## 2022-10-06 RX ADMIN — INSULIN HUMAN 1 UNITS: 100 INJECTION, SOLUTION PARENTERAL at 11:56

## 2022-10-06 RX ADMIN — ATORVASTATIN CALCIUM 80 MG: 80 TABLET, FILM COATED ORAL at 17:14

## 2022-10-06 RX ADMIN — CARVEDILOL 3.12 MG: 3.12 TABLET, FILM COATED ORAL at 17:14

## 2022-10-06 RX ADMIN — HYDRALAZINE HYDROCHLORIDE 10 MG: 20 INJECTION INTRAMUSCULAR; INTRAVENOUS at 06:01

## 2022-10-06 RX ADMIN — NIFEDIPINE 90 MG: 30 TABLET, FILM COATED, EXTENDED RELEASE ORAL at 04:32

## 2022-10-06 RX ADMIN — CLOPIDOGREL BISULFATE 75 MG: 75 TABLET ORAL at 04:32

## 2022-10-06 RX ADMIN — LISINOPRIL 10 MG: 10 TABLET ORAL at 17:14

## 2022-10-06 RX ADMIN — LISINOPRIL 10 MG: 10 TABLET ORAL at 04:32

## 2022-10-06 RX ADMIN — ESCITALOPRAM OXALATE 20 MG: 10 TABLET ORAL at 04:35

## 2022-10-06 RX ADMIN — ISOSORBIDE MONONITRATE 30 MG: 30 TABLET, EXTENDED RELEASE ORAL at 04:33

## 2022-10-06 RX ADMIN — RISPERIDONE 0.5 MG: 0.5 TABLET ORAL at 04:32

## 2022-10-06 RX ADMIN — LEVOTHYROXINE SODIUM 100 MCG: 0.1 TABLET ORAL at 04:32

## 2022-10-06 RX ADMIN — RISPERIDONE 0.5 MG: 0.5 TABLET ORAL at 17:14

## 2022-10-06 RX ADMIN — GABAPENTIN 200 MG: 100 CAPSULE ORAL at 08:43

## 2022-10-06 RX ADMIN — INSULIN HUMAN 2 UNITS: 100 INJECTION, SOLUTION PARENTERAL at 17:07

## 2022-10-06 RX ADMIN — HEPARIN SODIUM 5000 UNITS: 5000 INJECTION, SOLUTION INTRAVENOUS; SUBCUTANEOUS at 04:33

## 2022-10-06 RX ADMIN — ASPIRIN 81 MG: 81 TABLET, COATED ORAL at 04:32

## 2022-10-06 RX ADMIN — GABAPENTIN 200 MG: 100 CAPSULE ORAL at 14:38

## 2022-10-06 ASSESSMENT — PAIN DESCRIPTION - PAIN TYPE: TYPE: ACUTE PAIN

## 2022-10-06 NOTE — ASSESSMENT & PLAN NOTE
Patient developed new visual hallucinations  Still oriented x3  Only new medications are cefdinir which we switched back to ceftriaxone  Has been getting same pain medication including oxycodone and gabapentin  Renal function actually improving  No other area of infection or metabolic/toxic etiology  CT head reviewed; negative  CBC normal, no indication of infection  Likely delirium however unsure what is causing it  Will start risperidone 0.5 twice daily  Melatonin

## 2022-10-06 NOTE — PROGRESS NOTES
Assumed care at change of shift, bedside report received from Florecita SANDOVAL. Pt. Is medical not on the monitor. Initial assessment completed, orders reviewed, call light within reach, bed alarm id in use, and hourly rounding in place. POC addressed with patient, no additional questions at this time.

## 2022-10-06 NOTE — PROGRESS NOTES
Hypoglycemia Intervention    Hypoglycemia protocol intervention:  Blood glucose 62 at 0534.  Intervention: 8 oz of fruit juice   Repeat blood glucose 75 at 0557  4 oz juice and 1 pudding given  Repeat blood glucose 99 at 0609.

## 2022-10-06 NOTE — CARE PLAN
The patient is Stable - Low risk of patient condition declining or worsening    Shift Goals  Clinical Goals: sleep; safety; reorient      Progress made toward(s) clinical / shift goals:  Patient frequently ambulating up to bathroom to void. Patient cleaned self up this evening and able to toilet self with a standby assist.     Patient is not progressing towards the following goals:      Problem: Mobility  Goal: Patient's capacity to carry out activities will improve  Outcome: Progressing     Problem: Self Care  Goal: Patient will have the ability to perform ADLs independently or with assistance (bathe, groom, dress, toilet and feed)  Outcome: Progressing     Problem: Fall Risk  Goal: Patient will remain free from falls  Outcome: Progressing

## 2022-10-06 NOTE — PROGRESS NOTES
Assumed care of patient at 1920. Patient is AOx1. Patient is restless and frequently getting out of bed to use restroom. Bed alarm is on, call light within reach, hourly rounding in place, personal belongings in reach, upper bed rails up, bed in lowest and locked position.

## 2022-10-07 NOTE — DISCHARGE SUMMARY
Discharge Summary    CHIEF COMPLAINT ON ADMISSION  Chief Complaint   Patient presents with    Chest Pain     BIB EMS from [facility]. Pt was about to have cataract surgery today. Pt was hypertensive, they gave pt an antihypertensive then he developed severe chest pain with an elevated troponin.        Reason for Admission  ems     Admission Date  9/28/2022    CODE STATUS  DNAR/DNI    HPI & HOSPITAL COURSE    A 72-year-old man with h/o CAD, s/p CABG in the 1990s w/stents, dementia, IDDM 2, hypothyroidism on Synthroid, hypertension on chronic clonidine and beta-blockers, and CKD stage III presented from [place] [facility] where he is found to have an elevated troponin in the 40s, baseline, no other new known cardiologist recommended transfer for cardiac cath, with [facility] cardiologist planning for catheterization in the morning.  Patient states that he has had 2 days of worsening chest pain lasting 2 to 3 hours at a time described as a pressure radiating sometimes near arm into esophagus which has been like his previous attacks, at rest and on exertion, no lower extremity edema, no recent viral illnesses.  Patient was given morphine and started on a heparin drip, on aspirin and Plavix as well as high intensity statin and Zetia at baseline.EKG without concern for ST elevations, trigeminy, similar to previous, patient hemodynamically stable blood pressure in the 170s, with beta-blocker addition and down into 150s, no major electrolyte abnormalities on exam, creatinine at baseline, CBC with mild leukocytosis of 12.4 thousand likely secondary to stress chronic mild anemia of 13.8 normocytic.  INR mildly elevated at 1.18.    Patient was evaluated by cardiology who did not recommend left heart catheterization.  As repeat echo was stable.  Patient was started on Coreg and Imdur.  Hospitalization was complicated by MILTON and delirium.  Patient was started on risperidone 0.5 mg twice daily, discharged with 0.25 mg twice daily since  likely will do better at home in natural environment.  He will need to have his creatinine monitored closely as well as his blood pressure.  His lisinopril was added back on 10/5 at increased dose.  His gabapentin was decreased given his worsening renal function.  Patient was found to have urinary tract infection and was treated with ceftriaxone, did try to transition to cefdinir however that is when patient developed visual hallucinations.  Was transitioned back to ceftriaxone with improvement in mental status.  Recommend close follow-up with PCP and cardiology.    Therefore, he is discharged in fair and stable condition to home with close outpatient follow-up.    The patient met 2-midnight criteria for an inpatient stay at the time of discharge.    Discharge Date  10/06/22      FOLLOW UP ITEMS POST DISCHARGE  PCP: Monitor blood pressure and diabetes  Monitor renal function, may need nephrology referral since now entering CKD stage IV  Cardiology follow-up    DISCHARGE DIAGNOSES  Principal Problem:    NSTEMI (non-ST elevated myocardial infarction) ([facility]) POA: Yes  Active Problems:    CKD (chronic kidney disease) stage 3, GFR 30-59 ml/min ([facility]) POA: Yes    Insulin dependent type 2 diabetes mellitus ([facility]) POA: Yes    HLD (hyperlipidemia) POA: Yes    HTN (hypertension) POA: Yes    Normocytic anemia POA: Yes    Diabetic neuropathy ([facility]) POA: Yes    Depression POA: Yes    Dementia ([facility]) POA: Yes    COPD (chronic obstructive pulmonary disease) ([facility]) POA: Yes    Hypothyroidism POA: Yes    UTI (urinary tract infection) POA: Unknown  Resolved Problems:    Bradycardia POA: Yes    Visual hallucinations POA: Unknown      FOLLOW UP  No future appointments.  Suhail Aguirre M.D.  [address] [facility] [id number] [phone]    Schedule an appointment as soon as possible for a visit      [facility]  [address]  [address]  [facility] [id number] [phone]  Call  Your doctor has referred you for  Cardiac Rehab, which is important for your recovery. Please call to make an appointment, or speak with your local doctor to find a program in your area.      MEDICATIONS ON DISCHARGE     Medication List        START taking these medications        Instructions   carvedilol 3.125 MG Tabs  Commonly known as: COREG   Take 1 Tablet by mouth 2 times a day with meals.  Dose: 3.125 mg     gabapentin 300 MG Caps  Commonly known as: NEURONTIN  Replaces: gabapentin 800 MG tablet   Take 1 Capsule by mouth 2 times a day.  Dose: 300 mg     isosorbide mononitrate SR 30 MG Tb24  Start taking on: October 7, 2022  Commonly known as: IMDUR   Take 1 Tablet by mouth every day.  Dose: 30 mg     melatonin 5 mg Tabs   Take 1 Tablet by mouth every evening.  Dose: 5 mg     nitroglycerin 0.4 MG Subl  Commonly known as: NITROSTAT   Place 1 Tablet under the tongue as needed for Chest Pain (up to 3 doses (if SBP greater than 90 mmHg)).  Dose: 0.4 mg     risperidone 0.25 MG Tabs  Commonly known as: RISPERDAL   Take 1 Tablet by mouth 2 times a day.  Dose: 0.25 mg            CHANGE how you take these medications        Instructions   atorvastatin 80 MG tablet  What changed:   medication strength  how much to take  additional instructions  Commonly known as: LIPITOR   Take 1 Tablet by mouth every evening.  Dose: 80 mg     lisinopril 10 MG Tabs  What changed: when to take this  Commonly known as: PRINIVIL   Take 1 Tablet by mouth 2 times a day.  Dose: 10 mg            CONTINUE taking these medications        Instructions   albuterol 108 (90 Base) MCG/ACT Aers inhalation aerosol   Inhale 2 Puffs every 6 hours as needed for Shortness of Breath.  Dose: 2 Puff     aspirin 81 MG EC tablet   Take 1 Tablet by mouth every day. For heart health  Dose: 81 mg     clopidogrel 75 MG Tabs  Commonly known as: PLAVIX   Take 1 Tablet by mouth every day.  Dose: 75 mg     escitalopram 20 MG tablet  Commonly known as: LEXAPRO   Take 1 Tablet by mouth every day.  Dose:  20 mg     insulin detemir 100 UNIT/ML Soln  Commonly known as: Levemir   Inject 15-50 Units under the skin 2 times a day. 50 units every morning  15 units every night  Dose: 15-50 Units     insulin regular 100 Unit/mL Soln  Commonly known as: HumuLIN R   Inject 2-20 Units under the skin 4 Times a Day,Before Meals and at Bedtime. Pt unable to define Sliding scale  Dose: 2-20 Units     levothyroxine 100 MCG Tabs  Commonly known as: SYNTHROID   Take 1 Tablet by mouth every morning on an empty stomach.  Dose: 100 mcg     NIFEdipine SR 90 MG CR tablet  Commonly known as: PROCADIA-XL   Take 1 Tablet by mouth every day.  Dose: 90 mg     omeprazole 20 MG delayed-release capsule  Commonly known as: PRILOSEC   Take 20 mg by mouth every day.  Dose: 20 mg     vitamin D3 5000 Unit (125 mcg) Tabs  Commonly known as: cholecalciferol   Take 5,000 Units by mouth every day.  Dose: 5,000 Units            STOP taking these medications      gabapentin 800 MG tablet  Commonly known as: NEURONTIN  Replaced by: gabapentin 300 MG Caps              Allergies  No Known Allergies    DIET  Orders Placed This Encounter   Procedures    Diet Order Diet: Level 6 - Soft and Bite Sized; Liquid level: Level 0 - Thin; Second Modifier: (optional): Consistent CHO (Diabetic)     Standing Status:   Standing     Number of Occurrences:   1     Order Specific Question:   Diet:     Answer:   Level 6 - Soft and Bite Sized [23]     Order Specific Question:   Liquid level     Answer:   Level 0 - Thin     Order Specific Question:   Second Modifier: (optional)     Answer:   Consistent CHO (Diabetic) [4]    Discontinue Diet Tray     Standing Status:   Standing     Number of Occurrences:   1       ACTIVITY  As tolerated.  Weight bearing as tolerated    CONSULTATIONS  Cardiology    PROCEDURES  None    Discharge exam  Physical Exam  Constitutional:       General: He is not in acute distress.     Appearance: He is not ill-appearing, toxic-appearing or diaphoretic.    HENT:      Head: Normocephalic and atraumatic.      Nose: Nose normal.      Mouth/Throat:      Mouth: Mucous membranes are moist.   Eyes:      General: No scleral icterus.     Conjunctiva/sclera: Conjunctivae normal.   Cardiovascular:      Rate and Rhythm: Normal rate and regular rhythm.      Heart sounds: No murmur heard.    No friction rub. No gallop.   Pulmonary:      Effort: Pulmonary effort is normal.      Breath sounds: Normal breath sounds.   Abdominal:      General: Bowel sounds are normal. There is no distension.      Palpations: Abdomen is soft.      Tenderness: There is no abdominal tenderness.   Musculoskeletal:      Cervical back: Normal range of motion.      Right lower leg: No edema.      Left lower leg: No edema.   Skin:     Coloration: Skin is not jaundiced.      Findings: No rash.   Neurological:      Mental Status: He is alert. He is disoriented.   Psychiatric:         Attention and Perception: He does not perceive visual hallucinations.         Mood and Affect: Mood normal.         Behavior: Behavior normal.         LABORATORY  Lab Results   Component Value Date    SODIUM 141 10/06/2022    POTASSIUM 4.4 10/06/2022    CHLORIDE 108 10/06/2022    CO2 18 (L) 10/06/2022    GLUCOSE 158 (H) 10/06/2022    BUN 40 (H) 10/06/2022    CREATININE 1.75 (H) 10/06/2022        Lab Results   Component Value Date    WBC 8.0 10/06/2022    HEMOGLOBIN 11.8 (L) 10/06/2022    HEMATOCRIT 35.1 (L) 10/06/2022    PLATELETCT 333 10/06/2022      CT-HEAD W/O  Narrative: 10/5/2022 3:01 PM    HISTORY/REASON FOR EXAM:  Mental status change, unknown cause.    TECHNIQUE/EXAM DESCRIPTION AND NUMBER OF VIEWS:  CT of the head without contrast.    The study was performed on a helical multidetector CT scanner. Contiguous axial sections were obtained from the skull base through the vertex.    Up to date radiation dose reduction adjustments have been utilized to meet ALARA standards for radiation dose reduction.    COMPARISON:  None  available    FINDINGS:  Intracranial: Exam is degraded by motion and streak artifact, limiting evaluation of the posterior fossa. No intracranial hemorrhage or abnormal extra-axial collection. No evidence of acute territorial infarct. No mass effect. No hydrocephalus.    Generalized cerebral atrophy with ex vacuo dilatation of the ventricular system proportionate to sulci. Scattered foci of hypoattenuation within the periventricular and subcortical white matter are a nonspecific finding commonly attributed to chronic   small vessel ischemic disease.    Osseous structures: No fracture. No suspicious lesion.    Paranasal sinuses and mastoid air cells: No evidence of sinusitis. Mastoids are clear.    Orbital contents: Globes are intact.  Impression: Limited evaluation of the posterior fossa due to motion and streak artifact.  Within this limitation, no acute intracranial hemorrhage or CT evidence of territorial infarct.    If there is persistent clinical concern for intracranial pathology, consider repeating the exam when the patient can hold still.    Echo 10/2/22  CONCLUSIONS  Mild concentric left ventricular hypertrophy. Normal left ventricular   size and systolic function.  Mildly dilated right vetricle with preserved systolic function.  Mildly dilated left atrium.  No significant valvular pathology.  No pericardial effusion.  Total time of the discharge process exceeds 35 minutes.

## 2022-10-14 NOTE — DOCUMENTATION QUERY
Critical access hospital                                                                       Query Response Note      PATIENT:               EVAN JURADO  ACCT #:                  [id number]  MRN:                     [id number]  :                      1950  ADMIT DATE:       2022 9:10 PM  DISCH DATE:        10/6/2022 6:21 PM  RESPONDING  PROVIDER #:        [id number]           QUERY TEXT:    Based on your medical judgment, please clarify the diagnosis related to these findings such as (includes suspected or probable):    NOTE:  If the appropriate response is not listed below, please respond with a new note.    Thank you.    The patient's Clinical Indicators include:  10/5 Hospitalist: UTI. Switched to cefdinir on 10/4 however then patient developed new visual hallucinations.  10/5 Discharge summary: Delirium, visual hallucination when transition to cefdinir.    Risk factors: Cefdinir, UTI    Treatment: Medication adjustment, UTI    Thank you,  Jax Taveras  Clinical   Connect via [facility] Connect  Options provided:   -- Acute toxic encephalopathy   -- Acute toxic-metabolic encephalopathy}}  [[Acute metabolic encephalopathy   -- Other explanation, Please specify   -- Unable to determine      Query created by: Jax Taveras on 10/7/2022 1:11 PM    RESPONSE TEXT:    Acute toxic encephalopathy          Electronically signed by:  ZABRINA AGUIRRE MD 10/14/2022 6:22 AM

## 2022-11-25 ENCOUNTER — HOSPITAL ENCOUNTER (INPATIENT)
Facility: MEDICAL CENTER | Age: 72
LOS: 6 days | DRG: 281 | End: 2022-12-02
Attending: HOSPITALIST | Admitting: STUDENT IN AN ORGANIZED HEALTH CARE EDUCATION/TRAINING PROGRAM
Payer: MEDICARE

## 2022-11-25 DIAGNOSIS — E11.43 DIABETIC AUTONOMIC NEUROPATHY ASSOCIATED WITH TYPE 2 DIABETES MELLITUS (HCC): ICD-10-CM

## 2022-11-25 DIAGNOSIS — F32.A DEPRESSION, UNSPECIFIED DEPRESSION TYPE: ICD-10-CM

## 2022-11-25 DIAGNOSIS — E03.9 HYPOTHYROIDISM, UNSPECIFIED TYPE: ICD-10-CM

## 2022-11-25 DIAGNOSIS — J44.9 CHRONIC OBSTRUCTIVE PULMONARY DISEASE, UNSPECIFIED COPD TYPE (HCC): ICD-10-CM

## 2022-11-25 DIAGNOSIS — I16.1 HYPERTENSIVE EMERGENCY: ICD-10-CM

## 2022-11-25 DIAGNOSIS — N18.31 STAGE 3A CHRONIC KIDNEY DISEASE: ICD-10-CM

## 2022-11-25 DIAGNOSIS — R44.1 VISUAL HALLUCINATIONS: ICD-10-CM

## 2022-11-25 DIAGNOSIS — F03.90 DEMENTIA WITHOUT BEHAVIORAL DISTURBANCE, PSYCHOTIC DISTURBANCE, MOOD DISTURBANCE, OR ANXIETY, UNSPECIFIED DEMENTIA SEVERITY, UNSPECIFIED DEMENTIA TYPE (HCC): ICD-10-CM

## 2022-11-25 PROBLEM — Z79.899 MEDICATION DOSE CHANGED: Status: ACTIVE | Noted: 2022-11-25

## 2022-11-25 PROCEDURE — G0378 HOSPITAL OBSERVATION PER HR: HCPCS

## 2022-11-25 PROCEDURE — 99220 PR INITIAL OBSERVATION CARE,LEVL III: CPT | Mod: GC | Performed by: STUDENT IN AN ORGANIZED HEALTH CARE EDUCATION/TRAINING PROGRAM

## 2022-11-25 PROCEDURE — 93005 ELECTROCARDIOGRAM TRACING: CPT | Performed by: INTERNAL MEDICINE

## 2022-11-25 RX ORDER — BISACODYL 10 MG
10 SUPPOSITORY, RECTAL RECTAL
Status: DISCONTINUED | OUTPATIENT
Start: 2022-11-25 | End: 2022-12-02 | Stop reason: HOSPADM

## 2022-11-25 RX ORDER — ATORVASTATIN CALCIUM 40 MG/1
40 TABLET, FILM COATED ORAL EVERY EVENING
Status: DISCONTINUED | OUTPATIENT
Start: 2022-11-26 | End: 2022-12-02 | Stop reason: HOSPADM

## 2022-11-25 RX ORDER — CLOPIDOGREL BISULFATE 75 MG/1
75 TABLET ORAL DAILY
Status: DISCONTINUED | OUTPATIENT
Start: 2022-11-26 | End: 2022-11-26

## 2022-11-25 RX ORDER — AMOXICILLIN 250 MG
2 CAPSULE ORAL 2 TIMES DAILY
Status: DISCONTINUED | OUTPATIENT
Start: 2022-11-26 | End: 2022-12-02 | Stop reason: HOSPADM

## 2022-11-25 RX ORDER — LISINOPRIL 20 MG/1
40 TABLET ORAL
Status: DISCONTINUED | OUTPATIENT
Start: 2022-11-26 | End: 2022-11-25

## 2022-11-25 RX ORDER — SODIUM CHLORIDE 9 MG/ML
INJECTION, SOLUTION INTRAVENOUS CONTINUOUS
Status: ACTIVE | OUTPATIENT
Start: 2022-11-26 | End: 2022-11-26

## 2022-11-25 RX ORDER — ENOXAPARIN SODIUM 100 MG/ML
40 INJECTION SUBCUTANEOUS DAILY
Status: DISCONTINUED | OUTPATIENT
Start: 2022-11-26 | End: 2022-12-02 | Stop reason: HOSPADM

## 2022-11-25 RX ORDER — HYDRALAZINE HYDROCHLORIDE 20 MG/ML
10 INJECTION INTRAMUSCULAR; INTRAVENOUS EVERY 4 HOURS PRN
Status: DISCONTINUED | OUTPATIENT
Start: 2022-11-25 | End: 2022-11-26

## 2022-11-25 RX ORDER — LISINOPRIL 20 MG/1
40 TABLET ORAL
Status: DISCONTINUED | OUTPATIENT
Start: 2022-11-26 | End: 2022-11-26

## 2022-11-25 RX ORDER — NIFEDIPINE 30 MG/1
90 TABLET, EXTENDED RELEASE ORAL
Status: DISCONTINUED | OUTPATIENT
Start: 2022-11-26 | End: 2022-12-02 | Stop reason: HOSPADM

## 2022-11-25 RX ORDER — ACETAMINOPHEN 325 MG/1
650 TABLET ORAL EVERY 6 HOURS PRN
Status: DISCONTINUED | OUTPATIENT
Start: 2022-11-25 | End: 2022-11-27

## 2022-11-25 RX ORDER — INSULIN LISPRO 100 [IU]/ML
2-9 INJECTION, SOLUTION INTRAVENOUS; SUBCUTANEOUS
Status: DISCONTINUED | OUTPATIENT
Start: 2022-11-26 | End: 2022-12-02 | Stop reason: HOSPADM

## 2022-11-25 RX ORDER — ENOXAPARIN SODIUM 100 MG/ML
40 INJECTION SUBCUTANEOUS DAILY
Status: DISCONTINUED | OUTPATIENT
Start: 2022-11-26 | End: 2022-11-25

## 2022-11-25 RX ORDER — ATORVASTATIN CALCIUM 80 MG/1
80 TABLET, FILM COATED ORAL EVERY EVENING
Status: DISCONTINUED | OUTPATIENT
Start: 2022-11-26 | End: 2022-11-25

## 2022-11-25 RX ORDER — LEVOTHYROXINE SODIUM 0.1 MG/1
100 TABLET ORAL
Status: DISCONTINUED | OUTPATIENT
Start: 2022-11-26 | End: 2022-11-28

## 2022-11-25 RX ORDER — POLYETHYLENE GLYCOL 3350 17 G/17G
1 POWDER, FOR SOLUTION ORAL
Status: DISCONTINUED | OUTPATIENT
Start: 2022-11-25 | End: 2022-12-02 | Stop reason: HOSPADM

## 2022-11-25 RX ORDER — NITROGLYCERIN 0.4 MG/1
0.4 TABLET SUBLINGUAL
Status: DISCONTINUED | OUTPATIENT
Start: 2022-11-25 | End: 2022-12-02 | Stop reason: HOSPADM

## 2022-11-25 RX ORDER — OMEPRAZOLE 20 MG/1
40 CAPSULE, DELAYED RELEASE ORAL DAILY
Status: DISCONTINUED | OUTPATIENT
Start: 2022-11-26 | End: 2022-11-26

## 2022-11-25 RX ORDER — ESCITALOPRAM OXALATE 10 MG/1
20 TABLET ORAL DAILY
Status: DISCONTINUED | OUTPATIENT
Start: 2022-11-26 | End: 2022-12-02 | Stop reason: HOSPADM

## 2022-11-25 RX ORDER — HEPARIN SODIUM 5000 [USP'U]/ML
5000 INJECTION, SOLUTION INTRAVENOUS; SUBCUTANEOUS EVERY 8 HOURS
Status: DISCONTINUED | OUTPATIENT
Start: 2022-11-26 | End: 2022-11-25

## 2022-11-25 ASSESSMENT — ENCOUNTER SYMPTOMS
DIZZINESS: 0
SPUTUM PRODUCTION: 0
NAUSEA: 0
FEVER: 0
BLURRED VISION: 0
PALPITATIONS: 0
PHOTOPHOBIA: 0
CHILLS: 0
VOMITING: 0
COUGH: 0
HEMOPTYSIS: 0
ORTHOPNEA: 0
DOUBLE VISION: 0
MYALGIAS: 0
HEARTBURN: 0
WEIGHT LOSS: 0
HEADACHES: 1

## 2022-11-25 ASSESSMENT — FIBROSIS 4 INDEX: FIB4 SCORE: 1.53

## 2022-11-25 ASSESSMENT — LIFESTYLE VARIABLES: SUBSTANCE_ABUSE: 0

## 2022-11-25 NOTE — PROGRESS NOTES
[facility] HOSPITALIST TRIAGE OFFICER DIRECT ADMISSION REPORT  Transferring facility: [facility]  Transferring physician: Dr Eren Wagner    Chief complaint: chest pain    Pertinent history & patient course:   71 y/o M with PMHX CAD s/p PCI and CABG, DM, HTN, CKD stage 3, hypothyroidism here for chest pain with SOB. went to ER yesterday  and both tropes negative. today presented with worsening CP. trop negative, . CTA neg for big PE (per ERP). better after nitro and morphine. EKG yesterday NSR. EKG today NSR with frequetn PVCs, no ischemic changes.   VS: afebrile 95% RA, bradycardic, 150/65      Pertinent imaging & lab results: as gaston  Code Status: NA per transferring provider, I personally verified with the transferring provider patient's code status and the transferring provider has confirmed this with the patient.  Further work up or recommendations per triage officer prior to transfer: NA  Consultants called prior to transfer and pertinent input from consultants: none  Patient accepted for transfer: Yes  Consultants to be called upon arrival: none  Admission status: Observation.   Floor requested: tele  If ICU transfer, name of intensivist case discussed with and pertinent input from critical care: NA    Please inform the triage officer upon arrival of the patient to [facility] for assignment of a hospitalist to perform admission.     For any question or concerns regarding the care of this patient, please reach out to the assigned hospitalist.

## 2022-11-26 ENCOUNTER — APPOINTMENT (OUTPATIENT)
Dept: RADIOLOGY | Facility: MEDICAL CENTER | Age: 72
DRG: 281 | End: 2022-11-26
Attending: STUDENT IN AN ORGANIZED HEALTH CARE EDUCATION/TRAINING PROGRAM
Payer: MEDICARE

## 2022-11-26 PROBLEM — I16.1 HYPERTENSIVE EMERGENCY: Status: ACTIVE | Noted: 2022-11-26

## 2022-11-26 PROBLEM — R07.9 CHEST PAIN: Status: ACTIVE | Noted: 2022-11-26

## 2022-11-26 LAB
ANION GAP SERPL CALC-SCNC: 12 MMOL/L (ref 7–16)
BUN SERPL-MCNC: 32 MG/DL (ref 8–22)
CALCIUM SERPL-MCNC: 9.5 MG/DL (ref 8.5–10.5)
CHLORIDE SERPL-SCNC: 109 MMOL/L (ref 96–112)
CO2 SERPL-SCNC: 18 MMOL/L (ref 20–33)
CREAT SERPL-MCNC: 1.38 MG/DL (ref 0.5–1.4)
EKG IMPRESSION: NORMAL
EKG IMPRESSION: NORMAL
ERYTHROCYTE [DISTWIDTH] IN BLOOD BY AUTOMATED COUNT: 48.4 FL (ref 35.9–50)
GFR SERPLBLD CREATININE-BSD FMLA CKD-EPI: 54 ML/MIN/1.73 M 2
GLUCOSE SERPL-MCNC: 135 MG/DL (ref 65–99)
HCT VFR BLD AUTO: 37.2 % (ref 42–52)
HGB BLD-MCNC: 12.7 G/DL (ref 14–18)
MCH RBC QN AUTO: 30.8 PG (ref 27–33)
MCHC RBC AUTO-ENTMCNC: 34.1 G/DL (ref 33.7–35.3)
MCV RBC AUTO: 90.1 FL (ref 81.4–97.8)
PLATELET # BLD AUTO: 259 K/UL (ref 164–446)
PMV BLD AUTO: 11.4 FL (ref 9–12.9)
POTASSIUM SERPL-SCNC: 5 MMOL/L (ref 3.6–5.5)
RBC # BLD AUTO: 4.13 M/UL (ref 4.7–6.1)
SODIUM SERPL-SCNC: 139 MMOL/L (ref 135–145)
TROPONIN T SERPL-MCNC: 42 NG/L (ref 6–19)
TROPONIN T SERPL-MCNC: 43 NG/L (ref 6–19)
TROPONIN T SERPL-MCNC: 46 NG/L (ref 6–19)
TROPONIN T SERPL-MCNC: 47 NG/L (ref 6–19)
WBC # BLD AUTO: 9 K/UL (ref 4.8–10.8)

## 2022-11-26 PROCEDURE — 700111 HCHG RX REV CODE 636 W/ 250 OVERRIDE (IP): Performed by: STUDENT IN AN ORGANIZED HEALTH CARE EDUCATION/TRAINING PROGRAM

## 2022-11-26 PROCEDURE — A9270 NON-COVERED ITEM OR SERVICE: HCPCS | Performed by: INTERNAL MEDICINE

## 2022-11-26 PROCEDURE — 82962 GLUCOSE BLOOD TEST: CPT

## 2022-11-26 PROCEDURE — 700111 HCHG RX REV CODE 636 W/ 250 OVERRIDE (IP)

## 2022-11-26 PROCEDURE — 85027 COMPLETE CBC AUTOMATED: CPT

## 2022-11-26 PROCEDURE — 93010 ELECTROCARDIOGRAM REPORT: CPT | Performed by: INTERNAL MEDICINE

## 2022-11-26 PROCEDURE — A9270 NON-COVERED ITEM OR SERVICE: HCPCS | Performed by: STUDENT IN AN ORGANIZED HEALTH CARE EDUCATION/TRAINING PROGRAM

## 2022-11-26 PROCEDURE — 96374 THER/PROPH/DIAG INJ IV PUSH: CPT

## 2022-11-26 PROCEDURE — 96372 THER/PROPH/DIAG INJ SC/IM: CPT

## 2022-11-26 PROCEDURE — 700105 HCHG RX REV CODE 258: Performed by: INTERNAL MEDICINE

## 2022-11-26 PROCEDURE — 700102 HCHG RX REV CODE 250 W/ 637 OVERRIDE(OP): Performed by: INTERNAL MEDICINE

## 2022-11-26 PROCEDURE — 700111 HCHG RX REV CODE 636 W/ 250 OVERRIDE (IP): Performed by: INTERNAL MEDICINE

## 2022-11-26 PROCEDURE — 36415 COLL VENOUS BLD VENIPUNCTURE: CPT

## 2022-11-26 PROCEDURE — 99233 SBSQ HOSP IP/OBS HIGH 50: CPT | Performed by: STUDENT IN AN ORGANIZED HEALTH CARE EDUCATION/TRAINING PROGRAM

## 2022-11-26 PROCEDURE — 84484 ASSAY OF TROPONIN QUANT: CPT | Mod: 91

## 2022-11-26 PROCEDURE — 96376 TX/PRO/DX INJ SAME DRUG ADON: CPT

## 2022-11-26 PROCEDURE — 76775 US EXAM ABDO BACK WALL LIM: CPT

## 2022-11-26 PROCEDURE — 93005 ELECTROCARDIOGRAM TRACING: CPT | Performed by: STUDENT IN AN ORGANIZED HEALTH CARE EDUCATION/TRAINING PROGRAM

## 2022-11-26 PROCEDURE — 770020 HCHG ROOM/CARE - TELE (206)

## 2022-11-26 PROCEDURE — 80048 BASIC METABOLIC PNL TOTAL CA: CPT

## 2022-11-26 PROCEDURE — 71045 X-RAY EXAM CHEST 1 VIEW: CPT

## 2022-11-26 PROCEDURE — 700102 HCHG RX REV CODE 250 W/ 637 OVERRIDE(OP): Performed by: STUDENT IN AN ORGANIZED HEALTH CARE EDUCATION/TRAINING PROGRAM

## 2022-11-26 RX ORDER — CLOPIDOGREL BISULFATE 75 MG/1
75 TABLET ORAL DAILY
Status: DISCONTINUED | OUTPATIENT
Start: 2022-11-27 | End: 2022-12-02 | Stop reason: HOSPADM

## 2022-11-26 RX ORDER — HYDRALAZINE HYDROCHLORIDE 20 MG/ML
20 INJECTION INTRAMUSCULAR; INTRAVENOUS EVERY 4 HOURS PRN
Status: DISCONTINUED | OUTPATIENT
Start: 2022-11-26 | End: 2022-12-02 | Stop reason: HOSPADM

## 2022-11-26 RX ORDER — MORPHINE SULFATE 4 MG/ML
2 INJECTION INTRAVENOUS EVERY 8 HOURS PRN
Status: DISCONTINUED | OUTPATIENT
Start: 2022-11-26 | End: 2022-11-26

## 2022-11-26 RX ORDER — CAPTOPRIL 12.5 MG/1
50 TABLET ORAL EVERY 8 HOURS
Status: DISCONTINUED | OUTPATIENT
Start: 2022-11-26 | End: 2022-11-27

## 2022-11-26 RX ORDER — CARVEDILOL 3.12 MG/1
3.12 TABLET ORAL 2 TIMES DAILY WITH MEALS
Status: DISCONTINUED | OUTPATIENT
Start: 2022-11-26 | End: 2022-12-02 | Stop reason: HOSPADM

## 2022-11-26 RX ORDER — ISOSORBIDE MONONITRATE 30 MG/1
30 TABLET, EXTENDED RELEASE ORAL DAILY
Status: DISCONTINUED | OUTPATIENT
Start: 2022-11-26 | End: 2022-11-27

## 2022-11-26 RX ORDER — OMEPRAZOLE 20 MG/1
40 CAPSULE, DELAYED RELEASE ORAL 2 TIMES DAILY
Status: DISCONTINUED | OUTPATIENT
Start: 2022-11-26 | End: 2022-12-02 | Stop reason: HOSPADM

## 2022-11-26 RX ORDER — GABAPENTIN 300 MG/1
300 CAPSULE ORAL 2 TIMES DAILY
Status: DISCONTINUED | OUTPATIENT
Start: 2022-11-26 | End: 2022-12-02 | Stop reason: HOSPADM

## 2022-11-26 RX ORDER — RISPERIDONE 0.5 MG/1
0.25 TABLET ORAL 2 TIMES DAILY
Status: DISCONTINUED | OUTPATIENT
Start: 2022-11-26 | End: 2022-12-02 | Stop reason: HOSPADM

## 2022-11-26 RX ORDER — MORPHINE SULFATE 4 MG/ML
2 INJECTION INTRAVENOUS EVERY 4 HOURS PRN
Status: DISCONTINUED | OUTPATIENT
Start: 2022-11-26 | End: 2022-11-27

## 2022-11-26 RX ORDER — MORPHINE SULFATE 4 MG/ML
INJECTION INTRAVENOUS
Status: COMPLETED
Start: 2022-11-26 | End: 2022-11-26

## 2022-11-26 RX ADMIN — CLOPIDOGREL BISULFATE 75 MG: 75 TABLET ORAL at 05:53

## 2022-11-26 RX ADMIN — CARVEDILOL 3.12 MG: 3.12 TABLET, FILM COATED ORAL at 18:33

## 2022-11-26 RX ADMIN — NITROGLYCERIN 0.4 MG: 0.4 TABLET, ORALLY DISINTEGRATING SUBLINGUAL at 17:11

## 2022-11-26 RX ADMIN — ASPIRIN 81 MG: 81 TABLET, COATED ORAL at 05:53

## 2022-11-26 RX ADMIN — MORPHINE SULFATE 2 MG: 4 INJECTION, SOLUTION INTRAMUSCULAR; INTRAVENOUS at 17:48

## 2022-11-26 RX ADMIN — MORPHINE SULFATE 2 MG: 4 INJECTION, SOLUTION INTRAMUSCULAR; INTRAVENOUS at 21:54

## 2022-11-26 RX ADMIN — DOCUSATE SODIUM 50 MG AND SENNOSIDES 8.6 MG 2 TABLET: 8.6; 5 TABLET, FILM COATED ORAL at 18:33

## 2022-11-26 RX ADMIN — SODIUM CHLORIDE: 9 INJECTION, SOLUTION INTRAVENOUS at 01:14

## 2022-11-26 RX ADMIN — LEVOTHYROXINE SODIUM 100 MCG: 0.1 TABLET ORAL at 05:53

## 2022-11-26 RX ADMIN — NITROGLYCERIN 0.4 MG: 0.4 TABLET, ORALLY DISINTEGRATING SUBLINGUAL at 15:16

## 2022-11-26 RX ADMIN — ENOXAPARIN SODIUM 40 MG: 40 INJECTION SUBCUTANEOUS at 18:34

## 2022-11-26 RX ADMIN — RISPERIDONE 0.25 MG: 0.5 TABLET ORAL at 18:32

## 2022-11-26 RX ADMIN — ESCITALOPRAM OXALATE 20 MG: 10 TABLET ORAL at 05:53

## 2022-11-26 RX ADMIN — LISINOPRIL 40 MG: 20 TABLET ORAL at 01:03

## 2022-11-26 RX ADMIN — NIFEDIPINE 90 MG: 30 TABLET, FILM COATED, EXTENDED RELEASE ORAL at 05:56

## 2022-11-26 RX ADMIN — HYDRALAZINE HYDROCHLORIDE 10 MG: 20 INJECTION INTRAMUSCULAR; INTRAVENOUS at 09:54

## 2022-11-26 RX ADMIN — GABAPENTIN 300 MG: 300 CAPSULE ORAL at 18:33

## 2022-11-26 RX ADMIN — INSULIN GLARGINE-YFGN 15 UNITS: 100 INJECTION, SOLUTION SUBCUTANEOUS at 18:42

## 2022-11-26 RX ADMIN — OMEPRAZOLE 40 MG: 20 CAPSULE, DELAYED RELEASE ORAL at 05:53

## 2022-11-26 RX ADMIN — ATORVASTATIN CALCIUM 40 MG: 40 TABLET, FILM COATED ORAL at 18:33

## 2022-11-26 RX ADMIN — DOCUSATE SODIUM 50 MG AND SENNOSIDES 8.6 MG 2 TABLET: 8.6; 5 TABLET, FILM COATED ORAL at 05:53

## 2022-11-26 RX ADMIN — INSULIN LISPRO 6 UNITS: 100 INJECTION, SOLUTION INTRAVENOUS; SUBCUTANEOUS at 13:33

## 2022-11-26 RX ADMIN — MORPHINE SULFATE 2 MG: 4 INJECTION, SOLUTION INTRAMUSCULAR; INTRAVENOUS at 15:39

## 2022-11-26 RX ADMIN — CAPTOPRIL 50 MG: 12.5 TABLET ORAL at 12:44

## 2022-11-26 RX ADMIN — HYDRALAZINE HYDROCHLORIDE 20 MG: 20 INJECTION INTRAMUSCULAR; INTRAVENOUS at 14:15

## 2022-11-26 RX ADMIN — NITROGLYCERIN 0.4 MG: 0.4 TABLET, ORALLY DISINTEGRATING SUBLINGUAL at 15:08

## 2022-11-26 RX ADMIN — OMEPRAZOLE 40 MG: 20 CAPSULE, DELAYED RELEASE ORAL at 18:34

## 2022-11-26 RX ADMIN — CAPTOPRIL 50 MG: 12.5 TABLET ORAL at 21:43

## 2022-11-26 RX ADMIN — ISOSORBIDE MONONITRATE 30 MG: 30 TABLET, EXTENDED RELEASE ORAL at 14:15

## 2022-11-26 ASSESSMENT — ENCOUNTER SYMPTOMS
GASTROINTESTINAL NEGATIVE: 1
PSYCHIATRIC NEGATIVE: 1
MUSCULOSKELETAL NEGATIVE: 1
NEUROLOGICAL NEGATIVE: 1
CONSTITUTIONAL NEGATIVE: 1
EYES NEGATIVE: 1
RESPIRATORY NEGATIVE: 1

## 2022-11-26 ASSESSMENT — PAIN DESCRIPTION - PAIN TYPE
TYPE: ACUTE PAIN
TYPE: ACUTE PAIN

## 2022-11-26 NOTE — PROGRESS NOTES
Patient a direct admit from [facility] in [place]. Patient arrives on stretcher alert and orient x3 able to voice needs, has dementia with some noted forgetfulness. Patient Lung sounds diminished, HR is irregular, cont of bowel and bladder with periods of incontinence. Patient able to self transfer and ambulate to the restroom with supervision. Move all extremities with no noted difficulties at this time. Direct admit physician notified of patient's arrival. Call light in reach.

## 2022-11-26 NOTE — PROGRESS NOTES
Hospital Medicine Daily Progress Note    Date of Service  11/26/2022    Chief Complaint  Chest pain    Hospital Course  72 y.o. male who presented 11/25/2022 with past medical history of CAD s/p CABG in 1990s, PCI with stenting, IDDM 2, hypothyroidism on Synthyroid, hypertension, CKD stage III who was transferred from [facility].   Patient was initially transferred from outside hospital 9/22 for higher troponins, at the time cardiology was consulted who recommended against left heart catheterization.  Patient describes that since being discharged from the hospital he has experienced off-and-on chest pain.  The pain usually starts from the lower chin and goes all the way To the middle of his chest. It is usually burning in nature and at times he experiences chest tightness as well.  Does not associate exertion to the onset of his chest pain, as the chest pain occasionally occurs when he is sitting watching TV as well.  Is compliant to his medications, describes he is handed over his medications at the facility he stays active.  Denies radiation to the shoulder or back.  Denies improvement of chest pain with change of position.  The chest pain gets better with nitroglycerin.  Denies any diaphoresis.  Patient describes that the chest pain is usually associated with headaches as well, usually around his forehead.  His blood pressure is regularly monitored at the facility, he describes it is usually high however he does not remember the exact numbers.  Does confirm that it is higher than 140-150s.  Also describes that his blood pressure is high when he is having the chest pain.  Of note patient's lisinopril was recently increased to 40 mg daily    Interval Problem Update  Patient was evaluated bedside  Patient awake and up to chair  Still having some substernal chest pain that radiates to the neck  Vital signs with hypertensive emergency with SBP up to the 210s  Troponin is in the 50s, trending  CBC with  stable anemia  Chemistry with non-anion gap metabolic acidosis with bicarbonate 18 and improving renal function.   Restart home Imdur  Adjust antihypertensives  Trend troponin  Pending renal ultrasound  Labs on a.m.    Patient has POLST form reporting comfort care measures only but patient wanting to seek medical management.  He does have history of dementia but is able to make his own medical decisions and understands risk and benefits.  Attempted to call daughter power of  but nobody answered.  Continue medical management for now and clarify later.    Spoke to daughter who reports that patient has significant bladder history and has a bladder sphincter implant which was placed in [place].  Also daughter reporting that he is DNI/DNR but she did not understand the comfort measures only on a POLST means no medical management.  Patient will need to have new POLST done  Consult palliative care    I have discussed this patient's plan of care and discharge plan at IDT rounds today with Case Management, Nursing, Nursing leadership, and other members of the IDT team.    Consultants/Specialty  None    Code Status  DNAR/DNI    Disposition  Patient is not medically cleared for discharge.   Anticipate discharge to to home with close outpatient follow-up.  I have placed the appropriate orders for post-discharge needs.    Review of Systems  Review of Systems   Constitutional: Negative.    HENT: Negative.     Eyes: Negative.    Respiratory: Negative.     Cardiovascular:  Positive for chest pain.   Gastrointestinal: Negative.    Genitourinary: Negative.    Musculoskeletal: Negative.    Skin: Negative.    Neurological: Negative.    Endo/Heme/Allergies: Negative.    Psychiatric/Behavioral: Negative.        Physical Exam  Temp:  [36.7 °C (98.1 °F)-37.1 °C (98.8 °F)] 36.8 °C (98.2 °F)  Pulse:  [51-67] 60  Resp:  [16-17] 16  BP: (157-217)/(68-86) 217/82  SpO2:  [92 %-97 %] 97 %    Physical Exam  Constitutional:       General: He is  not in acute distress.     Appearance: Normal appearance.   HENT:      Head: Normocephalic and atraumatic.      Nose: Nose normal. No congestion.      Mouth/Throat:      Mouth: Mucous membranes are moist.   Eyes:      Extraocular Movements: Extraocular movements intact.      Pupils: Pupils are equal, round, and reactive to light.   Cardiovascular:      Rate and Rhythm: Normal rate and regular rhythm.      Pulses: Normal pulses.      Heart sounds: Normal heart sounds.   Pulmonary:      Effort: Pulmonary effort is normal.      Breath sounds: Normal breath sounds.   Abdominal:      General: Bowel sounds are normal.      Palpations: Abdomen is soft.      Tenderness: There is no abdominal tenderness.   Musculoskeletal:         General: No swelling. Normal range of motion.      Cervical back: Normal range of motion and neck supple.   Skin:     General: Skin is warm.      Coloration: Skin is not jaundiced.   Neurological:      General: No focal deficit present.      Mental Status: He is alert and oriented to person, place, and time. Mental status is at baseline.      Cranial Nerves: No cranial nerve deficit.   Psychiatric:         Mood and Affect: Mood normal.         Behavior: Behavior normal.         Thought Content: Thought content normal.         Judgment: Judgment normal.       Fluids    Intake/Output Summary (Last 24 hours) at 11/26/2022 1440  Last data filed at 11/26/2022 1000  Gross per 24 hour   Intake 480 ml   Output --   Net 480 ml       Laboratory  Recent Labs     11/26/22  0802   WBC 9.0   RBC 4.13*   HEMOGLOBIN 12.7*   HEMATOCRIT 37.2*   MCV 90.1   MCH 30.8   MCHC 34.1   RDW 48.4   PLATELETCT 259   MPV 11.4     Recent Labs     11/26/22  0802   SODIUM 139   POTASSIUM 5.0   CHLORIDE 109   CO2 18*   GLUCOSE 135*   BUN 32*   CREATININE 1.38   CALCIUM 9.5                   Imaging  DX-CHEST-LIMITED (1 VIEW)   Final Result         No acute cardiopulmonary abnormalities are identified.      US-RENAL    (Results  Pending)        Assessment/Plan  CKD (chronic kidney disease) stage 3, GFR 30-59 ml/min ([facility])- (present on admission)  Assessment & Plan  Creatinine trending down slowly  Avoid nephrotoxin  Renal dose meds  Labs on a.m.    Hypertensive emergency  Assessment & Plan  SBP in the 210s  Patient reporting chest pain and troponin slightly elevated  Had MILTON but kidney function improving      Hypothyroidism- (present on admission)  Assessment & Plan  Continue home Synthroid    Coronary artery disease- (present on admission)  Assessment & Plan  Patient transferred from the outside facility for concerns of ACS rule out.  His troponins remained negative at the outside facility, ->> increased to 300 the next day.  EKG with sinus rhythm, incomplete right bundle branch block  Differentials include Prinzmetal's angina vs GERD vs demand ischemia   Likely NSTEMI type II/demand ischemia secondary to uncontrolled hypertension as he confirms his pressures are usually greater than 140s-150s systolic at his facility.  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 with nifedipine  Already on omeprazole for GERD     -Monitor on telemetry  -We will repeat troponins and EKG x 1   -Resume aspirin and Plavix (of note cardiology recommended continuing aspirin and Plavix for 12 months, note 9/22)  -Resume home lisinopril and nifedipine  -Continue home PPI   -As needed nitroglycerin for chest pain  -Uptitrate antihypertensives as patient tolerates    HTN (hypertension)- (present on admission)  Assessment & Plan  Continue home meds  Adjust meds daily  As needed antihypertensives    HLD (hyperlipidemia)- (present on admission)  Assessment & Plan  Continue home statin    Insulin dependent type 2 diabetes mellitus (HCC)- (present on admission)  Assessment & Plan  Diabetic diet  Insulin sliding scale  Labs on a.m.       VTE prophylaxis: enoxaparin  ppx    I have performed a physical exam and reviewed and updated ROS and Plan today (11/26/2022). In review of yesterday's note (11/25/2022), there are no changes except as documented above.

## 2022-11-26 NOTE — H&P
[facility] History & Physical Note    Date of Service  11/25/2022    [facility] Team: [facility]   Attending: Rufina Quezada M.d.  Senior Resident: Dr. Griggs   Contact Number: [phone]    Primary Care Physician  Suhail Aguirre M.D.    Consultants  None     Specialist Names: N/A    Code Status  DNAR/DNI    Chief Complaint  No chief complaint on file.      History of Presenting Illness (HPI):   Sulaiman Ceja is a 72 y.o. male who presented 11/25/2022 with past medical history of CAD s/p CABG in 1990s, PCI with stenting, IDDM 2, hypothyroidism on Synthyroid, hypertension, CKD stage III who was transferred from [facility].   Patient was initially transferred from outside hospital 9/22 for higher troponins, at the time cardiology was consulted who recommended against left heart catheterization.  Patient describes that since being discharged from the hospital he has experienced off-and-on chest pain.  The pain usually starts from the lower chin and goes all the way To the middle of his chest. It is usually burning in nature and at times he experiences chest tightness as well.  Does not associate exertion to the onset of his chest pain, as the chest pain occasionally occurs when he is sitting watching TV as well.  Is compliant to his medications, describes he is handed over his medications at the facility he stays active.  Denies radiation to the shoulder or back.  Denies improvement of chest pain with change of position.  The chest pain gets better with nitroglycerin.  Denies any diaphoresis.  Patient describes that the chest pain is usually associated with headaches as well, usually around his forehead.  His blood pressure is regularly monitored at the facility, he describes it is usually high however he does not remember the exact numbers.  Does confirm that it is higher than 140-150s.  Also describes that his blood pressure is high when he is having the chest pain.  Of note patient's lisinopril was  recently increased to 40 mg daily    I discussed the plan of care with patient, bedside RN, and my attending who aggress with the plan of care .    Review of Systems  Review of Systems   Constitutional:  Negative for chills, fever and weight loss.   HENT:  Negative for hearing loss and tinnitus.    Eyes:  Negative for blurred vision, double vision and photophobia.   Respiratory:  Negative for cough, hemoptysis and sputum production.    Cardiovascular:  Positive for chest pain. Negative for palpitations, orthopnea and leg swelling.   Gastrointestinal:  Negative for heartburn, nausea and vomiting.   Genitourinary:  Negative for dysuria and urgency.   Musculoskeletal:  Negative for myalgias.   Skin:  Negative for itching and rash.   Neurological:  Positive for headaches. Negative for dizziness.   Psychiatric/Behavioral:  Negative for substance abuse.      Past Medical History   has a past medical history of Anginal syndrome (HCC), CAD (coronary artery disease), Cataract, Diabetes (HCC), Hypertension, and Renal disorder.    Surgical History   has no past surgical history on file.     Family History  family history includes No Known Problems in his father and mother.   Family history reviewed with patient.     Social History  Tobacco: 30-pack-year smoking history, quit 45 years ago  Alcohol: Denies  Recreational drugs (illegal or prescription): Denies  Employment: Unemployed  Living Situation: Assisted living  Recent Travel: Denies   Primary Care Provider: Reviewed    Other (stressors, spirituality, exposures): No recent stressors, no recent travel    Allergies  Allergies   Allergen Reactions    Cefdinir Unspecified     Patient does not remember       Medications  Prior to Admission Medications   Prescriptions Last Dose Informant Patient Reported? Taking?   NIFEdipine SR (PROCADIA-XL) 90 MG CR tablet   No No   Sig: Take 1 Tablet by mouth every day.   Vitamin D3 5000 Unit (125 mcg) Tab  Family Member Yes No   Sig: Take  5,000 Units by mouth every day.   albuterol 108 (90 Base) MCG/ACT Aero Soln inhalation aerosol   No No   Sig: Inhale 2 Puffs every 6 hours as needed for Shortness of Breath.   aspirin 81 MG EC tablet   No No   Sig: Take 1 Tablet by mouth every day. For heart health   atorvastatin (LIPITOR) 80 MG tablet   No No   Sig: Take 1 Tablet by mouth every evening.   carvedilol (COREG) 3.125 MG Tab   No No   Sig: Take 1 Tablet by mouth 2 times a day with meals.   clopidogrel (PLAVIX) 75 MG Tab   No No   Sig: Take 1 Tablet by mouth every day.   escitalopram (LEXAPRO) 20 MG tablet   No No   Sig: Take 1 Tablet by mouth every day.   gabapentin (NEURONTIN) 300 MG Cap   No No   Sig: Take 1 Capsule by mouth 2 times a day.   insulin detemir (LEVEMIR) 100 UNIT/ML Solution  Family Member Yes No   Sig: Inject 15-50 Units under the skin 2 times a day. 50 units every morning  15 units every night   insulin regular (HUMULIN R) 100 Unit/mL Solution  Family Member Yes No   Sig: Inject 2-20 Units under the skin 4 Times a Day,Before Meals and at Bedtime. Pt unable to define Sliding scale   isosorbide mononitrate SR (IMDUR) 30 MG TABLET SR 24 HR   No No   Sig: Take 1 Tablet by mouth every day.   levothyroxine (SYNTHROID) 100 MCG Tab   No No   Sig: Take 1 Tablet by mouth every morning on an empty stomach.   lisinopril (PRINIVIL) 10 MG Tab   No No   Sig: Take 1 Tablet by mouth 2 times a day.   melatonin 5 mg Tab   No No   Sig: Take 1 Tablet by mouth every evening.   nitroglycerin (NITROSTAT) 0.4 MG SL Tab   No No   Sig: Place 1 Tablet under the tongue as needed for Chest Pain (up to 3 doses (if SBP greater than 90 mmHg)).   omeprazole (PRILOSEC) 20 MG delayed-release capsule  Family Member Yes No   Sig: Take 20 mg by mouth every day.   risperidone (RISPERDAL) 0.25 MG Tab   No No   Sig: Take 1 Tablet by mouth 2 times a day.      Facility-Administered Medications: None       Physical Exam  Temp:  [37.1 °C (98.8 °F)] 37.1 °C (98.8 °F)  Pulse:   [55-57] 57  Resp:  [17] 17  BP: (157-171)/(68-74) 171/74  SpO2:  [92 %] 92 %  Blood Pressure : (!) 157/68   Temperature: 37.1 °C (98.8 °F)   Pulse: (!) 55   Respiration: 17   Pulse Oximetry: 92 %       Physical Exam  Constitutional:       General: He is not in acute distress.     Appearance: He is not ill-appearing.   HENT:      Head: Normocephalic and atraumatic.      Right Ear: External ear normal.      Left Ear: External ear normal.      Nose: Nose normal.      Mouth/Throat:      Mouth: Mucous membranes are moist.   Eyes:      Extraocular Movements: Extraocular movements intact.      Pupils: Pupils are equal, round, and reactive to light.   Cardiovascular:      Rate and Rhythm: Regular rhythm. Bradycardia present.   Pulmonary:      Effort: Pulmonary effort is normal.      Breath sounds: Normal breath sounds.      Comments: Crackles appreciated Right upper lobe   Abdominal:      General: Abdomen is flat. Bowel sounds are normal. There is no distension.      Palpations: Abdomen is soft.      Tenderness: There is no abdominal tenderness.   Musculoskeletal:      Right lower leg: No edema.      Left lower leg: No edema.   Skin:     General: Skin is warm.      Capillary Refill: Capillary refill takes less than 2 seconds.   Neurological:      Mental Status: He is alert and oriented to person, place, and time.   Psychiatric:         Mood and Affect: Mood normal.       Laboratory:          No results for input(s): ALTSGPT, ASTSGOT, ALKPHOSPHAT, TBILIRUBIN, DBILIRUBIN, GAMMAGT, AMYLASE, LIPASE, ALB, PREALBUMIN, GLUCOSE in the last 72 hours.      No results for input(s): NTPROBNP in the last 72 hours.      No results for input(s): TROPONINT in the last 72 hours.    Imaging:  No orders to display       X-Ray:  I have personally reviewed the images and compared with prior images.  EKG:  I have personally reviewed the images and compared with prior images.      Labs 10/25   Troponin <0.05,   Pro time 9.7, INR 0.99, PTT  23.2  Lactate 1.33  Sodium 139, potassium 5.  2, chloride 107, bicarb 20, BUN 35 and creatinine 1.3 seems around baseline  COVID-negative, influenza AMB negative    CT abdomen and pelvis without contrast 11/18/2022  -Moderate bilateral hydronephrosis.  No obstructing calculus.  Bladder is minimally distended.  Differential includes infection versus obstruction near the UVJ's.  2 mm nonobstructive left renal calculus  Borderline bladder wall thickening.  Correlate with urinalysis for cystitis  Large amount of colonic stool, suggesting constipation  Stable 3 mm left lower lobe pulmonary nodule    Medication list from his facility  Lisinopril 40 mg once daily  Melatonin 10 mg at bedtime  Nifedipine 90 mg daily  Novolin sliding scale insulin  Omeprazole 40 mg  Plavix 75 mg  Synthyroid 100 mg  Aspirin 81 mg daily  Escitalopram 20 mg daily  Gabapentin 800 mg 1 tablet with meals  Insulin detemir, Levemir  Vitamin D 3 5000 MCG      Per documentation received from prior facility, patient has an upcoming appointment with cardiology as well as urology.      CT PE was ordered at the outside facility that per review of providers note was negative for large PE, however formal read is not available on the documentation sent.  Imaging has been requested from the prior facility and is pending at this time    Assessment/Plan:    Problem Representation:     Sulaiman Ceja is a 72 y.o. male who presented 11/25/2022 with past medical history of CAD s/p CABG in 1990s, PCI with stenting, IDDM 2, hypothyroidism on Synthyroid, hypertension, CKD stage III who was transferred from [facility] for intermittent chest pain likely secondary to demand ischemia in the setting of uncontrolled hypertension    I anticipate this patient is appropriate for observation status at this time because does not seem to be in ACS    Patient will need a Telemetry bed on CARDIOLOGY service .  The need is secondary to cardiac disease.    * Coronary  artery disease- (present on admission)  Assessment & Plan  ACS rule out    Patient transferred from the outside facility for concerns of ACS rule out.  His troponins remained negative at the outside facility, ->> increased to 300 the next day.  EKG with sinus rhythm, incomplete right bundle branch block    Differentials include Prinzmetal's angina vs GERD vs demand ischemia     Likely NSTEMI type II/demand ischemia secondary to uncontrolled hypertension as he confirms his pressures are usually greater than 140s-150s systolic at his facility.  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 with nifedipine  Already on omeprazole for GERD      -Monitor on telemetry  -We will repeat troponins and EKG x 1   -Resume aspirin and Plavix (of note cardiology recommended continuing aspirin and Plavix for 12 months, note 9/22)  -Resume home lisinopril and nifedipine  -Continue home PPI   -As needed nitroglycerin for chest pain  -Uptitrate antihypertensives as patient tolerates    Hypothyroidism- (present on admission)  Assessment & Plan  Resume home synthyroid     HTN (hypertension)- (present on admission)  Assessment & Plan  Resume home lisinopril  PRN anti-hypertensives ordered (hydralazine)    HLD (hyperlipidemia)- (present on admission)  Assessment & Plan  Continue home atorvastatin    Insulin dependent type 2 diabetes mellitus (HCC)- (present on admission)  Assessment & Plan  Initiate sliding scale insulin, glucose Accu-Cheks and hypoglycemia protocol    CKD (chronic kidney disease) stage 3, GFR 30-59 ml/min ([facility])- (present on admission)   Assessment & Plan  Patient at baseline  Avoid nephrotoxins and NSAIDs    B/L hydronephrosis   Assessment & Plan        Denies any urinary complaints at this time.         Follow up with urology outpatient     Mild hyperkalemia   Assessment & Plan         K 5.2 on labs from this  morning at the OSH.          Patient currently on lisinopril, dose recently increased         Monitor K levels, if persistently high could likely be 2/2 increased dose of         ACE -, will lower dose and add a different anti-HTN for BP control          Monitor on tele     VTE prophylaxis: enoxaparin ppx and heparin ppx

## 2022-11-26 NOTE — PROGRESS NOTES
4 Eyes Skin Assessment Completed by Pilar ALVARADO RN and LORI Mckeon.    Head WDL  Ears WDL  Nose WDL  Mouth WDL  Neck WDL  Breast/Chest WDL  Shoulder Blades WDL  Spine WDL  (R) Arm/Elbow/Hand WDL  (L) Arm/Elbow/Hand WDL  Abdomen Bruising to left side dark purple discoloration  Groin WDL  Scrotum/Coccyx/Buttocks WDL  (R) Leg WDL  (L) Leg WDL  (R) Heel/Foot/Toe WDL  (L) Heel/Foot/Toe WDL  noted a small red bump upper foot, no open areas        Devices In Places Tele Box      Interventions In Place N/A    Possible Skin Injury No    Pictures Uploaded Into Epic Yes  Wound Consult Placed N/A  RN Wound Prevention Protocol Ordered No

## 2022-11-26 NOTE — ASSESSMENT & PLAN NOTE
ACS rule out    Patient transferred from the outside facility for concerns of ACS rule out.  His troponins remained negative at the outside facility, ->> increased to 300 the next day.  EKG with sinus rhythm, incomplete right bundle branch block    Differentials include Prinzmetal's angina vs GERD vs demand ischemia     Likely NSTEMI type II/demand ischemia secondary to uncontrolled hypertension as he confirms his pressures are usually greater than 140s-150s systolic at his facility.  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

## 2022-11-26 NOTE — ASSESSMENT & PLAN NOTE
Renal function at baseline but trending up  Likely secondary to bilateral hydronephrosis  Hold ACE inhibitor  Consult urology  Avoid nephrotoxin  Renal dose meds  Labs on a.m.

## 2022-11-27 LAB
ALBUMIN SERPL BCP-MCNC: 4 G/DL (ref 3.2–4.9)
ALBUMIN/GLOB SERPL: 1.5 G/DL
ALP SERPL-CCNC: 71 U/L (ref 30–99)
ALT SERPL-CCNC: 16 U/L (ref 2–50)
ANION GAP SERPL CALC-SCNC: 9 MMOL/L (ref 7–16)
AST SERPL-CCNC: 14 U/L (ref 12–45)
BASOPHILS # BLD AUTO: 0.8 % (ref 0–1.8)
BASOPHILS # BLD: 0.08 K/UL (ref 0–0.12)
BILIRUB SERPL-MCNC: 0.2 MG/DL (ref 0.1–1.5)
BUN SERPL-MCNC: 32 MG/DL (ref 8–22)
CALCIUM SERPL-MCNC: 9.2 MG/DL (ref 8.5–10.5)
CHLORIDE SERPL-SCNC: 109 MMOL/L (ref 96–112)
CO2 SERPL-SCNC: 21 MMOL/L (ref 20–33)
CREAT SERPL-MCNC: 1.43 MG/DL (ref 0.5–1.4)
EOSINOPHIL # BLD AUTO: 0.31 K/UL (ref 0–0.51)
EOSINOPHIL NFR BLD: 3.2 % (ref 0–6.9)
ERYTHROCYTE [DISTWIDTH] IN BLOOD BY AUTOMATED COUNT: 48.5 FL (ref 35.9–50)
FLUAV RNA SPEC QL NAA+PROBE: NEGATIVE
FLUBV RNA SPEC QL NAA+PROBE: NEGATIVE
GFR SERPLBLD CREATININE-BSD FMLA CKD-EPI: 52 ML/MIN/1.73 M 2
GLOBULIN SER CALC-MCNC: 2.7 G/DL (ref 1.9–3.5)
GLUCOSE BLD STRIP.AUTO-MCNC: 135 MG/DL (ref 65–99)
GLUCOSE BLD STRIP.AUTO-MCNC: 143 MG/DL (ref 65–99)
GLUCOSE BLD STRIP.AUTO-MCNC: 149 MG/DL (ref 65–99)
GLUCOSE BLD STRIP.AUTO-MCNC: 217 MG/DL (ref 65–99)
GLUCOSE SERPL-MCNC: 142 MG/DL (ref 65–99)
HCT VFR BLD AUTO: 37.4 % (ref 42–52)
HGB BLD-MCNC: 12 G/DL (ref 14–18)
IMM GRANULOCYTES # BLD AUTO: 0.03 K/UL (ref 0–0.11)
IMM GRANULOCYTES NFR BLD AUTO: 0.3 % (ref 0–0.9)
LYMPHOCYTES # BLD AUTO: 1.75 K/UL (ref 1–4.8)
LYMPHOCYTES NFR BLD: 18.2 % (ref 22–41)
MAGNESIUM SERPL-MCNC: 1.7 MG/DL (ref 1.5–2.5)
MCH RBC QN AUTO: 29 PG (ref 27–33)
MCHC RBC AUTO-ENTMCNC: 32.1 G/DL (ref 33.7–35.3)
MCV RBC AUTO: 90.3 FL (ref 81.4–97.8)
MONOCYTES # BLD AUTO: 0.93 K/UL (ref 0–0.85)
MONOCYTES NFR BLD AUTO: 9.7 % (ref 0–13.4)
NEUTROPHILS # BLD AUTO: 6.51 K/UL (ref 1.82–7.42)
NEUTROPHILS NFR BLD: 67.8 % (ref 44–72)
NRBC # BLD AUTO: 0 K/UL
NRBC BLD-RTO: 0 /100 WBC
PHOSPHATE SERPL-MCNC: 3.2 MG/DL (ref 2.5–4.5)
PLATELET # BLD AUTO: 273 K/UL (ref 164–446)
PMV BLD AUTO: 11.3 FL (ref 9–12.9)
POTASSIUM SERPL-SCNC: 5.1 MMOL/L (ref 3.6–5.5)
PROT SERPL-MCNC: 6.7 G/DL (ref 6–8.2)
RBC # BLD AUTO: 4.14 M/UL (ref 4.7–6.1)
RSV RNA SPEC QL NAA+PROBE: NEGATIVE
SARS-COV-2 RNA RESP QL NAA+PROBE: NOTDETECTED
SODIUM SERPL-SCNC: 139 MMOL/L (ref 135–145)
SPECIMEN SOURCE: NORMAL
T4 FREE SERPL-MCNC: 1.01 NG/DL (ref 0.93–1.7)
TROPONIN T SERPL-MCNC: 53 NG/L (ref 6–19)
TROPONIN T SERPL-MCNC: 55 NG/L (ref 6–19)
TSH SERPL DL<=0.005 MIU/L-ACNC: 22.1 UIU/ML (ref 0.38–5.33)
WBC # BLD AUTO: 9.6 K/UL (ref 4.8–10.8)

## 2022-11-27 PROCEDURE — 84439 ASSAY OF FREE THYROXINE: CPT

## 2022-11-27 PROCEDURE — A9270 NON-COVERED ITEM OR SERVICE: HCPCS | Performed by: STUDENT IN AN ORGANIZED HEALTH CARE EDUCATION/TRAINING PROGRAM

## 2022-11-27 PROCEDURE — 85025 COMPLETE CBC W/AUTO DIFF WBC: CPT

## 2022-11-27 PROCEDURE — 99221 1ST HOSP IP/OBS SF/LOW 40: CPT | Performed by: INTERNAL MEDICINE

## 2022-11-27 PROCEDURE — 80053 COMPREHEN METABOLIC PANEL: CPT

## 2022-11-27 PROCEDURE — 770020 HCHG ROOM/CARE - TELE (206)

## 2022-11-27 PROCEDURE — 82962 GLUCOSE BLOOD TEST: CPT | Mod: 91

## 2022-11-27 PROCEDURE — 99232 SBSQ HOSP IP/OBS MODERATE 35: CPT | Performed by: STUDENT IN AN ORGANIZED HEALTH CARE EDUCATION/TRAINING PROGRAM

## 2022-11-27 PROCEDURE — 84443 ASSAY THYROID STIM HORMONE: CPT

## 2022-11-27 PROCEDURE — 0241U HCHG SARS-COV-2 COVID-19 NFCT DS RESP RNA 4 TRGT MIC: CPT

## 2022-11-27 PROCEDURE — 84100 ASSAY OF PHOSPHORUS: CPT

## 2022-11-27 PROCEDURE — 700111 HCHG RX REV CODE 636 W/ 250 OVERRIDE (IP): Performed by: STUDENT IN AN ORGANIZED HEALTH CARE EDUCATION/TRAINING PROGRAM

## 2022-11-27 PROCEDURE — 700102 HCHG RX REV CODE 250 W/ 637 OVERRIDE(OP): Performed by: INTERNAL MEDICINE

## 2022-11-27 PROCEDURE — 700111 HCHG RX REV CODE 636 W/ 250 OVERRIDE (IP): Performed by: INTERNAL MEDICINE

## 2022-11-27 PROCEDURE — 700102 HCHG RX REV CODE 250 W/ 637 OVERRIDE(OP): Performed by: STUDENT IN AN ORGANIZED HEALTH CARE EDUCATION/TRAINING PROGRAM

## 2022-11-27 PROCEDURE — C9803 HOPD COVID-19 SPEC COLLECT: HCPCS | Performed by: STUDENT IN AN ORGANIZED HEALTH CARE EDUCATION/TRAINING PROGRAM

## 2022-11-27 PROCEDURE — 83735 ASSAY OF MAGNESIUM: CPT

## 2022-11-27 PROCEDURE — 36415 COLL VENOUS BLD VENIPUNCTURE: CPT

## 2022-11-27 PROCEDURE — A9270 NON-COVERED ITEM OR SERVICE: HCPCS | Performed by: INTERNAL MEDICINE

## 2022-11-27 PROCEDURE — 84484 ASSAY OF TROPONIN QUANT: CPT

## 2022-11-27 RX ORDER — CARBOXYMETHYLCELLULOSE SODIUM 5 MG/ML
1 SOLUTION/ DROPS OPHTHALMIC EVERY 8 HOURS
Status: DISCONTINUED | OUTPATIENT
Start: 2022-11-27 | End: 2022-12-02 | Stop reason: HOSPADM

## 2022-11-27 RX ORDER — ISOSORBIDE MONONITRATE 30 MG/1
60 TABLET, EXTENDED RELEASE ORAL DAILY
Status: DISCONTINUED | OUTPATIENT
Start: 2022-11-28 | End: 2022-11-27

## 2022-11-27 RX ORDER — ACETAMINOPHEN 500 MG
1000 TABLET ORAL 3 TIMES DAILY
Status: DISCONTINUED | OUTPATIENT
Start: 2022-11-27 | End: 2022-12-02 | Stop reason: HOSPADM

## 2022-11-27 RX ORDER — ISOSORBIDE MONONITRATE 30 MG/1
30 TABLET, EXTENDED RELEASE ORAL DAILY
Status: DISCONTINUED | OUTPATIENT
Start: 2022-11-28 | End: 2022-11-29

## 2022-11-27 RX ORDER — CAPTOPRIL 12.5 MG/1
50 TABLET ORAL EVERY 8 HOURS
Status: DISCONTINUED | OUTPATIENT
Start: 2022-11-27 | End: 2022-11-28

## 2022-11-27 RX ORDER — LISINOPRIL 10 MG/1
10 TABLET ORAL 2 TIMES DAILY
Status: DISCONTINUED | OUTPATIENT
Start: 2022-11-27 | End: 2022-11-27

## 2022-11-27 RX ADMIN — CAPTOPRIL 50 MG: 12.5 TABLET ORAL at 13:39

## 2022-11-27 RX ADMIN — GABAPENTIN 300 MG: 300 CAPSULE ORAL at 05:30

## 2022-11-27 RX ADMIN — ACETAMINOPHEN 1000 MG: 500 TABLET ORAL at 11:18

## 2022-11-27 RX ADMIN — INSULIN GLARGINE-YFGN 15 UNITS: 100 INJECTION, SOLUTION SUBCUTANEOUS at 05:46

## 2022-11-27 RX ADMIN — ESCITALOPRAM OXALATE 20 MG: 10 TABLET ORAL at 05:31

## 2022-11-27 RX ADMIN — ATORVASTATIN CALCIUM 40 MG: 40 TABLET, FILM COATED ORAL at 17:54

## 2022-11-27 RX ADMIN — ACETAMINOPHEN 1000 MG: 500 TABLET ORAL at 22:12

## 2022-11-27 RX ADMIN — INSULIN LISPRO 2 UNITS: 100 INJECTION, SOLUTION INTRAVENOUS; SUBCUTANEOUS at 17:54

## 2022-11-27 RX ADMIN — ACETAMINOPHEN 1000 MG: 500 TABLET ORAL at 15:47

## 2022-11-27 RX ADMIN — NITROGLYCERIN 0.4 MG: 0.4 TABLET, ORALLY DISINTEGRATING SUBLINGUAL at 09:41

## 2022-11-27 RX ADMIN — LEVOTHYROXINE SODIUM 100 MCG: 0.1 TABLET ORAL at 05:32

## 2022-11-27 RX ADMIN — INSULIN LISPRO 3 UNITS: 100 INJECTION, SOLUTION INTRAVENOUS; SUBCUTANEOUS at 13:46

## 2022-11-27 RX ADMIN — CAPTOPRIL 50 MG: 12.5 TABLET ORAL at 05:37

## 2022-11-27 RX ADMIN — RISPERIDONE 0.25 MG: 0.5 TABLET ORAL at 17:54

## 2022-11-27 RX ADMIN — GABAPENTIN 300 MG: 300 CAPSULE ORAL at 17:54

## 2022-11-27 RX ADMIN — INSULIN GLARGINE-YFGN 15 UNITS: 100 INJECTION, SOLUTION SUBCUTANEOUS at 17:55

## 2022-11-27 RX ADMIN — ISOSORBIDE MONONITRATE 30 MG: 30 TABLET, EXTENDED RELEASE ORAL at 05:32

## 2022-11-27 RX ADMIN — ASPIRIN 81 MG: 81 TABLET, COATED ORAL at 05:31

## 2022-11-27 RX ADMIN — OMEPRAZOLE 40 MG: 20 CAPSULE, DELAYED RELEASE ORAL at 05:31

## 2022-11-27 RX ADMIN — OMEPRAZOLE 40 MG: 20 CAPSULE, DELAYED RELEASE ORAL at 17:53

## 2022-11-27 RX ADMIN — CLOPIDOGREL BISULFATE 75 MG: 75 TABLET ORAL at 05:31

## 2022-11-27 RX ADMIN — HYDRALAZINE HYDROCHLORIDE 20 MG: 20 INJECTION INTRAMUSCULAR; INTRAVENOUS at 09:03

## 2022-11-27 RX ADMIN — ENOXAPARIN SODIUM 40 MG: 40 INJECTION SUBCUTANEOUS at 17:53

## 2022-11-27 RX ADMIN — CARBOXYMETHYLCELLULOSE SODIUM 1 DROP: 5 SOLUTION/ DROPS OPHTHALMIC at 22:14

## 2022-11-27 RX ADMIN — NIFEDIPINE 90 MG: 30 TABLET, FILM COATED, EXTENDED RELEASE ORAL at 05:38

## 2022-11-27 RX ADMIN — NITROGLYCERIN 0.4 MG: 0.4 TABLET, ORALLY DISINTEGRATING SUBLINGUAL at 10:01

## 2022-11-27 RX ADMIN — CARBOXYMETHYLCELLULOSE SODIUM 1 DROP: 5 SOLUTION/ DROPS OPHTHALMIC at 15:47

## 2022-11-27 RX ADMIN — RISPERIDONE 0.25 MG: 0.5 TABLET ORAL at 05:30

## 2022-11-27 RX ADMIN — MINERAL OIL, PETROLATUM 1 APPLICATION: 425; 573 OINTMENT OPHTHALMIC at 09:04

## 2022-11-27 RX ADMIN — DOCUSATE SODIUM 50 MG AND SENNOSIDES 8.6 MG 2 TABLET: 8.6; 5 TABLET, FILM COATED ORAL at 17:53

## 2022-11-27 RX ADMIN — CARVEDILOL 3.12 MG: 3.12 TABLET, FILM COATED ORAL at 17:54

## 2022-11-27 RX ADMIN — DOCUSATE SODIUM 50 MG AND SENNOSIDES 8.6 MG 2 TABLET: 8.6; 5 TABLET, FILM COATED ORAL at 05:31

## 2022-11-27 ASSESSMENT — COGNITIVE AND FUNCTIONAL STATUS - GENERAL
DAILY ACTIVITIY SCORE: 22
HELP NEEDED FOR BATHING: A LITTLE
MOBILITY SCORE: 21
WALKING IN HOSPITAL ROOM: A LITTLE
TOILETING: A LITTLE
CLIMB 3 TO 5 STEPS WITH RAILING: A LITTLE
SUGGESTED CMS G CODE MODIFIER DAILY ACTIVITY: CJ
SUGGESTED CMS G CODE MODIFIER MOBILITY: CJ
STANDING UP FROM CHAIR USING ARMS: A LITTLE

## 2022-11-27 ASSESSMENT — PAIN DESCRIPTION - PAIN TYPE
TYPE: ACUTE PAIN

## 2022-11-27 ASSESSMENT — ENCOUNTER SYMPTOMS
GASTROINTESTINAL NEGATIVE: 1
MUSCULOSKELETAL NEGATIVE: 1
RESPIRATORY NEGATIVE: 1
CONSTITUTIONAL NEGATIVE: 1
PSYCHIATRIC NEGATIVE: 1
NEUROLOGICAL NEGATIVE: 1
EYES NEGATIVE: 1

## 2022-11-27 ASSESSMENT — PATIENT HEALTH QUESTIONNAIRE - PHQ9
2. FEELING DOWN, DEPRESSED, IRRITABLE, OR HOPELESS: NOT AT ALL
1. LITTLE INTEREST OR PLEASURE IN DOING THINGS: NOT AT ALL
SUM OF ALL RESPONSES TO PHQ9 QUESTIONS 1 AND 2: 0

## 2022-11-27 NOTE — CARE PLAN
The patient is Stable - Low risk of patient condition declining or worsening    Shift Goals  Clinical Goals: reduce chest pain  Patient Goals: comfort  Family Goals: resolve chest pain    Progress made toward(s) clinical / shift goals:  started on schedule tylenol, no CP thus far.

## 2022-11-27 NOTE — PROGRESS NOTES
Hospital Medicine Daily Progress Note    Date of Service  11/27/2022    Chief Complaint  Chest pain    Hospital Course  72 y.o. male who presented 11/25/2022 with past medical history of CAD s/p CABG in 1990s, PCI with stenting, IDDM 2, hypothyroidism on Synthyroid, hypertension, CKD stage III who was transferred from [facility].   Patient was initially transferred from outside hospital 9/22 for higher troponins, at the time cardiology was consulted who recommended against left heart catheterization.  Patient describes that since being discharged from the hospital he has experienced off-and-on chest pain.  The pain usually starts from the lower chin and goes all the way To the middle of his chest. It is usually burning in nature and at times he experiences chest tightness as well.  Does not associate exertion to the onset of his chest pain, as the chest pain occasionally occurs when he is sitting watching TV as well.  Is compliant to his medications, describes he is handed over his medications at the facility he stays active.  Denies radiation to the shoulder or back.  Denies improvement of chest pain with change of position.  The chest pain gets better with nitroglycerin.  Denies any diaphoresis.  Patient describes that the chest pain is usually associated with headaches as well, usually around his forehead.  His blood pressure is regularly monitored at the facility, he describes it is usually high however he does not remember the exact numbers.  Does confirm that it is higher than 140-150s.  Also describes that his blood pressure is high when he is having the chest pain.  Of note patient's lisinopril was recently increased to 40 mg daily    Interval Problem Update  Patient was evaluated bedside  Patient still having substernal chest pain today which resolved with nitro  BP better today  Troponin flat in the 40s-50s  Renal function at baseline  Renal ultrasound consistent with hydronephrosis  Adjust  antihypertensives  Consult cardiology, appreciate recommendations  Labs on AM    I have discussed this patient's plan of care and discharge plan at IDT rounds today with Case Management, Nursing, Nursing leadership, and other members of the IDT team.    Consultants/Specialty  None    Code Status  DNAR/DNI    Disposition  Patient is not medically cleared for discharge.   Anticipate discharge to to home with close outpatient follow-up.  I have placed the appropriate orders for post-discharge needs.    Review of Systems  Review of Systems   Constitutional: Negative.    HENT: Negative.     Eyes: Negative.    Respiratory: Negative.     Cardiovascular:  Positive for chest pain.   Gastrointestinal: Negative.    Genitourinary: Negative.    Musculoskeletal: Negative.    Skin: Negative.    Neurological: Negative.    Endo/Heme/Allergies: Negative.    Psychiatric/Behavioral: Negative.        Physical Exam  Temp:  [36.4 °C (97.5 °F)-37 °C (98.6 °F)] 36.6 °C (97.9 °F)  Pulse:  [54-70] 60  Resp:  [16] 16  BP: (130-183)/(60-77) 149/64  SpO2:  [94 %-98 %] 95 %    Physical Exam  Constitutional:       General: He is not in acute distress.     Appearance: Normal appearance.   HENT:      Head: Normocephalic and atraumatic.      Nose: Nose normal. No congestion.      Mouth/Throat:      Mouth: Mucous membranes are moist.   Eyes:      Extraocular Movements: Extraocular movements intact.      Pupils: Pupils are equal, round, and reactive to light.   Cardiovascular:      Rate and Rhythm: Normal rate and regular rhythm.      Pulses: Normal pulses.      Heart sounds: Normal heart sounds.   Pulmonary:      Effort: Pulmonary effort is normal.      Breath sounds: Normal breath sounds.   Abdominal:      General: Bowel sounds are normal.      Palpations: Abdomen is soft.      Tenderness: There is no abdominal tenderness.   Musculoskeletal:         General: No swelling. Normal range of motion.      Cervical back: Normal range of motion and neck  supple.   Skin:     General: Skin is warm.      Coloration: Skin is not jaundiced.   Neurological:      General: No focal deficit present.      Mental Status: He is alert and oriented to person, place, and time. Mental status is at baseline.      Cranial Nerves: No cranial nerve deficit.   Psychiatric:         Mood and Affect: Mood normal.         Behavior: Behavior normal.         Thought Content: Thought content normal.         Judgment: Judgment normal.       Fluids    Intake/Output Summary (Last 24 hours) at 11/27/2022 1508  Last data filed at 11/27/2022 1110  Gross per 24 hour   Intake 240 ml   Output --   Net 240 ml       Laboratory  Recent Labs     11/26/22  0802 11/27/22  0054   WBC 9.0 9.6   RBC 4.13* 4.14*   HEMOGLOBIN 12.7* 12.0*   HEMATOCRIT 37.2* 37.4*   MCV 90.1 90.3   MCH 30.8 29.0   MCHC 34.1 32.1*   RDW 48.4 48.5   PLATELETCT 259 273   MPV 11.4 11.3     Recent Labs     11/26/22  0802 11/27/22  0054   SODIUM 139 139   POTASSIUM 5.0 5.1   CHLORIDE 109 109   CO2 18* 21   GLUCOSE 135* 142*   BUN 32* 32*   CREATININE 1.38 1.43*   CALCIUM 9.5 9.2                   Imaging  US-RENAL   Final Result      Moderate right and mild-to-moderate left hydronephrosis      DX-CHEST-LIMITED (1 VIEW)   Final Result         No acute cardiopulmonary abnormalities are identified.           Assessment/Plan  * Hypertensive emergency  Assessment & Plan  SBP in the 210s  Patient reporting chest pain and troponin slightly elevated  Had MILTON but kidney function improving      CKD (chronic kidney disease) stage 3, GFR 30-59 ml/min ([facility])- (present on admission)  Assessment & Plan  Renal function at baseline  Avoid nephrotoxin  Renal dose meds  Labs on a.m.    Hypothyroidism- (present on admission)  Assessment & Plan  Continue home Synthroid    Coronary artery disease- (present on admission)  Assessment & Plan       -Monitor on telemetry  -We will repeat troponins and EKG x 1   -Resume aspirin and Plavix (of note cardiology  recommended continuing aspirin and Plavix for 12 months, note 9/22)  -Resume home lisinopril and nifedipine  -Continue home PPI   -As needed nitroglycerin for chest pain  -Uptitrate antihypertensives as patient tolerates    HTN (hypertension)- (present on admission)  Assessment & Plan  Continue home meds  Adjust meds daily  As needed antihypertensives    HLD (hyperlipidemia)- (present on admission)  Assessment & Plan  Continue home statin    Insulin dependent type 2 diabetes mellitus (HCC)- (present on admission)  Assessment & Plan  Diabetic diet  Insulin sliding scale  Labs on a.m.       VTE prophylaxis: enoxaparin ppx    I have performed a physical exam and reviewed and updated ROS and Plan today (11/27/2022). In review of yesterday's note (11/26/2022), there are no changes except as documented above.

## 2022-11-27 NOTE — CARE PLAN
The patient is Watcher - Medium risk of patient condition declining or worsening    Shift Goals  Clinical Goals: A/Ox4, BP control  Patient Goals: no CP  Family Goals: MORGAN    Progress made toward(s) clinical / shift goals:      Persistent HTN with multiple PRNs given for SBP >180 today. Severe CP reported around 1500. SL nitro x2 given and 2mg morphine. Tele reports that HR was elevated to 90-100s at this time and vent bi/trigeminy  was noted on monitor. EKG obtained following this and rhythm had returned to SR 60s with first degree, RBBB,  and only occasional PVCs. CP returned about 1700 along with similar changes in rhythm. A 3rd dose of SL nitro was given at this time, and an additional 2mg morphine per provider order. Morphine PRN frequency was increased at this time by Dr Zheng. CP has not completely resolved, but is reportedly tolerable at this time. Provider aware of changes throughout shift. No additional orders received.     Problem: Knowledge Deficit - Standard  Goal: Patient and family/care givers will demonstrate understanding of plan of care, disease process/condition, diagnostic tests and medications  Outcome: Progressing     Problem: Pain - Standard  Goal: Alleviation of pain or a reduction in pain to the patient’s comfort goal  Outcome: Progressing

## 2022-11-27 NOTE — PROGRESS NOTES
Pt complaining of chest pain 10/10 throbbing and sharp. First occurrence at 0940. Two doses of nitro given. VS stable. Chest pain has subsided. Verbal report given to Diana MARTINEZ.    Post-Care Instructions: Patient instructed to not lie down for 4 hours and limit physical activity for 24 hours.

## 2022-11-28 ENCOUNTER — APPOINTMENT (OUTPATIENT)
Dept: RADIOLOGY | Facility: MEDICAL CENTER | Age: 72
DRG: 281 | End: 2022-11-28
Attending: STUDENT IN AN ORGANIZED HEALTH CARE EDUCATION/TRAINING PROGRAM
Payer: MEDICARE

## 2022-11-28 LAB
ALBUMIN SERPL BCP-MCNC: 3.8 G/DL (ref 3.2–4.9)
ALBUMIN/GLOB SERPL: 1.3 G/DL
ALP SERPL-CCNC: 72 U/L (ref 30–99)
ALT SERPL-CCNC: 16 U/L (ref 2–50)
ANION GAP SERPL CALC-SCNC: 13 MMOL/L (ref 7–16)
APPEARANCE UR: CLEAR
AST SERPL-CCNC: 17 U/L (ref 12–45)
BASOPHILS # BLD AUTO: 1 % (ref 0–1.8)
BASOPHILS # BLD: 0.06 K/UL (ref 0–0.12)
BILIRUB SERPL-MCNC: 0.2 MG/DL (ref 0.1–1.5)
BILIRUB UR QL STRIP.AUTO: NEGATIVE
BUN SERPL-MCNC: 37 MG/DL (ref 8–22)
CALCIUM SERPL-MCNC: 9.5 MG/DL (ref 8.5–10.5)
CHLORIDE SERPL-SCNC: 108 MMOL/L (ref 96–112)
CO2 SERPL-SCNC: 19 MMOL/L (ref 20–33)
COLOR UR: YELLOW
CREAT SERPL-MCNC: 1.7 MG/DL (ref 0.5–1.4)
EOSINOPHIL # BLD AUTO: 0.29 K/UL (ref 0–0.51)
EOSINOPHIL NFR BLD: 4.6 % (ref 0–6.9)
ERYTHROCYTE [DISTWIDTH] IN BLOOD BY AUTOMATED COUNT: 47.4 FL (ref 35.9–50)
GFR SERPLBLD CREATININE-BSD FMLA CKD-EPI: 42 ML/MIN/1.73 M 2
GLOBULIN SER CALC-MCNC: 2.9 G/DL (ref 1.9–3.5)
GLUCOSE SERPL-MCNC: 114 MG/DL (ref 65–99)
GLUCOSE UR STRIP.AUTO-MCNC: NEGATIVE MG/DL
HCT VFR BLD AUTO: 35.5 % (ref 42–52)
HGB BLD-MCNC: 11.8 G/DL (ref 14–18)
IMM GRANULOCYTES # BLD AUTO: 0.01 K/UL (ref 0–0.11)
IMM GRANULOCYTES NFR BLD AUTO: 0.2 % (ref 0–0.9)
KETONES UR STRIP.AUTO-MCNC: NEGATIVE MG/DL
LEUKOCYTE ESTERASE UR QL STRIP.AUTO: NEGATIVE
LYMPHOCYTES # BLD AUTO: 1.8 K/UL (ref 1–4.8)
LYMPHOCYTES NFR BLD: 28.8 % (ref 22–41)
MCH RBC QN AUTO: 29.6 PG (ref 27–33)
MCHC RBC AUTO-ENTMCNC: 33.2 G/DL (ref 33.7–35.3)
MCV RBC AUTO: 89.2 FL (ref 81.4–97.8)
MICRO URNS: NORMAL
MONOCYTES # BLD AUTO: 0.75 K/UL (ref 0–0.85)
MONOCYTES NFR BLD AUTO: 12 % (ref 0–13.4)
NEUTROPHILS # BLD AUTO: 3.33 K/UL (ref 1.82–7.42)
NEUTROPHILS NFR BLD: 53.4 % (ref 44–72)
NITRITE UR QL STRIP.AUTO: NEGATIVE
NRBC # BLD AUTO: 0 K/UL
NRBC BLD-RTO: 0 /100 WBC
PH UR STRIP.AUTO: 6.5 [PH] (ref 5–8)
PLATELET # BLD AUTO: 269 K/UL (ref 164–446)
PMV BLD AUTO: 11.2 FL (ref 9–12.9)
POTASSIUM SERPL-SCNC: 4.4 MMOL/L (ref 3.6–5.5)
PROT SERPL-MCNC: 6.7 G/DL (ref 6–8.2)
PROT UR QL STRIP: NEGATIVE MG/DL
RBC # BLD AUTO: 3.98 M/UL (ref 4.7–6.1)
RBC UR QL AUTO: NEGATIVE
SODIUM SERPL-SCNC: 140 MMOL/L (ref 135–145)
SP GR UR STRIP.AUTO: 1.01
UROBILINOGEN UR STRIP.AUTO-MCNC: 0.2 MG/DL
WBC # BLD AUTO: 6.2 K/UL (ref 4.8–10.8)

## 2022-11-28 PROCEDURE — 80053 COMPREHEN METABOLIC PANEL: CPT

## 2022-11-28 PROCEDURE — 85025 COMPLETE CBC W/AUTO DIFF WBC: CPT

## 2022-11-28 PROCEDURE — A9270 NON-COVERED ITEM OR SERVICE: HCPCS | Performed by: STUDENT IN AN ORGANIZED HEALTH CARE EDUCATION/TRAINING PROGRAM

## 2022-11-28 PROCEDURE — 700111 HCHG RX REV CODE 636 W/ 250 OVERRIDE (IP): Performed by: INTERNAL MEDICINE

## 2022-11-28 PROCEDURE — 770020 HCHG ROOM/CARE - TELE (206)

## 2022-11-28 PROCEDURE — 99232 SBSQ HOSP IP/OBS MODERATE 35: CPT | Performed by: STUDENT IN AN ORGANIZED HEALTH CARE EDUCATION/TRAINING PROGRAM

## 2022-11-28 PROCEDURE — 51798 US URINE CAPACITY MEASURE: CPT

## 2022-11-28 PROCEDURE — 74176 CT ABD & PELVIS W/O CONTRAST: CPT

## 2022-11-28 PROCEDURE — 700102 HCHG RX REV CODE 250 W/ 637 OVERRIDE(OP): Performed by: INTERNAL MEDICINE

## 2022-11-28 PROCEDURE — 81003 URINALYSIS AUTO W/O SCOPE: CPT

## 2022-11-28 PROCEDURE — 700102 HCHG RX REV CODE 250 W/ 637 OVERRIDE(OP): Performed by: STUDENT IN AN ORGANIZED HEALTH CARE EDUCATION/TRAINING PROGRAM

## 2022-11-28 PROCEDURE — 36415 COLL VENOUS BLD VENIPUNCTURE: CPT

## 2022-11-28 PROCEDURE — A9270 NON-COVERED ITEM OR SERVICE: HCPCS | Performed by: INTERNAL MEDICINE

## 2022-11-28 RX ORDER — HYDRALAZINE HYDROCHLORIDE 10 MG/1
10 TABLET, FILM COATED ORAL EVERY 8 HOURS PRN
Status: DISCONTINUED | OUTPATIENT
Start: 2022-11-28 | End: 2022-11-28

## 2022-11-28 RX ORDER — LEVOTHYROXINE SODIUM 112 UG/1
112 TABLET ORAL
Status: DISCONTINUED | OUTPATIENT
Start: 2022-11-29 | End: 2022-12-02 | Stop reason: HOSPADM

## 2022-11-28 RX ADMIN — LEVOTHYROXINE SODIUM 100 MCG: 0.1 TABLET ORAL at 06:26

## 2022-11-28 RX ADMIN — ENOXAPARIN SODIUM 40 MG: 40 INJECTION SUBCUTANEOUS at 16:51

## 2022-11-28 RX ADMIN — ISOSORBIDE MONONITRATE 30 MG: 30 TABLET, EXTENDED RELEASE ORAL at 06:23

## 2022-11-28 RX ADMIN — RISPERIDONE 0.25 MG: 0.5 TABLET ORAL at 06:25

## 2022-11-28 RX ADMIN — ACETAMINOPHEN 1000 MG: 500 TABLET ORAL at 16:03

## 2022-11-28 RX ADMIN — INSULIN LISPRO 2 UNITS: 100 INJECTION, SOLUTION INTRAVENOUS; SUBCUTANEOUS at 18:15

## 2022-11-28 RX ADMIN — ACETAMINOPHEN 1000 MG: 500 TABLET ORAL at 09:50

## 2022-11-28 RX ADMIN — CARBOXYMETHYLCELLULOSE SODIUM 1 DROP: 5 SOLUTION/ DROPS OPHTHALMIC at 20:59

## 2022-11-28 RX ADMIN — INSULIN GLARGINE-YFGN 15 UNITS: 100 INJECTION, SOLUTION SUBCUTANEOUS at 18:15

## 2022-11-28 RX ADMIN — GABAPENTIN 300 MG: 300 CAPSULE ORAL at 06:22

## 2022-11-28 RX ADMIN — INSULIN LISPRO 3 UNITS: 100 INJECTION, SOLUTION INTRAVENOUS; SUBCUTANEOUS at 20:55

## 2022-11-28 RX ADMIN — CARVEDILOL 3.12 MG: 3.12 TABLET, FILM COATED ORAL at 09:50

## 2022-11-28 RX ADMIN — DOCUSATE SODIUM 50 MG AND SENNOSIDES 8.6 MG 2 TABLET: 8.6; 5 TABLET, FILM COATED ORAL at 06:20

## 2022-11-28 RX ADMIN — ASPIRIN 81 MG: 81 TABLET, COATED ORAL at 06:22

## 2022-11-28 RX ADMIN — GABAPENTIN 300 MG: 300 CAPSULE ORAL at 16:52

## 2022-11-28 RX ADMIN — ATORVASTATIN CALCIUM 40 MG: 40 TABLET, FILM COATED ORAL at 16:51

## 2022-11-28 RX ADMIN — CARBOXYMETHYLCELLULOSE SODIUM 1 DROP: 5 SOLUTION/ DROPS OPHTHALMIC at 13:07

## 2022-11-28 RX ADMIN — INSULIN GLARGINE-YFGN 15 UNITS: 100 INJECTION, SOLUTION SUBCUTANEOUS at 09:41

## 2022-11-28 RX ADMIN — CARVEDILOL 3.12 MG: 3.12 TABLET, FILM COATED ORAL at 16:52

## 2022-11-28 RX ADMIN — CARBOXYMETHYLCELLULOSE SODIUM 1 DROP: 5 SOLUTION/ DROPS OPHTHALMIC at 06:28

## 2022-11-28 RX ADMIN — INSULIN LISPRO 3 UNITS: 100 INJECTION, SOLUTION INTRAVENOUS; SUBCUTANEOUS at 13:07

## 2022-11-28 RX ADMIN — OMEPRAZOLE 40 MG: 20 CAPSULE, DELAYED RELEASE ORAL at 16:52

## 2022-11-28 RX ADMIN — CAPTOPRIL 50 MG: 12.5 TABLET ORAL at 06:33

## 2022-11-28 RX ADMIN — CLOPIDOGREL BISULFATE 75 MG: 75 TABLET ORAL at 06:25

## 2022-11-28 RX ADMIN — ESCITALOPRAM OXALATE 20 MG: 10 TABLET ORAL at 06:22

## 2022-11-28 RX ADMIN — OMEPRAZOLE 40 MG: 20 CAPSULE, DELAYED RELEASE ORAL at 06:26

## 2022-11-28 RX ADMIN — DOCUSATE SODIUM 50 MG AND SENNOSIDES 8.6 MG 2 TABLET: 8.6; 5 TABLET, FILM COATED ORAL at 16:52

## 2022-11-28 RX ADMIN — NIFEDIPINE 90 MG: 30 TABLET, FILM COATED, EXTENDED RELEASE ORAL at 06:21

## 2022-11-28 RX ADMIN — RISPERIDONE 0.25 MG: 0.5 TABLET ORAL at 16:51

## 2022-11-28 RX ADMIN — ACETAMINOPHEN 1000 MG: 500 TABLET ORAL at 20:54

## 2022-11-28 ASSESSMENT — FIBROSIS 4 INDEX: FIB4 SCORE: .923076923076923077

## 2022-11-28 ASSESSMENT — ENCOUNTER SYMPTOMS
VOMITING: 0
FLANK PAIN: 0
FEVER: 0
RESPIRATORY NEGATIVE: 1
GASTROINTESTINAL NEGATIVE: 1
NAUSEA: 0
ABDOMINAL PAIN: 0
PSYCHIATRIC NEGATIVE: 1
NEUROLOGICAL NEGATIVE: 1
EYES NEGATIVE: 1
CONSTITUTIONAL NEGATIVE: 1
CHILLS: 0
MUSCULOSKELETAL NEGATIVE: 1

## 2022-11-28 ASSESSMENT — PAIN DESCRIPTION - PAIN TYPE
TYPE: ACUTE PAIN

## 2022-11-28 NOTE — PROGRESS NOTES
Patient's continuous glucose monitor stated patient's blood sugar is 139 mg/dL. Per protocol, there is no need for any insulin coverage before bed tonight.

## 2022-11-28 NOTE — PROGRESS NOTES
Spoke with JOSE MIGUEL Strickland [phone] from [facility]. Stated the pt is a new resident there and has only lived there for 3 weeks. Currently pt is pending a pacemaker and an adjustment on his penile pump for urination from his home urologist Shae MARTINEZ [phone].

## 2022-11-28 NOTE — PROGRESS NOTES
Monitor Report    Sinus Bradycardia to Sinus Rhythm  PVC, trigeminy  Rate 50 to 89 bpm  .20/.09/.44

## 2022-11-28 NOTE — CARE PLAN
Problem: Pain - Standard  Goal: Alleviation of pain or a reduction in pain to the patient’s comfort goal  Outcome: Progressing     Problem: Fall Risk  Goal: Patient will remain free from falls  Outcome: Progressing   The patient is Watcher - Medium risk of patient condition declining or worsening    Shift Goals  Clinical Goals: Remain free from pain, monitor neurological status  Patient Goals: Rest and comfort  Family Goals: MORGAN.  No family present    Progress made toward(s) clinical / shift goals:  Patient has remained free from pain throughout the night.  Patient's neurological status has remained unchanged.  Patient has been sleeping comfortably throughout the night.     Patient is not progressing towards the following goals: N/A

## 2022-11-28 NOTE — PROGRESS NOTES
Assumed care of patient. Pt is A+O x3. No chest pain or SOB. No active bleeding noted. Informed of safety and call system. rhythm is sinus rhythm. Co concerns at this time.

## 2022-11-28 NOTE — DISCHARGE PLANNING
Case Management Discharge Planning    Admission Date: 11/25/2022  GMLOS: 2.4  ALOS: 2    6-Clicks ADL Score: 22  6-Clicks Mobility Score: 21      Anticipated Discharge Dispo: Discharge Disposition: D/T to SNF with Medicare cert in anticipation of skilled care (03) ([facility])    Medically Clear: No    Next Steps: f/u with pt and medical team to discuss dc needs and barriers.    Barriers to Discharge: Medical clearance, urology clearance      RNCM received call from pt daughter who stated pt is a resident at [facility] for 3 weeks now and will need a ride home at DC. RNCM to inquire if pt has MTM though [employer].  [address] [address]  [phone]

## 2022-11-28 NOTE — PROGRESS NOTES
Received Bedside report. Assumed care at 1900. This pt is AOx3, ambulatory with standby assistance, voiding adequately, 0/10 pain. Patient and RN discussed plan of care: questions answered. Labs noted, assessment complete, patient tolerating consistent CHO (Diabetic), cardiac diet. Tele box in place. Pt is on room air. Call light in place, fall precautions in place, patient educated on importance of calling for assistance. No additional needs at this time. VSS

## 2022-11-28 NOTE — DISCHARGE PLANNING
Received Choice form at 3723  Agency/Facility Name: [facility]   Referral sent per Choice form @ 0412

## 2022-11-28 NOTE — PROGRESS NOTES
Assumed care of patient Key LORI. Pt is A+O x3. No chest pain or SOB. No active bleeding noted. Informed of safety and call system. rhythm is Sinus Rhythm.

## 2022-11-28 NOTE — CONSULTS
Cardiology Initial Consultation    Date of Service  11/27/2022    Referring Physician  Jeb Palafox*    Reason for Consultation  Chest pain.    History of Presenting Illness  Sulaiman Ceja is a 72 y.o. male with a past medical history of coronary artery disease with remote coronary artery bypass graft surgery ([place] 1980), chronic kidney disease, chronic obstructive pulmonary disease, previous tobacco abuse, do not necessitate code status who presented 11/25/2022 with atypical chest pain resolved with acetaminophen. He remains confused.    Review of Systems  Review of Systems   Unable to perform ROS: Other     Past Medical History   has a past medical history of Anginal syndrome (HCC), CAD (coronary artery disease), Cataract, Diabetes (HCC), Hypertension, and Renal disorder.    Surgical History   has no past surgical history on file.    Family History  family history includes No Known Problems in his father and mother.    Social History   reports that he has quit smoking. His smoking use included cigarettes. He started smoking about 26 years ago. He has been exposed to tobacco smoke. He has never used smokeless tobacco. He reports that he does not drink alcohol and does not use drugs.    Medications  Prior to Admission Medications   Prescriptions Last Dose Informant Patient Reported? Taking?   NIFEdipine SR (PROCADIA-XL) 90 MG CR tablet 11/25/2022  No No   Sig: Take 1 Tablet by mouth every day.   albuterol 108 (90 Base) MCG/ACT Aero Soln inhalation aerosol   No No   Sig: Inhale 2 Puffs every 6 hours as needed for Shortness of Breath.   aspirin 81 MG EC tablet 11/24/2022  No No   Sig: Take 1 Tablet by mouth every day. For heart health   atorvastatin (LIPITOR) 80 MG tablet 11/24/2022  No No   Sig: Take 1 Tablet by mouth every evening.   carvedilol (COREG) 3.125 MG Tab   No No   Sig: Take 1 Tablet by mouth 2 times a day with meals.   clopidogrel (PLAVIX) 75 MG Tab 11/24/2022  No No   Sig: Take 1 Tablet by  mouth every day.   escitalopram (LEXAPRO) 20 MG tablet 11/24/2022  No No   Sig: Take 1 Tablet by mouth every day.   gabapentin (NEURONTIN) 300 MG Cap 11/25/2022  No No   Sig: Take 1 Capsule by mouth 2 times a day.   insulin detemir (LEVEMIR) 100 UNIT/ML Solution 11/25/2022 Family Member Yes No   Sig: Inject 15-50 Units under the skin 2 times a day. 50 units every morning  15 units every night   insulin regular (HUMULIN R) 100 Unit/mL Solution 11/25/2022 Family Member Yes No   Sig: Inject 2-20 Units under the skin 4 Times a Day,Before Meals and at Bedtime. Pt unable to define Sliding scale   isosorbide mononitrate SR (IMDUR) 30 MG TABLET SR 24 HR   No No   Sig: Take 1 Tablet by mouth every day.   levothyroxine (SYNTHROID) 100 MCG Tab 11/25/2022  No No   Sig: Take 1 Tablet by mouth every morning on an empty stomach.   lisinopril (PRINIVIL) 10 MG Tab 11/25/2022  No No   Sig: Take 1 Tablet by mouth 2 times a day.   melatonin 5 mg Tab 11/24/2022  No No   Sig: Take 1 Tablet by mouth every evening.   nitroglycerin (NITROSTAT) 0.4 MG SL Tab   No No   Sig: Place 1 Tablet under the tongue as needed for Chest Pain (up to 3 doses (if SBP greater than 90 mmHg)).   omeprazole (PRILOSEC) 20 MG delayed-release capsule 11/25/2022 Family Member Yes No   Sig: Take 20 mg by mouth every day.   risperidone (RISPERDAL) 0.25 MG Tab 11/25/2022  No No   Sig: Take 1 Tablet by mouth 2 times a day.   vitamin D3 (CHOLECALCIFEROL) 5000 Unit (125 mcg) Tab 11/25/2022 Family Member Yes No   Sig: Take 5,000 Units by mouth every day.      Facility-Administered Medications: None       Allergies  Allergies   Allergen Reactions    Cefdinir Unspecified     Patient does not remember       Vital signs in last 24 hours  Temp:  [36.6 °C (97.9 °F)-37 °C (98.6 °F)] 36.9 °C (98.4 °F)  Pulse:  [54-70] 68  Resp:  [16] 16  BP: (130-182)/(60-77) 148/69  SpO2:  [94 %-97 %] 95 %    Physical Exam  Physical Exam  Constitutional:       Appearance: Normal appearance. He  is well-developed and normal weight.   HENT:      Head: Normocephalic and atraumatic.      Mouth/Throat:      Mouth: Mucous membranes are moist.   Eyes:      Extraocular Movements: Extraocular movements intact.      Conjunctiva/sclera: Conjunctivae normal.   Cardiovascular:      Rate and Rhythm: Normal rate and regular rhythm.      Pulses: Normal pulses.      Heart sounds: Normal heart sounds.   Pulmonary:      Effort: Pulmonary effort is normal.      Breath sounds: Normal breath sounds.   Abdominal:      General: Bowel sounds are normal.      Palpations: Abdomen is soft.   Musculoskeletal:         General: Normal range of motion.      Cervical back: Normal range of motion and neck supple.   Skin:     General: Skin is warm and dry.   Neurological:      General: No focal deficit present.      Mental Status: Mental status is at baseline. He is confused.   Psychiatric:         Mood and Affect: Mood normal.         Behavior: Behavior normal.         Thought Content: Thought content normal.         Judgment: Judgment normal.       Lab Review  Lab Results   Component Value Date/Time    WBC 9.6 11/27/2022 12:54 AM    RBC 4.14 (L) 11/27/2022 12:54 AM    HEMOGLOBIN 12.0 (L) 11/27/2022 12:54 AM    HEMATOCRIT 37.4 (L) 11/27/2022 12:54 AM    MCV 90.3 11/27/2022 12:54 AM    MCH 29.0 11/27/2022 12:54 AM    MCHC 32.1 (L) 11/27/2022 12:54 AM    MPV 11.3 11/27/2022 12:54 AM      Lab Results   Component Value Date/Time    SODIUM 139 11/27/2022 12:54 AM    POTASSIUM 5.1 11/27/2022 12:54 AM    CHLORIDE 109 11/27/2022 12:54 AM    CO2 21 11/27/2022 12:54 AM    GLUCOSE 142 (H) 11/27/2022 12:54 AM    BUN 32 (H) 11/27/2022 12:54 AM    CREATININE 1.43 (H) 11/27/2022 12:54 AM      Lab Results   Component Value Date/Time    ASTSGOT 14 11/27/2022 12:54 AM    ALTSGPT 16 11/27/2022 12:54 AM     Lab Results   Component Value Date/Time    CHOLSTRLTOT 152 09/29/2022 02:26 AM    LDL 87 09/29/2022 02:26 AM    HDL 35 (A) 09/29/2022 02:26 AM     TRIGLYCERIDE 148 09/29/2022 02:26 AM    TROPONINT 53 (H) 11/27/2022 05:49 PM       No results for input(s): NTPROBNP in the last 72 hours.    Cardiac Imaging and Procedures Review  CARDIAC STUDIES/PROCEDURES:    ECHOCARDIOGRAM CONCLUSIONS (10/02/22)  Mild concentric left ventricular hypertrophy. Normal left ventricular   size and systolic function.  Mildly dilated right vetricle with preserved systolic function.  Mildly dilated left atrium.  No significant valvular pathology.  No pericardial effusion.  (study result reviewed)    EKG performed on (11/26/22) was reviewed: EKG personally interpreted shows sinus rhythm with premature ventricular contractions.    Assessment/Plan  Chest pain:  is a 72 y.o. male with a past medical history of coronary artery disease with remote coronary artery bypass graft surgery ([place] 1980), chronic kidney disease, chronic obstructive pulmonary disease, previous tobacco abuse, do not necessitate code status who presented again with atypical chest pain. His pain has resolved with acetaminophen. He remains confused. No further studies recommended, cardiac procedures are recommended. We will continue with medical therapy only.      Thank you for allowing me to participate in the care of this patient.    Cardiology will sign off on this patient    Please contact me with any questions.    Phillip Estevez M.D.   Cardiologist, [facility]  [phone]

## 2022-11-28 NOTE — CONSULTS
"[facility] [place] Consult/H&P Note    Primary Service: Medicine  Attending: Jeb Palafox*  Patient's Name/MRN: Sulaiman Ceja, [id number]    Admit Date:11/25/2022  Today's Date: 11/28/2022   Length of stay:  LOS: 2 days   Room #: T729/01      Reason for consult/chief complaint: hydronephrosis  ID/HPI: Sulaiman Ceja is a 72 y.o. male patient with a history of CKD, CAD admitted 11/25 with AMS and chest pain. During admission, his Cr was found to be rising and RBUS done 11/26 demonstrated bilateral hydronephrosis.     11/28: Seen and examined, lying in bed in NAD. Reports brain fog and admits that he cannot clearly recall much of his medical history at present. States that he had part of his bladder removed which he believes was 2/2 bladder cancer. He developed urinary incontinence, and had an artificial urinary sphincter placed several years ago in [place]. He then developed GH and what he believes was sphincter erosion, so his AUS was replaced, and he is unsure when this was done. States he does not get sensation of needing to void and at home, he sets a timer for q2h to remind him to urinate. Denies flank pain, GH, dysuria, abd pain. Cr today 1.7 (1.43), no urinalysis done prior to consult. States that he has seen a \"kidney specialist\" before though he is unsure if this was urology or nephrology, and he believes prior imaging has showed \"kidney swelling\" (unsure where this imaging was done). Notably, his last measured voided volume was 1500cc.       Past Medical History:   Past Medical History:   Diagnosis Date    Anginal syndrome (HCC)     CAD (coronary artery disease)     CABG 90's    Cataract     Diabetes (HCC)     Hypertension     Renal disorder         Past Surgical History:   History reviewed. No pertinent surgical history.     Family History:   Family History   Problem Relation Age of Onset    No Known Problems Mother     No Known Problems Father          Social History:   Social History     Tobacco Use    " Smoking status: Former     Types: Cigarettes     Start date: 3/1/1996     Passive exposure: Past    Smokeless tobacco: Never   Vaping Use    Vaping Use: Never used   Substance Use Topics    Alcohol use: Never    Drug use: Never      Social History     Social History Narrative    Not on file        Allergies: he Cefdinir    Medications:   Medications Prior to Admission   Medication Sig Dispense Refill Last Dose    carvedilol (COREG) 3.125 MG Tab Take 1 Tablet by mouth 2 times a day with meals. 60 Tablet 0     nitroglycerin (NITROSTAT) 0.4 MG SL Tab Place 1 Tablet under the tongue as needed for Chest Pain (up to 3 doses (if SBP greater than 90 mmHg)). 25 Tablet 0     gabapentin (NEURONTIN) 300 MG Cap Take 1 Capsule by mouth 2 times a day. 60 Capsule 0 11/25/2022    lisinopril (PRINIVIL) 10 MG Tab Take 1 Tablet by mouth 2 times a day. 60 Tablet 0 11/25/2022    NIFEdipine SR (PROCADIA-XL) 90 MG CR tablet Take 1 Tablet by mouth every day. 30 Tablet 0 11/25/2022    levothyroxine (SYNTHROID) 100 MCG Tab Take 1 Tablet by mouth every morning on an empty stomach. 30 Tablet 0 11/25/2022    escitalopram (LEXAPRO) 20 MG tablet Take 1 Tablet by mouth every day. 30 Tablet 0 11/24/2022    clopidogrel (PLAVIX) 75 MG Tab Take 1 Tablet by mouth every day. 30 Tablet 0 11/24/2022    atorvastatin (LIPITOR) 80 MG tablet Take 1 Tablet by mouth every evening. 30 Tablet 0 11/24/2022    aspirin 81 MG EC tablet Take 1 Tablet by mouth every day. For heart health 30 Tablet 0 11/24/2022    albuterol 108 (90 Base) MCG/ACT Aero Soln inhalation aerosol Inhale 2 Puffs every 6 hours as needed for Shortness of Breath. 8.5 g 0     melatonin 5 mg Tab Take 1 Tablet by mouth every evening. 30 Tablet 0 11/24/2022    [DISCONTINUED] isosorbide mononitrate SR (IMDUR) 30 MG TABLET SR 24 HR Take 1 Tablet by mouth every day. 30 Tablet 0     [DISCONTINUED] risperidone (RISPERDAL) 0.25 MG Tab Take 1 Tablet by mouth 2 times a day. 60 Tablet 0 11/25/2022    vitamin  D3 (CHOLECALCIFEROL) 5000 Unit (125 mcg) Tab Take 5,000 Units by mouth every day.   11/25/2022    insulin detemir (LEVEMIR) 100 UNIT/ML Solution Inject 15-50 Units under the skin 2 times a day. 50 units every morning  15 units every night   11/25/2022    insulin regular (HUMULIN R) 100 Unit/mL Solution Inject 2-20 Units under the skin 4 Times a Day,Before Meals and at Bedtime. Pt unable to define Sliding scale   11/25/2022    omeprazole (PRILOSEC) 20 MG delayed-release capsule Take 20 mg by mouth every day.   11/25/2022         Review of Systems  Review of Systems   Constitutional:  Negative for chills and fever.   Gastrointestinal:  Negative for abdominal pain, nausea and vomiting.   Genitourinary:  Negative for dysuria, flank pain and hematuria.   All other systems reviewed and are negative.     Physical Exam  VITAL SIGNS: BP (!) 157/81   Pulse 63   Temp 36.6 °C (97.9 °F) (Temporal)   Resp 16   Wt 83.9 kg (184 lb 15.5 oz)   SpO2 96%   BMI 27.31 kg/m²   Physical Exam  Vitals and nursing note reviewed.   Constitutional:       General: He is not in acute distress.  HENT:      Head: Normocephalic and atraumatic.      Mouth/Throat:      Pharynx: Oropharynx is clear.   Eyes:      Extraocular Movements: Extraocular movements intact.      Conjunctiva/sclera: Conjunctivae normal.   Pulmonary:      Effort: Pulmonary effort is normal.   Abdominal:      General: There is no distension.      Palpations: Abdomen is soft.   Musculoskeletal:         General: Normal range of motion.   Neurological:      Mental Status: He is alert.      Comments: Poor remote recall, but able to remember recent events   Psychiatric:         Mood and Affect: Mood normal.         Behavior: Behavior normal.         Labs:  Recent Labs     11/26/22  0802 11/27/22  0054 11/28/22  0149   WBC 9.0 9.6 6.2   RBC 4.13* 4.14* 3.98*   HEMOGLOBIN 12.7* 12.0* 11.8*   HEMATOCRIT 37.2* 37.4* 35.5*   MCV 90.1 90.3 89.2   MCH 30.8 29.0 29.6   MCHC 34.1 32.1*  33.2*   RDW 48.4 48.5 47.4   PLATELETCT 259 273 269   MPV 11.4 11.3 11.2     Recent Labs     11/26/22  0802 11/27/22  0054 11/28/22  0149   SODIUM 139 139 140   POTASSIUM 5.0 5.1 4.4   CHLORIDE 109 109 108   CO2 18* 21 19*   GLUCOSE 135* 142* 114*   BUN 32* 32* 37*   CREATININE 1.38 1.43* 1.70*   CALCIUM 9.5 9.2 9.5         Glucose:  Recent Labs     11/26/22  0802 11/27/22  0054 11/28/22  0149   GLUCOSE 135* 142* 114*     Coags:  No results for input(s): INR in the last 72 hours.      Urinalysis:   No results for input(s): COLORURINE, CLARITY, SPECGRAVITY, PHURINE, GLUCOSEUR, KETONES, NITRITE, OCCULTBLOOD, RBCURINE, BACTERIA, EPITHELCELL in the last 72 hours.    Invalid input(s): BILRUBINUR, LEUESTERASE    Imaging:  US-RENAL   Final Result      Moderate right and mild-to-moderate left hydronephrosis      DX-CHEST-LIMITED (1 VIEW)   Final Result         No acute cardiopulmonary abnormalities are identified.      CT-RENAL COLIC EVALUATION(A/P W/O)    (Results Pending)       @lastct@     Assessment/Recommendation   72 y.o. M with bilateral hydronephrosis and rising Cr, in the setting of AUS and history of CKD    Given patient's elevated voided volumes, recommend providing patient with urinal and reminding him to void q2h, measuring voided volumes  Will also obtain bladder scan after voiding to measure post void residual and ensure patient not retaining urine after voiding  If voided volumes >500cc, encourage more frequent urination to avoid excessive bladder distention  BUN elevated as well as Cr, IV fluids per primary team, hopeful that Cr will improve with better hydration  No indication for nephrostomy tubes at this time  Urology will continue to follow      Dr Francisco is aware of consult and has directed this patient's plan of care.       Leslie Bonner, P.AFreeman-C.   [address] [place] [address]   [phone]

## 2022-11-28 NOTE — DOCUMENTATION QUERY
"                                                                         [facility]                                                                       Query Response Note      PATIENT:               EVAN JURADO  ACCT #:                  [id number]  MRN:                     [id number]  :                      1950  ADMIT DATE:       2022 7:50 PM  DISCH DATE:          RESPONDING  PROVIDER #:        [id number]           QUERY TEXT:    Anemia is documented in the Medical Record. After further review, please specify the underlying cause of the anemia.  NOTE:  If the appropriate response is not listed below, please respond with a new note.    The patient's Clinical Indicators include:  73yo with dx of MI type II, HTN, CKD 3, CAD     Javy PN \"CBC with stable anemia\"     HGB 12.7, RBC 4.13   HGB 12.0, MCHC 32.1, RBC 4.14   HGB 11.8, MCHC 33.2, RBC 3.98    Risk factors: advanced age, CKD 3, CAD, HTN, COPD, MI type 2, MILTON  Treatments: labwork, monitoring    Contact me with questions.     Thank you,  Sheila Castorena, ARANZAP, CDI  [email or web address]  Options provided:   -- Due to/in/with chronic kidney disease   -- Due to/in/with kidney failure   -- Anemia in chronic disease   -- Other explanation:Other explanation-, please specify   -- Unable to determine      Query created by: Sheila Castorena on 2022 12:19 PM    RESPONSE TEXT:    Due to/in/with chronic kidney disease          Electronically signed by:  EVITA DOMINGUEZ MD 2022 1:58 PM              "

## 2022-11-28 NOTE — PROGRESS NOTES
Hospital Medicine Daily Progress Note    Date of Service  11/28/2022    Chief Complaint  Chest pain    Hospital Course  72 y.o. male who presented 11/25/2022 with past medical history of CAD s/p CABG in 1990s, PCI with stenting, IDDM 2, hypothyroidism on Synthyroid, hypertension, and CKD stage III was admitted as a transfer from [facility] on 11/25/122 for chest pain work-up.  Patient is reporting substernal chest pain that worsens with deep breath and palpation.  No EKG changes and troponins remain at baseline.  Cardiology following and recommending medical management.  Chest pain is noncardiac in nature and resolved with Tylenol 3 times daily.  Additionally, hospital course complicated by acute on chronic renal failure and patient noted for bilateral hydronephrosis on renal ultrasound.  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  Urology was consulted.    Interval Problem Update  Patient was evaluated bedside  No chest pain  Morning BP in the 150s  Creatinine trending up  Hold captopril for now  Patient nursing reporting adequate artificial urinary sphincter 2/draining  Renal ultrasound with bilateral hydronephrosis  Adjust antihypertensives  Cardiology evaluated, recommend medical mgmt  Consult Urology, appreciate recommendations  Labs on AM    I have discussed this patient's plan of care and discharge plan at IDT rounds today with Case Management, Nursing, Nursing leadership, and other members of the IDT team.    Consultants/Specialty  None    Code Status  DNAR/DNI    Disposition  Patient is not medically cleared for discharge.   Anticipate discharge to to home with close outpatient follow-up.  I have placed the appropriate orders for post-discharge needs.    Review of Systems  Review of Systems   Constitutional: Negative.    HENT: Negative.     Eyes: Negative.    Respiratory: Negative.     Cardiovascular:  Positive for chest pain.   Gastrointestinal: Negative.    Genitourinary: Negative.     Musculoskeletal: Negative.    Skin: Negative.    Neurological: Negative.    Endo/Heme/Allergies: Negative.    Psychiatric/Behavioral: Negative.        Physical Exam  Temp:  [36.6 °C (97.9 °F)-36.9 °C (98.4 °F)] 36.6 °C (97.9 °F)  Pulse:  [56-69] 69  Resp:  [16-17] 17  BP: (118-179)/(59-81) 164/76  SpO2:  [92 %-96 %] 95 %    Physical Exam  Constitutional:       General: He is not in acute distress.     Appearance: Normal appearance.   HENT:      Head: Normocephalic and atraumatic.      Nose: Nose normal. No congestion.      Mouth/Throat:      Mouth: Mucous membranes are moist.   Eyes:      Extraocular Movements: Extraocular movements intact.      Pupils: Pupils are equal, round, and reactive to light.   Cardiovascular:      Rate and Rhythm: Normal rate and regular rhythm.      Pulses: Normal pulses.      Heart sounds: Normal heart sounds.   Pulmonary:      Effort: Pulmonary effort is normal.      Breath sounds: Normal breath sounds.   Abdominal:      General: Bowel sounds are normal.      Palpations: Abdomen is soft.      Tenderness: There is no abdominal tenderness.   Musculoskeletal:         General: No swelling. Normal range of motion.      Cervical back: Normal range of motion and neck supple.   Skin:     General: Skin is warm.      Coloration: Skin is not jaundiced.   Neurological:      General: No focal deficit present.      Mental Status: He is alert and oriented to person, place, and time. Mental status is at baseline.      Cranial Nerves: No cranial nerve deficit.   Psychiatric:         Mood and Affect: Mood normal.         Behavior: Behavior normal.         Thought Content: Thought content normal.         Judgment: Judgment normal.       Fluids    Intake/Output Summary (Last 24 hours) at 11/28/2022 1023  Last data filed at 11/28/2022 0346  Gross per 24 hour   Intake 630 ml   Output 2050 ml   Net -1420 ml       Laboratory  Recent Labs     11/26/22  0802 11/27/22  0054 11/28/22  0149   WBC 9.0 9.6 6.2   RBC  4.13* 4.14* 3.98*   HEMOGLOBIN 12.7* 12.0* 11.8*   HEMATOCRIT 37.2* 37.4* 35.5*   MCV 90.1 90.3 89.2   MCH 30.8 29.0 29.6   MCHC 34.1 32.1* 33.2*   RDW 48.4 48.5 47.4   PLATELETCT 259 273 269   MPV 11.4 11.3 11.2     Recent Labs     11/26/22  0802 11/27/22  0054 11/28/22  0149   SODIUM 139 139 140   POTASSIUM 5.0 5.1 4.4   CHLORIDE 109 109 108   CO2 18* 21 19*   GLUCOSE 135* 142* 114*   BUN 32* 32* 37*   CREATININE 1.38 1.43* 1.70*   CALCIUM 9.5 9.2 9.5                   Imaging  CT-RENAL COLIC EVALUATION(A/P W/O)   Final Result      1.  Coronary calcifications.   2.  Hyperdense layering material in the gallbladder consistent with sludge, numerous small stones, or vicarious excretion of contrast. No secondary findings indicative of acute cholecystitis.   3.  Moderate bilateral hydronephrosis and bilateral hydroureter. No radiopaque obstructing ureteral or renal calculi are evident. Findings may be related to prior obstruction or reflux.   4.  Postoperative changes lumbar spine.      US-RENAL   Final Result      Moderate right and mild-to-moderate left hydronephrosis      DX-CHEST-LIMITED (1 VIEW)   Final Result         No acute cardiopulmonary abnormalities are identified.           Assessment/Plan  * CKD (chronic kidney disease) stage 3, GFR 30-59 ml/min ([facility])- (present on admission)  Assessment & Plan  Renal function at baseline but trending up  Likely secondary to bilateral hydronephrosis  Hold ACE inhibitor  Consult urology  Avoid nephrotoxin  Renal dose meds  Labs on a.m.    HTN (hypertension)- (present on admission)  Assessment & Plan  Continue home meds  Adjust meds daily  As needed antihypertensives    Hypertensive emergency- (present on admission)  Assessment & Plan  Resolved  Adjust medicines daily  Will likely need as needed oral antihypertensive on discharge    Hypothyroidism- (present on admission)  Assessment & Plan  TSH 22  Increase Synthroid  Will need repeat TSH in 6-week in outpatient  setting    Coronary artery disease- (present on admission)  Assessment & Plan  Chest pain noncardiac in nature  Continue home meds  Monitor    HLD (hyperlipidemia)- (present on admission)  Assessment & Plan  Continue home statin    Insulin dependent type 2 diabetes mellitus (HCC)- (present on admission)  Assessment & Plan  Diabetic diet  Insulin sliding scale  Labs on a.m.       VTE prophylaxis: enoxaparin ppx    I have performed a physical exam and reviewed and updated ROS and Plan today (11/28/2022). In review of yesterday's note (11/27/2022), there are no changes except as documented above.

## 2022-11-28 NOTE — CONSULTS
"Reason for PC Consult: Advance Care Planning    Consulted by: Dr. Zheng    Assessment:  General: Sulaiman is a 72 y.o. male who presented to [facility] for chest pain and shortness of breath 11/25/22. He was transferred to [facility] for ACS rule out.     PMH: CAD s/p CABG, PCI with stenting, hypothyroidism, DM, dementia, depression, HTN, HLD, CKD S3, bladder implant, COPD, former smoker.     Social: Sulaiman is a  and lost his wife, Jaziel, in 2015. According to his daughter they were, \"childhood sweethearts.\" He has three sons (Paul, Jameel, and Laci) and one daughter, Ana Rosa, who he is estranged from. He lived with his son, Paul, from [id number]. In May 2021 he moved in with his daughter-in-law, Tricia, and his son, Laci. Recently, they were no longer able to provide 24/7 care for him. Bebe explained that Sulaiman struggled with remembering when he had already administered a dose of insulin and would give himself a second that was not needed. He was admitted to a SNF 11/21/22 near [facility] in [place], [place]. Sulaiman enjoys listening to the radio, is \"very social\", and likes watching Old Western and [employer] movies.      Consults: Interventional cardiology    Dyspnea: No   Last BM: 11/26/22   Pain: No  Depression: unable to determine  Dementia: Yes, per Tricia and chart review.    Spiritual:  Is Restorationism or spirituality important for coping with this illness? No  Has a  or spiritual provider visit been requested?  No    Palliative Performance Scale: 60 %    Advance Directive: Advance Directive. Pt made selections indicating that he would not want his life prolonged in the case of an incurable and irreversible condition that will result in my death in a relatively short time, became unconscious and to a reasonable degree of medical certainty, will not regain consciousness, or if the likely risks and burden of treatment would outweigh the expected benefits. Further, that he would want " "treatment for alleviation of pain or discomfort to be provided at all times. Added instructions include pt's written statement of, \" I do not want CPR or long term artifical life support.\"  DPOA: Yes- Tricia Ceja. First alternative -in-fact: Laci Ceja. Second alternative -in-fact:Kayce Ceja.  POLST: Yes. Current POLST reflects DNR, comfort focused treatment and no artifical nutrition. Per chart review pt and family would like to complete new POLST. During phone call with Tricia she states, Sulaiman was in agreement with DNR/DNI POLST, but not comfort focused treatment. She states they went to an  and,\"there must have been some confusion.\" PC RN offered to e-mail new POSLT reflecting the pt's wishes for DNR, selective treatment/DNI, and no artifical nutrition/feed tube. Tricia in agreement and POLST emailed to [email or web address]. Document to be signed by physician when signed by Tricia and returned via e-mail.    Code Status: DNR. Confirmed with pt's DPOA-HC, Tricia, that pt would not want resuscitation attempted or intubation/mechanical ventilation.     Outcome:  0800: Consult received and EMR reviewed; case discussed with Dr. Zheng. Per MD pt unable to make medical decisions at this time and to call pt's daughter, Tricia, regarding GOC.     1245: PC RN placed phone call to Tricia. Introduced self and role of Palliative Care.  Assessed pt's understanding of their current medical status, overall health picture, and options for future care. Pt expressed good understanding of acute situation and chronic issues.Tricia described pt's decline in ability to manage his own medications or being able to be left alone. She states he would bathe, use the toilet, and get dressed by himself. He would forget to eat therefore Tricia and her  would have to remind him. She understands that the pt is in the hospital due to chest pain and his heart. And that he is having further " "evaluation from Urology.     Explored pt's values, beliefs, and preferences in order to identify GOC. Tricia states that when Sulaiman previously completed his AD he stated he would not want, \"life support\" or aggressive measures. She states that he values his independence and his ability to be social.     Discussion regarding dementia - it's progressive and terminal nature, the disease trajectory, effects of physical/emotional stress in accelerating decline, expectations with advancement (including loss of safe swallowing), and what to expect at end of life. Provided education on tube feedings/artificial nutrition, including rationale for not recommending in advanced neurocognitive disease - does not 100% protect against aspiration, quality of life issue, infection source. Tricia verbalized understanding and was grateful for the information.     Tricia had questions regarding d/c plans and timeframe. She was agreeable to be transferred to CM RN Tricia for more information.     Active listening, reflection, reminiscing, validation & normalization, empathic support and therapeutic touch utilized throughout this encounter.  All questions answered.  PC contact information given.     Updated: Dr. Zheng    Plan: Tricia to complete new POLST and e-mail to PCRN. PC RN will continue to follow and assist when needs arise.     Thank you for allowing Palliative Care to participate in this patient's care. Please feel free to call [phone] with any questions or concerns.   "

## 2022-11-29 LAB
EKG IMPRESSION: NORMAL
GLUCOSE BLD STRIP.AUTO-MCNC: 145 MG/DL (ref 65–99)
GLUCOSE BLD STRIP.AUTO-MCNC: 173 MG/DL (ref 65–99)
TROPONIN T SERPL-MCNC: 52 NG/L (ref 6–19)

## 2022-11-29 PROCEDURE — 82962 GLUCOSE BLOOD TEST: CPT

## 2022-11-29 PROCEDURE — 36415 COLL VENOUS BLD VENIPUNCTURE: CPT

## 2022-11-29 PROCEDURE — 700102 HCHG RX REV CODE 250 W/ 637 OVERRIDE(OP): Performed by: INTERNAL MEDICINE

## 2022-11-29 PROCEDURE — 84484 ASSAY OF TROPONIN QUANT: CPT

## 2022-11-29 PROCEDURE — A9270 NON-COVERED ITEM OR SERVICE: HCPCS | Performed by: INTERNAL MEDICINE

## 2022-11-29 PROCEDURE — A9270 NON-COVERED ITEM OR SERVICE: HCPCS

## 2022-11-29 PROCEDURE — 93005 ELECTROCARDIOGRAM TRACING: CPT | Performed by: INTERNAL MEDICINE

## 2022-11-29 PROCEDURE — 770020 HCHG ROOM/CARE - TELE (206)

## 2022-11-29 PROCEDURE — 700111 HCHG RX REV CODE 636 W/ 250 OVERRIDE (IP): Performed by: INTERNAL MEDICINE

## 2022-11-29 PROCEDURE — 700102 HCHG RX REV CODE 250 W/ 637 OVERRIDE(OP)

## 2022-11-29 PROCEDURE — 700111 HCHG RX REV CODE 636 W/ 250 OVERRIDE (IP): Performed by: STUDENT IN AN ORGANIZED HEALTH CARE EDUCATION/TRAINING PROGRAM

## 2022-11-29 PROCEDURE — 700102 HCHG RX REV CODE 250 W/ 637 OVERRIDE(OP): Performed by: STUDENT IN AN ORGANIZED HEALTH CARE EDUCATION/TRAINING PROGRAM

## 2022-11-29 PROCEDURE — 51798 US URINE CAPACITY MEASURE: CPT

## 2022-11-29 PROCEDURE — 80048 BASIC METABOLIC PNL TOTAL CA: CPT

## 2022-11-29 PROCEDURE — 99232 SBSQ HOSP IP/OBS MODERATE 35: CPT | Performed by: INTERNAL MEDICINE

## 2022-11-29 PROCEDURE — 93010 ELECTROCARDIOGRAM REPORT: CPT | Performed by: INTERNAL MEDICINE

## 2022-11-29 PROCEDURE — A9270 NON-COVERED ITEM OR SERVICE: HCPCS | Performed by: STUDENT IN AN ORGANIZED HEALTH CARE EDUCATION/TRAINING PROGRAM

## 2022-11-29 RX ORDER — CHOLECALCIFEROL (VITAMIN D3) 125 MCG
5 CAPSULE ORAL NIGHTLY PRN
Status: DISCONTINUED | OUTPATIENT
Start: 2022-11-29 | End: 2022-12-02 | Stop reason: HOSPADM

## 2022-11-29 RX ORDER — AMLODIPINE BESYLATE 10 MG/1
10 TABLET ORAL
Status: DISCONTINUED | OUTPATIENT
Start: 2022-11-29 | End: 2022-11-29

## 2022-11-29 RX ORDER — ISOSORBIDE MONONITRATE 30 MG/1
30 TABLET, EXTENDED RELEASE ORAL ONCE
Status: COMPLETED | OUTPATIENT
Start: 2022-11-29 | End: 2022-11-29

## 2022-11-29 RX ORDER — OXYCODONE HYDROCHLORIDE 5 MG/1
5 TABLET ORAL ONCE
Status: COMPLETED | OUTPATIENT
Start: 2022-11-29 | End: 2022-11-29

## 2022-11-29 RX ORDER — ISOSORBIDE MONONITRATE 30 MG/1
60 TABLET, EXTENDED RELEASE ORAL DAILY
Status: DISCONTINUED | OUTPATIENT
Start: 2022-11-30 | End: 2022-12-02 | Stop reason: HOSPADM

## 2022-11-29 RX ADMIN — NITROGLYCERIN 0.4 MG: 0.4 TABLET, ORALLY DISINTEGRATING SUBLINGUAL at 07:44

## 2022-11-29 RX ADMIN — ACETAMINOPHEN 1000 MG: 500 TABLET ORAL at 16:09

## 2022-11-29 RX ADMIN — INSULIN GLARGINE-YFGN 15 UNITS: 100 INJECTION, SOLUTION SUBCUTANEOUS at 17:52

## 2022-11-29 RX ADMIN — NIFEDIPINE 90 MG: 30 TABLET, FILM COATED, EXTENDED RELEASE ORAL at 06:20

## 2022-11-29 RX ADMIN — CARVEDILOL 3.12 MG: 3.12 TABLET, FILM COATED ORAL at 07:30

## 2022-11-29 RX ADMIN — RISPERIDONE 0.25 MG: 0.5 TABLET ORAL at 17:41

## 2022-11-29 RX ADMIN — LEVOTHYROXINE SODIUM 112 MCG: 0.11 TABLET ORAL at 06:17

## 2022-11-29 RX ADMIN — INSULIN LISPRO 2 UNITS: 100 INJECTION, SOLUTION INTRAVENOUS; SUBCUTANEOUS at 17:49

## 2022-11-29 RX ADMIN — GABAPENTIN 300 MG: 300 CAPSULE ORAL at 17:41

## 2022-11-29 RX ADMIN — Medication 5 MG: at 23:30

## 2022-11-29 RX ADMIN — ACETAMINOPHEN 1000 MG: 500 TABLET ORAL at 08:13

## 2022-11-29 RX ADMIN — CLOPIDOGREL BISULFATE 75 MG: 75 TABLET ORAL at 06:16

## 2022-11-29 RX ADMIN — DOCUSATE SODIUM 50 MG AND SENNOSIDES 8.6 MG 2 TABLET: 8.6; 5 TABLET, FILM COATED ORAL at 06:17

## 2022-11-29 RX ADMIN — DOCUSATE SODIUM 50 MG AND SENNOSIDES 8.6 MG 2 TABLET: 8.6; 5 TABLET, FILM COATED ORAL at 17:41

## 2022-11-29 RX ADMIN — RISPERIDONE 0.25 MG: 0.5 TABLET ORAL at 06:20

## 2022-11-29 RX ADMIN — HYDRALAZINE HYDROCHLORIDE 20 MG: 20 INJECTION INTRAMUSCULAR; INTRAVENOUS at 06:21

## 2022-11-29 RX ADMIN — INSULIN GLARGINE-YFGN 15 UNITS: 100 INJECTION, SOLUTION SUBCUTANEOUS at 06:00

## 2022-11-29 RX ADMIN — OMEPRAZOLE 40 MG: 20 CAPSULE, DELAYED RELEASE ORAL at 17:42

## 2022-11-29 RX ADMIN — CARBOXYMETHYLCELLULOSE SODIUM 1 DROP: 5 SOLUTION/ DROPS OPHTHALMIC at 16:11

## 2022-11-29 RX ADMIN — GABAPENTIN 300 MG: 300 CAPSULE ORAL at 06:16

## 2022-11-29 RX ADMIN — ENOXAPARIN SODIUM 40 MG: 40 INJECTION SUBCUTANEOUS at 17:42

## 2022-11-29 RX ADMIN — OMEPRAZOLE 40 MG: 20 CAPSULE, DELAYED RELEASE ORAL at 06:17

## 2022-11-29 RX ADMIN — CARBOXYMETHYLCELLULOSE SODIUM 1 DROP: 5 SOLUTION/ DROPS OPHTHALMIC at 21:31

## 2022-11-29 RX ADMIN — ACETAMINOPHEN 1000 MG: 500 TABLET ORAL at 21:30

## 2022-11-29 RX ADMIN — INSULIN LISPRO 2 UNITS: 100 INJECTION, SOLUTION INTRAVENOUS; SUBCUTANEOUS at 11:58

## 2022-11-29 RX ADMIN — ASPIRIN 81 MG: 81 TABLET, COATED ORAL at 06:15

## 2022-11-29 RX ADMIN — CARVEDILOL 3.12 MG: 3.12 TABLET, FILM COATED ORAL at 17:41

## 2022-11-29 RX ADMIN — ESCITALOPRAM OXALATE 20 MG: 10 TABLET ORAL at 06:14

## 2022-11-29 RX ADMIN — OXYCODONE 5 MG: 5 TABLET ORAL at 08:14

## 2022-11-29 RX ADMIN — ISOSORBIDE MONONITRATE 30 MG: 30 TABLET, EXTENDED RELEASE ORAL at 12:00

## 2022-11-29 RX ADMIN — ATORVASTATIN CALCIUM 40 MG: 40 TABLET, FILM COATED ORAL at 17:41

## 2022-11-29 RX ADMIN — CARBOXYMETHYLCELLULOSE SODIUM 1 DROP: 5 SOLUTION/ DROPS OPHTHALMIC at 06:16

## 2022-11-29 ASSESSMENT — ENCOUNTER SYMPTOMS
RESPIRATORY NEGATIVE: 1
GASTROINTESTINAL NEGATIVE: 1
PSYCHIATRIC NEGATIVE: 1
MUSCULOSKELETAL NEGATIVE: 1
EYES NEGATIVE: 1
CONSTITUTIONAL NEGATIVE: 1
NEUROLOGICAL NEGATIVE: 1

## 2022-11-29 ASSESSMENT — FIBROSIS 4 INDEX: FIB4 SCORE: 1.14

## 2022-11-29 NOTE — PROGRESS NOTES
Monitor Report    Sinus Tyson & Sinus Rhythm  PVC, trigeminy  Rate 50's- 80's  .21/.07/.46

## 2022-11-29 NOTE — CARE PLAN
The patient is Stable - Low risk of patient condition declining or worsening    Shift Goals  Clinical Goals: monitor pain, urology consult  Patient Goals: urology consult  Family Goals: MORGAN    Progress made toward(s) clinical / shift goals: monitor output, bladder scan completed,     Patient is not progressing towards the following goals: n/a

## 2022-11-29 NOTE — PROGRESS NOTES
"[facility] [place] Progress Note    Service: [facility] [place]  Patient's Name: Sulaiman Ceja  MRN: [id number]  Admit Date:11/25/2022  Today's Date: 11/29/2022   Room #: T729/01      Identification:  Sulaiman Ceja is a 72 y.o. male patient with a history of CKD, CAD admitted 11/25 with AMS and chest pain. During admission, his Cr was found to be rising and RBUS done 11/26 demonstrated bilateral hydronephrosis. CT imaging with consistent findings, no obstructing stone seen.    Overnight-Interval events:     11/29. Lower voided volumes 200-400cc with normal PVR following voids. Creat 1.7. Patient awake and alert, resting comfortably in bed, states he has been emptying well without difficulty.    11/28. Patient seen and examined, lying in bed in NAD. Reports brain fog and admits that he cannot clearly recall much of his medical history at present. States that he had part of his bladder removed which he believes was 2/2 bladder cancer. He developed urinary incontinence, and had an artificial urinary sphincter placed several years ago in [place]. He then developed GH and what he believes was sphincter erosion, so his AUS was replaced, and he is unsure when this was done. States he does not get sensation of needing to void and at home, he sets a timer for q2h to remind him to urinate. Denies flank pain, GH, dysuria, abd pain. Cr today 1.7 (1.43), no urinalysis done prior to consult. States that he has seen a \"kidney specialist\" before though he is unsure if this was urology or nephrology, and he believes prior imaging has showed \"kidney swelling\" (unsure where this imaging was done). Notably, his last measured voided volume was 1500cc.    Subjective/ROS:   No CP/SOB/F/C. No abdominal pain or pressure, no flank pain.    Physical Exam:  Current Vitals:   /61   Pulse 61   Temp 36.7 °C (98.1 °F) (Temporal)   Resp 17   Wt 83.9 kg (184 lb 15.5 oz)   SpO2 98%   BMI 27.31 kg/m²     11/27 1900 - 11/29 0659  In: 507 [P.O.:507]  Out: " 2600 [Urine:2600]    GEN : NAD, alert  RES:  no acute respiratory distress  ABD: Soft ND, non-tender to palpation.    :   No reyna  INC:  CDI, no erythema, exudate, induration.   EXT:  No edema, SCD's in place    Labs:   Recent Labs     11/27/22 0054 11/28/22 0149   SODIUM 139 140   POTASSIUM 5.1 4.4   CHLORIDE 109 108   CO2 21 19*   GLUCOSE 142* 114*   BUN 32* 37*   CREATININE 1.43* 1.70*   CALCIUM 9.2 9.5     Recent Labs     11/27/22 0054 11/28/22 0149   WBC 9.6 6.2   RBC 4.14* 3.98*   HEMOGLOBIN 12.0* 11.8*   HEMATOCRIT 37.4* 35.5*   MCV 90.3 89.2   MCH 29.0 29.6   MCHC 32.1* 33.2*   RDW 48.5 47.4   PLATELETCT 273 269   MPV 11.3 11.2     Lab Results   Component Value Date/Time    GLUCOSE 114 (H) 11/28/2022 01:49 AM    GLUCOSE 142 (H) 11/27/2022 12:54 AM    GLUCOSE 135 (H) 11/26/2022 08:02 AM    GLUCOSE 158 (H) 10/06/2022 06:35 AM       Assessment/Plan    72 y.o. M with bilateral hydronephrosis and rising Cr, in the setting of AUS and history of CKD.    Overnight, appropriate voided volumes 200cc-400cc with normal/low PVR of 32cc.    Creatinine elevated at 1.7, will continue to trend.    PLAN:     - Reminders to void q2h  - Continue to measure voided volumes  - Recommend increasing frequency of voids to keep volumes <500-600cc/void  - Continue to trend BUN/Creatinine, hopeful that Cr will improve with better hydration and better bladder emptying  - No indication for nephrostomy tubes at this time    Gabriela Chiang PA-C   [facility] [place]

## 2022-11-29 NOTE — DISCHARGE PLANNING
Agency/Facility Name: [facility]   Spoke To: Vanessa   Outcome: Per Vanessa referral not received. Vanessa suggested to hard fax referral to [phone].

## 2022-11-29 NOTE — PROGRESS NOTES
Hospital Medicine Daily Progress Note    Date of Service  11/29/2022    Chief Complaint  Chest pain    Hospital Course  72 y.o. male who presented 11/25/2022 with past medical history of CAD s/p CABG in 1990s, PCI with stenting, IDDM 2, hypothyroidism on Synthyroid, hypertension, and CKD stage III was admitted as a transfer from [facility] on 11/25/122 for chest pain work-up.  Patient is reporting substernal chest pain that worsens with deep breath and palpation.  No EKG changes and troponins remain at baseline.  Cardiology following and recommending medical management.  Chest pain is noncardiac in nature and resolved with Tylenol 3 times daily.  Additionally, hospital course complicated by acute on chronic renal failure and patient noted for bilateral hydronephrosis on renal ultrasound.  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  Urology was consulted.    Interval Problem Update  Patient seen and examined, was complaining of chest pain this morning, repeat troponin trending down.  He was seen by cardiology previously and recommending medical management.  BP not well controlled so increasing his IMDUR   Regarding his bilateral hydronephrosis urology following . Monitor kidney function  Appreciate rec.     I have discussed this patient's plan of care and discharge plan at IDT rounds today with Case Management, Nursing, Nursing leadership, and other members of the IDT team.    Consultants/Specialty  Cardiology  Urology     Code Status  DNAR/DNI    Disposition  Patient is not medically cleared for discharge.   Anticipate discharge to to home with close outpatient follow-up.  I have placed the appropriate orders for post-discharge needs.    Review of Systems  Review of Systems   Constitutional: Negative.    HENT: Negative.     Eyes: Negative.    Respiratory: Negative.     Cardiovascular:  Positive for chest pain.   Gastrointestinal: Negative.    Genitourinary: Negative.    Musculoskeletal: Negative.     Skin: Negative.    Neurological: Negative.    Endo/Heme/Allergies: Negative.    Psychiatric/Behavioral: Negative.        Physical Exam  Temp:  [36.2 °C (97.2 °F)-36.8 °C (98.2 °F)] 36.7 °C (98.1 °F)  Pulse:  [43-63] 61  Resp:  [16-17] 17  BP: (121-181)/(61-78) 121/61  SpO2:  [93 %-98 %] 98 %    Physical Exam  Constitutional:       General: He is not in acute distress.     Appearance: Normal appearance.   HENT:      Head: Normocephalic and atraumatic.      Nose: Nose normal. No congestion.      Mouth/Throat:      Mouth: Mucous membranes are moist.   Eyes:      Extraocular Movements: Extraocular movements intact.      Pupils: Pupils are equal, round, and reactive to light.   Cardiovascular:      Rate and Rhythm: Normal rate and regular rhythm.      Pulses: Normal pulses.      Heart sounds: Normal heart sounds.   Pulmonary:      Effort: Pulmonary effort is normal.      Breath sounds: Normal breath sounds.   Abdominal:      General: Bowel sounds are normal.      Palpations: Abdomen is soft.      Tenderness: There is no abdominal tenderness.   Musculoskeletal:         General: No swelling. Normal range of motion.      Cervical back: Normal range of motion and neck supple.   Skin:     General: Skin is warm.      Coloration: Skin is not jaundiced.   Neurological:      General: No focal deficit present.      Mental Status: He is alert and oriented to person, place, and time. Mental status is at baseline.      Cranial Nerves: No cranial nerve deficit.   Psychiatric:         Mood and Affect: Mood normal.         Behavior: Behavior normal.         Thought Content: Thought content normal.         Judgment: Judgment normal.       Fluids    Intake/Output Summary (Last 24 hours) at 11/29/2022 1423  Last data filed at 11/29/2022 1100  Gross per 24 hour   Intake 120 ml   Output 950 ml   Net -830 ml         Laboratory  Recent Labs     11/27/22  0054 11/28/22  0149   WBC 9.6 6.2   RBC 4.14* 3.98*   HEMOGLOBIN 12.0* 11.8*    HEMATOCRIT 37.4* 35.5*   MCV 90.3 89.2   MCH 29.0 29.6   MCHC 32.1* 33.2*   RDW 48.5 47.4   PLATELETCT 273 269   MPV 11.3 11.2       Recent Labs     11/27/22  0054 11/28/22  0149   SODIUM 139 140   POTASSIUM 5.1 4.4   CHLORIDE 109 108   CO2 21 19*   GLUCOSE 142* 114*   BUN 32* 37*   CREATININE 1.43* 1.70*   CALCIUM 9.2 9.5                     Imaging  CT-RENAL COLIC EVALUATION(A/P W/O)   Final Result      1.  Coronary calcifications.   2.  Hyperdense layering material in the gallbladder consistent with sludge, numerous small stones, or vicarious excretion of contrast. No secondary findings indicative of acute cholecystitis.   3.  Moderate bilateral hydronephrosis and bilateral hydroureter. No radiopaque obstructing ureteral or renal calculi are evident. Findings may be related to prior obstruction or reflux.   4.  Postoperative changes lumbar spine.      US-RENAL   Final Result      Moderate right and mild-to-moderate left hydronephrosis      DX-CHEST-LIMITED (1 VIEW)   Final Result         No acute cardiopulmonary abnormalities are identified.             Assessment/Plan  * CKD (chronic kidney disease) stage 3, GFR 30-59 ml/min ([facility])- (present on admission)  Assessment & Plan  Renal function at baseline but trending up  Likely secondary to bilateral hydronephrosis  Hold ACE inhibitor  Consult urology  Avoid nephrotoxin  Renal dose meds  Labs on a.m.    Hypertensive emergency- (present on admission)  Assessment & Plan  Resolved  Adjust medicines daily  Will likely need as needed oral antihypertensive on discharge    Hypothyroidism- (present on admission)  Assessment & Plan  TSH 22  Increase Synthroid  Will need repeat TSH in 6-week in outpatient setting    Coronary artery disease- (present on admission)  Assessment & Plan  Chest pain noncardiac in nature  Continue home meds  Monitor    HTN (hypertension)- (present on admission)  Assessment & Plan  Continue home meds  Adjust meds daily  As needed  antihypertensives    HLD (hyperlipidemia)- (present on admission)  Assessment & Plan  Continue home statin    Insulin dependent type 2 diabetes mellitus (HCC)- (present on admission)  Assessment & Plan  Diabetic diet  Insulin sliding scale  Labs on a.m.         VTE prophylaxis: enoxaparin ppx    I have performed a physical exam and reviewed and updated ROS and Plan today (11/29/2022). In review of yesterday's note (11/28/2022), there are no changes except as documented above.

## 2022-11-29 NOTE — PALLIATIVE CARE
Palliative Care follow-up  Received return email with signed POLST form Tricia. Signed by Dr. Cook. Hard copy at pt's bedside and will go with pt on discharge. A copy will be scanned into epic.     Updated: Dr. Cook    Plan: Palliative care to continue to follow, provide support, and help facilitate decision-making as clinical picture evolves.    Thank you for allowing Palliative Care to support this patient and family. Contact [phone] for additional assistance, change in patient status, or with any questions/concerns.

## 2022-11-29 NOTE — CARE PLAN
The patient is Stable - Low risk of patient condition declining or worsening    Shift Goals  Clinical Goals:  (Monitor pain)  Patient Goals:  (Rest)  Family Goals:  (MORGAN)      Problem: Knowledge Deficit - Standard  Goal: Patient and family/care givers will demonstrate understanding of plan of care, disease process/condition, diagnostic tests and medications  Outcome: Progressing     Problem: Pain - Standard  Goal: Alleviation of pain or a reduction in pain to the patient’s comfort goal  Outcome: Progressing     Problem: Fall Risk  Goal: Patient will remain free from falls  Outcome: Progressing       Progress made toward(s) clinical / shift goals:  Progressing    Patient is not progressing towards the following goals:

## 2022-11-30 LAB
ANION GAP SERPL CALC-SCNC: 14 MMOL/L (ref 7–16)
ANION GAP SERPL CALC-SCNC: 16 MMOL/L (ref 7–16)
BUN SERPL-MCNC: 39 MG/DL (ref 8–22)
BUN SERPL-MCNC: 40 MG/DL (ref 8–22)
CALCIUM SERPL-MCNC: 9.3 MG/DL (ref 8.5–10.5)
CALCIUM SERPL-MCNC: 9.9 MG/DL (ref 8.5–10.5)
CHLORIDE SERPL-SCNC: 106 MMOL/L (ref 96–112)
CHLORIDE SERPL-SCNC: 110 MMOL/L (ref 96–112)
CO2 SERPL-SCNC: 15 MMOL/L (ref 20–33)
CO2 SERPL-SCNC: 16 MMOL/L (ref 20–33)
CREAT SERPL-MCNC: 1.56 MG/DL (ref 0.5–1.4)
CREAT SERPL-MCNC: 1.63 MG/DL (ref 0.5–1.4)
ERYTHROCYTE [DISTWIDTH] IN BLOOD BY AUTOMATED COUNT: 47.2 FL (ref 35.9–50)
GFR SERPLBLD CREATININE-BSD FMLA CKD-EPI: 44 ML/MIN/1.73 M 2
GFR SERPLBLD CREATININE-BSD FMLA CKD-EPI: 47 ML/MIN/1.73 M 2
GLUCOSE SERPL-MCNC: 100 MG/DL (ref 65–99)
GLUCOSE SERPL-MCNC: 145 MG/DL (ref 65–99)
HCT VFR BLD AUTO: 35.2 % (ref 42–52)
HGB BLD-MCNC: 11.6 G/DL (ref 14–18)
MCH RBC QN AUTO: 29.1 PG (ref 27–33)
MCHC RBC AUTO-ENTMCNC: 33 G/DL (ref 33.7–35.3)
MCV RBC AUTO: 88.4 FL (ref 81.4–97.8)
PLATELET # BLD AUTO: 259 K/UL (ref 164–446)
PMV BLD AUTO: 11.2 FL (ref 9–12.9)
POTASSIUM SERPL-SCNC: 4.2 MMOL/L (ref 3.6–5.5)
POTASSIUM SERPL-SCNC: 4.6 MMOL/L (ref 3.6–5.5)
RBC # BLD AUTO: 3.98 M/UL (ref 4.7–6.1)
SODIUM SERPL-SCNC: 136 MMOL/L (ref 135–145)
SODIUM SERPL-SCNC: 141 MMOL/L (ref 135–145)
WBC # BLD AUTO: 6.4 K/UL (ref 4.8–10.8)

## 2022-11-30 PROCEDURE — 770020 HCHG ROOM/CARE - TELE (206)

## 2022-11-30 PROCEDURE — 700111 HCHG RX REV CODE 636 W/ 250 OVERRIDE (IP): Performed by: STUDENT IN AN ORGANIZED HEALTH CARE EDUCATION/TRAINING PROGRAM

## 2022-11-30 PROCEDURE — 700102 HCHG RX REV CODE 250 W/ 637 OVERRIDE(OP): Performed by: STUDENT IN AN ORGANIZED HEALTH CARE EDUCATION/TRAINING PROGRAM

## 2022-11-30 PROCEDURE — 700102 HCHG RX REV CODE 250 W/ 637 OVERRIDE(OP): Performed by: INTERNAL MEDICINE

## 2022-11-30 PROCEDURE — A9270 NON-COVERED ITEM OR SERVICE: HCPCS | Performed by: INTERNAL MEDICINE

## 2022-11-30 PROCEDURE — 700111 HCHG RX REV CODE 636 W/ 250 OVERRIDE (IP): Performed by: INTERNAL MEDICINE

## 2022-11-30 PROCEDURE — 99232 SBSQ HOSP IP/OBS MODERATE 35: CPT | Performed by: INTERNAL MEDICINE

## 2022-11-30 PROCEDURE — 80048 BASIC METABOLIC PNL TOTAL CA: CPT

## 2022-11-30 PROCEDURE — 85027 COMPLETE CBC AUTOMATED: CPT

## 2022-11-30 PROCEDURE — A9270 NON-COVERED ITEM OR SERVICE: HCPCS | Performed by: STUDENT IN AN ORGANIZED HEALTH CARE EDUCATION/TRAINING PROGRAM

## 2022-11-30 PROCEDURE — 36415 COLL VENOUS BLD VENIPUNCTURE: CPT

## 2022-11-30 RX ADMIN — ISOSORBIDE MONONITRATE 60 MG: 30 TABLET, EXTENDED RELEASE ORAL at 06:43

## 2022-11-30 RX ADMIN — HYDRALAZINE HYDROCHLORIDE 20 MG: 20 INJECTION INTRAMUSCULAR; INTRAVENOUS at 23:27

## 2022-11-30 RX ADMIN — NIFEDIPINE 90 MG: 30 TABLET, FILM COATED, EXTENDED RELEASE ORAL at 06:46

## 2022-11-30 RX ADMIN — ACETAMINOPHEN 1000 MG: 500 TABLET ORAL at 14:07

## 2022-11-30 RX ADMIN — ESCITALOPRAM OXALATE 20 MG: 10 TABLET ORAL at 06:43

## 2022-11-30 RX ADMIN — LEVOTHYROXINE SODIUM 112 MCG: 0.11 TABLET ORAL at 06:43

## 2022-11-30 RX ADMIN — OMEPRAZOLE 40 MG: 20 CAPSULE, DELAYED RELEASE ORAL at 06:43

## 2022-11-30 RX ADMIN — INSULIN LISPRO 2 UNITS: 100 INJECTION, SOLUTION INTRAVENOUS; SUBCUTANEOUS at 17:09

## 2022-11-30 RX ADMIN — CARVEDILOL 3.12 MG: 3.12 TABLET, FILM COATED ORAL at 08:10

## 2022-11-30 RX ADMIN — ASPIRIN 81 MG: 81 TABLET, COATED ORAL at 06:43

## 2022-11-30 RX ADMIN — GABAPENTIN 300 MG: 300 CAPSULE ORAL at 17:13

## 2022-11-30 RX ADMIN — CARBOXYMETHYLCELLULOSE SODIUM 1 DROP: 5 SOLUTION/ DROPS OPHTHALMIC at 06:46

## 2022-11-30 RX ADMIN — CLOPIDOGREL BISULFATE 75 MG: 75 TABLET ORAL at 06:44

## 2022-11-30 RX ADMIN — DOCUSATE SODIUM 50 MG AND SENNOSIDES 8.6 MG 2 TABLET: 8.6; 5 TABLET, FILM COATED ORAL at 17:13

## 2022-11-30 RX ADMIN — GABAPENTIN 300 MG: 300 CAPSULE ORAL at 06:44

## 2022-11-30 RX ADMIN — CARBOXYMETHYLCELLULOSE SODIUM 1 DROP: 5 SOLUTION/ DROPS OPHTHALMIC at 14:08

## 2022-11-30 RX ADMIN — ACETAMINOPHEN 1000 MG: 500 TABLET ORAL at 08:10

## 2022-11-30 RX ADMIN — ACETAMINOPHEN 1000 MG: 500 TABLET ORAL at 20:22

## 2022-11-30 RX ADMIN — OMEPRAZOLE 40 MG: 20 CAPSULE, DELAYED RELEASE ORAL at 17:11

## 2022-11-30 RX ADMIN — RISPERIDONE 0.25 MG: 0.5 TABLET ORAL at 06:44

## 2022-11-30 RX ADMIN — DOCUSATE SODIUM 50 MG AND SENNOSIDES 8.6 MG 2 TABLET: 8.6; 5 TABLET, FILM COATED ORAL at 06:44

## 2022-11-30 RX ADMIN — INSULIN GLARGINE-YFGN 15 UNITS: 100 INJECTION, SOLUTION SUBCUTANEOUS at 17:10

## 2022-11-30 RX ADMIN — INSULIN GLARGINE-YFGN 15 UNITS: 100 INJECTION, SOLUTION SUBCUTANEOUS at 08:10

## 2022-11-30 RX ADMIN — CARBOXYMETHYLCELLULOSE SODIUM 1 DROP: 5 SOLUTION/ DROPS OPHTHALMIC at 20:22

## 2022-11-30 RX ADMIN — ATORVASTATIN CALCIUM 40 MG: 40 TABLET, FILM COATED ORAL at 17:13

## 2022-11-30 RX ADMIN — INSULIN LISPRO 2 UNITS: 100 INJECTION, SOLUTION INTRAVENOUS; SUBCUTANEOUS at 21:07

## 2022-11-30 RX ADMIN — ENOXAPARIN SODIUM 40 MG: 40 INJECTION SUBCUTANEOUS at 17:12

## 2022-11-30 RX ADMIN — RISPERIDONE 0.25 MG: 0.5 TABLET ORAL at 17:13

## 2022-11-30 ASSESSMENT — ENCOUNTER SYMPTOMS
GASTROINTESTINAL NEGATIVE: 1
CONSTITUTIONAL NEGATIVE: 1
RESPIRATORY NEGATIVE: 1
NEUROLOGICAL NEGATIVE: 1
MUSCULOSKELETAL NEGATIVE: 1
PSYCHIATRIC NEGATIVE: 1
EYES NEGATIVE: 1

## 2022-11-30 ASSESSMENT — FIBROSIS 4 INDEX: FIB4 SCORE: 1.18

## 2022-11-30 ASSESSMENT — PAIN DESCRIPTION - PAIN TYPE
TYPE: ACUTE PAIN
TYPE: ACUTE PAIN;CHRONIC PAIN

## 2022-11-30 NOTE — PROGRESS NOTES
Monitor Summary:   Rhythm: SR, SB  Rate: 52-70  Measurement: .25/.09/.45  Ectopy: O PVC, BIG, TRIG. Multiple 2.9 sec pauses, MD aware

## 2022-11-30 NOTE — DISCHARGE PLANNING
Anticipated Discharge Disposition: Home    Action: pt pending medical clearance, pt has no case management needs    Barriers to Discharge: medical clearance    Plan: dc to home when cleared.     Casandra Zuluaga(Attending)

## 2022-11-30 NOTE — DISCHARGE PLANNING
DC Transport Scheduled    Received request at: 11/28/2022 AT 2:16pm    Transport Company Scheduled:  [employer]    Scheduled Date: 12/02/2022  Scheduled Time: 1005    Destination: [facility] at [address] [place] [id number]    Notified care team of scheduled transport via Voalte.     If there are any changes needed to the DC transportation scheduled, please contact [facility] at ext. [phone] between the hours of 6320-0865 Mon-Fri. If outside those hours, contact the ED Case Manager at ext. [phone].

## 2022-11-30 NOTE — DISCHARGE PLANNING
DC Transport Scheduled    Received request at: 11/28/22 AT 2:16pm    Transport Company Scheduled:  [employer]    Scheduled Date: 12/02/2022   Scheduled Time: 1005    Destination: [place] [address] [address] [facility] [id number]    Notified care team of scheduled transport via Voalte.     If there are any changes needed to the DC transportation scheduled, please contact [facility] at ext. [phone] between the hours of 3071-0504 Mon-Fri. If outside those hours, contact the ED Case Manager at ext. [phone].      **PT will need an Approved Services Form and the trip will cost $1555.30.

## 2022-11-30 NOTE — PROGRESS NOTES
Monitor Summary    Rhythm : SR/SB 63 - 92    Ectopy : PVC/BIG/TRIG    WV/QRS/QT : .22/.08/.41

## 2022-11-30 NOTE — PROGRESS NOTES
Handoff report received from night shift nurse. Pt care assumed. Pt is currently resting in bed. POC discussed with Pt and Pt verbalizes no questions at this time. Pt is AAOx3, on Ra, on Tele monitoring, and VSS. Call light and belongings within reach, bed in lowest and locked position, and Pt educated on use of call light.    Quality 111:Pneumonia Vaccination Status For Older Adults: Pneumococcal Vaccination Previously Received Detail Level: Detailed Quality 110: Preventive Care And Screening: Influenza Immunization: Influenza Immunization Administered during Influenza season

## 2022-11-30 NOTE — DISCHARGE PLANNING
Received documentation from [employer] that PT does not have benefits. Have reached out to [facility] for a transport quote to [place].

## 2022-11-30 NOTE — PROGRESS NOTES
"[facility] [place] Progress Note    Service: [facility] [place]  Patient's Name: Sulaiman Ceja  MRN: [id number]  Admit Date:11/25/2022  Today's Date: 11/29/2022   Room #: T729/01      Identification:  Sulaiman Ceja is a 72 y.o. male patient with a history of CKD, CAD admitted 11/25 with AMS and chest pain. During admission, his Cr was found to be rising and RBUS done 11/26 demonstrated bilateral hydronephrosis. CT imaging with consistent findings, no obstructing stone seen.    Overnight-Interval events:     11/30. Patient sitting up at bedside, states he feels the need to void at this time. Doing well and continues to empty with appropriate volumes, confirmed with RN today. Will continue to remind patient to void q2-3 hours.    11/29. Lower voided volumes 200-400cc with normal PVR following voids. Creat 1.7. Patient awake and alert, resting comfortably in bed, states he has been emptying well without difficulty.    11/28. Patient seen and examined, lying in bed in NAD. Reports brain fog and admits that he cannot clearly recall much of his medical history at present. States that he had part of his bladder removed which he believes was 2/2 bladder cancer. He developed urinary incontinence, and had an artificial urinary sphincter placed several years ago in [place]. He then developed GH and what he believes was sphincter erosion, so his AUS was replaced, and he is unsure when this was done. States he does not get sensation of needing to void and at home, he sets a timer for q2h to remind him to urinate. Denies flank pain, GH, dysuria, abd pain. Cr today 1.7 (1.43), no urinalysis done prior to consult. States that he has seen a \"kidney specialist\" before though he is unsure if this was urology or nephrology, and he believes prior imaging has showed \"kidney swelling\" (unsure where this imaging was done). Notably, his last measured voided volume was 1500cc.    Subjective/ROS:   No CP/SOB/F/C. No abdominal pain or pressure, no flank " pain.    Physical Exam:  Current Vitals:   BP (!) 150/64 Comment: RN notified   Pulse (!) 48 Comment: RN notified   Temp 36.6 °C (97.9 °F) (Temporal)   Resp 15   Wt 80.9 kg (178 lb 5.6 oz)   SpO2 91%   BMI 26.34 kg/m²     11/28 1900 - 11/30 0659  In: 240 [P.O.:240]  Out: 1325 [Urine:1325]    GEN : NAD, alert  RES:  no acute respiratory distress  ABD: Soft ND, non-tender to palpation.    :   No reyna  INC:  CDI, no erythema, exudate, induration.   EXT:  No edema, SCD's in place    Labs:   Recent Labs     11/28/22  0149 11/30/22  0026   SODIUM 140 136   POTASSIUM 4.4 4.2   CHLORIDE 108 106   CO2 19* 16*   GLUCOSE 114* 100*   BUN 37* 40*   CREATININE 1.70* 1.63*   CALCIUM 9.5 9.3     Recent Labs     11/28/22 0149 11/30/22  0026   WBC 6.2 6.4   RBC 3.98* 3.98*   HEMOGLOBIN 11.8* 11.6*   HEMATOCRIT 35.5* 35.2*   MCV 89.2 88.4   MCH 29.6 29.1   MCHC 33.2* 33.0*   RDW 47.4 47.2   PLATELETCT 269 259   MPV 11.2 11.2     Lab Results   Component Value Date/Time    GLUCOSE 100 (H) 11/30/2022 12:26 AM    GLUCOSE 114 (H) 11/28/2022 01:49 AM    GLUCOSE 142 (H) 11/27/2022 12:54 AM    GLUCOSE 135 (H) 11/26/2022 08:02 AM       Assessment/Plan    72 y.o. M with bilateral hydronephrosis and rising Cr, in the setting of AUS and history of CKD.    Overnight, appropriate voided volumes 200cc-400cc with normal/low PVR of 32cc.    Creatinine elevated at 1.63 (1.7), will continue to trend.    PLAN:     - Reminders to void q2h  - Continue to measure voided volumes  - Recommend increasing frequency of voids to keep volumes <500-600cc/void  - Continue to trend BUN/Creatinine, hopeful that Cr will improve with better hydration and better bladder emptying  - No indication for nephrostomy tubes at this time  - Doing well from the urology standpoint, will continue to monitor while inpatient    Gabriela Chiang PA-C   [facility] [place]

## 2022-11-30 NOTE — DISCHARGE PLANNING
Agency/Facility Name: [facility]   Outcome: DPA left Vmail to verify Pt can be accepted back once Pt is medically cleared. DPA awaiting call back.     1111  Agency/Facility Name: [facility]   Spoke To: Idalia   Outcome: Idalia called to inform bed is being held for Pt once he is medically cleared. Idalia also requesting urology/cardiac updates. DPA to call back with updates.

## 2022-11-30 NOTE — PROGRESS NOTES
Hospital Medicine Daily Progress Note    Date of Service  11/30/2022    Chief Complaint  Chest pain    Hospital Course  72 y.o. male who presented 11/25/2022 with past medical history of CAD s/p CABG in 1990s, PCI with stenting, IDDM 2, hypothyroidism on Synthyroid, hypertension, and CKD stage III was admitted as a transfer from [facility] on 11/25/122 for chest pain work-up.  Patient is reporting substernal chest pain that worsens with deep breath and palpation.  No EKG changes and troponins remain at baseline.  Cardiology following and recommending medical management.  Chest pain is noncardiac in nature and resolved with Tylenol 3 times daily.  Additionally, hospital course complicated by acute on chronic renal failure and patient noted for bilateral hydronephrosis on renal ultrasound.  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  Urology was consulted.    Interval Problem Update  11/29:Patient seen and examined, was complaining of chest pain this morning, repeat troponin trending down.  He was seen by cardiology previously and recommending medical management.  BP not well controlled so increasing his IMDUR   Regarding his bilateral hydronephrosis urology following . Monitor kidney function  Appreciate rec.   11/30: Patient seen and examined, having some pauses today holding carvedilol and will monitor   Urology following regarding his bilateral hydronephrosis appreciate rec.      I have discussed this patient's plan of care and discharge plan at IDT rounds today with Case Management, Nursing, Nursing leadership, and other members of the IDT team.    Consultants/Specialty  Cardiology  Urology     Code Status  DNAR/DNI    Disposition  Patient is not medically cleared for discharge.   Anticipate discharge to to home with close outpatient follow-up.  I have placed the appropriate orders for post-discharge needs.    Review of Systems  Review of Systems   Constitutional: Negative.    HENT: Negative.      Eyes: Negative.    Respiratory: Negative.     Cardiovascular:  Positive for chest pain.   Gastrointestinal: Negative.    Genitourinary: Negative.    Musculoskeletal: Negative.    Skin: Negative.    Neurological: Negative.    Endo/Heme/Allergies: Negative.    Psychiatric/Behavioral: Negative.        Physical Exam  Temp:  [36.6 °C (97.9 °F)-36.9 °C (98.4 °F)] 36.6 °C (97.9 °F)  Pulse:  [48-62] 48  Resp:  [15-18] 15  BP: (119-162)/(56-98) 150/64  SpO2:  [91 %-95 %] 91 %    Physical Exam  Constitutional:       General: He is not in acute distress.     Appearance: Normal appearance.   HENT:      Head: Normocephalic and atraumatic.      Nose: Nose normal. No congestion.      Mouth/Throat:      Mouth: Mucous membranes are moist.   Eyes:      Extraocular Movements: Extraocular movements intact.      Pupils: Pupils are equal, round, and reactive to light.   Cardiovascular:      Rate and Rhythm: Normal rate and regular rhythm.      Pulses: Normal pulses.      Heart sounds: Normal heart sounds.   Pulmonary:      Effort: Pulmonary effort is normal.      Breath sounds: Normal breath sounds.   Abdominal:      General: Bowel sounds are normal.      Palpations: Abdomen is soft.      Tenderness: There is no abdominal tenderness.   Musculoskeletal:         General: No swelling. Normal range of motion.      Cervical back: Normal range of motion and neck supple.   Skin:     General: Skin is warm.      Coloration: Skin is not jaundiced.   Neurological:      General: No focal deficit present.      Mental Status: He is alert and oriented to person, place, and time. Mental status is at baseline.      Cranial Nerves: No cranial nerve deficit.   Psychiatric:         Mood and Affect: Mood normal.         Behavior: Behavior normal.         Thought Content: Thought content normal.         Judgment: Judgment normal.       Fluids    Intake/Output Summary (Last 24 hours) at 11/30/2022 1251  Last data filed at 11/30/2022 1230  Gross per 24 hour    Intake 360 ml   Output 875 ml   Net -515 ml         Laboratory  Recent Labs     11/28/22  0149 11/30/22  0026   WBC 6.2 6.4   RBC 3.98* 3.98*   HEMOGLOBIN 11.8* 11.6*   HEMATOCRIT 35.5* 35.2*   MCV 89.2 88.4   MCH 29.6 29.1   MCHC 33.2* 33.0*   RDW 47.4 47.2   PLATELETCT 269 259   MPV 11.2 11.2       Recent Labs     11/28/22  0149 11/30/22  0026   SODIUM 140 136   POTASSIUM 4.4 4.2   CHLORIDE 108 106   CO2 19* 16*   GLUCOSE 114* 100*   BUN 37* 40*   CREATININE 1.70* 1.63*   CALCIUM 9.5 9.3                     Imaging  CT-RENAL COLIC EVALUATION(A/P W/O)   Final Result      1.  Coronary calcifications.   2.  Hyperdense layering material in the gallbladder consistent with sludge, numerous small stones, or vicarious excretion of contrast. No secondary findings indicative of acute cholecystitis.   3.  Moderate bilateral hydronephrosis and bilateral hydroureter. No radiopaque obstructing ureteral or renal calculi are evident. Findings may be related to prior obstruction or reflux.   4.  Postoperative changes lumbar spine.      US-RENAL   Final Result      Moderate right and mild-to-moderate left hydronephrosis      DX-CHEST-LIMITED (1 VIEW)   Final Result         No acute cardiopulmonary abnormalities are identified.             Assessment/Plan  * CKD (chronic kidney disease) stage 3, GFR 30-59 ml/min ([facility])- (present on admission)  Assessment & Plan  Renal function at baseline but trending up  Likely secondary to bilateral hydronephrosis  Hold ACE inhibitor  Consult urology  Avoid nephrotoxin  Renal dose meds  Labs on a.m.    Hypertensive emergency- (present on admission)  Assessment & Plan  Resolved  Adjust medicines daily  Will likely need as needed oral antihypertensive on discharge    Hypothyroidism- (present on admission)  Assessment & Plan  TSH 22  Increase Synthroid  Will need repeat TSH in 6-week in outpatient setting    Coronary artery disease- (present on admission)  Assessment & Plan  Chest pain noncardiac in  nature  Continue home meds  Monitor    HTN (hypertension)- (present on admission)  Assessment & Plan  Continue home meds  Adjust meds daily  As needed antihypertensives    HLD (hyperlipidemia)- (present on admission)  Assessment & Plan  Continue home statin    Insulin dependent type 2 diabetes mellitus (HCC)- (present on admission)  Assessment & Plan  Diabetic diet  Insulin sliding scale  Labs on a.m.         VTE prophylaxis: enoxaparin ppx    I have performed a physical exam and reviewed and updated ROS and Plan today (11/30/2022). In review of yesterday's note (11/29/2022), there are no changes except as documented above.

## 2022-11-30 NOTE — PROGRESS NOTES
Received a call from the moniotr tech, pt had a 2.9 second pause. Pt asymptomatic, SB, BP stable. MD St made aware, no new orders

## 2022-12-01 LAB
EKG IMPRESSION: NORMAL
GLUCOSE BLD STRIP.AUTO-MCNC: 91 MG/DL (ref 65–99)
SARS-COV+SARS-COV-2 AG RESP QL IA.RAPID: NOTDETECTED
SPECIMEN SOURCE: NORMAL

## 2022-12-01 PROCEDURE — 93005 ELECTROCARDIOGRAM TRACING: CPT | Performed by: INTERNAL MEDICINE

## 2022-12-01 PROCEDURE — 700111 HCHG RX REV CODE 636 W/ 250 OVERRIDE (IP): Performed by: INTERNAL MEDICINE

## 2022-12-01 PROCEDURE — 700111 HCHG RX REV CODE 636 W/ 250 OVERRIDE (IP): Performed by: STUDENT IN AN ORGANIZED HEALTH CARE EDUCATION/TRAINING PROGRAM

## 2022-12-01 PROCEDURE — A9270 NON-COVERED ITEM OR SERVICE: HCPCS | Performed by: STUDENT IN AN ORGANIZED HEALTH CARE EDUCATION/TRAINING PROGRAM

## 2022-12-01 PROCEDURE — A9270 NON-COVERED ITEM OR SERVICE: HCPCS

## 2022-12-01 PROCEDURE — 99232 SBSQ HOSP IP/OBS MODERATE 35: CPT | Performed by: INTERNAL MEDICINE

## 2022-12-01 PROCEDURE — 700102 HCHG RX REV CODE 250 W/ 637 OVERRIDE(OP): Performed by: INTERNAL MEDICINE

## 2022-12-01 PROCEDURE — 82962 GLUCOSE BLOOD TEST: CPT

## 2022-12-01 PROCEDURE — 93010 ELECTROCARDIOGRAM REPORT: CPT | Performed by: INTERNAL MEDICINE

## 2022-12-01 PROCEDURE — A9270 NON-COVERED ITEM OR SERVICE: HCPCS | Performed by: INTERNAL MEDICINE

## 2022-12-01 PROCEDURE — 770020 HCHG ROOM/CARE - TELE (206)

## 2022-12-01 PROCEDURE — 700102 HCHG RX REV CODE 250 W/ 637 OVERRIDE(OP): Performed by: STUDENT IN AN ORGANIZED HEALTH CARE EDUCATION/TRAINING PROGRAM

## 2022-12-01 PROCEDURE — 700102 HCHG RX REV CODE 250 W/ 637 OVERRIDE(OP)

## 2022-12-01 PROCEDURE — 87426 SARSCOV CORONAVIRUS AG IA: CPT

## 2022-12-01 RX ORDER — ALUMINA, MAGNESIA, AND SIMETHICONE 2400; 2400; 240 MG/30ML; MG/30ML; MG/30ML
10 SUSPENSION ORAL 4 TIMES DAILY PRN
Status: DISCONTINUED | OUTPATIENT
Start: 2022-12-01 | End: 2022-12-01

## 2022-12-01 RX ORDER — CALCIUM CARBONATE 500 MG/1
1000 TABLET, CHEWABLE ORAL 4 TIMES DAILY PRN
Status: DISCONTINUED | OUTPATIENT
Start: 2022-12-01 | End: 2022-12-02 | Stop reason: HOSPADM

## 2022-12-01 RX ADMIN — OMEPRAZOLE 40 MG: 20 CAPSULE, DELAYED RELEASE ORAL at 05:08

## 2022-12-01 RX ADMIN — INSULIN GLARGINE-YFGN 15 UNITS: 100 INJECTION, SOLUTION SUBCUTANEOUS at 17:36

## 2022-12-01 RX ADMIN — ATORVASTATIN CALCIUM 40 MG: 40 TABLET, FILM COATED ORAL at 17:32

## 2022-12-01 RX ADMIN — OMEPRAZOLE 40 MG: 20 CAPSULE, DELAYED RELEASE ORAL at 18:00

## 2022-12-01 RX ADMIN — CLOPIDOGREL BISULFATE 75 MG: 75 TABLET ORAL at 04:59

## 2022-12-01 RX ADMIN — CALCIUM CARBONATE 1000 MG: 500 TABLET, CHEWABLE ORAL at 02:42

## 2022-12-01 RX ADMIN — ACETAMINOPHEN 1000 MG: 500 TABLET ORAL at 15:00

## 2022-12-01 RX ADMIN — DOCUSATE SODIUM 50 MG AND SENNOSIDES 8.6 MG 2 TABLET: 8.6; 5 TABLET, FILM COATED ORAL at 04:57

## 2022-12-01 RX ADMIN — HYDRALAZINE HYDROCHLORIDE 20 MG: 20 INJECTION INTRAMUSCULAR; INTRAVENOUS at 02:46

## 2022-12-01 RX ADMIN — ENOXAPARIN SODIUM 40 MG: 40 INJECTION SUBCUTANEOUS at 17:32

## 2022-12-01 RX ADMIN — INSULIN LISPRO 2 UNITS: 100 INJECTION, SOLUTION INTRAVENOUS; SUBCUTANEOUS at 21:28

## 2022-12-01 RX ADMIN — CARBOXYMETHYLCELLULOSE SODIUM 1 DROP: 5 SOLUTION/ DROPS OPHTHALMIC at 05:06

## 2022-12-01 RX ADMIN — ACETAMINOPHEN 1000 MG: 500 TABLET ORAL at 09:00

## 2022-12-01 RX ADMIN — LEVOTHYROXINE SODIUM 112 MCG: 0.11 TABLET ORAL at 05:08

## 2022-12-01 RX ADMIN — Medication 5 MG: at 02:42

## 2022-12-01 RX ADMIN — CARBOXYMETHYLCELLULOSE SODIUM 1 DROP: 5 SOLUTION/ DROPS OPHTHALMIC at 21:25

## 2022-12-01 RX ADMIN — ASPIRIN 81 MG: 81 TABLET, COATED ORAL at 05:06

## 2022-12-01 RX ADMIN — CARBOXYMETHYLCELLULOSE SODIUM 1 DROP: 5 SOLUTION/ DROPS OPHTHALMIC at 14:00

## 2022-12-01 RX ADMIN — ESCITALOPRAM OXALATE 20 MG: 10 TABLET ORAL at 04:57

## 2022-12-01 RX ADMIN — INSULIN GLARGINE-YFGN 15 UNITS: 100 INJECTION, SOLUTION SUBCUTANEOUS at 06:58

## 2022-12-01 RX ADMIN — RISPERIDONE 0.25 MG: 0.5 TABLET ORAL at 17:32

## 2022-12-01 RX ADMIN — GABAPENTIN 300 MG: 300 CAPSULE ORAL at 05:07

## 2022-12-01 RX ADMIN — GABAPENTIN 300 MG: 300 CAPSULE ORAL at 17:32

## 2022-12-01 RX ADMIN — RISPERIDONE 0.25 MG: 0.5 TABLET ORAL at 05:09

## 2022-12-01 RX ADMIN — NIFEDIPINE 90 MG: 30 TABLET, FILM COATED, EXTENDED RELEASE ORAL at 04:57

## 2022-12-01 RX ADMIN — ISOSORBIDE MONONITRATE 60 MG: 30 TABLET, EXTENDED RELEASE ORAL at 04:56

## 2022-12-01 RX ADMIN — DOCUSATE SODIUM 50 MG AND SENNOSIDES 8.6 MG 2 TABLET: 8.6; 5 TABLET, FILM COATED ORAL at 17:32

## 2022-12-01 RX ADMIN — ACETAMINOPHEN 1000 MG: 500 TABLET ORAL at 21:24

## 2022-12-01 ASSESSMENT — ENCOUNTER SYMPTOMS
CONSTITUTIONAL NEGATIVE: 1
RESPIRATORY NEGATIVE: 1
NEUROLOGICAL NEGATIVE: 1
PSYCHIATRIC NEGATIVE: 1
MUSCULOSKELETAL NEGATIVE: 1
EYES NEGATIVE: 1
GASTROINTESTINAL NEGATIVE: 1

## 2022-12-01 ASSESSMENT — PATIENT HEALTH QUESTIONNAIRE - PHQ9
1. LITTLE INTEREST OR PLEASURE IN DOING THINGS: NOT AT ALL
SUM OF ALL RESPONSES TO PHQ9 QUESTIONS 1 AND 2: 0
2. FEELING DOWN, DEPRESSED, IRRITABLE, OR HOPELESS: NOT AT ALL

## 2022-12-01 ASSESSMENT — PAIN DESCRIPTION - PAIN TYPE: TYPE: ACUTE PAIN;CHRONIC PAIN

## 2022-12-01 NOTE — PROGRESS NOTES
"[facility] [place] Progress Note    Service: [facility] [place]  Patient's Name: Sulaiman Ceja  MRN: [id number]  Admit Date:11/25/2022  Today's Date: 11/29/2022   Room #: T729/01      Identification:  Sulaiman Ceja is a 72 y.o. male patient with a history of CKD, CAD admitted 11/25 with AMS and chest pain. During admission, his Cr was found to be rising and RBUS done 11/26 demonstrated bilateral hydronephrosis. CT imaging with consistent findings, no obstructing stone seen.    Overnight-Interval events:     12/01. Patient laying in bed comfortably. Denies any pain, reports voiding q2 hours or less.     11/30. Patient sitting up at bedside, states he feels the need to void at this time. Doing well and continues to empty with appropriate volumes, confirmed with RN today. Will continue to remind patient to void q2-3 hours.    11/29. Lower voided volumes 200-400cc with normal PVR following voids. Creat 1.7. Patient awake and alert, resting comfortably in bed, states he has been emptying well without difficulty.    11/28. Patient seen and examined, lying in bed in NAD. Reports brain fog and admits that he cannot clearly recall much of his medical history at present. States that he had part of his bladder removed which he believes was 2/2 bladder cancer. He developed urinary incontinence, and had an artificial urinary sphincter placed several years ago in [place]. He then developed GH and what he believes was sphincter erosion, so his AUS was replaced, and he is unsure when this was done. States he does not get sensation of needing to void and at home, he sets a timer for q2h to remind him to urinate. Denies flank pain, GH, dysuria, abd pain. Cr today 1.7 (1.43), no urinalysis done prior to consult. States that he has seen a \"kidney specialist\" before though he is unsure if this was urology or nephrology, and he believes prior imaging has showed \"kidney swelling\" (unsure where this imaging was done). Notably, his last measured voided " volume was 1500cc.    Subjective/ROS:   No CP/SOB/F/C. No abdominal pain or pressure, no flank pain.    Physical Exam:  Current Vitals:   /69   Pulse 63   Temp 37 °C (98.6 °F) (Temporal)   Resp 16   Wt 81.8 kg (180 lb 5.4 oz)   SpO2 95%   BMI 26.63 kg/m²     11/29 1900 - 12/01 0659  In: 360 [P.O.:360]  Out: 975 [Urine:975]    GEN : NAD, alert  RES:  no acute respiratory distress  ABD: Soft ND, non-tender to palpation.    :   No reyna, -CVA tenderness  INC:  CDI, no erythema, exudate, induration.   EXT:  No edema, SCD's in place    Labs:   Recent Labs     11/29/22 0820 11/30/22  0026   SODIUM 141 136   POTASSIUM 4.6 4.2   CHLORIDE 110 106   CO2 15* 16*   GLUCOSE 145* 100*   BUN 39* 40*   CREATININE 1.56* 1.63*   CALCIUM 9.9 9.3     Recent Labs     11/30/22  0026   WBC 6.4   RBC 3.98*   HEMOGLOBIN 11.6*   HEMATOCRIT 35.2*   MCV 88.4   MCH 29.1   MCHC 33.0*   RDW 47.2   PLATELETCT 259   MPV 11.2     Lab Results   Component Value Date/Time    GLUCOSE 100 (H) 11/30/2022 12:26 AM    GLUCOSE 145 (H) 11/29/2022 08:20 AM    GLUCOSE 114 (H) 11/28/2022 01:49 AM    GLUCOSE 142 (H) 11/27/2022 12:54 AM       Assessment/Plan    72 y.o. M with bilateral hydronephrosis and rising Cr, in the setting of AUS and history of CKD.    Creatinine elevated at 1.63 (1.7), no new kidney function labs today.       PLAN:     - Reminders to void q2h  - Continue to measure voided volumes  - Recommend increasing frequency of voids to keep volumes <500-600cc/void  - Continue to trend BUN/Creatinine, hopeful that Cr will improve with better hydration and better bladder emptying  - No indication for nephrostomy tubes at this time  -Consider repeat RBUS in 1-2 days top monitor for improvement of hydro  - Doing well from the urology standpoint, will sign off at this time    Bella Burnette PA-C   [facility] [place]

## 2022-12-01 NOTE — PROGRESS NOTES
Monitor Summary:     Rhythm: SR  Rate: 74-98  Ectopy: (F) PVC's, (f) bigem, (o) PAC, (o) PVC  Measurements: .23/.10/.44           12 Hour Chart Check

## 2022-12-01 NOTE — PROGRESS NOTES
EKG ordered for chest pain, BP of 184/77, heartburn and headache. Results showed SR, SB. LEE ANN St notified.

## 2022-12-01 NOTE — PROGRESS NOTES
Monitor summary:    SB-SR 41-92  With frequent PVC, occasional bigem, occasional trigem  2.3-2.7 sec pauses    With first degree HB  .22/.06/.44

## 2022-12-01 NOTE — CARE PLAN
The patient is Stable - Low risk of patient condition declining or worsening    Shift Goals  Clinical Goals: Monitor Urine Output and BS  Patient Goals: DC home, monitoring BS  Family Goals: d/c    Progress made toward(s) clinical / shift goals:        Problem: Urinary Elimination  Goal: Establish and maintain regular urinary output  Outcome: Progressing     Problem: Hemodynamics  Goal: Patient's hemodynamics, fluid balance and neurologic status will be stable or improve  Outcome: Progressing     Problem: Infection - Standard  Goal: Patient will remain free from infection  Outcome: Progressing     Problem: Psychosocial  Goal: Patient's level of anxiety will decrease  Outcome: Progressing       Patient is not progressing towards the following goals: NA

## 2022-12-01 NOTE — PROGRESS NOTES
Handoff report received from day shift nurse. Pt care assumed. Pt is currently resting in bed. POC discussed with Pt and Pt verbalizes no questions at this time. Pt is AAOx3 with some forgetfulness, on Ra, on Tele monitoring, and VSS. Call light and belongings within reach, bed in lowest and locked position, and Pt educated on use of call light.

## 2022-12-01 NOTE — PROGRESS NOTES
Hospital Medicine Daily Progress Note    Date of Service  12/1/2022    Chief Complaint  Chest pain    Hospital Course  72 y.o. male who presented 11/25/2022 with past medical history of CAD s/p CABG in 1990s, PCI with stenting, IDDM 2, hypothyroidism on Synthyroid, hypertension, and CKD stage III was admitted as a transfer from [facility] on 11/25/122 for chest pain work-up.  Patient is reporting substernal chest pain that worsens with deep breath and palpation.  No EKG changes and troponins remain at baseline.  Cardiology following and recommending medical management.  Chest pain is noncardiac in nature and resolved with Tylenol 3 times daily.  Additionally, hospital course complicated by acute on chronic renal failure and patient noted for bilateral hydronephrosis on renal ultrasound.  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  Urology was consulted.    Interval Problem Update  11/29:Patient seen and examined, was complaining of chest pain this morning, repeat troponin trending down.  He was seen by cardiology previously and recommending medical management.  BP not well controlled so increasing his IMDUR   Regarding his bilateral hydronephrosis urology following . Monitor kidney function  Appreciate rec.   11/30: Patient seen and examined, having some pauses today holding carvedilol and will monitor   Urology following regarding his bilateral hydronephrosis appreciate rec.    12/1: Patient resting in bed, denies any CP, nausea or vomiting, his HR now better after stopping his carvedilol no more pauses  Urology following appreciate rec   I have discussed this patient's plan of care and discharge plan at IDT rounds today with Case Management, Nursing, Nursing leadership, and other members of the IDT team.    Consultants/Specialty  Cardiology  Urology     Code Status  DNAR/DNI    Disposition  Patient is not medically cleared for discharge.   Anticipate discharge to to home with close outpatient  follow-up.  I have placed the appropriate orders for post-discharge needs.    Review of Systems  Review of Systems   Constitutional: Negative.    HENT: Negative.     Eyes: Negative.    Respiratory: Negative.     Cardiovascular:  Positive for chest pain.   Gastrointestinal: Negative.    Genitourinary: Negative.    Musculoskeletal: Negative.    Skin: Negative.    Neurological: Negative.    Endo/Heme/Allergies: Negative.    Psychiatric/Behavioral: Negative.        Physical Exam  Temp:  [36.5 °C (97.7 °F)-37 °C (98.6 °F)] 37 °C (98.6 °F)  Pulse:  [37-81] 63  Resp:  [16-18] 16  BP: (118-184)/(52-83) 131/69  SpO2:  [93 %-95 %] 95 %    Physical Exam  Constitutional:       General: He is not in acute distress.     Appearance: Normal appearance.   HENT:      Head: Normocephalic and atraumatic.      Nose: Nose normal. No congestion.      Mouth/Throat:      Mouth: Mucous membranes are moist.   Eyes:      Extraocular Movements: Extraocular movements intact.      Pupils: Pupils are equal, round, and reactive to light.   Cardiovascular:      Rate and Rhythm: Normal rate and regular rhythm.      Pulses: Normal pulses.      Heart sounds: Normal heart sounds.   Pulmonary:      Effort: Pulmonary effort is normal.      Breath sounds: Normal breath sounds.   Abdominal:      General: Bowel sounds are normal.      Palpations: Abdomen is soft.      Tenderness: There is no abdominal tenderness.   Musculoskeletal:         General: No swelling. Normal range of motion.      Cervical back: Normal range of motion and neck supple.   Skin:     General: Skin is warm.      Coloration: Skin is not jaundiced.   Neurological:      General: No focal deficit present.      Mental Status: He is alert and oriented to person, place, and time. Mental status is at baseline.      Cranial Nerves: No cranial nerve deficit.   Psychiatric:         Mood and Affect: Mood normal.         Behavior: Behavior normal.         Thought Content: Thought content normal.          Judgment: Judgment normal.       Fluids    Intake/Output Summary (Last 24 hours) at 12/1/2022 1310  Last data filed at 12/1/2022 1108  Gross per 24 hour   Intake 480 ml   Output 100 ml   Net 380 ml       Laboratory  Recent Labs     11/30/22  0026   WBC 6.4   RBC 3.98*   HEMOGLOBIN 11.6*   HEMATOCRIT 35.2*   MCV 88.4   MCH 29.1   MCHC 33.0*   RDW 47.2   PLATELETCT 259   MPV 11.2     Recent Labs     11/29/22  0820 11/30/22  0026   SODIUM 141 136   POTASSIUM 4.6 4.2   CHLORIDE 110 106   CO2 15* 16*   GLUCOSE 145* 100*   BUN 39* 40*   CREATININE 1.56* 1.63*   CALCIUM 9.9 9.3                   Imaging  CT-RENAL COLIC EVALUATION(A/P W/O)   Final Result      1.  Coronary calcifications.   2.  Hyperdense layering material in the gallbladder consistent with sludge, numerous small stones, or vicarious excretion of contrast. No secondary findings indicative of acute cholecystitis.   3.  Moderate bilateral hydronephrosis and bilateral hydroureter. No radiopaque obstructing ureteral or renal calculi are evident. Findings may be related to prior obstruction or reflux.   4.  Postoperative changes lumbar spine.      US-RENAL   Final Result      Moderate right and mild-to-moderate left hydronephrosis      DX-CHEST-LIMITED (1 VIEW)   Final Result         No acute cardiopulmonary abnormalities are identified.             Assessment/Plan  * CKD (chronic kidney disease) stage 3, GFR 30-59 ml/min ([facility])- (present on admission)  Assessment & Plan  Renal function at baseline but trending up  Likely secondary to bilateral hydronephrosis  Hold ACE inhibitor  Consult urology  Avoid nephrotoxin  Renal dose meds  Labs on a.m.    Hypertensive emergency- (present on admission)  Assessment & Plan  Resolved  Adjust medicines daily  Will likely need as needed oral antihypertensive on discharge    Hypothyroidism- (present on admission)  Assessment & Plan  TSH 22  Increase Synthroid  Will need repeat TSH in 6-week in outpatient setting    Coronary  artery disease- (present on admission)  Assessment & Plan  Chest pain noncardiac in nature  Continue home meds  Monitor    HTN (hypertension)- (present on admission)  Assessment & Plan  Continue home meds  Adjust meds daily  As needed antihypertensives    HLD (hyperlipidemia)- (present on admission)  Assessment & Plan  Continue home statin    Insulin dependent type 2 diabetes mellitus (HCC)- (present on admission)  Assessment & Plan  Diabetic diet  Insulin sliding scale  Labs on a.m.       VTE prophylaxis: enoxaparin ppx    I have performed a physical exam and reviewed and updated ROS and Plan today (12/1/2022). In review of yesterday's note (11/30/2022), there are no changes except as documented above.

## 2022-12-02 ENCOUNTER — PATIENT OUTREACH (OUTPATIENT)
Dept: SCHEDULING | Facility: IMAGING CENTER | Age: 72
End: 2022-12-02
Payer: MEDICARE

## 2022-12-02 VITALS
WEIGHT: 180.34 LBS | TEMPERATURE: 97.7 F | DIASTOLIC BLOOD PRESSURE: 85 MMHG | OXYGEN SATURATION: 95 % | BODY MASS INDEX: 26.63 KG/M2 | HEART RATE: 70 BPM | RESPIRATION RATE: 18 BRPM | SYSTOLIC BLOOD PRESSURE: 121 MMHG

## 2022-12-02 PROCEDURE — 99239 HOSP IP/OBS DSCHRG MGMT >30: CPT | Performed by: INTERNAL MEDICINE

## 2022-12-02 PROCEDURE — A9270 NON-COVERED ITEM OR SERVICE: HCPCS | Performed by: INTERNAL MEDICINE

## 2022-12-02 PROCEDURE — 700102 HCHG RX REV CODE 250 W/ 637 OVERRIDE(OP): Performed by: INTERNAL MEDICINE

## 2022-12-02 PROCEDURE — A9270 NON-COVERED ITEM OR SERVICE: HCPCS | Performed by: STUDENT IN AN ORGANIZED HEALTH CARE EDUCATION/TRAINING PROGRAM

## 2022-12-02 PROCEDURE — 700102 HCHG RX REV CODE 250 W/ 637 OVERRIDE(OP): Performed by: STUDENT IN AN ORGANIZED HEALTH CARE EDUCATION/TRAINING PROGRAM

## 2022-12-02 RX ORDER — ISOSORBIDE MONONITRATE 60 MG/1
60 TABLET, EXTENDED RELEASE ORAL DAILY
Qty: 30 TABLET | Status: SHIPPED
Start: 2022-12-03

## 2022-12-02 RX ORDER — RISPERIDONE 0.25 MG/1
0.25 TABLET ORAL 2 TIMES DAILY
Qty: 60 TABLET | Refills: 0 | Status: SHIPPED
Start: 2022-12-02 | End: 2024-01-04

## 2022-12-02 RX ORDER — LEVOTHYROXINE SODIUM 112 UG/1
112 TABLET ORAL
Qty: 30 TABLET | Status: SHIPPED
Start: 2022-12-03

## 2022-12-02 RX ADMIN — RISPERIDONE 0.25 MG: 0.5 TABLET ORAL at 05:00

## 2022-12-02 RX ADMIN — ISOSORBIDE MONONITRATE 60 MG: 30 TABLET, EXTENDED RELEASE ORAL at 05:01

## 2022-12-02 RX ADMIN — DOCUSATE SODIUM 50 MG AND SENNOSIDES 8.6 MG 2 TABLET: 8.6; 5 TABLET, FILM COATED ORAL at 05:00

## 2022-12-02 RX ADMIN — GABAPENTIN 300 MG: 300 CAPSULE ORAL at 05:02

## 2022-12-02 RX ADMIN — NIFEDIPINE 90 MG: 30 TABLET, FILM COATED, EXTENDED RELEASE ORAL at 04:59

## 2022-12-02 RX ADMIN — ASPIRIN 81 MG: 81 TABLET, COATED ORAL at 05:00

## 2022-12-02 RX ADMIN — LEVOTHYROXINE SODIUM 112 MCG: 0.11 TABLET ORAL at 05:02

## 2022-12-02 RX ADMIN — ESCITALOPRAM OXALATE 20 MG: 10 TABLET ORAL at 05:02

## 2022-12-02 RX ADMIN — ACETAMINOPHEN 1000 MG: 500 TABLET ORAL at 10:09

## 2022-12-02 RX ADMIN — OMEPRAZOLE 40 MG: 20 CAPSULE, DELAYED RELEASE ORAL at 04:59

## 2022-12-02 RX ADMIN — CARBOXYMETHYLCELLULOSE SODIUM 1 DROP: 5 SOLUTION/ DROPS OPHTHALMIC at 04:59

## 2022-12-02 RX ADMIN — CLOPIDOGREL BISULFATE 75 MG: 75 TABLET ORAL at 05:02

## 2022-12-02 ASSESSMENT — PAIN DESCRIPTION - PAIN TYPE
TYPE: ACUTE PAIN;CHRONIC PAIN
TYPE: ACUTE PAIN;CHRONIC PAIN

## 2022-12-02 NOTE — DISCHARGE SUMMARY
Discharge Summary    CHIEF COMPLAINT ON ADMISSION  No chief complaint on file.      Reason for Admission  ACS    Admission Date  11/25/2022     CODE STATUS  DNAR/DNI    HPI & HOSPITAL COURSE  This is a 72 y.o. male with past medical history of CAD s/p CABG in 1990s, PCI with stenting, IDDM 2, hypothyroidism on Synthyroid, hypertension, and CKD stage III who presented 11/25/2022.  as a transfer from [facility] on 11/25/122 for chest pain work-up.  Patient is reporting substernal chest pain that worsens with deep breath and palpation.  No EKG changes and troponins remain at baseline.  Cardiology following and recommending medical management.  Chest pain is noncardiac in nature and resolved with Tylenol 3 times daily.  Additionally, hospital course complicated by acute on chronic renal failure and patient noted for bilateral hydronephrosis on renal ultrasound.  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  Urology was consulted and evaluated patient , patient needs to be reminded to void frequently.  Patent bradycardic so carvedilol was stopped and his HR is now better.  Patient now doing better and will be discharged to SNF today     The patient met 2-midnight criteria for an inpatient stay at the time of discharge.      FOLLOW UP ITEMS POST DISCHARGE  PCP  Cardiology     DISCHARGE DIAGNOSES  Principal Problem:    CKD (chronic kidney disease) stage 3, GFR 30-59 ml/min ([facility]) POA: Yes  Active Problems:    Insulin dependent type 2 diabetes mellitus (HCC) POA: Yes    HLD (hyperlipidemia) POA: Yes    HTN (hypertension) POA: Yes    Coronary artery disease POA: Yes    Hypothyroidism POA: Yes    Medication reconciliation POA: Unknown    Chest pain POA: Yes    Hypertensive emergency POA: Yes  Resolved Problems:    * No resolved hospital problems. *      FOLLOW UP  No future appointments.  [facility] [address] [address]  [phone]        Theodore Francisco M.D.  1968  [place] [address]  [phone]    Follow up  Our office will schedule follow up in our clinic in the next 2-3 weeks for outpatient follow up.      MEDICATIONS ON DISCHARGE     Medication List        CHANGE how you take these medications        Instructions   isosorbide mononitrate SR 60 MG Tb24  Start taking on: December 3, 2022  What changed:   medication strength  how much to take  Commonly known as: IMDUR   Take 1 Tablet by mouth every day.  Dose: 60 mg     levothyroxine 112 MCG Tabs  Start taking on: December 3, 2022  What changed:   medication strength  how much to take  Commonly known as: SYNTHROID   Take 1 Tablet by mouth every morning on an empty stomach.  Dose: 112 mcg            CONTINUE taking these medications        Instructions   albuterol 108 (90 Base) MCG/ACT Aers inhalation aerosol   Inhale 2 Puffs every 6 hours as needed for Shortness of Breath.  Dose: 2 Puff     aspirin 81 MG EC tablet   Take 1 Tablet by mouth every day. For heart health  Dose: 81 mg     atorvastatin 80 MG tablet  Commonly known as: LIPITOR   Take 1 Tablet by mouth every evening.  Dose: 80 mg     clopidogrel 75 MG Tabs  Commonly known as: PLAVIX   Take 1 Tablet by mouth every day.  Dose: 75 mg     escitalopram 20 MG tablet  Commonly known as: LEXAPRO   Take 1 Tablet by mouth every day.  Dose: 20 mg     gabapentin 300 MG Caps  Commonly known as: NEURONTIN   Take 1 Capsule by mouth 2 times a day.  Dose: 300 mg     insulin detemir 100 UNIT/ML Soln  Commonly known as: Levemir   Inject 15-50 Units under the skin 2 times a day. 50 units every morning  15 units every night  Dose: 15-50 Units     insulin regular 100 Unit/mL Soln  Commonly known as: HumuLIN R   Inject 2-20 Units under the skin 4 Times a Day,Before Meals and at Bedtime. Pt unable to define Sliding scale  Dose: 2-20 Units     melatonin 5 mg Tabs   Take 1 Tablet by mouth every evening.  Dose: 5 mg     NIFEdipine SR 90 MG CR tablet  Commonly known as:  PROCADIA-XL   Take 1 Tablet by mouth every day.  Dose: 90 mg     nitroglycerin 0.4 MG Subl  Commonly known as: NITROSTAT   Place 1 Tablet under the tongue as needed for Chest Pain (up to 3 doses (if SBP greater than 90 mmHg)).  Dose: 0.4 mg     omeprazole 20 MG delayed-release capsule  Commonly known as: PRILOSEC   Take 20 mg by mouth every day.  Dose: 20 mg     risperidone 0.25 MG Tabs  Commonly known as: RISPERDAL   Take 1 Tablet by mouth 2 times a day.  Dose: 0.25 mg     vitamin D3 5000 Unit (125 mcg) Tabs  Commonly known as: cholecalciferol   Take 5,000 Units by mouth every day.  Dose: 5,000 Units            STOP taking these medications      carvedilol 3.125 MG Tabs  Commonly known as: COREG     lisinopril 10 MG Tabs  Commonly known as: PRINIVIL              Allergies  Allergies   Allergen Reactions    Cefdinir Unspecified     Patient does not remember       DIET  Orders Placed This Encounter   Procedures    Diet Order Diet: Consistent CHO (Diabetic); Second Modifier: (optional): Cardiac     Standing Status:   Standing     Number of Occurrences:   1     Order Specific Question:   Diet:     Answer:   Consistent CHO (Diabetic) [4]     Order Specific Question:   Second Modifier: (optional)     Answer:   Cardiac [6]       ACTIVITY  As tolerated.  Weight bearing as tolerated    LINES, DRAINS, AND WOUNDS  This is an automated list. Peripheral IVs will be removed prior to discharge.                     MENTAL STATUS ON TRANSFER             CONSULTATIONS  Cardiology     PROCEDURES  None     LABORATORY  Lab Results   Component Value Date    SODIUM 136 11/30/2022    POTASSIUM 4.2 11/30/2022    CHLORIDE 106 11/30/2022    CO2 16 (L) 11/30/2022    GLUCOSE 100 (H) 11/30/2022    BUN 40 (H) 11/30/2022    CREATININE 1.63 (H) 11/30/2022        Lab Results   Component Value Date    WBC 6.4 11/30/2022    HEMOGLOBIN 11.6 (L) 11/30/2022    HEMATOCRIT 35.2 (L) 11/30/2022    PLATELETCT 259 11/30/2022        Total time of the discharge  process exceeds 41 minutes.

## 2022-12-02 NOTE — PROGRESS NOTES
Monitor Summary:     Rhythm: SB, SR  Rate: 56-73  Ectopy: (F) PVC's, (R) Bigem, (o) Trigem  Measurements: .20/.11/.48           12 Hour Chart Check

## 2022-12-02 NOTE — DISCHARGE PLANNING
John Figueroa at [facility] transport moved to noon today due to chain controls further south on the way to [place].

## 2022-12-02 NOTE — PROGRESS NOTES
Patient discharged back to [facility]. Upon discharge, patient is alert & oriented x4 and on room air. Discharge instructions given to and explained to patient. PIV removed and telemetry monitor removed. Patient transferred via stretcher by [facility]. Discharge paperwork and POLST forms given [facility] transporters.

## 2024-01-04 ENCOUNTER — APPOINTMENT (OUTPATIENT)
Dept: RADIOLOGY | Facility: MEDICAL CENTER | Age: 74
DRG: 243 | End: 2024-01-04
Attending: EMERGENCY MEDICINE
Payer: MEDICARE

## 2024-01-04 ENCOUNTER — HOSPITAL ENCOUNTER (INPATIENT)
Facility: MEDICAL CENTER | Age: 74
LOS: 3 days | DRG: 243 | End: 2024-01-22
Attending: EMERGENCY MEDICINE | Admitting: HOSPITALIST
Payer: MEDICARE

## 2024-01-04 DIAGNOSIS — R07.9 CHEST PAIN, UNSPECIFIED TYPE: ICD-10-CM

## 2024-01-04 DIAGNOSIS — I16.1 HYPERTENSIVE EMERGENCY: ICD-10-CM

## 2024-01-04 DIAGNOSIS — N18.32 TYPE 2 DIABETES MELLITUS WITH STAGE 3B CHRONIC KIDNEY DISEASE, WITH LONG-TERM CURRENT USE OF INSULIN (HCC): ICD-10-CM

## 2024-01-04 DIAGNOSIS — I48.92 ATRIAL FLUTTER, UNSPECIFIED TYPE (HCC): ICD-10-CM

## 2024-01-04 DIAGNOSIS — E11.22 TYPE 2 DIABETES MELLITUS WITH STAGE 3B CHRONIC KIDNEY DISEASE, WITH LONG-TERM CURRENT USE OF INSULIN (HCC): ICD-10-CM

## 2024-01-04 DIAGNOSIS — Z79.4 TYPE 2 DIABETES MELLITUS WITH STAGE 3B CHRONIC KIDNEY DISEASE, WITH LONG-TERM CURRENT USE OF INSULIN (HCC): ICD-10-CM

## 2024-01-04 LAB
ALBUMIN SERPL BCP-MCNC: 4.1 G/DL (ref 3.2–4.9)
ALBUMIN/GLOB SERPL: 1.3 G/DL
ALP SERPL-CCNC: 100 U/L (ref 30–99)
ALT SERPL-CCNC: 19 U/L (ref 2–50)
ANION GAP SERPL CALC-SCNC: 13 MMOL/L (ref 7–16)
APPEARANCE UR: CLEAR
AST SERPL-CCNC: 14 U/L (ref 12–45)
BACTERIA #/AREA URNS HPF: NEGATIVE /HPF
BASOPHILS # BLD AUTO: 1.1 % (ref 0–1.8)
BASOPHILS # BLD: 0.09 K/UL (ref 0–0.12)
BILIRUB SERPL-MCNC: 0.2 MG/DL (ref 0.1–1.5)
BILIRUB UR QL STRIP.AUTO: NEGATIVE
BUN SERPL-MCNC: 23 MG/DL (ref 8–22)
CALCIUM ALBUM COR SERPL-MCNC: 9.3 MG/DL (ref 8.5–10.5)
CALCIUM SERPL-MCNC: 9.4 MG/DL (ref 8.5–10.5)
CHLORIDE SERPL-SCNC: 107 MMOL/L (ref 96–112)
CO2 SERPL-SCNC: 22 MMOL/L (ref 20–33)
COLOR UR: YELLOW
CREAT SERPL-MCNC: 1.33 MG/DL (ref 0.5–1.4)
EKG IMPRESSION: NORMAL
EOSINOPHIL # BLD AUTO: 0.48 K/UL (ref 0–0.51)
EOSINOPHIL NFR BLD: 5.6 % (ref 0–6.9)
EPI CELLS #/AREA URNS HPF: NEGATIVE /HPF
ERYTHROCYTE [DISTWIDTH] IN BLOOD BY AUTOMATED COUNT: 47.4 FL (ref 35.9–50)
GFR SERPLBLD CREATININE-BSD FMLA CKD-EPI: 56 ML/MIN/1.73 M 2
GLOBULIN SER CALC-MCNC: 3.1 G/DL (ref 1.9–3.5)
GLUCOSE BLD STRIP.AUTO-MCNC: 195 MG/DL (ref 65–99)
GLUCOSE BLD STRIP.AUTO-MCNC: 243 MG/DL (ref 65–99)
GLUCOSE SERPL-MCNC: 206 MG/DL (ref 65–99)
GLUCOSE UR STRIP.AUTO-MCNC: 100 MG/DL
HCT VFR BLD AUTO: 45.2 % (ref 42–52)
HGB BLD-MCNC: 15 G/DL (ref 14–18)
HYALINE CASTS #/AREA URNS LPF: ABNORMAL /LPF
IMM GRANULOCYTES # BLD AUTO: 0.04 K/UL (ref 0–0.11)
IMM GRANULOCYTES NFR BLD AUTO: 0.5 % (ref 0–0.9)
KETONES UR STRIP.AUTO-MCNC: NEGATIVE MG/DL
LEUKOCYTE ESTERASE UR QL STRIP.AUTO: NEGATIVE
LYMPHOCYTES # BLD AUTO: 1.46 K/UL (ref 1–4.8)
LYMPHOCYTES NFR BLD: 17.1 % (ref 22–41)
MCH RBC QN AUTO: 30.1 PG (ref 27–33)
MCHC RBC AUTO-ENTMCNC: 33.2 G/DL (ref 32.3–36.5)
MCV RBC AUTO: 90.8 FL (ref 81.4–97.8)
MICRO URNS: ABNORMAL
MONOCYTES # BLD AUTO: 0.98 K/UL (ref 0–0.85)
MONOCYTES NFR BLD AUTO: 11.5 % (ref 0–13.4)
NEUTROPHILS # BLD AUTO: 5.48 K/UL (ref 1.82–7.42)
NEUTROPHILS NFR BLD: 64.2 % (ref 44–72)
NITRITE UR QL STRIP.AUTO: NEGATIVE
NRBC # BLD AUTO: 0 K/UL
NRBC BLD-RTO: 0 /100 WBC (ref 0–0.2)
PH UR STRIP.AUTO: 6 [PH] (ref 5–8)
PLATELET # BLD AUTO: 269 K/UL (ref 164–446)
PMV BLD AUTO: 11 FL (ref 9–12.9)
POTASSIUM SERPL-SCNC: 4.6 MMOL/L (ref 3.6–5.5)
PROT SERPL-MCNC: 7.2 G/DL (ref 6–8.2)
PROT UR QL STRIP: 100 MG/DL
RBC # BLD AUTO: 4.98 M/UL (ref 4.7–6.1)
RBC # URNS HPF: ABNORMAL /HPF
RBC UR QL AUTO: ABNORMAL
SODIUM SERPL-SCNC: 142 MMOL/L (ref 135–145)
SP GR UR STRIP.AUTO: 1.01
TROPONIN T SERPL-MCNC: 89 NG/L (ref 6–19)
TROPONIN T SERPL-MCNC: 93 NG/L (ref 6–19)
UROBILINOGEN UR STRIP.AUTO-MCNC: 0.2 MG/DL
WBC # BLD AUTO: 8.5 K/UL (ref 4.8–10.8)
WBC #/AREA URNS HPF: ABNORMAL /HPF

## 2024-01-04 PROCEDURE — 81001 URINALYSIS AUTO W/SCOPE: CPT

## 2024-01-04 PROCEDURE — 85025 COMPLETE CBC W/AUTO DIFF WBC: CPT

## 2024-01-04 PROCEDURE — 90662 IIV NO PRSV INCREASED AG IM: CPT | Performed by: HOSPITALIST

## 2024-01-04 PROCEDURE — 71045 X-RAY EXAM CHEST 1 VIEW: CPT

## 2024-01-04 PROCEDURE — 36415 COLL VENOUS BLD VENIPUNCTURE: CPT

## 2024-01-04 PROCEDURE — 82962 GLUCOSE BLOOD TEST: CPT | Mod: 91

## 2024-01-04 PROCEDURE — 80053 COMPREHEN METABOLIC PANEL: CPT

## 2024-01-04 PROCEDURE — 93005 ELECTROCARDIOGRAM TRACING: CPT | Performed by: EMERGENCY MEDICINE

## 2024-01-04 PROCEDURE — G0378 HOSPITAL OBSERVATION PER HR: HCPCS

## 2024-01-04 PROCEDURE — 84484 ASSAY OF TROPONIN QUANT: CPT

## 2024-01-04 PROCEDURE — 700102 HCHG RX REV CODE 250 W/ 637 OVERRIDE(OP): Performed by: HOSPITALIST

## 2024-01-04 PROCEDURE — 96372 THER/PROPH/DIAG INJ SC/IM: CPT

## 2024-01-04 PROCEDURE — A9270 NON-COVERED ITEM OR SERVICE: HCPCS | Performed by: HOSPITALIST

## 2024-01-04 PROCEDURE — 3E02340 INTRODUCTION OF INFLUENZA VACCINE INTO MUSCLE, PERCUTANEOUS APPROACH: ICD-10-PCS | Performed by: STUDENT IN AN ORGANIZED HEALTH CARE EDUCATION/TRAINING PROGRAM

## 2024-01-04 PROCEDURE — 700111 HCHG RX REV CODE 636 W/ 250 OVERRIDE (IP): Performed by: HOSPITALIST

## 2024-01-04 PROCEDURE — 99222 1ST HOSP IP/OBS MODERATE 55: CPT | Performed by: HOSPITALIST

## 2024-01-04 PROCEDURE — 99285 EMERGENCY DEPT VISIT HI MDM: CPT

## 2024-01-04 PROCEDURE — 90471 IMMUNIZATION ADMIN: CPT

## 2024-01-04 PROCEDURE — 93005 ELECTROCARDIOGRAM TRACING: CPT

## 2024-01-04 RX ORDER — SENNOSIDES A AND B 8.6 MG/1
17.2 TABLET, FILM COATED ORAL
COMMUNITY

## 2024-01-04 RX ORDER — ACETAMINOPHEN 325 MG/1
650 TABLET ORAL EVERY 6 HOURS PRN
Status: DISCONTINUED | OUTPATIENT
Start: 2024-01-04 | End: 2024-01-20

## 2024-01-04 RX ORDER — GABAPENTIN 400 MG/1
800 CAPSULE ORAL 2 TIMES DAILY
Status: DISCONTINUED | OUTPATIENT
Start: 2024-01-04 | End: 2024-01-22 | Stop reason: HOSPADM

## 2024-01-04 RX ORDER — LANOLIN ALCOHOL/MO/W.PET/CERES
9 CREAM (GRAM) TOPICAL
COMMUNITY

## 2024-01-04 RX ORDER — UMECLIDINIUM 62.5 UG/1
1 AEROSOL, POWDER ORAL DAILY
COMMUNITY

## 2024-01-04 RX ORDER — ATORVASTATIN CALCIUM 20 MG/1
20 TABLET, FILM COATED ORAL DAILY
Status: DISCONTINUED | OUTPATIENT
Start: 2024-01-04 | End: 2024-01-21

## 2024-01-04 RX ORDER — POLYETHYLENE GLYCOL 3350 17 G/17G
1 POWDER, FOR SOLUTION ORAL
Status: DISCONTINUED | OUTPATIENT
Start: 2024-01-04 | End: 2024-01-22 | Stop reason: HOSPADM

## 2024-01-04 RX ORDER — LISINOPRIL 20 MG/1
20 TABLET ORAL DAILY
Status: DISCONTINUED | OUTPATIENT
Start: 2024-01-04 | End: 2024-01-22 | Stop reason: HOSPADM

## 2024-01-04 RX ORDER — ASPIRIN 81 MG
5 TABLET, DELAYED RELEASE (ENTERIC COATED) ORAL 2 TIMES DAILY
Status: ON HOLD | COMMUNITY
Start: 2024-01-04 | End: 2024-01-12

## 2024-01-04 RX ORDER — DOCUSATE SODIUM 100 MG/1
100 CAPSULE, LIQUID FILLED ORAL DAILY
COMMUNITY

## 2024-01-04 RX ORDER — POLYETHYLENE GLYCOL 3350 17 G/17G
17 POWDER, FOR SOLUTION ORAL
COMMUNITY

## 2024-01-04 RX ORDER — MEMANTINE HYDROCHLORIDE 5 MG/1
5 TABLET ORAL 2 TIMES DAILY
COMMUNITY

## 2024-01-04 RX ORDER — ATORVASTATIN CALCIUM 20 MG/1
20 TABLET, FILM COATED ORAL DAILY
COMMUNITY

## 2024-01-04 RX ORDER — MEMANTINE HYDROCHLORIDE 10 MG/1
5 TABLET ORAL 2 TIMES DAILY
Status: DISCONTINUED | OUTPATIENT
Start: 2024-01-04 | End: 2024-01-22 | Stop reason: HOSPADM

## 2024-01-04 RX ORDER — LOPERAMIDE HYDROCHLORIDE 2 MG/1
2 CAPSULE ORAL 4 TIMES DAILY PRN
COMMUNITY

## 2024-01-04 RX ORDER — MIRABEGRON 25 MG/1
25 TABLET, FILM COATED, EXTENDED RELEASE ORAL DAILY
COMMUNITY

## 2024-01-04 RX ORDER — TRAMADOL HYDROCHLORIDE 50 MG/1
50 TABLET ORAL EVERY 6 HOURS PRN
COMMUNITY

## 2024-01-04 RX ORDER — LISINOPRIL 20 MG/1
20 TABLET ORAL DAILY
Status: ON HOLD | COMMUNITY
End: 2024-01-22

## 2024-01-04 RX ORDER — ESCITALOPRAM OXALATE 10 MG/1
10 TABLET ORAL DAILY
COMMUNITY

## 2024-01-04 RX ORDER — ESCITALOPRAM OXALATE 10 MG/1
10 TABLET ORAL DAILY
Status: DISCONTINUED | OUTPATIENT
Start: 2024-01-04 | End: 2024-01-22 | Stop reason: HOSPADM

## 2024-01-04 RX ORDER — LEVALBUTEROL TARTRATE 45 UG/1
2 AEROSOL, METERED ORAL
COMMUNITY

## 2024-01-04 RX ORDER — PHENOL 1.4 %
600 AEROSOL, SPRAY (ML) MUCOUS MEMBRANE 2 TIMES DAILY
COMMUNITY

## 2024-01-04 RX ORDER — ACETAMINOPHEN 325 MG/1
650 TABLET ORAL EVERY 8 HOURS PRN
COMMUNITY

## 2024-01-04 RX ORDER — LEVOTHYROXINE SODIUM 112 UG/1
112 TABLET ORAL
Status: DISCONTINUED | OUTPATIENT
Start: 2024-01-04 | End: 2024-01-22 | Stop reason: HOSPADM

## 2024-01-04 RX ORDER — BISACODYL 10 MG
10 SUPPOSITORY, RECTAL RECTAL
Status: DISCONTINUED | OUTPATIENT
Start: 2024-01-04 | End: 2024-01-22 | Stop reason: HOSPADM

## 2024-01-04 RX ORDER — AMLODIPINE BESYLATE 10 MG/1
10 TABLET ORAL
Status: DISCONTINUED | OUTPATIENT
Start: 2024-01-04 | End: 2024-01-22 | Stop reason: HOSPADM

## 2024-01-04 RX ORDER — ISOSORBIDE MONONITRATE 30 MG/1
60 TABLET, EXTENDED RELEASE ORAL DAILY
Status: DISCONTINUED | OUTPATIENT
Start: 2024-01-04 | End: 2024-01-22 | Stop reason: HOSPADM

## 2024-01-04 RX ORDER — DILTIAZEM HYDROCHLORIDE 240 MG/1
240 CAPSULE, COATED, EXTENDED RELEASE ORAL DAILY
COMMUNITY

## 2024-01-04 RX ORDER — PANTOPRAZOLE SODIUM 40 MG/1
40 TABLET, DELAYED RELEASE ORAL DAILY
COMMUNITY

## 2024-01-04 RX ORDER — AMOXICILLIN 250 MG
2 CAPSULE ORAL 2 TIMES DAILY
Status: DISCONTINUED | OUTPATIENT
Start: 2024-01-04 | End: 2024-01-22 | Stop reason: HOSPADM

## 2024-01-04 RX ORDER — GABAPENTIN 800 MG/1
800 TABLET ORAL 2 TIMES DAILY
COMMUNITY

## 2024-01-04 RX ORDER — CHOLECALCIFEROL (VITAMIN D3) 50 MCG
4000 TABLET ORAL DAILY
COMMUNITY

## 2024-01-04 RX ADMIN — INSULIN HUMAN 4 UNITS: 100 INJECTION, SOLUTION PARENTERAL at 16:05

## 2024-01-04 RX ADMIN — LISINOPRIL 20 MG: 20 TABLET ORAL at 16:01

## 2024-01-04 RX ADMIN — INSULIN HUMAN 3 UNITS: 100 INJECTION, SOLUTION PARENTERAL at 20:32

## 2024-01-04 RX ADMIN — ATORVASTATIN CALCIUM 20 MG: 20 TABLET, FILM COATED ORAL at 16:01

## 2024-01-04 RX ADMIN — ESCITALOPRAM OXALATE 10 MG: 10 TABLET ORAL at 16:01

## 2024-01-04 RX ADMIN — GABAPENTIN 800 MG: 400 CAPSULE ORAL at 17:17

## 2024-01-04 RX ADMIN — APIXABAN 5 MG: 5 TABLET, FILM COATED ORAL at 20:32

## 2024-01-04 RX ADMIN — ISOSORBIDE MONONITRATE 60 MG: 60 TABLET, EXTENDED RELEASE ORAL at 16:01

## 2024-01-04 RX ADMIN — INSULIN GLARGINE-YFGN 20 UNITS: 100 INJECTION, SOLUTION SUBCUTANEOUS at 17:22

## 2024-01-04 RX ADMIN — INFLUENZA A VIRUS A/VICTORIA/4897/2022 IVR-238 (H1N1) ANTIGEN (FORMALDEHYDE INACTIVATED), INFLUENZA A VIRUS A/DARWIN/9/2021 SAN-010 (H3N2) ANTIGEN (FORMALDEHYDE INACTIVATED), INFLUENZA B VIRUS B/PHUKET/3073/2013 ANTIGEN (FORMALDEHYDE INACTIVATED), AND INFLUENZA B VIRUS B/MICHIGAN/01/2021 ANTIGEN (FORMALDEHYDE INACTIVATED) 0.7 ML: 60; 60; 60; 60 INJECTION, SUSPENSION INTRAMUSCULAR at 20:33

## 2024-01-04 RX ADMIN — MEMANTINE HYDROCHLORIDE 5 MG: 10 TABLET ORAL at 16:01

## 2024-01-04 RX ADMIN — DOCUSATE SODIUM 50 MG AND SENNOSIDES 8.6 MG 2 TABLET: 8.6; 5 TABLET, FILM COATED ORAL at 17:17

## 2024-01-04 RX ADMIN — AMLODIPINE BESYLATE 10 MG: 10 TABLET ORAL at 17:20

## 2024-01-04 RX ADMIN — LEVOTHYROXINE SODIUM 112 MCG: 0.11 TABLET ORAL at 17:25

## 2024-01-04 ASSESSMENT — CHA2DS2 SCORE
SEX: MALE
CHF OR LEFT VENTRICULAR DYSFUNCTION: NO
AGE 75 OR GREATER: NO
VASCULAR DISEASE: YES
DIABETES: YES
PRIOR STROKE OR TIA OR THROMBOEMBOLISM: NO
CHA2DS2 VASC SCORE: 4
HYPERTENSION: YES
AGE 65 TO 74: YES

## 2024-01-04 ASSESSMENT — FIBROSIS 4 INDEX
FIB4 SCORE: 1.2
FIB4 SCORE: 0.87

## 2024-01-04 ASSESSMENT — ENCOUNTER SYMPTOMS: SHORTNESS OF BREATH: 0

## 2024-01-04 ASSESSMENT — LIFESTYLE VARIABLES
TOTAL SCORE: 0
DOES PATIENT WANT TO STOP DRINKING: NO
HAVE YOU EVER FELT YOU SHOULD CUT DOWN ON YOUR DRINKING: NO
EVER FELT BAD OR GUILTY ABOUT YOUR DRINKING: NO
AVERAGE NUMBER OF DAYS PER WEEK YOU HAVE A DRINK CONTAINING ALCOHOL: 0
ON A TYPICAL DAY WHEN YOU DRINK ALCOHOL HOW MANY DRINKS DO YOU HAVE: 0
ALCOHOL_USE: NO
AVERAGE NUMBER OF DAYS PER WEEK YOU HAVE A DRINK CONTAINING ALCOHOL: 0
TOTAL SCORE: 0
ON A TYPICAL DAY WHEN YOU DRINK ALCOHOL HOW MANY DRINKS DO YOU HAVE: 0
HAVE YOU EVER FELT YOU SHOULD CUT DOWN ON YOUR DRINKING: NO
EVER FELT BAD OR GUILTY ABOUT YOUR DRINKING: NO
DOES PATIENT WANT TO STOP DRINKING: NO
HOW MANY TIMES IN THE PAST YEAR HAVE YOU HAD 5 OR MORE DRINKS IN A DAY: 0
TOTAL SCORE: 0
CONSUMPTION TOTAL: NEGATIVE
EVER HAD A DRINK FIRST THING IN THE MORNING TO STEADY YOUR NERVES TO GET RID OF A HANGOVER: NO
HOW MANY TIMES IN THE PAST YEAR HAVE YOU HAD 5 OR MORE DRINKS IN A DAY: 0
CONSUMPTION TOTAL: INCOMPLETE
ALCOHOL_USE: NO
HAVE PEOPLE ANNOYED YOU BY CRITICIZING YOUR DRINKING: NO
HAVE PEOPLE ANNOYED YOU BY CRITICIZING YOUR DRINKING: NO

## 2024-01-04 ASSESSMENT — HEART SCORE
HISTORY: MODERATELY SUSPICIOUS
RISK FACTORS: >2 RISK FACTORS OR HX OF ATHEROSCLEROTIC DISEASE
TROPONIN: LESS THAN OR EQUAL TO NORMAL LIMIT
HEART SCORE: 6
AGE: 65+
ECG: NON-SPECIFIC REPOLARIZATION DISTURBANCE

## 2024-01-04 ASSESSMENT — PAIN DESCRIPTION - PAIN TYPE
TYPE: ACUTE PAIN
TYPE: ACUTE PAIN

## 2024-01-04 NOTE — ED NOTES
Med red completed per med list sent from skilled nursing   Allergies reviewed (pt does not recall having an allergie to Cefdinir)     I called to get a MAR faxed over but [facility] only sent over a med list   Unknown last doses of medications taken     Pt takes Eliquis 2.5 mg twice a day

## 2024-01-04 NOTE — ED TRIAGE NOTES
.  Chief Complaint   Patient presents with    Chest Pain      Pt BIB EMS from [facility] for chest pain and elevated troponin. Pt reports CP began yesterday, denies pain at this time. Pt was hypertensive upon arrival to local ED. Pt given nitro and ASA at [facility].   Presents in Atrial flutter.

## 2024-01-04 NOTE — CARE PLAN
The patient is Watcher - Medium risk of patient condition declining or worsening         Progress made toward(s) clinical / shift goals:    Problem: Knowledge Deficit - Standard  Goal: Patient and family/care givers will demonstrate understanding of plan of care, disease process/condition, diagnostic tests and medications  Description: Target End Date:  1-3 days or as soon as patient condition allows    Document in Patient Education    1.  Patient and family/caregiver oriented to unit, equipment, visitation policy and means for communicating concern  2.  Complete/review Learning Assessment  3.  Assess knowledge level of disease process/condition, treatment plan, diagnostic tests and medications  4.  Explain disease process/condition, treatment plan, diagnostic tests and medications  Outcome: Progressing       Patient is not progressing towards the following goals:

## 2024-01-04 NOTE — ASSESSMENT & PLAN NOTE
Diltiazem, Eliquis  May need to consult cardiology -possible cardioversion    1/15 Discussed with cardiology, they will arrange an outpatient EP consultation for the patient for this upcoming week given stability on PO medication.    01/20/24 s/p pacemaker placement.

## 2024-01-04 NOTE — PROGRESS NOTES
Patient arrived to floor. Assumed care at 1420. Assessment complete, A&Ox4 Admission record complete,Pt requesting influenza vaccine, order placed. Patient on monitor, Monitor room notified.AFLUTTER 67; Pt oriented to room, educated on call light/extension/phone system, white board updated. Fall assessment complete, patient educated to call for assistance, pt verbalizes understanding. Pt denies pain or any additional needs at this time. Questions and concerns addressed. Call light, phone, and personal belongings within reach.

## 2024-01-04 NOTE — H&P
Hospital Medicine History & Physical Note    Date of Service  1/4/2024    Primary Care Physician  Suhail Aguirre M.D.    Consultants  none    Code Status  Full Code    Chief Complaint  Chief Complaint   Patient presents with    Chest Pain       History of Presenting Illness  Sulaiman Ceja is a 73 y.o. male who presented 1/4/2024 with chest pain.  Mr. Ceja has a past medical history of insulin-dependent diabetes mellitus, hypertension, coronary artery bypass graft sometime in the 1990s with no subsequent interventions that has been admitted this hospital multiple times because of chest pain.  He is on Eliquis for atrial fibrillation.  He presented to the emergency room in [facility] with chest pain that he describes as central in nature.  He had serial troponins that were negative there and EKG revealed atrial flutter with varying block and T wave inversions laterally.  He was given weight-based Lovenox despite the fact that he is on full dose Eliquis and was transferred here for higher level care.  Here EKG again reveals flutter with varying block and overall bradycardia in the 50s with T wave inversions laterally that are new from 2022.  His troponin here is 93.  He is currently chest pain-free.  He reportedly has dementia and is on Namenda though is a reasonable historian with me and he does not want any heroic methods such as heart catheterization this time and for now we will monitor his troponins and hold his Cardizem 240 mg daily given his bradycardia.    I discussed the plan of care with Dr. Garcia.    Review of Systems  Review of Systems   Respiratory:  Negative for shortness of breath.    Cardiovascular:  Positive for chest pain.       Past Medical History   has a past medical history of Anginal syndrome (HCC), CAD (coronary artery disease), Cataract, Diabetes (HCC), Hypertension, and Renal disorder.    Surgical History   has no past surgical history on file.     Family History  family history  includes No Known Problems in his father and mother.   Family history reviewed with patient. There is no family history that is pertinent to the chief complaint.     Social History   reports that he has quit smoking. His smoking use included cigarettes. He started smoking about 27 years ago. He has been exposed to tobacco smoke. He has never used smokeless tobacco. He reports that he does not drink alcohol and does not use drugs.    Allergies  Allergies   Allergen Reactions    Cefdinir Unspecified     Patient does not remember       Medications  Prior to Admission Medications   Prescriptions Last Dose Informant Patient Reported? Taking?   Calcium Carbonate 600 MG Tab UNK at [facility] Yes Yes   Sig: Take 600 mg by mouth 2 times a day.   Cholecalciferol (VITAMIN D) 2000 UNIT Tab UNK at [facility] Yes Yes   Sig: Take 4,000 Units by mouth every day.   acetaminophen (TYLENOL) 325 MG Tab UNK at [facility] Yes Yes   Sig: Take 650 mg by mouth every 8 hours as needed. Indications: Fever, Pain   apixaban (ELIQUIS) 2.5mg Tab UNK at [facility] Yes Yes   Sig: Take 2.5 mg by mouth 2 times a day.   atorvastatin (LIPITOR) 20 MG Tab UNK at [facility] Yes Yes   Sig: Take 20 mg by mouth every day.   carbamide peroxide (DEBROX) 6.5 % Solution UNK at [facility] Yes Yes   Sig: Administer 5 Drops into affected ear(s) 2 times a day. 5 day course   dilTIAZem CD (CARDIZEM CD) 240 MG CAPSULE SR 24 HR UNK at [facility] Yes Yes   Sig: Take 240 mg by mouth every day.   docusate sodium (COLACE) 100 MG Cap UNK at [facility] Yes Yes   Sig: Take 100 mg by mouth every day.   escitalopram (LEXAPRO) 10 MG Tab UNK at [facility] Yes Yes   Sig: Take 10 mg by mouth every day.   gabapentin (NEURONTIN) 800 MG tablet UNK at [facility] Yes Yes   Sig: Take 800 mg by mouth 2 times a day.   insulin detemir (LEVEMIR) 100 UNIT/ML Solution UNK at [facility] Yes No   Sig: Inject  15-50 Units under the skin 2 times a day. 50 units every morning  15 units every night   insulin regular (HUMULIN R) 100 Unit/mL Solution UNK at [facility] Yes No   Sig: Inject 3-12 Units under the skin 3 times a day before meals. Sliding Scale   151-200= 3 units  201-250= 5 units  251-300= 8 units  301-350= 10 units  351-400= 12 units   isosorbide mononitrate SR (IMDUR) 60 MG TABLET SR 24 HR UNK at [facility] No No   Sig: Take 1 Tablet by mouth every day.   levalbuterol (XOPENEX HFA) 45 MCG/ACT inhaler UNK at [facility] Yes Yes   Sig: Inhale 2 Puffs every 2 hours as needed for Shortness of Breath.   levothyroxine (SYNTHROID) 112 MCG Tab UNK at [facility] No No   Sig: Take 1 Tablet by mouth every morning on an empty stomach.   lisinopril (PRINIVIL) 20 MG Tab UN at [facility] Yes Yes   Sig: Take 20 mg by mouth every day.   loperamide (IMODIUM) 2 MG Cap K at [facility] Yes Yes   Sig: Take 2 mg by mouth 4 times a day as needed for Diarrhea.   melatonin 3 MG Tab UNK at [facility] Yes Yes   Sig: Take 9 mg by mouth at bedtime.   memantine (NAMENDA) 5 MG Tab UNK at [facility] Yes Yes   Sig: Take 5 mg by mouth 2 times a day.   mirabegron ER (MYRBETRIQ) 25 MG TABLET SR 24 HR UNK at [facility] Yes Yes   Sig: Take 25 mg by mouth every day.   pantoprazole (PROTONIX) 40 MG Tablet Delayed Response UNK at [facility] Yes Yes   Sig: Take 40 mg by mouth every day.   polyethylene glycol/lytes (MIRALAX) Pack UNK at [facility] Yes Yes   Sig: Take 17 g by mouth 1 time a day as needed. Indications: Constipation   sennosides (SENOKOT) 8.6 MG Tab UNK at [facility] Yes Yes   Sig: Take 17.2 mg by mouth 1 time a day as needed. Indications: Constipation   traMADol (ULTRAM) 50 MG Tab UNK at [facility] Yes Yes   Sig: Take 50 mg by mouth every 6 hours as needed. Indications: Pain   umeclidinium bromide (INCRUSE ELLIPTA) 62.5 MCG/ACT AEROSOL  "POWDER, BREATH ACTIVATED inhaler UNK at [facility] Facility Yes Yes   Sig: Inhale 1 Puff every day.      Facility-Administered Medications: None       Physical Exam  Temp:  [36.2 °C (97.1 °F)] 36.2 °C (97.1 °F)  Pulse:  [54-55] 55  Resp:  [14-19] 14  BP: (173-195)/(78-89) 195/89  SpO2:  [96 %-99 %] 96 %  Blood Pressure : (!) 195/89   Temperature: 36.2 °C (97.1 °F)   Pulse: (!) 55   Respiration: 14   Pulse Oximetry: 96 %       Physical Exam  Vitals and nursing note reviewed.   HENT:      Mouth/Throat:      Mouth: Mucous membranes are dry.   Cardiovascular:      Rate and Rhythm: Bradycardia present. Rhythm irregular.   Pulmonary:      Effort: Pulmonary effort is normal.      Breath sounds: Normal breath sounds.   Abdominal:      General: There is no distension.      Tenderness: There is no abdominal tenderness.   Musculoskeletal:      Cervical back: Neck supple.      Right lower leg: No edema.      Left lower leg: No edema.   Neurological:      Mental Status: He is alert.   Psychiatric:      Comments: He is in good spirits in no apparent distress         Laboratory:  Recent Labs     01/04/24  1141   WBC 8.5   RBC 4.98   HEMOGLOBIN 15.0   HEMATOCRIT 45.2   MCV 90.8   MCH 30.1   MCHC 33.2   RDW 47.4   PLATELETCT 269   MPV 11.0     Recent Labs     01/04/24  1141   SODIUM 142   POTASSIUM 4.6   CHLORIDE 107   CO2 22   GLUCOSE 206*   BUN 23*   CREATININE 1.33   CALCIUM 9.4     Recent Labs     01/04/24  1141   ALTSGPT 19   ASTSGOT 14   ALKPHOSPHAT 100*   TBILIRUBIN 0.2   GLUCOSE 206*         No results for input(s): \"NTPROBNP\" in the last 72 hours.      Recent Labs     01/04/24  1141   TROPONINT 93*       Imaging:  DX-CHEST-PORTABLE (1 VIEW)   Final Result      Cardiomegaly.      No definite acute abnormality. See details above.          EKG interpreted by me atrial flutter with varying block overall bradycardia with T wave inversions laterally    Assessment/Plan:  Justification for Admission Status  I anticipate this patient " is appropriate for observation status at this time because further workup with serial troponins and continuous telemetry monitoring given his bradycardia    Patient will need a Telemetry bed on MEDICAL service .  The need is secondary to as above.    * Chest pain- (present on admission)  Assessment & Plan  History of coronary artery bypass graft in the 1990s and apparently no subsequent stents since.  He has had multiple admissions here for chest pain which has always been medically managed.  His initial troponins were negative prior to transfer and his current 1 is 93.  Will trend 1 more troponin.  Consider cardiology consultation if it continues to rise.  Currently he his chest pain-free.  He received aspirin and weight-based Lovenox prior to transfer though for now we will continue the Eliquis.    Atrial flutter (HCC)- (present on admission)  Assessment & Plan  Upon review of the records there have been notes of transient A-fib  He currently is in atrial flutter with varying block with overall rate in the 50s.  He has been on high-dose Cardizem 240 mg daily which will be held.  Continue outpatient Eliquis though increase the dose from 2.5 mg twice daily to 5 mg twice daily as he has normal renal function and a weight of 190 pounds.  Monitor on telemetry  Echocardiogram October 2022 revealed a normal ejection fraction.    Hypothyroidism- (present on admission)  Assessment & Plan  Restart synthroid    COPD (chronic obstructive pulmonary disease) (HCC)- (present on admission)  Assessment & Plan  Hx of without exacerbation    Dementia (HCC)- (present on admission)  Assessment & Plan  Continue Namenda     HTN (hypertension)- (present on admission)  Assessment & Plan  Restart isosorbide and lisinopril though hold Cardizem due to bradycardia    Insulin dependent type 2 diabetes mellitus (HCC)- (present on admission)  Assessment & Plan  Glucose in [place] was 293  Glucose here is 206  He is on Levemir 50 units morning  and 15 units evening  It is not clear if he received insulin today thus change to 20 units BID with sliding scale and adjust accordingly        VTE prophylaxis: therapeutic anticoagulation with Eliquis

## 2024-01-04 NOTE — ASSESSMENT & PLAN NOTE
Namenda   Avoid Benzodiazepines, Anticholinergics, Opiates  Frequent orientation  Update Board daily  Open window blinds during the day  Avoid naps during the day  Family at bedside whenever possible  Ensure adequate sleep at night - use Melatonin preferably  Avoid early morning labs  Avoid vital signs during sleep  Ambulate if possible  Provide adequate pain control  Monitor for urinary retention  Prevent and manage constipation  Discontinue IVs, Catheters, Tubes, Lines, Cardiac monitors, SCDs if possible

## 2024-01-04 NOTE — ED NOTES
Pt daughter in law, Tricia phoned. She is concerned Pt may have UTI and is requesting we check. ERP notified. Assist Pt to stand at bedside to use urinal, sample provided and sent.

## 2024-01-04 NOTE — PROGRESS NOTES
2 RN Skin Assessment Completed by Heather RN and LORI Alanis.   Generalized skin, dry/flaky.Some small bruises.        Head: WDL  Ears: WDL  Nose: WDL  Mouth: WDL  Neck: WDL  Breasts/Chest: WDL  Shoulder Blades: WDL  Spine: WDL  (R) Arm/Elbow/hand: WDL  (L) Arm/Elbow/hand: WDL  Abdomen:WDL  Groin: WDL  Sacrum/Coccyx/Buttocks: blanching  (R) Leg: WDL  (L) Leg: WDL  (R) Heel/Foot/Toe: blanching  (L) Heel/Foot/Toe: blanching              Devices in place: BP Cuff and Pulse Ox     Interventions in place: Gray ear foams and Pillows     Possible skin injury found: No     Pictures uploaded into Epic: N/A  Wound Consult Placed: N/A

## 2024-01-04 NOTE — ED PROVIDER NOTES
ED Provider Note    CHIEF COMPLAINT  Chief Complaint   Patient presents with    Chest Pain       EXTERNAL RECORDS REVIEWED  External ED Note as a transfer from [facility] after having chest pain rating yesterday and continuing throughout today.  This morning when he went to the hospital he was found today to be in atrial flutter with a normal heart rate.  He was given IV Lopressor and nitroglycerin and his chest pain improved patient's 2 troponins have been within normal limits.  CBC was normal.  He was transferred here for cardiac evaluation for possible acute coronary syndrome.    HPI/ROS  LIMITATION TO HISTORY   Select: Patient has some dementia and does not know his medications  OUTSIDE HISTORIAN(S):  Patient's daughter is concerned he might have a bladder infection and wants to check a urinalysis    Sulaiman Ceja is a 73 y.o. male who presents by ambulance as a transfer from [facility] complaining of chest pain that started last night and continued throughout today.  Upon arrival he was hypertensive and tachycardic at the other hospital and treated with Lopressor and nitroglycerin which improved his chest pain as well as his heart rate.  He had 2 negative troponins at the other hospital and was transferred here for evaluation for acute coronary syndrome given his history of coronary artery disease and bypass surgery.  Currently the patient is chest pain-free.  He is bradycardic in the 50s he does not have any shortness of breath.  He denies any swelling in the legs.  He has no fevers or chills sore throat coughing nausea or vomiting.  He denies any sweating dizziness or lightheadedness.    PAST MEDICAL HISTORY   has a past medical history of Anginal syndrome (HCC), CAD (coronary artery disease), Cataract, Diabetes (HCC), Hypertension, and Renal disorder.    SURGICAL HISTORY  patient denies any surgical history    FAMILY HISTORY  Family History   Problem Relation Age of Onset    No Known  "Problems Mother     No Known Problems Father        SOCIAL HISTORY  Social History     Tobacco Use    Smoking status: Former     Types: Cigarettes     Start date: 3/1/1996     Passive exposure: Past    Smokeless tobacco: Never   Vaping Use    Vaping Use: Never used   Substance and Sexual Activity    Alcohol use: Never    Drug use: Never    Sexual activity: Not on file       CURRENT MEDICATIONS  Home Medications       Reviewed by Heather Aaron (Pharmacy Tech) on 01/04/24 at 1222  Med List Status: Partial     Medication Last Dose Status   apixaban (ELIQUIS) 2.5mg Tab UNK Active   atorvastatin (LIPITOR) 20 MG Tab UNK Active   dilTIAZem CD (CARDIZEM CD) 240 MG CAPSULE SR 24 HR UNK Active   escitalopram (LEXAPRO) 10 MG Tab UNK Active   gabapentin (NEURONTIN) 800 MG tablet UNK Active   insulin detemir (LEVEMIR) 100 UNIT/ML Solution UNK Active   insulin regular (HUMULIN R) 100 Unit/mL Solution UNK Active   isosorbide mononitrate SR (IMDUR) 60 MG TABLET SR 24 HR UNK Active   levothyroxine (SYNTHROID) 112 MCG Tab UNK Active   lisinopril (PRINIVIL) 20 MG Tab UNK Active   memantine (NAMENDA) 5 MG Tab UNK Active   mirabegron ER (MYRBETRIQ) 25 MG TABLET SR 24 HR UNK Active   pantoprazole (PROTONIX) 40 MG Tablet Delayed Response UNK Active   umeclidinium bromide (INCRUSE ELLIPTA) 62.5 MCG/ACT AEROSOL POWDER, BREATH ACTIVATED inhaler UNK Active                    ALLERGIES  Allergies   Allergen Reactions    Cefdinir Unspecified     Patient does not remember       PHYSICAL EXAM  VITAL SIGNS: BP (!) 183/87   Pulse (!) 54   Temp 36.2 °C (97.1 °F) (Temporal)   Resp 16   Ht 1.778 m (5' 10\")   Wt 86.2 kg (190 lb)   SpO2 99%   BMI 27.26 kg/m²      Constitutional: Well developed, Well nourished, No acute distress, Non-toxic appearance.   HEENT: Normocephalic, Atraumatic,  external ears normal, pharynx pink,  Mucous  Membranes moist, No rhinorrhea or mucosal edema  Eyes: PERRL, EOMI, Conjunctiva normal, No discharge.   Neck: " Normal range of motion, No tenderness, Supple, No stridor.   Lymphatic: No lymphadenopathy    Cardiovascular: Bradycardic, regular rhythm no murmurs,  rubs, or gallops.   Thorax & Lungs: Lungs clear to auscultation bilaterally, No respiratory distress, No wheezes, rhales or rhonchi, No chest wall tenderness.   Abdomen: Bowel sounds normal, Soft, non tender, non distended,  No pulsatile masses., no rebound guarding or peritoneal signs.   Skin: Warm, Dry, No erythema, No rash,   Back:  No CVA tenderness,  No spinal tenderness, bony crepitance step offs or instability.   Extremities: Equal, intact distal pulses, No cyanosis, clubbing or edema,  No tenderness.   Musculoskeletal: Good range of motion in all major joints. No tenderness to palpation or major deformities noted.   Neurologic: Alert & oriented No focal deficits noted.  Psychiatric: Affect normal, Judgment normal, Mood normal.      DIAGNOSTIC STUDIES / PROCEDURES  EKG  I have independently interpreted this EKG  See below    LABS  Results for orders placed or performed during the hospital encounter of 01/04/24   CBC with Differential   Result Value Ref Range    WBC 8.5 4.8 - 10.8 K/uL    RBC 4.98 4.70 - 6.10 M/uL    Hemoglobin 15.0 14.0 - 18.0 g/dL    Hematocrit 45.2 42.0 - 52.0 %    MCV 90.8 81.4 - 97.8 fL    MCH 30.1 27.0 - 33.0 pg    MCHC 33.2 32.3 - 36.5 g/dL    RDW 47.4 35.9 - 50.0 fL    Platelet Count 269 164 - 446 K/uL    MPV 11.0 9.0 - 12.9 fL    Neutrophils-Polys 64.20 44.00 - 72.00 %    Lymphocytes 17.10 (L) 22.00 - 41.00 %    Monocytes 11.50 0.00 - 13.40 %    Eosinophils 5.60 0.00 - 6.90 %    Basophils 1.10 0.00 - 1.80 %    Immature Granulocytes 0.50 0.00 - 0.90 %    Nucleated RBC 0.00 0.00 - 0.20 /100 WBC    Neutrophils (Absolute) 5.48 1.82 - 7.42 K/uL    Lymphs (Absolute) 1.46 1.00 - 4.80 K/uL    Monos (Absolute) 0.98 (H) 0.00 - 0.85 K/uL    Eos (Absolute) 0.48 0.00 - 0.51 K/uL    Baso (Absolute) 0.09 0.00 - 0.12 K/uL    Immature Granulocytes (abs)  0.04 0.00 - 0.11 K/uL    NRBC (Absolute) 0.00 K/uL   Complete Metabolic Panel (CMP)   Result Value Ref Range    Sodium 142 135 - 145 mmol/L    Potassium 4.6 3.6 - 5.5 mmol/L    Chloride 107 96 - 112 mmol/L    Co2 22 20 - 33 mmol/L    Anion Gap 13.0 7.0 - 16.0    Glucose 206 (H) 65 - 99 mg/dL    Bun 23 (H) 8 - 22 mg/dL    Creatinine 1.33 0.50 - 1.40 mg/dL    Calcium 9.4 8.5 - 10.5 mg/dL    Correct Calcium 9.3 8.5 - 10.5 mg/dL    AST(SGOT) 14 12 - 45 U/L    ALT(SGPT) 19 2 - 50 U/L    Alkaline Phosphatase 100 (H) 30 - 99 U/L    Total Bilirubin 0.2 0.1 - 1.5 mg/dL    Albumin 4.1 3.2 - 4.9 g/dL    Total Protein 7.2 6.0 - 8.2 g/dL    Globulin 3.1 1.9 - 3.5 g/dL    A-G Ratio 1.3 g/dL   Troponins NOW   Result Value Ref Range    Troponin T 93 (H) 6 - 19 ng/L   ESTIMATED GFR   Result Value Ref Range    GFR (CKD-EPI) 56 (A) >60 mL/min/1.73 m 2   EKG   Result Value Ref Range    Report       [facility] Emergency Dept.    Test Date:  2024  Pt Name:    EVAN JURADO               Department: ER  MRN:        [id number]                      Room:        02  Gender:     Male                         Technician: [id number]  :        1950                   Requested By:ER TRIAGE PROTOCOL  Order #:    [id number]                    Reading MD: RYAN NARANJO MD    Measurements  Intervals                                Axis  Rate:       47                           P:          0  MA:         0                            QRS:        -71  QRSD:       101                          T:          228  QT:         476  QTc:        421    Interpretive Statements  Atrial flutter  Incomplete RBBB and LAFB  Probable anterior infarct, age indeterminate  Abnormal T, consider ischemia, diffuse leads  Lateral leads are also involved  Compared to ECG 2022 00:16:08  Incomplete right bundle-branch block now present  Right bundle-branch block now present  Myocardial infa rct finding now present  T-wave abnormality now  present  Possible ischemia now present  Sinus rhythm no longer present  Electronically Signed On 01- 12:07:17 PST by RYAN NARANJO MD          RADIOLOGY  I have independently interpreted the diagnostic imaging associated with this visit and am waiting the final reading from the radiologist.   My preliminary interpretation is as follows: Chest x-ray no infiltrate or pleural effusion  Radiologist interpretation:   DX-CHEST-PORTABLE (1 VIEW)   Final Result      Cardiomegaly.      No definite acute abnormality. See details above.            COURSE & MEDICAL DECISION MAKING    ED Observation Status? No; Patient does not meet criteria for ED Observation.     INITIAL ASSESSMENT, COURSE AND PLAN  Care Narrative: This is a 73-year-old male with a history of atrial flutter CABG and coronary artery disease who comes in with chest pain that started last night.  He was seen at [facility] and had 2 negative troponins.  His symptoms improved after Lopressor and nitroglycerin and he was sent here for further evaluation for acute coronary syndrome.  Currently the patient is chest pain-free and does not have any sweating dizziness or shortness of breath.  He has no leg edema his abdomen is soft and his lungs are clear to auscultation bilaterally he is in no distress.     Differential diagnosis: Acute coronary syndrome versus non-STEMI  ADDITIONAL PROBLEM LIST  Hypertension  Coronary artery disease with CABG  Atrial flutter  Chronic kidney disease  Dementia  Diabetes  DISPOSITION AND DISCUSSIONS    Patient's EKG shows atrial flutter with bradycardia and nonspecific ST changes.     The patient's EKG shows atrial flutter in the 50s with bradycardia and nonspecific ST-T changes his troponin is elevated at 93.  With his story this gives him a heart score of 7.  CBC and comprehensive metabolic panel are unremarkable except for slight elevated glucose of 206 BUN is slightly evaded at 23 liver function tests and  electrolytes are normal he is not anemic and he has a normal white count normal differential.  Chest x-ray shows no infiltrate or pleural effusion.    Patient will be admitted to the hospitalist service for further cardiac workup and care.  His daughter did come in and asked us to check for urinary tract infection stating that he always has these so we have ordered a UA to evaluate him for a urinary tract infection.  The patient states he has no symptoms.  I have discussed management of the patient with the following physicians and DEANDRA's: Hospitalist  admit the patient for further cardiac workup and pain control    Discussion of management with other QHP or appropriate source(s): None     Escalation of care considered, and ultimately not performed: none    Barriers to care at this time, including but not limited to: Patient does not have established PCP.     Decision tools and prescription drugs considered including, but not limited to: HEART Score 7  .    Pt will be admitted in guarded condition    FINAL DIAGNOSIS  1. Chest pain, unspecified type    2. Atrial flutter, unspecified type (HCC)           Electronically signed by: Courtney Garcia M.D., 1/4/2024 11:46 AM

## 2024-01-05 ENCOUNTER — APPOINTMENT (OUTPATIENT)
Dept: RADIOLOGY | Facility: MEDICAL CENTER | Age: 74
DRG: 243 | End: 2024-01-05
Attending: STUDENT IN AN ORGANIZED HEALTH CARE EDUCATION/TRAINING PROGRAM
Payer: MEDICARE

## 2024-01-05 ENCOUNTER — APPOINTMENT (OUTPATIENT)
Dept: CARDIOLOGY | Facility: MEDICAL CENTER | Age: 74
DRG: 243 | End: 2024-01-05
Attending: STUDENT IN AN ORGANIZED HEALTH CARE EDUCATION/TRAINING PROGRAM
Payer: MEDICARE

## 2024-01-05 PROBLEM — G31.84 MILD COGNITIVE IMPAIRMENT: Status: ACTIVE | Noted: 2022-03-02

## 2024-01-05 PROBLEM — I50.22 CHRONIC HFREF (HEART FAILURE WITH REDUCED EJECTION FRACTION) (HCC): Status: ACTIVE | Noted: 2024-01-05

## 2024-01-05 PROBLEM — N18.31 STAGE 3A CHRONIC KIDNEY DISEASE: Status: ACTIVE | Noted: 2024-01-05

## 2024-01-05 PROBLEM — I25.708 CORONARY ARTERY DISEASE OF BYPASS GRAFT OF NATIVE HEART WITH STABLE ANGINA PECTORIS (HCC): Status: ACTIVE | Noted: 2022-11-26

## 2024-01-05 LAB
GLUCOSE BLD STRIP.AUTO-MCNC: 135 MG/DL (ref 65–99)
GLUCOSE BLD STRIP.AUTO-MCNC: 158 MG/DL (ref 65–99)
GLUCOSE BLD STRIP.AUTO-MCNC: 232 MG/DL (ref 65–99)
GLUCOSE BLD STRIP.AUTO-MCNC: 243 MG/DL (ref 65–99)
LV EJECT FRACT MOD 2C 99903: 78.24
LV EJECT FRACT MOD 4C 99902: 67.62
LV EJECT FRACT MOD BP 99901: 72.29

## 2024-01-05 PROCEDURE — A9270 NON-COVERED ITEM OR SERVICE: HCPCS | Performed by: STUDENT IN AN ORGANIZED HEALTH CARE EDUCATION/TRAINING PROGRAM

## 2024-01-05 PROCEDURE — 700102 HCHG RX REV CODE 250 W/ 637 OVERRIDE(OP): Performed by: STUDENT IN AN ORGANIZED HEALTH CARE EDUCATION/TRAINING PROGRAM

## 2024-01-05 PROCEDURE — 96372 THER/PROPH/DIAG INJ SC/IM: CPT | Mod: XU

## 2024-01-05 PROCEDURE — 700117 HCHG RX CONTRAST REV CODE 255: Performed by: STUDENT IN AN ORGANIZED HEALTH CARE EDUCATION/TRAINING PROGRAM

## 2024-01-05 PROCEDURE — A9502 TC99M TETROFOSMIN: HCPCS

## 2024-01-05 PROCEDURE — 93306 TTE W/DOPPLER COMPLETE: CPT

## 2024-01-05 PROCEDURE — 700102 HCHG RX REV CODE 250 W/ 637 OVERRIDE(OP): Performed by: HOSPITALIST

## 2024-01-05 PROCEDURE — G0378 HOSPITAL OBSERVATION PER HR: HCPCS

## 2024-01-05 PROCEDURE — 82962 GLUCOSE BLOOD TEST: CPT

## 2024-01-05 PROCEDURE — A9270 NON-COVERED ITEM OR SERVICE: HCPCS | Performed by: HOSPITALIST

## 2024-01-05 PROCEDURE — 99232 SBSQ HOSP IP/OBS MODERATE 35: CPT | Performed by: STUDENT IN AN ORGANIZED HEALTH CARE EDUCATION/TRAINING PROGRAM

## 2024-01-05 PROCEDURE — 93306 TTE W/DOPPLER COMPLETE: CPT | Mod: 26 | Performed by: STUDENT IN AN ORGANIZED HEALTH CARE EDUCATION/TRAINING PROGRAM

## 2024-01-05 PROCEDURE — 700111 HCHG RX REV CODE 636 W/ 250 OVERRIDE (IP)

## 2024-01-05 PROCEDURE — 96375 TX/PRO/DX INJ NEW DRUG ADDON: CPT | Mod: XU

## 2024-01-05 RX ORDER — REGADENOSON 0.08 MG/ML
0.4 INJECTION, SOLUTION INTRAVENOUS ONCE
Status: COMPLETED | OUTPATIENT
Start: 2024-01-05 | End: 2024-01-05

## 2024-01-05 RX ORDER — REGADENOSON 0.08 MG/ML
INJECTION, SOLUTION INTRAVENOUS
Status: COMPLETED
Start: 2024-01-05 | End: 2024-01-05

## 2024-01-05 RX ORDER — AMINOPHYLLINE 25 MG/ML
100 INJECTION, SOLUTION INTRAVENOUS
Status: DISCONTINUED | OUTPATIENT
Start: 2024-01-05 | End: 2024-01-22 | Stop reason: HOSPADM

## 2024-01-05 RX ORDER — DILTIAZEM HYDROCHLORIDE 120 MG/1
120 CAPSULE, COATED, EXTENDED RELEASE ORAL NIGHTLY
Status: DISCONTINUED | OUTPATIENT
Start: 2024-01-05 | End: 2024-01-11

## 2024-01-05 RX ADMIN — INSULIN HUMAN 4 UNITS: 100 INJECTION, SOLUTION PARENTERAL at 18:29

## 2024-01-05 RX ADMIN — MEMANTINE HYDROCHLORIDE 5 MG: 10 TABLET ORAL at 08:45

## 2024-01-05 RX ADMIN — REGADENOSON 0.4 MG: 0.08 INJECTION, SOLUTION INTRAVENOUS at 14:54

## 2024-01-05 RX ADMIN — MEMANTINE HYDROCHLORIDE 5 MG: 10 TABLET ORAL at 18:33

## 2024-01-05 RX ADMIN — ATORVASTATIN CALCIUM 20 MG: 20 TABLET, FILM COATED ORAL at 08:42

## 2024-01-05 RX ADMIN — LEVOTHYROXINE SODIUM 112 MCG: 0.11 TABLET ORAL at 08:42

## 2024-01-05 RX ADMIN — HUMAN ALBUMIN MICROSPHERES AND PERFLUTREN 3 ML: 10; .22 INJECTION, SOLUTION INTRAVENOUS at 21:00

## 2024-01-05 RX ADMIN — INSULIN HUMAN 4 UNITS: 100 INJECTION, SOLUTION PARENTERAL at 21:10

## 2024-01-05 RX ADMIN — DILTIAZEM HYDROCHLORIDE 120 MG: 120 CAPSULE, COATED, EXTENDED RELEASE ORAL at 21:12

## 2024-01-05 RX ADMIN — AMLODIPINE BESYLATE 10 MG: 10 TABLET ORAL at 08:41

## 2024-01-05 RX ADMIN — LISINOPRIL 20 MG: 20 TABLET ORAL at 08:41

## 2024-01-05 RX ADMIN — INSULIN HUMAN 3 UNITS: 100 INJECTION, SOLUTION PARENTERAL at 09:23

## 2024-01-05 RX ADMIN — DOCUSATE SODIUM 50 MG AND SENNOSIDES 8.6 MG 2 TABLET: 8.6; 5 TABLET, FILM COATED ORAL at 08:41

## 2024-01-05 RX ADMIN — ISOSORBIDE MONONITRATE 60 MG: 60 TABLET, EXTENDED RELEASE ORAL at 08:45

## 2024-01-05 RX ADMIN — APIXABAN 5 MG: 5 TABLET, FILM COATED ORAL at 18:28

## 2024-01-05 RX ADMIN — APIXABAN 5 MG: 5 TABLET, FILM COATED ORAL at 08:42

## 2024-01-05 RX ADMIN — ESCITALOPRAM OXALATE 10 MG: 10 TABLET ORAL at 08:42

## 2024-01-05 RX ADMIN — GABAPENTIN 800 MG: 400 CAPSULE ORAL at 18:28

## 2024-01-05 RX ADMIN — GABAPENTIN 800 MG: 400 CAPSULE ORAL at 08:41

## 2024-01-05 RX ADMIN — ACETAMINOPHEN 650 MG: 325 TABLET, FILM COATED ORAL at 01:58

## 2024-01-05 RX ADMIN — INSULIN GLARGINE-YFGN 20 UNITS: 100 INJECTION, SOLUTION SUBCUTANEOUS at 18:30

## 2024-01-05 ASSESSMENT — ENCOUNTER SYMPTOMS
BACK PAIN: 0
MYALGIAS: 0
SHORTNESS OF BREATH: 0
DIZZINESS: 0
NECK PAIN: 0
VOMITING: 0
NAUSEA: 0

## 2024-01-05 ASSESSMENT — FIBROSIS 4 INDEX: FIB4 SCORE: 0.87

## 2024-01-05 NOTE — DISCHARGE PLANNING
Called [facility] and spoke with Lio and updated that they do not accept admissions over the weekend. Patient pending NM stress test. [facility] able to accept patient back Monday. DR. Trotter updated.

## 2024-01-05 NOTE — CARE PLAN
The patient is Stable - Low risk of patient condition declining or worsening    Shift Goals  Clinical Goals: Monitor trops, BP, and tele  Patient Goals: sleep      Problem: Knowledge Deficit - Standard  Goal: Patient and family/care givers will demonstrate understanding of plan of care, disease process/condition, diagnostic tests and medications  Outcome: Progressing     Problem: Skin Integrity  Goal: Skin integrity is maintained or improved  Outcome: Progressing     POC reviewed with pt, opportunity for questions provided. Meds reviewed with administration. Plan for monitoring tele, labs, and VS.

## 2024-01-05 NOTE — DISCHARGE PLANNING
Discussed in AM rounds, Anticipate return to [facility] when medically cleared. Spoke with patient at bedside and discussed SNF. Patient gave verbal consent for [facility] and patient signed choice. Choice form Faxed to Ramesh MCKEON. Message sent to [facility] to update.

## 2024-01-05 NOTE — PROGRESS NOTES
Sulaiman awake,a&ox4.sleeping most of the time,pt planned for stress test,npo status explained,MD updated FSBS,coverage given and lantus held per MD instruction,routine medicines given as meds were held earlier.poc explained to pt.pt ambulated to bathroom s/b assist.Tele shows atrial flutter with bradycardia at times,pt asymptomatic.

## 2024-01-06 PROBLEM — E11.29 TYPE 2 DIABETES MELLITUS WITH KIDNEY COMPLICATION, WITH LONG-TERM CURRENT USE OF INSULIN (HCC): Status: ACTIVE | Noted: 2022-03-01

## 2024-01-06 PROBLEM — E83.42 HYPOMAGNESEMIA: Status: ACTIVE | Noted: 2024-01-06

## 2024-01-06 PROBLEM — R41.89 COGNITIVE IMPAIRMENT: Status: ACTIVE | Noted: 2022-03-02

## 2024-01-06 LAB
ANION GAP SERPL CALC-SCNC: 13 MMOL/L (ref 7–16)
BUN SERPL-MCNC: 27 MG/DL (ref 8–22)
CALCIUM SERPL-MCNC: 9.4 MG/DL (ref 8.5–10.5)
CHLORIDE SERPL-SCNC: 106 MMOL/L (ref 96–112)
CO2 SERPL-SCNC: 19 MMOL/L (ref 20–33)
CREAT SERPL-MCNC: 1.29 MG/DL (ref 0.5–1.4)
D DIMER PPP IA.FEU-MCNC: 0.7 UG/ML (FEU) (ref 0–0.5)
ERYTHROCYTE [DISTWIDTH] IN BLOOD BY AUTOMATED COUNT: 46.6 FL (ref 35.9–50)
GFR SERPLBLD CREATININE-BSD FMLA CKD-EPI: 58 ML/MIN/1.73 M 2
GLUCOSE BLD STRIP.AUTO-MCNC: 147 MG/DL (ref 65–99)
GLUCOSE BLD STRIP.AUTO-MCNC: 181 MG/DL (ref 65–99)
GLUCOSE BLD STRIP.AUTO-MCNC: 209 MG/DL (ref 65–99)
GLUCOSE BLD STRIP.AUTO-MCNC: 225 MG/DL (ref 65–99)
GLUCOSE SERPL-MCNC: 163 MG/DL (ref 65–99)
HCT VFR BLD AUTO: 44.8 % (ref 42–52)
HGB BLD-MCNC: 15.1 G/DL (ref 14–18)
MAGNESIUM SERPL-MCNC: 1.8 MG/DL (ref 1.5–2.5)
MCH RBC QN AUTO: 29.4 PG (ref 27–33)
MCHC RBC AUTO-ENTMCNC: 33.7 G/DL (ref 32.3–36.5)
MCV RBC AUTO: 87.3 FL (ref 81.4–97.8)
PHOSPHATE SERPL-MCNC: 3.4 MG/DL (ref 2.5–4.5)
PLATELET # BLD AUTO: 269 K/UL (ref 164–446)
PMV BLD AUTO: 11 FL (ref 9–12.9)
POTASSIUM SERPL-SCNC: 4.2 MMOL/L (ref 3.6–5.5)
RBC # BLD AUTO: 5.13 M/UL (ref 4.7–6.1)
SODIUM SERPL-SCNC: 138 MMOL/L (ref 135–145)
TSH SERPL DL<=0.005 MIU/L-ACNC: 2.54 UIU/ML (ref 0.38–5.33)
VIT B12 SERPL-MCNC: 432 PG/ML (ref 211–911)
WBC # BLD AUTO: 7.7 K/UL (ref 4.8–10.8)

## 2024-01-06 PROCEDURE — 700102 HCHG RX REV CODE 250 W/ 637 OVERRIDE(OP): Performed by: STUDENT IN AN ORGANIZED HEALTH CARE EDUCATION/TRAINING PROGRAM

## 2024-01-06 PROCEDURE — 96366 THER/PROPH/DIAG IV INF ADDON: CPT

## 2024-01-06 PROCEDURE — 80048 BASIC METABOLIC PNL TOTAL CA: CPT

## 2024-01-06 PROCEDURE — 96365 THER/PROPH/DIAG IV INF INIT: CPT

## 2024-01-06 PROCEDURE — 96372 THER/PROPH/DIAG INJ SC/IM: CPT | Mod: XU

## 2024-01-06 PROCEDURE — 36415 COLL VENOUS BLD VENIPUNCTURE: CPT

## 2024-01-06 PROCEDURE — 700111 HCHG RX REV CODE 636 W/ 250 OVERRIDE (IP): Mod: JZ | Performed by: FAMILY MEDICINE

## 2024-01-06 PROCEDURE — 85027 COMPLETE CBC AUTOMATED: CPT

## 2024-01-06 PROCEDURE — G0378 HOSPITAL OBSERVATION PER HR: HCPCS

## 2024-01-06 PROCEDURE — 84443 ASSAY THYROID STIM HORMONE: CPT

## 2024-01-06 PROCEDURE — 83735 ASSAY OF MAGNESIUM: CPT

## 2024-01-06 PROCEDURE — 99233 SBSQ HOSP IP/OBS HIGH 50: CPT | Performed by: FAMILY MEDICINE

## 2024-01-06 PROCEDURE — 84100 ASSAY OF PHOSPHORUS: CPT

## 2024-01-06 PROCEDURE — 93005 ELECTROCARDIOGRAM TRACING: CPT | Performed by: FAMILY MEDICINE

## 2024-01-06 PROCEDURE — A9270 NON-COVERED ITEM OR SERVICE: HCPCS | Performed by: STUDENT IN AN ORGANIZED HEALTH CARE EDUCATION/TRAINING PROGRAM

## 2024-01-06 PROCEDURE — 82607 VITAMIN B-12: CPT

## 2024-01-06 PROCEDURE — 82962 GLUCOSE BLOOD TEST: CPT

## 2024-01-06 PROCEDURE — 700102 HCHG RX REV CODE 250 W/ 637 OVERRIDE(OP): Performed by: HOSPITALIST

## 2024-01-06 PROCEDURE — 85379 FIBRIN DEGRADATION QUANT: CPT

## 2024-01-06 PROCEDURE — A9270 NON-COVERED ITEM OR SERVICE: HCPCS | Performed by: HOSPITALIST

## 2024-01-06 RX ORDER — MAGNESIUM SULFATE HEPTAHYDRATE 40 MG/ML
2 INJECTION, SOLUTION INTRAVENOUS ONCE
Status: COMPLETED | OUTPATIENT
Start: 2024-01-06 | End: 2024-01-06

## 2024-01-06 RX ADMIN — DILTIAZEM HYDROCHLORIDE 120 MG: 120 CAPSULE, COATED, EXTENDED RELEASE ORAL at 21:43

## 2024-01-06 RX ADMIN — AMLODIPINE BESYLATE 10 MG: 10 TABLET ORAL at 06:01

## 2024-01-06 RX ADMIN — ESCITALOPRAM OXALATE 10 MG: 10 TABLET ORAL at 06:01

## 2024-01-06 RX ADMIN — INSULIN GLARGINE-YFGN 20 UNITS: 100 INJECTION, SOLUTION SUBCUTANEOUS at 18:19

## 2024-01-06 RX ADMIN — ATORVASTATIN CALCIUM 20 MG: 20 TABLET, FILM COATED ORAL at 06:01

## 2024-01-06 RX ADMIN — APIXABAN 5 MG: 5 TABLET, FILM COATED ORAL at 06:01

## 2024-01-06 RX ADMIN — MAGNESIUM SULFATE HEPTAHYDRATE 2 G: 2 INJECTION, SOLUTION INTRAVENOUS at 11:54

## 2024-01-06 RX ADMIN — INSULIN HUMAN 4 UNITS: 100 INJECTION, SOLUTION PARENTERAL at 09:12

## 2024-01-06 RX ADMIN — DOCUSATE SODIUM 50 MG AND SENNOSIDES 8.6 MG 2 TABLET: 8.6; 5 TABLET, FILM COATED ORAL at 18:22

## 2024-01-06 RX ADMIN — INSULIN HUMAN 3 UNITS: 100 INJECTION, SOLUTION PARENTERAL at 21:45

## 2024-01-06 RX ADMIN — GABAPENTIN 800 MG: 400 CAPSULE ORAL at 18:21

## 2024-01-06 RX ADMIN — MEMANTINE HYDROCHLORIDE 5 MG: 10 TABLET ORAL at 18:21

## 2024-01-06 RX ADMIN — INSULIN HUMAN 4 UNITS: 100 INJECTION, SOLUTION PARENTERAL at 11:45

## 2024-01-06 RX ADMIN — INSULIN GLARGINE-YFGN 20 UNITS: 100 INJECTION, SOLUTION SUBCUTANEOUS at 09:11

## 2024-01-06 RX ADMIN — APIXABAN 5 MG: 5 TABLET, FILM COATED ORAL at 18:21

## 2024-01-06 RX ADMIN — GABAPENTIN 800 MG: 400 CAPSULE ORAL at 06:01

## 2024-01-06 RX ADMIN — LEVOTHYROXINE SODIUM 112 MCG: 0.11 TABLET ORAL at 06:01

## 2024-01-06 RX ADMIN — LISINOPRIL 20 MG: 20 TABLET ORAL at 06:01

## 2024-01-06 RX ADMIN — MEMANTINE HYDROCHLORIDE 5 MG: 10 TABLET ORAL at 06:01

## 2024-01-06 ASSESSMENT — PAIN DESCRIPTION - PAIN TYPE
TYPE: ACUTE PAIN
TYPE: ACUTE PAIN

## 2024-01-06 ASSESSMENT — ENCOUNTER SYMPTOMS
NAUSEA: 0
BACK PAIN: 0
DIZZINESS: 0
MYALGIAS: 0
FLANK PAIN: 0
NERVOUS/ANXIOUS: 0
DIARRHEA: 0
VOMITING: 0
NECK PAIN: 0
HEADACHES: 0
HEARTBURN: 0
BLURRED VISION: 0
SPEECH CHANGE: 0
DIAPHORESIS: 0
FOCAL WEAKNESS: 0
FEVER: 0
COUGH: 0
PALPITATIONS: 0
ABDOMINAL PAIN: 0
WEAKNESS: 1
SHORTNESS OF BREATH: 0
SENSORY CHANGE: 0
CHILLS: 0
WHEEZING: 0
SORE THROAT: 0

## 2024-01-06 NOTE — PROGRESS NOTES
Received bedside report and accepted care of patient.  Patient currently resting in bed in no visible or stated distress.  Bed controls on and bed in locked position.  Call light and personal possessions within reach.  Plan of care to include pain management, assistance with ADL's continued work up of presenting symptoms and ACHS blood sugar monitoring and treatment.  Patient verbalizes agreement with plan of care, and has no additional questions or concerns at this time.  Will continue to update notes/plan of care as needed throughout shift.

## 2024-01-06 NOTE — CARE PLAN
The patient is Watcher - Medium risk of patient condition declining or worsening    Shift Goals  Clinical Goals: Decreased blood sugar levels throughout day shift  Patient Goals: None stated  Family Goals: None present    Progress made toward(s) clinical / shift goals:       Problem: Knowledge Deficit - Standard  Goal: Patient and family/care givers will demonstrate understanding of plan of care, disease process/condition, diagnostic tests and medications  Outcome: Progressing     Problem: Skin Integrity  Goal: Skin integrity is maintained or improved  Outcome: Progressing     Problem: Pain - Standard  Goal: Alleviation of pain or a reduction in pain to the patient’s comfort goal  Outcome: Progressing         Patient is not progressing towards the following goals: Blood sugars remain over 200 throughout day shift

## 2024-01-06 NOTE — PROGRESS NOTES
4 Eyes Skin Assessment Completed by LORI Kemp and LORI Null.    Head WDL  Ears WDL  Nose WDL  Mouth WDL  Neck WDL  Breast/Chest WDL  Shoulder Blades WDL  Spine WDL  (R) Arm/Elbow/Hand WDL  (L) Arm/Elbow/Hand WDL  Abdomen WDL  Groin WDL  Scrotum/Coccyx/Buttocks WDL  (R) Leg WDL  (L) Leg WDL  (R) Heel/Foot/Toe Redness, abrasion/scratch to achilles  (L) Heel/Foot/Toe WDL          Devices In Places - PIV      Interventions In Place Pillows and Pressure Redistribution Mattress    Possible Skin Injury No    Pictures Uploaded Into Epic N/A  Wound Consult Placed N/A  RN Wound Prevention Protocol Ordered No

## 2024-01-06 NOTE — ASSESSMENT & PLAN NOTE
Seemingly new diagnosis of LVEF 37% from NM estimate  Appears euvolemic and non-hypoxic  NM stress test negative for apparent ischemia  GDMT TBD by echocardiogram results

## 2024-01-06 NOTE — PROGRESS NOTES
Pt had stress test,awaiting for results,hypertensive,no c/o head ache or chest pain,pleasant,cooperative,ambulates s/b.,lunch served,70% intake,voiding without difficulty.

## 2024-01-06 NOTE — PROGRESS NOTES
Pt transferred from T215, report received from LORI Whitehead. No report of pain/discomfort, alert/roiented x4. Pt resting in bed, needs met.  Call light within reach, personal belongings available, bed in lowest position, treaded socks on, and hourly rounding in place. Pt calls appropriately when needing assistance.

## 2024-01-06 NOTE — PROGRESS NOTES
Hospital Medicine Daily Progress Note    Date of Service  1/5/2024    Chief Complaint  Sulaiman Ceja is a 73 y.o. male with CAD s/p CABG, HTN, MDD, IDDM, Afib on apixaban, hypothyroidism, CKD3a, and mild cognitive impairment admitted 1/4/2024 with chest pain/    Hospital Course  No notes on file    Interval Problem Update    LEXA ON  Tachycardic and hypertensive after holding diltiazem, restarted at 50% with 30 mg q6h  He feels well.  He reports chest pain has resolved. Denies dyspnea, N/V, presyncope, pain.  We discussed whether he would consider coronary angiography if a stress test was positive.  He advised that he would do whatever we think is best, to which he was agreeable to NM stress test.  Advised that if positive, a cardiologist would discuss the risks/benefits of invasive cardiac testing.  NM stress test was negative for jeapordized myocardium nor infarction but did estimate LVEF 37%.  TTE ordered for confirmation.  Troponin decreased to 89 from 93.  -243    I have discussed this patient's plan of care and discharge plan at IDT rounds today with Case Management, Nursing, Nursing leadership, and other members of the IDT team.    Consultants/Specialty  None    Code Status  Full Code    Disposition  The patient is medically cleared for discharge to home or a post-acute facility.  Anticipate discharge to: skilled nursing facility    I have placed the appropriate orders for post-discharge needs.    Review of Systems  Review of Systems   Constitutional:  Negative for malaise/fatigue.   Respiratory:  Negative for shortness of breath.    Cardiovascular:  Negative for chest pain.   Gastrointestinal:  Negative for nausea and vomiting.   Musculoskeletal:  Negative for back pain, joint pain, myalgias and neck pain.   Neurological:  Negative for dizziness.        Physical Exam  Temp:  [36.5 °C (97.7 °F)-37.4 °C (99.3 °F)] 36.5 °C (97.7 °F)  Pulse:  [42-66] 59  Resp:  [16-18] 18  BP: (145-182)/(70-93)  177/79  SpO2:  [92 %-96 %] 92 %    Physical Exam  Vitals and nursing note reviewed.   HENT:      Head: Normocephalic.      Nose: Nose normal.      Mouth/Throat:      Mouth: Mucous membranes are dry.   Eyes:      General: No scleral icterus.     Conjunctiva/sclera: Conjunctivae normal.   Cardiovascular:      Rate and Rhythm: Normal rate and regular rhythm.      Pulses: Normal pulses.      Heart sounds: Normal heart sounds. No murmur heard.     No friction rub. No gallop.   Pulmonary:      Effort: Pulmonary effort is normal. No respiratory distress.      Breath sounds: Normal breath sounds. No wheezing, rhonchi or rales.   Abdominal:      General: Abdomen is flat. Bowel sounds are normal. There is no distension.      Palpations: Abdomen is soft.      Tenderness: There is no abdominal tenderness. There is no guarding or rebound.   Genitourinary:     Comments: No reyna  Musculoskeletal:      Cervical back: Neck supple.      Right lower leg: No edema.      Left lower leg: No edema.   Skin:     General: Skin is warm and dry.   Neurological:      Mental Status: He is alert.      Comments: No reyna   Psychiatric:         Mood and Affect: Mood normal.         Behavior: Behavior normal.         Thought Content: Thought content normal.         Judgment: Judgment normal.         Fluids    Intake/Output Summary (Last 24 hours) at 1/5/2024 1633  Last data filed at 1/5/2024 1327  Gross per 24 hour   Intake 250 ml   Output 1475 ml   Net -1225 ml       Laboratory  Recent Labs     01/04/24  1141   WBC 8.5   RBC 4.98   HEMOGLOBIN 15.0   HEMATOCRIT 45.2   MCV 90.8   MCH 30.1   MCHC 33.2   RDW 47.4   PLATELETCT 269   MPV 11.0     Recent Labs     01/04/24  1141   SODIUM 142   POTASSIUM 4.6   CHLORIDE 107   CO2 22   GLUCOSE 206*   BUN 23*   CREATININE 1.33   CALCIUM 9.4                   Imaging  NM-CARDIAC STRESS TEST   Final Result      DX-CHEST-PORTABLE (1 VIEW)   Final Result      Cardiomegaly.      No definite acute abnormality. See  details above.      EC-ECHOCARDIOGRAM COMPLETE W/O CONT    (Results Pending)        Assessment/Plan  * Coronary artery disease of bypass graft of native heart with stable angina pectoris (HCC)- (present on admission)  Assessment & Plan  History of coronary artery bypass graft in the 1990s and apparently no subsequent stents since.    NM Stress test negative for infarct nor jeapordized myocardium  Elevated troponin possibly due to impaired renal clearance  Continue apixaban, isosorbide, and atorvastatin  Unclear why not on statin - will determine prior to discharge    Stage 3a chronic kidney disease ([facility])- (present on admission)  Assessment & Plan  Avoid nephrotoxins    Chronic HFrEF (heart failure with reduced ejection fraction) ([facility])- (present on admission)  Assessment & Plan  Seemingly new diagnosis of LVEF 37% from NM estimate  Appears euvolemic and non-hypoxic  NM stress test negative for apparent ischemia  GDMT TBD by echocardiogram results    Atrial flutter ([facility])- (present on admission)  Assessment & Plan  Reduced diltiazem XR to 120 mg due to bradycardia  Continue apixaban for CVA risk reduction    Acquired hypothyroidism- (present on admission)  Assessment & Plan  Continue levothyroxine    COPD (chronic obstructive pulmonary disease) ([facility])- (present on admission)  Assessment & Plan  Hx of without exacerbation    Mild cognitive impairment- (present on admission)  Assessment & Plan  Continue Namenda     Primary hypertension- (present on admission)  Assessment & Plan  Continue lisinopril, diltiazem, and isosorbide    Insulin dependent type 2 diabetes mellitus ([facility])- (present on admission)  Assessment & Plan  Glargine decreased to 20 units BID while monitoring prerna-procedure  SSI / POCT  Continue atorvastatin       VTE prophylaxis:    therapeutic anticoagulation with eliquis 5 mg BID    I have performed a physical exam and reviewed and updated ROS and Plan today (1/5/2024). In review of yesterday's note  (1/4/2024), there are no changes except as documented above.

## 2024-01-06 NOTE — PROGRESS NOTES
Hospital Medicine Daily Progress Note    Date of Service  1/6/2024    Chief Complaint  Sulaiman Ceja is a 73 y.o. male admitted 1/4/2024 with chest pain    Hospital Course  Admitted with chest pain, EKG showed atrial flutter, troponins were elevated.  Nuclear stress test was negative.For the atrial flutter, patient was started on anticoagulation with Eliquis and on Cardizem for rate control.  Echocardiogram was done which showed ejection fraction of 65%.  Patient's symptoms of chest pain resolved.    Interval Problem Update  Atrial flutter - rate 61  Hypertension - sbp 150-177  Diabetes - BS 200s    I have discussed this patient's plan of care and discharge plan at IDT rounds today with Case Management, Nursing, Nursing leadership, and other members of the IDT team.    Consultants/Specialty  None    Code Status  Full Code    Disposition  The patient is medically cleared for discharge to home or a post-acute facility.  Anticipate discharge to: skilled nursing facility    I have placed the appropriate orders for post-discharge needs.    Review of Systems  Review of Systems   Constitutional:  Negative for chills, diaphoresis, fever and malaise/fatigue.   HENT:  Negative for congestion, hearing loss and sore throat.    Eyes:  Negative for blurred vision.   Respiratory:  Negative for cough, shortness of breath and wheezing.    Cardiovascular:  Negative for chest pain, palpitations and leg swelling.   Gastrointestinal:  Negative for abdominal pain, diarrhea, heartburn, nausea and vomiting.   Genitourinary:  Negative for dysuria, flank pain and hematuria.   Musculoskeletal:  Negative for back pain, joint pain, myalgias and neck pain.   Skin:  Negative for rash.   Neurological:  Positive for weakness. Negative for dizziness, sensory change, speech change, focal weakness and headaches.   Psychiatric/Behavioral:  The patient is not nervous/anxious.         Physical Exam  Temp:  [36.4 °C (97.5 °F)-36.8 °C (98.3 °F)] 36.7 °C  (98 °F)  Pulse:  [50-61] 61  Resp:  [16-18] 16  BP: (150-177)/(61-80) 158/61  SpO2:  [91 %-96 %] 96 %    Physical Exam  Vitals and nursing note reviewed.   HENT:      Head: Normocephalic and atraumatic.      Nose: No congestion.      Mouth/Throat:      Mouth: Mucous membranes are moist.   Eyes:      Extraocular Movements: Extraocular movements intact.      Conjunctiva/sclera: Conjunctivae normal.   Cardiovascular:      Rate and Rhythm: Normal rate and regular rhythm.   Pulmonary:      Effort: Pulmonary effort is normal.      Breath sounds: Normal breath sounds.   Abdominal:      General: There is no distension.      Tenderness: There is no abdominal tenderness. There is no guarding or rebound.   Musculoskeletal:      Cervical back: No tenderness.      Right lower leg: No edema.      Left lower leg: No edema.   Skin:     General: Skin is warm and dry.   Neurological:      General: No focal deficit present.      Mental Status: He is alert.      Cranial Nerves: No cranial nerve deficit.      Comments: Oriented to person and place   Psychiatric:         Speech: Speech is delayed.         Cognition and Memory: He exhibits impaired recent memory.         Fluids    Intake/Output Summary (Last 24 hours) at 1/6/2024 1247  Last data filed at 1/6/2024 0900  Gross per 24 hour   Intake 240 ml   Output 700 ml   Net -460 ml       Laboratory  Recent Labs     01/04/24  1141 01/06/24  0832   WBC 8.5 7.7   RBC 4.98 5.13   HEMOGLOBIN 15.0 15.1   HEMATOCRIT 45.2 44.8   MCV 90.8 87.3   MCH 30.1 29.4   MCHC 33.2 33.7   RDW 47.4 46.6   PLATELETCT 269 269   MPV 11.0 11.0     Recent Labs     01/04/24  1141 01/06/24  0832   SODIUM 142 138   POTASSIUM 4.6 4.2   CHLORIDE 107 106   CO2 22 19*   GLUCOSE 206* 163*   BUN 23* 27*   CREATININE 1.33 1.29   CALCIUM 9.4 9.4                   Imaging  EC-ECHOCARDIOGRAM COMPLETE W/ CONT   Final Result      NM-CARDIAC STRESS TEST   Final Result      DX-CHEST-PORTABLE (1 VIEW)   Final Result       Cardiomegaly.      No definite acute abnormality. See details above.           Assessment/Plan  * Coronary artery disease of bypass graft of native heart with stable angina pectoris (HCC)- (present on admission)  Assessment & Plan  Lipitor, Imdur  start ASA    Atrial flutter (HCC)- (present on admission)  Assessment & Plan  Diltiazem, Eliquis    Hypomagnesemia- (present on admission)  Assessment & Plan  IV Mg 2 g  Follow level    Stage 3a chronic kidney disease (HCC)- (present on admission)  Assessment & Plan  Avoid nephrotoxins    COPD (chronic obstructive pulmonary disease) (HCC)- (present on admission)  Assessment & Plan  Resume Incruse Ellipta  RT protocol    Cognitive impairment- (present on admission)  Assessment & Plan  Namenda   Avoid Benzodiazepines, Anticholinergics, Opiates  Frequent orientation  Update Board daily  Open window blinds during the day  Avoid naps during the day  Family at bedside whenever possible  Ensure adequate sleep at night - use Melatonin preferably  Avoid early morning labs  Avoid vital signs during sleep  Ambulate if possible  Provide adequate pain control  Monitor for urinary retention  Prevent and manage constipation  Discontinue IVs, Catheters, Tubes, Lines, Cardiac monitors, SCDs if possible     Primary hypertension- (present on admission)  Assessment & Plan  Diltiazem, Lisinopril, Norvasc    Type 2 diabetes mellitus with kidney complication, with long-term current use of insulin (HCC)- (present on admission)  Assessment & Plan  Lantus, SSI    Acquired hypothyroidism- (present on admission)  Assessment & Plan  Continue levothyroxine         VTE prophylaxis:    therapeutic anticoagulation with eliquis 5 mg BID      I have performed a physical exam and reviewed and updated ROS and Plan today (1/6/2024). In review of yesterday's note (1/5/2024), there are no changes except as documented above.

## 2024-01-06 NOTE — RESPIRATORY CARE
"COPD EDUCATION by COPD CLINICAL EDUCATOR  1/6/2024 at 12:28 PM by Yvonne Prajapati, RRT     Patient interviewed by COPD education team. Patient refused COPD program at this time. An Action Plan was completed in the EMR to reflect current Respiratory Medication use.                   COPD Assessment  COPD Clinical Specialists ONLY  COPD Education Initiated: Yes--Short Intervention (met with pt - with prompting he dioes remember his daily inhaler; has a rescue inhaler and declined to discuss further \"I'm here for my heart\" AP updated)  DME Company: none  Physician Name: Suhail Aguirre M.D.  Pt lives in [place], [place]  Pulmonologist Name: denies    PFT Results  No results found for: \"PFT\"    Meds to Beds        MY COPD ACTION PLAN     It is recommended that patients and physicians /healthcare providers complete this action plan together. This plan should be discussed at each physician visit and updated as needed.    The green, yellow and red zones show groups of symptoms of COPD. This list of symptoms is not comprehensive, and you may experience other symptoms. In the \"Actions\" column, your healthcare provider has recommended actions for you to take based on your symptoms.    Patient Name: Sulaiman Ceja   YOB: 1950   Last Updated on: 1/6/2024 12:28 PM   Green Zone:  I am doing well today Actions     Usual activitiy and exercise level   Take daily medications     Usual amounts of cough and phlegm/mucus   Use oxygen as prescribed     Sleep well at night   Continue regular exercise/diet plan     Appetite is good   At all times avoid cigarette smoke, inhaled irritants     Daily Medications (these medications are taken every day):   Umeclidinium (Incurse Ellipta) 1 Puff Once daily        Yellow Zone:  I am having a bad day or a COPD flare Actions     More breathless than usual   Continue daily medications     I have less energy for my daily activities   Use quick relief inhaler as ordered     Increased or " "thicker phlegm/mucus   Use oxygen as prescribed     Using quick relief inhaler/nebulizer more often   Get plenty of rest     Swelling of ankles more than usual   Use pursed lip breathing     More coughing than usual   At all times avoid cigarette smoke, inhaled irritants     I feel like I have a \"chest cold\"     Poor sleep and my symptoms woke me up     My appetite is not good     My medicine is not helping      Call provider immediately if symptoms don’t improve     Continue daily medications, add rescue medications:   Levalbuterol (Xopenex) 2 Puffs         Medications to be used during a flare up, (as Discussed with Provider):           Additional Information:  Use as directed    Red Zone:  I need urgent medical care Actions     Severe shortness of breath even at rest   Call 911 or seek medical care immediately     Not able to do any activity because of breathing      Fever or shaking chills      Feeling confused or very drowsy       Chest pains      Coughing up blood                  "

## 2024-01-07 LAB
ANION GAP SERPL CALC-SCNC: 13 MMOL/L (ref 7–16)
BUN SERPL-MCNC: 29 MG/DL (ref 8–22)
CALCIUM SERPL-MCNC: 9.4 MG/DL (ref 8.5–10.5)
CHLORIDE SERPL-SCNC: 107 MMOL/L (ref 96–112)
CO2 SERPL-SCNC: 18 MMOL/L (ref 20–33)
CREAT SERPL-MCNC: 1.49 MG/DL (ref 0.5–1.4)
EKG IMPRESSION: NORMAL
GFR SERPLBLD CREATININE-BSD FMLA CKD-EPI: 49 ML/MIN/1.73 M 2
GLUCOSE BLD STRIP.AUTO-MCNC: 154 MG/DL (ref 65–99)
GLUCOSE BLD STRIP.AUTO-MCNC: 161 MG/DL (ref 65–99)
GLUCOSE BLD STRIP.AUTO-MCNC: 189 MG/DL (ref 65–99)
GLUCOSE BLD STRIP.AUTO-MCNC: 87 MG/DL (ref 65–99)
GLUCOSE SERPL-MCNC: 105 MG/DL (ref 65–99)
MAGNESIUM SERPL-MCNC: 2 MG/DL (ref 1.5–2.5)
PHOSPHATE SERPL-MCNC: 3.7 MG/DL (ref 2.5–4.5)
POTASSIUM SERPL-SCNC: 4.3 MMOL/L (ref 3.6–5.5)
SODIUM SERPL-SCNC: 138 MMOL/L (ref 135–145)

## 2024-01-07 PROCEDURE — 36415 COLL VENOUS BLD VENIPUNCTURE: CPT

## 2024-01-07 PROCEDURE — 83735 ASSAY OF MAGNESIUM: CPT

## 2024-01-07 PROCEDURE — A9270 NON-COVERED ITEM OR SERVICE: HCPCS | Performed by: HOSPITALIST

## 2024-01-07 PROCEDURE — 700102 HCHG RX REV CODE 250 W/ 637 OVERRIDE(OP): Performed by: HOSPITALIST

## 2024-01-07 PROCEDURE — 96372 THER/PROPH/DIAG INJ SC/IM: CPT

## 2024-01-07 PROCEDURE — A9270 NON-COVERED ITEM OR SERVICE: HCPCS | Performed by: FAMILY MEDICINE

## 2024-01-07 PROCEDURE — 99232 SBSQ HOSP IP/OBS MODERATE 35: CPT | Performed by: FAMILY MEDICINE

## 2024-01-07 PROCEDURE — 82962 GLUCOSE BLOOD TEST: CPT | Mod: 91

## 2024-01-07 PROCEDURE — A9270 NON-COVERED ITEM OR SERVICE: HCPCS | Performed by: STUDENT IN AN ORGANIZED HEALTH CARE EDUCATION/TRAINING PROGRAM

## 2024-01-07 PROCEDURE — 93010 ELECTROCARDIOGRAM REPORT: CPT | Performed by: STUDENT IN AN ORGANIZED HEALTH CARE EDUCATION/TRAINING PROGRAM

## 2024-01-07 PROCEDURE — 700102 HCHG RX REV CODE 250 W/ 637 OVERRIDE(OP): Performed by: FAMILY MEDICINE

## 2024-01-07 PROCEDURE — G0378 HOSPITAL OBSERVATION PER HR: HCPCS

## 2024-01-07 PROCEDURE — 700102 HCHG RX REV CODE 250 W/ 637 OVERRIDE(OP): Performed by: STUDENT IN AN ORGANIZED HEALTH CARE EDUCATION/TRAINING PROGRAM

## 2024-01-07 PROCEDURE — 80048 BASIC METABOLIC PNL TOTAL CA: CPT

## 2024-01-07 PROCEDURE — 84100 ASSAY OF PHOSPHORUS: CPT

## 2024-01-07 RX ADMIN — INSULIN HUMAN 3 UNITS: 100 INJECTION, SOLUTION PARENTERAL at 21:38

## 2024-01-07 RX ADMIN — APIXABAN 5 MG: 5 TABLET, FILM COATED ORAL at 05:26

## 2024-01-07 RX ADMIN — ESCITALOPRAM OXALATE 10 MG: 10 TABLET ORAL at 05:27

## 2024-01-07 RX ADMIN — ATORVASTATIN CALCIUM 20 MG: 20 TABLET, FILM COATED ORAL at 05:27

## 2024-01-07 RX ADMIN — GABAPENTIN 800 MG: 400 CAPSULE ORAL at 16:49

## 2024-01-07 RX ADMIN — AMLODIPINE BESYLATE 10 MG: 10 TABLET ORAL at 05:27

## 2024-01-07 RX ADMIN — INSULIN GLARGINE-YFGN 20 UNITS: 100 INJECTION, SOLUTION SUBCUTANEOUS at 05:31

## 2024-01-07 RX ADMIN — DOCUSATE SODIUM 50 MG AND SENNOSIDES 8.6 MG 2 TABLET: 8.6; 5 TABLET, FILM COATED ORAL at 06:00

## 2024-01-07 RX ADMIN — LEVOTHYROXINE SODIUM 112 MCG: 0.11 TABLET ORAL at 05:26

## 2024-01-07 RX ADMIN — DILTIAZEM HYDROCHLORIDE 120 MG: 120 CAPSULE, COATED, EXTENDED RELEASE ORAL at 21:34

## 2024-01-07 RX ADMIN — MEMANTINE HYDROCHLORIDE 5 MG: 10 TABLET ORAL at 05:27

## 2024-01-07 RX ADMIN — LISINOPRIL 20 MG: 20 TABLET ORAL at 05:26

## 2024-01-07 RX ADMIN — APIXABAN 5 MG: 5 TABLET, FILM COATED ORAL at 16:47

## 2024-01-07 RX ADMIN — INSULIN HUMAN 3 UNITS: 100 INJECTION, SOLUTION PARENTERAL at 12:05

## 2024-01-07 RX ADMIN — INSULIN HUMAN 3 UNITS: 100 INJECTION, SOLUTION PARENTERAL at 16:50

## 2024-01-07 RX ADMIN — TIOTROPIUM BROMIDE INHALATION SPRAY 5 MCG: 3.12 SPRAY, METERED RESPIRATORY (INHALATION) at 08:30

## 2024-01-07 RX ADMIN — MEMANTINE HYDROCHLORIDE 5 MG: 10 TABLET ORAL at 16:49

## 2024-01-07 RX ADMIN — DOCUSATE SODIUM 50 MG AND SENNOSIDES 8.6 MG 2 TABLET: 8.6; 5 TABLET, FILM COATED ORAL at 16:47

## 2024-01-07 RX ADMIN — GABAPENTIN 800 MG: 400 CAPSULE ORAL at 05:27

## 2024-01-07 ASSESSMENT — PAIN DESCRIPTION - PAIN TYPE: TYPE: ACUTE PAIN

## 2024-01-07 ASSESSMENT — ENCOUNTER SYMPTOMS
SENSORY CHANGE: 0
ABDOMINAL PAIN: 0
FLANK PAIN: 0
HEADACHES: 0
FOCAL WEAKNESS: 0
SHORTNESS OF BREATH: 0
NAUSEA: 0
FEVER: 0
SPEECH CHANGE: 0
PALPITATIONS: 0
NERVOUS/ANXIOUS: 0
DIZZINESS: 0
WHEEZING: 0
DIAPHORESIS: 0
MYALGIAS: 0
SORE THROAT: 0
CHILLS: 0
NECK PAIN: 0
DIARRHEA: 0
VOMITING: 0
WEAKNESS: 1
BACK PAIN: 0
HEARTBURN: 0
BLURRED VISION: 0
COUGH: 0

## 2024-01-07 NOTE — PROGRESS NOTES
Hospital Medicine Daily Progress Note    Date of Service  1/7/2024    Chief Complaint  Sulaiman Ceja is a 73 y.o. male admitted 1/4/2024 with chest pain    Hospital Course  Admitted with chest pain, EKG showed atrial flutter, troponins were elevated.  Nuclear stress test was negative. For the atrial flutter, patient was started on anticoagulation with Eliquis and on Cardizem for rate control.  Echocardiogram was done which showed ejection fraction of 65%.  Patient's symptoms of chest pain resolved.    Interval Problem Update  Atrial flutter - rate 58-60  Hypertension - sbp 126-150  Diabetes - BS     I have discussed this patient's plan of care and discharge plan at IDT rounds today with Case Management, Nursing, Nursing leadership, and other members of the IDT team.    Consultants/Specialty  None    Code Status  Full Code    Disposition  The patient is medically cleared for discharge to home or a post-acute facility.  Anticipate discharge to: skilled nursing facility    I have placed the appropriate orders for post-discharge needs.    Review of Systems  Review of Systems   Constitutional:  Negative for chills, diaphoresis, fever and malaise/fatigue.   HENT:  Negative for congestion, hearing loss and sore throat.    Eyes:  Negative for blurred vision.   Respiratory:  Negative for cough, shortness of breath and wheezing.    Cardiovascular:  Negative for chest pain, palpitations and leg swelling.   Gastrointestinal:  Negative for abdominal pain, diarrhea, heartburn, nausea and vomiting.   Genitourinary:  Negative for dysuria, flank pain and hematuria.   Musculoskeletal:  Negative for back pain, joint pain, myalgias and neck pain.   Skin:  Negative for rash.   Neurological:  Positive for weakness. Negative for dizziness, sensory change, speech change, focal weakness and headaches.   Psychiatric/Behavioral:  The patient is not nervous/anxious.         Physical Exam  Temp:  [35.9 °C (96.7 °F)-36.7 °C (98.1 °F)]  36.7 °C (98.1 °F)  Pulse:  [58-60] 58  Resp:  [16-18] 16  BP: (126-150)/(51-74) 132/66  SpO2:  [93 %-96 %] 93 %    Physical Exam  Vitals and nursing note reviewed.   HENT:      Head: Normocephalic and atraumatic.      Nose: No congestion.      Mouth/Throat:      Mouth: Mucous membranes are moist.   Eyes:      Extraocular Movements: Extraocular movements intact.      Conjunctiva/sclera: Conjunctivae normal.   Cardiovascular:      Rate and Rhythm: Normal rate and regular rhythm.   Pulmonary:      Effort: Pulmonary effort is normal.      Breath sounds: Normal breath sounds.   Abdominal:      General: There is no distension.      Tenderness: There is no abdominal tenderness. There is no guarding or rebound.   Musculoskeletal:      Cervical back: No tenderness.      Right lower leg: No edema.      Left lower leg: No edema.   Skin:     General: Skin is warm and dry.   Neurological:      General: No focal deficit present.      Mental Status: He is alert.      Cranial Nerves: No cranial nerve deficit.      Comments: Oriented to person and place   Psychiatric:         Speech: Speech is delayed.         Cognition and Memory: He exhibits impaired recent memory.         Fluids    Intake/Output Summary (Last 24 hours) at 1/7/2024 1457  Last data filed at 1/7/2024 0930  Gross per 24 hour   Intake 600 ml   Output 700 ml   Net -100 ml       Laboratory  Recent Labs     01/06/24  0832   WBC 7.7   RBC 5.13   HEMOGLOBIN 15.1   HEMATOCRIT 44.8   MCV 87.3   MCH 29.4   MCHC 33.7   RDW 46.6   PLATELETCT 269   MPV 11.0     Recent Labs     01/06/24  0832 01/07/24  0923   SODIUM 138 138   POTASSIUM 4.2 4.3   CHLORIDE 106 107   CO2 19* 18*   GLUCOSE 163* 105*   BUN 27* 29*   CREATININE 1.29 1.49*   CALCIUM 9.4 9.4                   Imaging  EC-ECHOCARDIOGRAM COMPLETE W/ CONT   Final Result      NM-CARDIAC STRESS TEST   Final Result      DX-CHEST-PORTABLE (1 VIEW)   Final Result      Cardiomegaly.      No definite acute abnormality. See details  above.           Assessment/Plan  * Coronary artery disease of bypass graft of native heart with stable angina pectoris (HCC)- (present on admission)  Assessment & Plan  Lipitor, Imdur, ASA    Atrial flutter (HCC)- (present on admission)  Assessment & Plan  Diltiazem, Eliquis    Hypomagnesemia- (present on admission)  Assessment & Plan  IV Mg 2 g  Follow level    Stage 3a chronic kidney disease ([facility])- (present on admission)  Assessment & Plan  Avoid nephrotoxins    COPD (chronic obstructive pulmonary disease) ([facility])- (present on admission)  Assessment & Plan  Resume Incruse Ellipta  RT protocol    Cognitive impairment- (present on admission)  Assessment & Plan  Namenda   Avoid Benzodiazepines, Anticholinergics, Opiates  Frequent orientation  Update Board daily  Open window blinds during the day  Avoid naps during the day  Family at bedside whenever possible  Ensure adequate sleep at night - use Melatonin preferably  Avoid early morning labs  Avoid vital signs during sleep  Ambulate if possible  Provide adequate pain control  Monitor for urinary retention  Prevent and manage constipation  Discontinue IVs, Catheters, Tubes, Lines, Cardiac monitors, SCDs if possible     Primary hypertension- (present on admission)  Assessment & Plan  Diltiazem, Lisinopril, Norvasc    Type 2 diabetes mellitus with kidney complication, with long-term current use of insulin ([facility])- (present on admission)  Assessment & Plan  SSI  Change Lantus to 30 units AM dosing    Acquired hypothyroidism- (present on admission)  Assessment & Plan  Levothyroxine         VTE prophylaxis:    therapeutic anticoagulation with eliquis 5 mg BID      I have performed a physical exam and reviewed and updated ROS and Plan today (1/7/2024). In review of yesterday's note (1/6/2024), there are no changes except as documented above.

## 2024-01-07 NOTE — CARE PLAN
The patient is Stable - Low risk of patient condition declining or worsening    Shift Goals  Clinical Goals: Safety  Patient Goals: Remain updated on POC    Progress made toward(s) clinical / shift goals:    Problem: Skin Integrity  Goal: Skin integrity is maintained or improved  Outcome: Progressing  Note: Appropriate interventions in place to maintain/ improve skin integrity.      Problem: Fall Risk  Goal: Patient will remain free from falls  Outcome: Progressing  Note: Appropriate fall precautions in place.

## 2024-01-07 NOTE — CARE PLAN
The patient is Stable - Low risk of patient condition declining or worsening    Shift Goals  Clinical Goals: Patient will remain free from any fall or injuries throughout shift  Patient Goals: Sleep and rest  Family Goals: None present    Progress made toward(s) clinical / shift goals:      Patient is alert and oriented x2-3, disoriented to time, forgetful. Patient able to verbalize needs. Denies any pain and discomfort. Patient's bed alarm is on, bed in low position, call light within reach.

## 2024-01-08 PROBLEM — R55 SYNCOPE AND COLLAPSE: Status: ACTIVE | Noted: 2024-01-08

## 2024-01-08 LAB
ANION GAP SERPL CALC-SCNC: 15 MMOL/L (ref 7–16)
BUN SERPL-MCNC: 32 MG/DL (ref 8–22)
CALCIUM SERPL-MCNC: 9.3 MG/DL (ref 8.5–10.5)
CHLORIDE SERPL-SCNC: 108 MMOL/L (ref 96–112)
CO2 SERPL-SCNC: 16 MMOL/L (ref 20–33)
CREAT SERPL-MCNC: 1.71 MG/DL (ref 0.5–1.4)
D DIMER PPP IA.FEU-MCNC: 0.67 UG/ML (FEU) (ref 0–0.5)
EKG IMPRESSION: NORMAL
ERYTHROCYTE [DISTWIDTH] IN BLOOD BY AUTOMATED COUNT: 47.5 FL (ref 35.9–50)
GFR SERPLBLD CREATININE-BSD FMLA CKD-EPI: 42 ML/MIN/1.73 M 2
GLUCOSE BLD STRIP.AUTO-MCNC: 122 MG/DL (ref 65–99)
GLUCOSE BLD STRIP.AUTO-MCNC: 135 MG/DL (ref 65–99)
GLUCOSE BLD STRIP.AUTO-MCNC: 178 MG/DL (ref 65–99)
GLUCOSE BLD STRIP.AUTO-MCNC: 193 MG/DL (ref 65–99)
GLUCOSE BLD STRIP.AUTO-MCNC: 246 MG/DL (ref 65–99)
GLUCOSE SERPL-MCNC: 147 MG/DL (ref 65–99)
HCT VFR BLD AUTO: 46.9 % (ref 42–52)
HGB BLD-MCNC: 16 G/DL (ref 14–18)
MAGNESIUM SERPL-MCNC: 1.9 MG/DL (ref 1.5–2.5)
MCH RBC QN AUTO: 30 PG (ref 27–33)
MCHC RBC AUTO-ENTMCNC: 34.1 G/DL (ref 32.3–36.5)
MCV RBC AUTO: 88 FL (ref 81.4–97.8)
PLATELET # BLD AUTO: 321 K/UL (ref 164–446)
PMV BLD AUTO: 10.9 FL (ref 9–12.9)
POTASSIUM SERPL-SCNC: 4 MMOL/L (ref 3.6–5.5)
RBC # BLD AUTO: 5.33 M/UL (ref 4.7–6.1)
SODIUM SERPL-SCNC: 139 MMOL/L (ref 135–145)
TROPONIN T SERPL-MCNC: 128 NG/L (ref 6–19)
WBC # BLD AUTO: 11 K/UL (ref 4.8–10.8)

## 2024-01-08 PROCEDURE — A9270 NON-COVERED ITEM OR SERVICE: HCPCS | Performed by: FAMILY MEDICINE

## 2024-01-08 PROCEDURE — 700105 HCHG RX REV CODE 258: Performed by: FAMILY MEDICINE

## 2024-01-08 PROCEDURE — A9270 NON-COVERED ITEM OR SERVICE: HCPCS | Performed by: HOSPITALIST

## 2024-01-08 PROCEDURE — 36415 COLL VENOUS BLD VENIPUNCTURE: CPT

## 2024-01-08 PROCEDURE — 93010 ELECTROCARDIOGRAM REPORT: CPT | Performed by: INTERNAL MEDICINE

## 2024-01-08 PROCEDURE — 93005 ELECTROCARDIOGRAM TRACING: CPT | Performed by: FAMILY MEDICINE

## 2024-01-08 PROCEDURE — G0378 HOSPITAL OBSERVATION PER HR: HCPCS

## 2024-01-08 PROCEDURE — 82962 GLUCOSE BLOOD TEST: CPT | Mod: 91

## 2024-01-08 PROCEDURE — 700102 HCHG RX REV CODE 250 W/ 637 OVERRIDE(OP): Performed by: STUDENT IN AN ORGANIZED HEALTH CARE EDUCATION/TRAINING PROGRAM

## 2024-01-08 PROCEDURE — 700111 HCHG RX REV CODE 636 W/ 250 OVERRIDE (IP): Mod: JZ | Performed by: FAMILY MEDICINE

## 2024-01-08 PROCEDURE — 84484 ASSAY OF TROPONIN QUANT: CPT

## 2024-01-08 PROCEDURE — 99233 SBSQ HOSP IP/OBS HIGH 50: CPT | Performed by: FAMILY MEDICINE

## 2024-01-08 PROCEDURE — 85027 COMPLETE CBC AUTOMATED: CPT

## 2024-01-08 PROCEDURE — 85379 FIBRIN DEGRADATION QUANT: CPT

## 2024-01-08 PROCEDURE — 700102 HCHG RX REV CODE 250 W/ 637 OVERRIDE(OP): Performed by: FAMILY MEDICINE

## 2024-01-08 PROCEDURE — 83735 ASSAY OF MAGNESIUM: CPT

## 2024-01-08 PROCEDURE — 80048 BASIC METABOLIC PNL TOTAL CA: CPT

## 2024-01-08 PROCEDURE — 700102 HCHG RX REV CODE 250 W/ 637 OVERRIDE(OP): Performed by: HOSPITALIST

## 2024-01-08 PROCEDURE — A9270 NON-COVERED ITEM OR SERVICE: HCPCS | Performed by: STUDENT IN AN ORGANIZED HEALTH CARE EDUCATION/TRAINING PROGRAM

## 2024-01-08 RX ORDER — MAGNESIUM SULFATE HEPTAHYDRATE 40 MG/ML
2 INJECTION, SOLUTION INTRAVENOUS ONCE
Status: COMPLETED | OUTPATIENT
Start: 2024-01-08 | End: 2024-01-08

## 2024-01-08 RX ORDER — SODIUM CHLORIDE 9 MG/ML
INJECTION, SOLUTION INTRAVENOUS CONTINUOUS
Status: ACTIVE | OUTPATIENT
Start: 2024-01-08 | End: 2024-01-08

## 2024-01-08 RX ORDER — ASPIRIN 81 MG/1
81 TABLET ORAL DAILY
Status: DISCONTINUED | OUTPATIENT
Start: 2024-01-08 | End: 2024-01-22 | Stop reason: HOSPADM

## 2024-01-08 RX ADMIN — MEMANTINE HYDROCHLORIDE 5 MG: 10 TABLET ORAL at 17:58

## 2024-01-08 RX ADMIN — GABAPENTIN 800 MG: 400 CAPSULE ORAL at 04:47

## 2024-01-08 RX ADMIN — ESCITALOPRAM OXALATE 10 MG: 10 TABLET ORAL at 04:47

## 2024-01-08 RX ADMIN — GABAPENTIN 800 MG: 400 CAPSULE ORAL at 17:58

## 2024-01-08 RX ADMIN — DOCUSATE SODIUM 50 MG AND SENNOSIDES 8.6 MG 2 TABLET: 8.6; 5 TABLET, FILM COATED ORAL at 17:58

## 2024-01-08 RX ADMIN — MEMANTINE HYDROCHLORIDE 5 MG: 10 TABLET ORAL at 04:47

## 2024-01-08 RX ADMIN — TIOTROPIUM BROMIDE INHALATION SPRAY 5 MCG: 3.12 SPRAY, METERED RESPIRATORY (INHALATION) at 04:52

## 2024-01-08 RX ADMIN — INSULIN HUMAN 3 UNITS: 100 INJECTION, SOLUTION PARENTERAL at 17:59

## 2024-01-08 RX ADMIN — DILTIAZEM HYDROCHLORIDE 120 MG: 120 CAPSULE, COATED, EXTENDED RELEASE ORAL at 21:01

## 2024-01-08 RX ADMIN — INSULIN HUMAN 3 UNITS: 100 INJECTION, SOLUTION PARENTERAL at 12:53

## 2024-01-08 RX ADMIN — APIXABAN 5 MG: 5 TABLET, FILM COATED ORAL at 17:58

## 2024-01-08 RX ADMIN — INSULIN HUMAN 4 UNITS: 100 INJECTION, SOLUTION PARENTERAL at 21:04

## 2024-01-08 RX ADMIN — AMLODIPINE BESYLATE 10 MG: 10 TABLET ORAL at 04:47

## 2024-01-08 RX ADMIN — SODIUM CHLORIDE: 9 INJECTION, SOLUTION INTRAVENOUS at 13:08

## 2024-01-08 RX ADMIN — MAGNESIUM SULFATE HEPTAHYDRATE 2 G: 2 INJECTION, SOLUTION INTRAVENOUS at 10:42

## 2024-01-08 RX ADMIN — ATORVASTATIN CALCIUM 20 MG: 20 TABLET, FILM COATED ORAL at 04:47

## 2024-01-08 RX ADMIN — ASPIRIN 81 MG: 81 TABLET, COATED ORAL at 13:07

## 2024-01-08 RX ADMIN — ISOSORBIDE MONONITRATE 60 MG: 60 TABLET, EXTENDED RELEASE ORAL at 04:48

## 2024-01-08 RX ADMIN — LEVOTHYROXINE SODIUM 112 MCG: 0.11 TABLET ORAL at 04:48

## 2024-01-08 RX ADMIN — LISINOPRIL 20 MG: 20 TABLET ORAL at 04:47

## 2024-01-08 RX ADMIN — APIXABAN 5 MG: 5 TABLET, FILM COATED ORAL at 04:48

## 2024-01-08 RX ADMIN — DOCUSATE SODIUM 50 MG AND SENNOSIDES 8.6 MG 2 TABLET: 8.6; 5 TABLET, FILM COATED ORAL at 04:48

## 2024-01-08 ASSESSMENT — ENCOUNTER SYMPTOMS
SORE THROAT: 0
NAUSEA: 0
FLANK PAIN: 0
COUGH: 0
DIZZINESS: 1
PALPITATIONS: 0
FOCAL WEAKNESS: 0
HEARTBURN: 0
VOMITING: 0
SENSORY CHANGE: 0
FEVER: 0
ABDOMINAL PAIN: 0
BACK PAIN: 0
BLURRED VISION: 0
MYALGIAS: 0
SHORTNESS OF BREATH: 0
CHILLS: 0
NECK PAIN: 0
WEAKNESS: 1
HEADACHES: 0
DIAPHORESIS: 0
WHEEZING: 0
NERVOUS/ANXIOUS: 0
DIARRHEA: 0
SPEECH CHANGE: 0

## 2024-01-08 NOTE — CARE PLAN
Problem: Knowledge Deficit - Standard  Goal: Patient and family/care givers will demonstrate understanding of plan of care, disease process/condition, diagnostic tests and medications  Outcome: Progressing     Problem: Skin Integrity  Goal: Skin integrity is maintained or improved  Outcome: Progressing     Problem: Fall Risk  Goal: Patient will remain free from falls  Outcome: Progressing   The patient is Stable - Low risk of patient condition declining or worsening    Shift Goals  Clinical Goals: monitor HR  Patient Goals: Remain updated on POC  Family Goals: None present    Progress made toward(s) clinical / shift goals:  yes    Patient is not progressing towards the following goals:

## 2024-01-08 NOTE — CARE PLAN
The patient is Stable - Low risk of patient condition declining or worsening    Shift Goals  Clinical Goals: Patient will remain free from fall or injuries throughout shift  Patient Goals: Sleep and rest  Family Goals: None present    Progress made toward(s) clinical / shift goals:      Patient is alert and oriented x3, pleasant and cooperative, with episodes of forgetfulness. Patient is ambulatory to the bathroom, stand by assist. Patient's bed in low position, call light within reach.

## 2024-01-08 NOTE — PROGRESS NOTES
Hospital Medicine Daily Progress Note    Date of Service  1/8/2024    Chief Complaint  Sulaiman Ceja is a 73 y.o. male admitted 1/4/2024 with chest pain    Hospital Course  Admitted with chest pain, EKG showed Atrial flutter, troponins were elevated.  Nuclear stress test was negative. For the Atrial flutter, patient was started on anticoagulation with Eliquis and on Cardizem for rate control.  Echocardiogram was done which showed ejection fraction of 65%.  Patient's symptoms of chest pain resolved.    Interval Problem Update  Atrial flutter - rate 60-63  Hypertension - sbp   Diabetes - -135  Syncope - found in bathroom, sitting on the toilet with altered mental status after he had a bowel movement, rapid response called, patient brought to bed and became more alert and oriented right away   CKD - crea went up to 1.7    Updates given and plan of care discussed with patient's daughter.  CODE STATUS clarified and verified DNR/DNI.    I have discussed this patient's plan of care and discharge plan at IDT rounds today with Case Management, Nursing, Nursing leadership, and other members of the IDT team.    Consultants/Specialty  None    Code Status  Prior    Disposition  The patient is not medically cleared for discharge to home or a post-acute facility.  Anticipate discharge to: skilled nursing facility    I have placed the appropriate orders for post-discharge needs.    Review of Systems  Review of Systems   Constitutional:  Negative for chills, diaphoresis, fever and malaise/fatigue.   HENT:  Negative for congestion, hearing loss and sore throat.    Eyes:  Negative for blurred vision.   Respiratory:  Negative for cough, shortness of breath and wheezing.    Cardiovascular:  Negative for chest pain, palpitations and leg swelling.   Gastrointestinal:  Negative for abdominal pain, diarrhea, heartburn, nausea and vomiting.   Genitourinary:  Negative for dysuria, flank pain and hematuria.   Musculoskeletal:   Negative for back pain, joint pain, myalgias and neck pain.   Skin:  Negative for rash.   Neurological:  Positive for dizziness and weakness. Negative for sensory change, speech change, focal weakness and headaches.   Psychiatric/Behavioral:  The patient is not nervous/anxious.         Physical Exam  Temp:  [36.2 °C (97.1 °F)-36.7 °C (98.1 °F)] 36.7 °C (98.1 °F)  Pulse:  [60-63] 63  Resp:  [16-18] 16  BP: ()/(52-78) 97/52  SpO2:  [93 %-98 %] 98 %    Physical Exam  Vitals and nursing note reviewed.   HENT:      Head: Normocephalic and atraumatic.      Nose: No congestion.      Mouth/Throat:      Mouth: Mucous membranes are moist.   Eyes:      Extraocular Movements: Extraocular movements intact.      Conjunctiva/sclera: Conjunctivae normal.   Cardiovascular:      Rate and Rhythm: Normal rate. Rhythm irregular.   Pulmonary:      Effort: Pulmonary effort is normal.      Breath sounds: Normal breath sounds.   Abdominal:      General: There is no distension.      Tenderness: There is no abdominal tenderness. There is no guarding or rebound.   Musculoskeletal:      Cervical back: No tenderness.      Right lower leg: No edema.      Left lower leg: No edema.   Skin:     General: Skin is warm and dry.   Neurological:      General: No focal deficit present.      Mental Status: He is alert.      Cranial Nerves: No cranial nerve deficit.      Comments: Oriented to person and place   Psychiatric:         Speech: Speech is delayed.         Cognition and Memory: He exhibits impaired recent memory.         Fluids    Intake/Output Summary (Last 24 hours) at 1/8/2024 1258  Last data filed at 1/8/2024 1000  Gross per 24 hour   Intake 960 ml   Output --   Net 960 ml       Laboratory  Recent Labs     01/06/24  0832 01/08/24  0916   WBC 7.7 11.0*   RBC 5.13 5.33   HEMOGLOBIN 15.1 16.0   HEMATOCRIT 44.8 46.9   MCV 87.3 88.0   MCH 29.4 30.0   MCHC 33.7 34.1   RDW 46.6 47.5   PLATELETCT 269 321   MPV 11.0 10.9     Recent Labs      01/06/24  0832 01/07/24  0923 01/08/24  0916   SODIUM 138 138 139   POTASSIUM 4.2 4.3 4.0   CHLORIDE 106 107 108   CO2 19* 18* 16*   GLUCOSE 163* 105* 147*   BUN 27* 29* 32*   CREATININE 1.29 1.49* 1.71*   CALCIUM 9.4 9.4 9.3                   Imaging  EC-ECHOCARDIOGRAM COMPLETE W/ CONT   Final Result      NM-CARDIAC STRESS TEST   Final Result      DX-CHEST-PORTABLE (1 VIEW)   Final Result      Cardiomegaly.      No definite acute abnormality. See details above.           Assessment/Plan  * Coronary artery disease of bypass graft of native heart with stable angina pectoris (HCC)- (present on admission)  Assessment & Plan  Lipitor, Imdur, ASA    Syncope and collapse  Assessment & Plan  Probably vasovagal  Transfer to Telemetry  IVF hydration  cc only    Atrial flutter (HCC)- (present on admission)  Assessment & Plan  Diltiazem, Eliquis  May need to consult cardiology -possible cardioversion    Hypomagnesemia- (present on admission)  Assessment & Plan  IV Mg 2 g  Follow level    Stage 3a chronic kidney disease (HCC)- (present on admission)  Assessment & Plan  Follow bmp  IVF hydration  cc only    COPD (chronic obstructive pulmonary disease) (HCC)- (present on admission)  Assessment & Plan  Spiriva  RT protocol    Cognitive impairment- (present on admission)  Assessment & Plan  Namenda   Avoid Benzodiazepines, Anticholinergics, Opiates  Frequent orientation  Update Board daily  Open window blinds during the day  Avoid naps during the day  Family at bedside whenever possible  Ensure adequate sleep at night - use Melatonin preferably  Avoid early morning labs  Avoid vital signs during sleep  Ambulate if possible  Provide adequate pain control  Monitor for urinary retention  Prevent and manage constipation  Discontinue IVs, Catheters, Tubes, Lines, Cardiac monitors, SCDs if possible     Primary hypertension- (present on admission)  Assessment & Plan  Diltiazem, Lisinopril, Norvasc    Type 2 diabetes mellitus  with kidney complication, with long-term current use of insulin (HCC)- (present on admission)  Assessment & Plan  SSI, Lantus    Acquired hypothyroidism- (present on admission)  Assessment & Plan  Levothyroxine         VTE prophylaxis:    therapeutic anticoagulation with eliquis 5 mg BID      I have performed a physical exam and reviewed and updated ROS and Plan today (1/8/2024). In review of yesterday's note (1/7/2024), there are no changes except as documented above.

## 2024-01-08 NOTE — PROGRESS NOTES
Approx 0855: Pt found sitting on toilet minimally responsive, pupils dilated, pale skin color in face. Multiple attempts of voice commands and sternal rub being unsuccessful. Staff assist called. Rapid response called. Pt lifted from toilet to wheelchair to bed by staff with success.     This ACLS RN currently sitting on patient with telemetry monitor on. Pt conversing appropriately with this RN. Respirations even and unlabored. Declines pain. All questions/ concerns addressed at this time.

## 2024-01-08 NOTE — PROGRESS NOTES
4 Eyes Skin Assessment Completed by LORI ash and YANG Aldridge.    Head WDL  Ears WDL  Nose WDL  Mouth WDL  Neck WDL  Breast/Chest WDL  Shoulder Blades WDL  Spine WDL  (R) Arm/Elbow/Hand WDL  (L) Arm/Elbow/Hand WDL  Abdomen WDL  Groin WDL  Scrotum/Coccyx/Buttocks WDL  (R) Leg WDL  (L) Leg WDL  (R) Heel/Foot/Toe WDL  (L) Heel/Foot/Toe WDL          Devices In Places Tele Box      Interventions In Place Pillows    Possible Skin Injury No    Pictures Uploaded Into Epic N/A  Wound Consult Placed N/A  RN Wound Prevention Protocol Ordered No

## 2024-01-09 LAB
ALBUMIN SERPL BCP-MCNC: 4.2 G/DL (ref 3.2–4.9)
ALBUMIN/GLOB SERPL: 1.4 G/DL
ALP SERPL-CCNC: 105 U/L (ref 30–99)
ALT SERPL-CCNC: 38 U/L (ref 2–50)
ANION GAP SERPL CALC-SCNC: 13 MMOL/L (ref 7–16)
AST SERPL-CCNC: 21 U/L (ref 12–45)
BASOPHILS # BLD AUTO: 1 % (ref 0–1.8)
BASOPHILS # BLD: 0.09 K/UL (ref 0–0.12)
BILIRUB SERPL-MCNC: 0.4 MG/DL (ref 0.1–1.5)
BUN SERPL-MCNC: 36 MG/DL (ref 8–22)
CALCIUM ALBUM COR SERPL-MCNC: 9.3 MG/DL (ref 8.5–10.5)
CALCIUM SERPL-MCNC: 9.5 MG/DL (ref 8.5–10.5)
CHLORIDE SERPL-SCNC: 106 MMOL/L (ref 96–112)
CO2 SERPL-SCNC: 19 MMOL/L (ref 20–33)
CREAT SERPL-MCNC: 1.72 MG/DL (ref 0.5–1.4)
EOSINOPHIL # BLD AUTO: 0.42 K/UL (ref 0–0.51)
EOSINOPHIL NFR BLD: 4.5 % (ref 0–6.9)
ERYTHROCYTE [DISTWIDTH] IN BLOOD BY AUTOMATED COUNT: 48 FL (ref 35.9–50)
GFR SERPLBLD CREATININE-BSD FMLA CKD-EPI: 41 ML/MIN/1.73 M 2
GLOBULIN SER CALC-MCNC: 3.1 G/DL (ref 1.9–3.5)
GLUCOSE BLD STRIP.AUTO-MCNC: 100 MG/DL (ref 65–99)
GLUCOSE BLD STRIP.AUTO-MCNC: 156 MG/DL (ref 65–99)
GLUCOSE BLD STRIP.AUTO-MCNC: 78 MG/DL (ref 65–99)
GLUCOSE SERPL-MCNC: 171 MG/DL (ref 65–99)
HCT VFR BLD AUTO: 46 % (ref 42–52)
HGB BLD-MCNC: 15.4 G/DL (ref 14–18)
IMM GRANULOCYTES # BLD AUTO: 0.13 K/UL (ref 0–0.11)
IMM GRANULOCYTES NFR BLD AUTO: 1.4 % (ref 0–0.9)
LYMPHOCYTES # BLD AUTO: 1.88 K/UL (ref 1–4.8)
LYMPHOCYTES NFR BLD: 20 % (ref 22–41)
MAGNESIUM SERPL-MCNC: 2.3 MG/DL (ref 1.5–2.5)
MCH RBC QN AUTO: 29.7 PG (ref 27–33)
MCHC RBC AUTO-ENTMCNC: 33.5 G/DL (ref 32.3–36.5)
MCV RBC AUTO: 88.6 FL (ref 81.4–97.8)
MONOCYTES # BLD AUTO: 1.08 K/UL (ref 0–0.85)
MONOCYTES NFR BLD AUTO: 11.5 % (ref 0–13.4)
NEUTROPHILS # BLD AUTO: 5.78 K/UL (ref 1.82–7.42)
NEUTROPHILS NFR BLD: 61.6 % (ref 44–72)
NRBC # BLD AUTO: 0 K/UL
NRBC BLD-RTO: 0 /100 WBC (ref 0–0.2)
PHOSPHATE SERPL-MCNC: 3.5 MG/DL (ref 2.5–4.5)
PLATELET # BLD AUTO: 276 K/UL (ref 164–446)
PMV BLD AUTO: 10.9 FL (ref 9–12.9)
POTASSIUM SERPL-SCNC: 4.4 MMOL/L (ref 3.6–5.5)
PROT SERPL-MCNC: 7.3 G/DL (ref 6–8.2)
RBC # BLD AUTO: 5.19 M/UL (ref 4.7–6.1)
SODIUM SERPL-SCNC: 138 MMOL/L (ref 135–145)
WBC # BLD AUTO: 9.4 K/UL (ref 4.8–10.8)

## 2024-01-09 PROCEDURE — 82962 GLUCOSE BLOOD TEST: CPT | Mod: 91

## 2024-01-09 PROCEDURE — 99233 SBSQ HOSP IP/OBS HIGH 50: CPT | Performed by: STUDENT IN AN ORGANIZED HEALTH CARE EDUCATION/TRAINING PROGRAM

## 2024-01-09 PROCEDURE — G0378 HOSPITAL OBSERVATION PER HR: HCPCS

## 2024-01-09 PROCEDURE — A9270 NON-COVERED ITEM OR SERVICE: HCPCS | Performed by: FAMILY MEDICINE

## 2024-01-09 PROCEDURE — 85025 COMPLETE CBC W/AUTO DIFF WBC: CPT

## 2024-01-09 PROCEDURE — 700102 HCHG RX REV CODE 250 W/ 637 OVERRIDE(OP): Performed by: FAMILY MEDICINE

## 2024-01-09 PROCEDURE — 36415 COLL VENOUS BLD VENIPUNCTURE: CPT

## 2024-01-09 PROCEDURE — 700102 HCHG RX REV CODE 250 W/ 637 OVERRIDE(OP): Performed by: HOSPITALIST

## 2024-01-09 PROCEDURE — 700102 HCHG RX REV CODE 250 W/ 637 OVERRIDE(OP): Performed by: STUDENT IN AN ORGANIZED HEALTH CARE EDUCATION/TRAINING PROGRAM

## 2024-01-09 PROCEDURE — A9270 NON-COVERED ITEM OR SERVICE: HCPCS | Performed by: HOSPITALIST

## 2024-01-09 PROCEDURE — 80053 COMPREHEN METABOLIC PANEL: CPT

## 2024-01-09 PROCEDURE — A9270 NON-COVERED ITEM OR SERVICE: HCPCS | Performed by: STUDENT IN AN ORGANIZED HEALTH CARE EDUCATION/TRAINING PROGRAM

## 2024-01-09 PROCEDURE — 83735 ASSAY OF MAGNESIUM: CPT

## 2024-01-09 PROCEDURE — 84100 ASSAY OF PHOSPHORUS: CPT

## 2024-01-09 RX ADMIN — MEMANTINE HYDROCHLORIDE 5 MG: 10 TABLET ORAL at 05:12

## 2024-01-09 RX ADMIN — ASPIRIN 81 MG: 81 TABLET, COATED ORAL at 05:12

## 2024-01-09 RX ADMIN — GABAPENTIN 800 MG: 400 CAPSULE ORAL at 05:12

## 2024-01-09 RX ADMIN — APIXABAN 5 MG: 5 TABLET, FILM COATED ORAL at 16:43

## 2024-01-09 RX ADMIN — DOCUSATE SODIUM 50 MG AND SENNOSIDES 8.6 MG 2 TABLET: 8.6; 5 TABLET, FILM COATED ORAL at 05:12

## 2024-01-09 RX ADMIN — LEVOTHYROXINE SODIUM 112 MCG: 0.11 TABLET ORAL at 05:12

## 2024-01-09 RX ADMIN — MEMANTINE HYDROCHLORIDE 5 MG: 10 TABLET ORAL at 16:43

## 2024-01-09 RX ADMIN — DOCUSATE SODIUM 50 MG AND SENNOSIDES 8.6 MG 2 TABLET: 8.6; 5 TABLET, FILM COATED ORAL at 16:43

## 2024-01-09 RX ADMIN — LISINOPRIL 20 MG: 20 TABLET ORAL at 05:12

## 2024-01-09 RX ADMIN — GABAPENTIN 800 MG: 400 CAPSULE ORAL at 16:43

## 2024-01-09 RX ADMIN — DILTIAZEM HYDROCHLORIDE 120 MG: 120 CAPSULE, COATED, EXTENDED RELEASE ORAL at 22:04

## 2024-01-09 RX ADMIN — AMLODIPINE BESYLATE 10 MG: 10 TABLET ORAL at 05:12

## 2024-01-09 RX ADMIN — ISOSORBIDE MONONITRATE 60 MG: 60 TABLET, EXTENDED RELEASE ORAL at 05:12

## 2024-01-09 RX ADMIN — ESCITALOPRAM OXALATE 10 MG: 10 TABLET ORAL at 05:12

## 2024-01-09 RX ADMIN — TIOTROPIUM BROMIDE INHALATION SPRAY 5 MCG: 3.12 SPRAY, METERED RESPIRATORY (INHALATION) at 05:21

## 2024-01-09 RX ADMIN — ATORVASTATIN CALCIUM 20 MG: 20 TABLET, FILM COATED ORAL at 05:12

## 2024-01-09 RX ADMIN — APIXABAN 5 MG: 5 TABLET, FILM COATED ORAL at 05:12

## 2024-01-09 ASSESSMENT — COGNITIVE AND FUNCTIONAL STATUS - GENERAL
CLIMB 3 TO 5 STEPS WITH RAILING: A LITTLE
SUGGESTED CMS G CODE MODIFIER DAILY ACTIVITY: CI
MOBILITY SCORE: 22
WALKING IN HOSPITAL ROOM: A LITTLE
TOILETING: A LITTLE
DAILY ACTIVITIY SCORE: 23
SUGGESTED CMS G CODE MODIFIER MOBILITY: CJ

## 2024-01-09 NOTE — CARE PLAN
The patient is Stable - Low risk of patient condition declining or worsening    Shift Goals  Clinical Goals: stable hemodynamics  Patient Goals: rest, go home  Family Goals: None present    Progress made toward(s) clinical / shift goals:      Problem: Knowledge Deficit - Standard  Goal: Patient and family/care givers will demonstrate understanding of plan of care, disease process/condition, diagnostic tests and medications  Description: Target End Date:  1-3 days or as soon as patient condition allows    Document in Patient Education    1.  Patient and family/caregiver oriented to unit, equipment, visitation policy and means for communicating concern  2.  Complete/review Learning Assessment  3.  Assess knowledge level of disease process/condition, treatment plan, diagnostic tests and medications  4.  Explain disease process/condition, treatment plan, diagnostic tests and medications  Outcome: Progressing     Problem: Skin Integrity  Goal: Skin integrity is maintained or improved  Description: Target End Date:  Prior to discharge or change in level of care    Document interventions on Skin Risk/[place] flowsheet groups and corresponding LDA    1.  Assess and monitor skin integrity, appearance and/or temperature  2.  Assess risk factors for impaired skin integrity and/or pressures ulcers  3.  Implement precautions to protect skin integrity in collaboration with interdisciplinary team  4.  Implement pressure ulcer prevention protocol if at risk for skin breakdown  5.  Confirm wound care consult if at risk for skin breakdown  6.  Ensure patient use of pressure relieving devices  (Low air loss bed, waffle overlay, heel protectors, ROHO cushion, etc)  Outcome: Progressing     Problem: Fall Risk  Goal: Patient will remain free from falls  Description: Target End Date:  Prior to discharge or change in level of care    Document interventions on the [employer] Fall Risk Assessment    1.  Assess for fall risk factors  2.   Implement fall precautions  Outcome: Progressing       Patient is not progressing towards the following goals:

## 2024-01-09 NOTE — PROGRESS NOTES
Rapid Response Summary     Rapid response called at 0903 for:  Unresponsive    VS: WDL (See Vitals Flowsheet)  Additional info: Syncope on toilet, unresponsive to sternal rub  MD Paged: Chiquita  Interventions:    Imaging/Tests: EKG    Labs: CBC, BMP, Troponin, and DDimer    Medications:  NA   Other: N/A  Disposition: Did not improve  with rapid response team interventions. Primary RN updated on plan of care. Patient transferred to telemetry.

## 2024-01-09 NOTE — DISCHARGE PLANNING
0955  Agency/Facility Name: [facility]   Spoke To: Lyndsay   Outcome: DPA called to notify Pt medically cleared and can discharged back to [facility] today. Per Lyndsay she did receive referral/clinicals and suggested to hard referral to Fax #[phone]. [phone]  DPA hard fax referral/clinicals to [facility].

## 2024-01-09 NOTE — PROGRESS NOTES
Bedside report received. Pt A&Ox4, forgetful. No pain this morning. POC discussed with pt. Pt verbalizes understanding. Call light and belongings within reach. Bed locked and in lowest position. Alarm and fall precautions in place.

## 2024-01-09 NOTE — PROGRESS NOTES
Assumed care on patient post bedside report. Oriented 3-4. Pleasant. Follows commands appropriately. Respiration even and unlabored. Vss. Denies pain. Repositioned.    Tele on and heart rhythm and rate checked on monitor.    Plan of care this shift discussed, stable hemodynamics, increase adl and injury free.  Safety precautions in place are side rails up x 3, bed to lowest level, alarms on. Appropriate to use call light for needs and assistance and placed within reach. Will round hourly and as needed.

## 2024-01-09 NOTE — DISCHARGE PLANNING
Case Management Discharge Planning    Admission Date: 1/4/2024  GMLOS:    ALOS: 0    6-Clicks ADL Score: 23  6-Clicks Mobility Score: 22    Anticipated Discharge Dispo: Discharge Disposition: D/T to SNF with Medicare cert in anticipation of skilled care (03)    DME Needed: No    Action(s) Taken: Discussed pt during morning rounds. Per attending, pt is medically cleared to return to [facility]. SW to f/u.    SARAH called [facility] regarding pt being able to return to facility. Per Franck, referral was not fully received. Referral and other necessary documents to be sent to [phone].     Discussed pt during IDT rounds. Pending [facility]'s response, transport, and other dc planning needs.    @ 1143: SARAH received a call from Franck with [facility]. Per Franck, she was wondering if pt had a heart cath done as pt's family had requested it to be done this admission. Per Franck, she is requesting stress test results. SARAH informed Franck stress test resulted negative. SW to reach out to medical team and pt's family regarding heart cath. SW reached out to pt's daughter-in-law/POA regarding pt's need for heart cath. Left a voice message.    @ 1337: SARAH received a call from pt's daughter-in-law/POA, Tricia. Per Tricia, pt has dementia and she is wanting heart cath done prior to pt returning to [facility]. Tricia requesting M.JEANNA. to call her. SARAH notified M.D.    @ 2342: M.JEANNA. notified this SW that pt's stress test is negative and just had a vagal syncope episode earlier today.    Discussed pt during afternoon rounds. Per Brock, pt is medically cleared to return to [facility] SNF. SW to f/u with pt's POA and [facility].    @ 1530: SARAH called pt's daughter-in-law/POA, Tricia. Per Tricia, she is not agreeable to pt returning to [facility] under his current condition. Does not believe [facility] will accept pt in this condition. SARAH called [facility] regarding being able to accept pt back. Left a voice message.    Escalations Completed:  None    Medically Clear: No    Next Steps: F/U with medical team to discuss discharge needs and barriers.    Barriers to Discharge: Medical clearance    Is the patient up for discharge tomorrow: No

## 2024-01-09 NOTE — DISCHARGE PLANNING
Care Transition Team Assessment    LMSW met with pt at bedside to complete assessment. Pt A&Ox4 and able to verify the information on the face sheet. Pt lives at [facility]. Pt reported that son and daughter-in-law are good support. No SA or MH concerns. Pt wants to return to [facility]. Per chart review, pt's POA is Tricia Brenna [phone].    Information Source  Orientation Level: Oriented X4  Information Given By: Patient  Who is responsible for making decisions for patient? : Patient    Readmission Evaluation  Is this a readmission?: No    Elopement Risk  Legal Hold: No  Ambulatory or Self Mobile in Wheelchair: Yes  Disoriented: No  Psychiatric Symptoms: None  History of Wandering: No  Elopement this Admit: No  Vocalizing Wanting to Leave: No  Displays Behaviors, Body Language Wanting to Leave: No-Not at Risk for Elopement  Elopement Risk: Not at Risk for Elopement    Interdisciplinary Discharge Planning  Primary Care Physician: Suhail Aguirre  Lives with - Patient's Self Care Capacity: Other (Comments), Attendant / Paid Care Giver  Patient or legal guardian wants to designate a caregiver: No  Support Systems: Other (Comments), Family Member(s)  Housing / Facility: Long Term Care Facility  Durable Medical Equipment: Not Applicable    Discharge Preparedness  What is your plan after discharge?: Other (comment), Home with help (Long term facility)    Vision / Hearing Impairment  Vision Impairment : Yes  Right Eye Vision: Wears Glasses  Left Eye Vision: Wears Glasses  Hearing Impairment : No    Advance Directive  Advance Directive?: [facility] for Health Care  Durable Power of  Name and Contact : Tricia Brenna [phone]    Domestic Abuse  Have you ever been the victim of abuse or violence?: No  Physical Abuse or Sexual Abuse: No  Verbal Abuse or Emotional Abuse: No  Possible Abuse/Neglect Reported to:: Not Applicable    Anticipated Discharge Information  Discharge Disposition: D/T to SNF  with Medicare cert in anticipation of skilled care (03)

## 2024-01-09 NOTE — CARE PLAN
Problem: Knowledge Deficit - Standard  Goal: Patient and family/care givers will demonstrate understanding of plan of care, disease process/condition, diagnostic tests and medications  Outcome: Progressing     Problem: Fall Risk  Goal: Patient will remain free from falls  Outcome: Progressing   The patient is Stable - Low risk of patient condition declining or worsening    Shift Goals  Clinical Goals: safety. monitor labs and vitals  Patient Goals: updates  Family Goals: jefferson    Progress made toward(s) clinical / shift goals:  yes    Patient is not progressing towards the following goals:

## 2024-01-10 DIAGNOSIS — I48.4 ATYPICAL ATRIAL FLUTTER (HCC): ICD-10-CM

## 2024-01-10 LAB
ANION GAP SERPL CALC-SCNC: 13 MMOL/L (ref 7–16)
BUN SERPL-MCNC: 36 MG/DL (ref 8–22)
CALCIUM SERPL-MCNC: 9.1 MG/DL (ref 8.5–10.5)
CHLORIDE SERPL-SCNC: 107 MMOL/L (ref 96–112)
CO2 SERPL-SCNC: 18 MMOL/L (ref 20–33)
CREAT SERPL-MCNC: 1.56 MG/DL (ref 0.5–1.4)
EKG IMPRESSION: NORMAL
GFR SERPLBLD CREATININE-BSD FMLA CKD-EPI: 46 ML/MIN/1.73 M 2
GLUCOSE BLD STRIP.AUTO-MCNC: 160 MG/DL (ref 65–99)
GLUCOSE SERPL-MCNC: 250 MG/DL (ref 65–99)
POTASSIUM SERPL-SCNC: 4.3 MMOL/L (ref 3.6–5.5)
SODIUM SERPL-SCNC: 138 MMOL/L (ref 135–145)

## 2024-01-10 PROCEDURE — 93010 ELECTROCARDIOGRAM REPORT: CPT | Performed by: INTERNAL MEDICINE

## 2024-01-10 PROCEDURE — A9270 NON-COVERED ITEM OR SERVICE: HCPCS | Performed by: HOSPITALIST

## 2024-01-10 PROCEDURE — G0378 HOSPITAL OBSERVATION PER HR: HCPCS

## 2024-01-10 PROCEDURE — 80048 BASIC METABOLIC PNL TOTAL CA: CPT

## 2024-01-10 PROCEDURE — A9270 NON-COVERED ITEM OR SERVICE: HCPCS | Performed by: STUDENT IN AN ORGANIZED HEALTH CARE EDUCATION/TRAINING PROGRAM

## 2024-01-10 PROCEDURE — 700102 HCHG RX REV CODE 250 W/ 637 OVERRIDE(OP): Performed by: HOSPITALIST

## 2024-01-10 PROCEDURE — 93005 ELECTROCARDIOGRAM TRACING: CPT | Performed by: STUDENT IN AN ORGANIZED HEALTH CARE EDUCATION/TRAINING PROGRAM

## 2024-01-10 PROCEDURE — A9270 NON-COVERED ITEM OR SERVICE: HCPCS | Performed by: FAMILY MEDICINE

## 2024-01-10 PROCEDURE — 700102 HCHG RX REV CODE 250 W/ 637 OVERRIDE(OP): Performed by: STUDENT IN AN ORGANIZED HEALTH CARE EDUCATION/TRAINING PROGRAM

## 2024-01-10 PROCEDURE — 99232 SBSQ HOSP IP/OBS MODERATE 35: CPT | Performed by: STUDENT IN AN ORGANIZED HEALTH CARE EDUCATION/TRAINING PROGRAM

## 2024-01-10 PROCEDURE — 82962 GLUCOSE BLOOD TEST: CPT

## 2024-01-10 PROCEDURE — 700102 HCHG RX REV CODE 250 W/ 637 OVERRIDE(OP): Performed by: FAMILY MEDICINE

## 2024-01-10 PROCEDURE — 36415 COLL VENOUS BLD VENIPUNCTURE: CPT

## 2024-01-10 RX ADMIN — GABAPENTIN 800 MG: 400 CAPSULE ORAL at 17:35

## 2024-01-10 RX ADMIN — LISINOPRIL 20 MG: 20 TABLET ORAL at 05:27

## 2024-01-10 RX ADMIN — ASPIRIN 81 MG: 81 TABLET, COATED ORAL at 05:27

## 2024-01-10 RX ADMIN — DILTIAZEM HYDROCHLORIDE 120 MG: 120 CAPSULE, COATED, EXTENDED RELEASE ORAL at 21:50

## 2024-01-10 RX ADMIN — ISOSORBIDE MONONITRATE 60 MG: 60 TABLET, EXTENDED RELEASE ORAL at 05:27

## 2024-01-10 RX ADMIN — ATORVASTATIN CALCIUM 20 MG: 20 TABLET, FILM COATED ORAL at 05:27

## 2024-01-10 RX ADMIN — LEVOTHYROXINE SODIUM 112 MCG: 0.11 TABLET ORAL at 05:27

## 2024-01-10 RX ADMIN — APIXABAN 5 MG: 5 TABLET, FILM COATED ORAL at 17:35

## 2024-01-10 RX ADMIN — ESCITALOPRAM OXALATE 10 MG: 10 TABLET ORAL at 05:27

## 2024-01-10 RX ADMIN — DOCUSATE SODIUM 50 MG AND SENNOSIDES 8.6 MG 2 TABLET: 8.6; 5 TABLET, FILM COATED ORAL at 17:35

## 2024-01-10 RX ADMIN — APIXABAN 5 MG: 5 TABLET, FILM COATED ORAL at 05:27

## 2024-01-10 RX ADMIN — GABAPENTIN 800 MG: 400 CAPSULE ORAL at 05:27

## 2024-01-10 RX ADMIN — AMLODIPINE BESYLATE 10 MG: 10 TABLET ORAL at 05:27

## 2024-01-10 RX ADMIN — MEMANTINE HYDROCHLORIDE 5 MG: 10 TABLET ORAL at 05:27

## 2024-01-10 RX ADMIN — TIOTROPIUM BROMIDE INHALATION SPRAY 5 MCG: 3.12 SPRAY, METERED RESPIRATORY (INHALATION) at 05:54

## 2024-01-10 RX ADMIN — MEMANTINE HYDROCHLORIDE 5 MG: 10 TABLET ORAL at 17:35

## 2024-01-10 ASSESSMENT — ENCOUNTER SYMPTOMS
DIARRHEA: 0
COUGH: 0
WEAKNESS: 1
DIAPHORESIS: 0
NERVOUS/ANXIOUS: 0
PALPITATIONS: 0
HEARTBURN: 0
NAUSEA: 0
ABDOMINAL PAIN: 0
VOMITING: 0
SHORTNESS OF BREATH: 0
NECK PAIN: 0
BLURRED VISION: 0
SENSORY CHANGE: 0
HEADACHES: 0
SORE THROAT: 0
FLANK PAIN: 0
FEVER: 0
BACK PAIN: 0
FOCAL WEAKNESS: 0
DIZZINESS: 1
WHEEZING: 0
SPEECH CHANGE: 0
CHILLS: 0
MYALGIAS: 0

## 2024-01-10 ASSESSMENT — FIBROSIS 4 INDEX: FIB4 SCORE: 0.9

## 2024-01-10 NOTE — CARE PLAN
The patient is Stable - Low risk of patient condition declining or worsening    Shift Goals  Clinical Goals: vss, maintain safety  Patient Goals: sleep  Family Goals: jefferson    Progress made toward(s) clinical / shift goals:        Patient is not progressing towards the following goals:

## 2024-01-10 NOTE — CARE PLAN
Problem: Knowledge Deficit - Standard  Goal: Patient and family/care givers will demonstrate understanding of plan of care, disease process/condition, diagnostic tests and medications  Outcome: Progressing     Problem: Skin Integrity  Goal: Skin integrity is maintained or improved  Outcome: Progressing     Problem: Fall Risk  Goal: Patient will remain free from falls  Outcome: Progressing   The patient is Stable - Low risk of patient condition declining or worsening    Shift Goals  Clinical Goals: VSS  Patient Goals: rest  Family Goals: jefferson    Progress made toward(s) clinical / shift goals:  yes    Patient is not progressing towards the following goals:

## 2024-01-10 NOTE — DISCHARGE PLANNING
0904  Agency/Facility Name: [facility]   Spoke To: Idalia   Outcome: Idalia called to ask if the Aflutter has been resolved, and requesting updated clinicals. DPA to ask of pt's updates and inform Idalia. DPA also to fax updated notes.

## 2024-01-10 NOTE — DISCHARGE PLANNING
Case Management Discharge Planning    Admission Date: 1/4/2024  GMLOS:    ALOS: 0    6-Clicks ADL Score: 23  6-Clicks Mobility Score: 22      Anticipated Discharge Dispo: Discharge Disposition: D/T to SNF with Medicare cert in anticipation of skilled care (03)    DME Needed: No    Action(s) Taken: Discussed pt during IDT. Per attending, pt anticipated to be medically cleared tomorrow in the afternoon. Monitoring will take place. Per attending, pt needs no cath, kidneys appear improved, and an EP appointment will be scheduled for outpatient. SW to f/u with Td Simpson and daughter-in-law concerning transfer back to SNF.    Escalations Completed: None    Medically Clear: No    Next Steps: F/U with medical team to discuss discharge needs and barriers.    Barriers to Discharge: Medical clearance and pending placement    Is the patient up for discharge tomorrow: Yes    Is transport arranged for discharge disposition: No - pending if [facility] can accept pt tomorrow. SW to f/u.

## 2024-01-10 NOTE — PROGRESS NOTES
Bedside report received. Pt A&Ox4, forgetful. No pain at this time. POC discussed with pt. Pt verbalizes understanding. Call light and belongings within reach. Bed locked and in lowest position. Alarm and fall precautions in place.     Edge of bed for breakfast.

## 2024-01-11 LAB
ANION GAP SERPL CALC-SCNC: 12 MMOL/L (ref 7–16)
BUN SERPL-MCNC: 37 MG/DL (ref 8–22)
CALCIUM SERPL-MCNC: 9.2 MG/DL (ref 8.5–10.5)
CHLORIDE SERPL-SCNC: 110 MMOL/L (ref 96–112)
CO2 SERPL-SCNC: 16 MMOL/L (ref 20–33)
CREAT SERPL-MCNC: 1.58 MG/DL (ref 0.5–1.4)
GFR SERPLBLD CREATININE-BSD FMLA CKD-EPI: 46 ML/MIN/1.73 M 2
GLUCOSE BLD STRIP.AUTO-MCNC: 168 MG/DL (ref 65–99)
GLUCOSE SERPL-MCNC: 151 MG/DL (ref 65–99)
MAGNESIUM SERPL-MCNC: 1.9 MG/DL (ref 1.5–2.5)
POTASSIUM SERPL-SCNC: 4.3 MMOL/L (ref 3.6–5.5)
SODIUM SERPL-SCNC: 138 MMOL/L (ref 135–145)

## 2024-01-11 PROCEDURE — 99232 SBSQ HOSP IP/OBS MODERATE 35: CPT | Performed by: STUDENT IN AN ORGANIZED HEALTH CARE EDUCATION/TRAINING PROGRAM

## 2024-01-11 PROCEDURE — A9270 NON-COVERED ITEM OR SERVICE: HCPCS | Performed by: FAMILY MEDICINE

## 2024-01-11 PROCEDURE — 82962 GLUCOSE BLOOD TEST: CPT

## 2024-01-11 PROCEDURE — G0378 HOSPITAL OBSERVATION PER HR: HCPCS

## 2024-01-11 PROCEDURE — 700102 HCHG RX REV CODE 250 W/ 637 OVERRIDE(OP): Performed by: FAMILY MEDICINE

## 2024-01-11 PROCEDURE — A9270 NON-COVERED ITEM OR SERVICE: HCPCS | Performed by: HOSPITALIST

## 2024-01-11 PROCEDURE — 80048 BASIC METABOLIC PNL TOTAL CA: CPT

## 2024-01-11 PROCEDURE — 700102 HCHG RX REV CODE 250 W/ 637 OVERRIDE(OP): Performed by: HOSPITALIST

## 2024-01-11 PROCEDURE — 83735 ASSAY OF MAGNESIUM: CPT

## 2024-01-11 PROCEDURE — 700102 HCHG RX REV CODE 250 W/ 637 OVERRIDE(OP): Performed by: STUDENT IN AN ORGANIZED HEALTH CARE EDUCATION/TRAINING PROGRAM

## 2024-01-11 PROCEDURE — 36415 COLL VENOUS BLD VENIPUNCTURE: CPT

## 2024-01-11 PROCEDURE — A9270 NON-COVERED ITEM OR SERVICE: HCPCS | Performed by: STUDENT IN AN ORGANIZED HEALTH CARE EDUCATION/TRAINING PROGRAM

## 2024-01-11 RX ORDER — DILTIAZEM HYDROCHLORIDE 240 MG/1
240 CAPSULE, COATED, EXTENDED RELEASE ORAL NIGHTLY
Status: DISCONTINUED | OUTPATIENT
Start: 2024-01-11 | End: 2024-01-19

## 2024-01-11 RX ORDER — DILTIAZEM HYDROCHLORIDE 240 MG/1
240 CAPSULE, COATED, EXTENDED RELEASE ORAL DAILY
Status: DISCONTINUED | OUTPATIENT
Start: 2024-01-11 | End: 2024-01-11

## 2024-01-11 RX ADMIN — GABAPENTIN 800 MG: 400 CAPSULE ORAL at 04:32

## 2024-01-11 RX ADMIN — ASPIRIN 81 MG: 81 TABLET, COATED ORAL at 04:32

## 2024-01-11 RX ADMIN — APIXABAN 5 MG: 5 TABLET, FILM COATED ORAL at 04:32

## 2024-01-11 RX ADMIN — AMLODIPINE BESYLATE 10 MG: 10 TABLET ORAL at 04:32

## 2024-01-11 RX ADMIN — TIOTROPIUM BROMIDE INHALATION SPRAY 5 MCG: 3.12 SPRAY, METERED RESPIRATORY (INHALATION) at 04:41

## 2024-01-11 RX ADMIN — GABAPENTIN 800 MG: 400 CAPSULE ORAL at 17:14

## 2024-01-11 RX ADMIN — LISINOPRIL 20 MG: 20 TABLET ORAL at 04:32

## 2024-01-11 RX ADMIN — ISOSORBIDE MONONITRATE 60 MG: 60 TABLET, EXTENDED RELEASE ORAL at 04:32

## 2024-01-11 RX ADMIN — MEMANTINE HYDROCHLORIDE 5 MG: 10 TABLET ORAL at 17:13

## 2024-01-11 RX ADMIN — MEMANTINE HYDROCHLORIDE 5 MG: 10 TABLET ORAL at 04:32

## 2024-01-11 RX ADMIN — LEVOTHYROXINE SODIUM 112 MCG: 0.11 TABLET ORAL at 04:32

## 2024-01-11 RX ADMIN — ATORVASTATIN CALCIUM 20 MG: 20 TABLET, FILM COATED ORAL at 04:32

## 2024-01-11 RX ADMIN — APIXABAN 5 MG: 5 TABLET, FILM COATED ORAL at 17:13

## 2024-01-11 RX ADMIN — DILTIAZEM HYDROCHLORIDE 240 MG: 240 CAPSULE, COATED, EXTENDED RELEASE ORAL at 21:09

## 2024-01-11 RX ADMIN — ESCITALOPRAM OXALATE 10 MG: 10 TABLET ORAL at 04:32

## 2024-01-11 ASSESSMENT — ENCOUNTER SYMPTOMS
BLURRED VISION: 0
NAUSEA: 0
SPEECH CHANGE: 0
DIAPHORESIS: 0
SHORTNESS OF BREATH: 0
CHILLS: 0
SORE THROAT: 0
DIZZINESS: 1
VOMITING: 0
HEARTBURN: 0
BACK PAIN: 0
COUGH: 0
HEADACHES: 0
ABDOMINAL PAIN: 0
SENSORY CHANGE: 0
MYALGIAS: 0
PALPITATIONS: 0
NERVOUS/ANXIOUS: 0
WHEEZING: 0
FEVER: 0
WEAKNESS: 1
DIARRHEA: 0
FOCAL WEAKNESS: 0
NECK PAIN: 0
FLANK PAIN: 0

## 2024-01-11 ASSESSMENT — FIBROSIS 4 INDEX: FIB4 SCORE: 0.9

## 2024-01-11 NOTE — DISCHARGE PLANNING
Discussed in discharge rounds this am. Pt is medically cleared for SNF. Called [facility] , left  message requesting return call asap to discuss.

## 2024-01-11 NOTE — PROGRESS NOTES
[facility] Medicine Daily Progress Note    Date of Service  1/10/2024    Chief Complaint  Sulaiman Ceja is a 73 y.o. male admitted 1/4/2024 with chest pain    Hospital Course  Admitted with chest pain, EKG showed Atrial flutter, troponins were elevated.  Nuclear stress test was negative. For the Atrial flutter, patient was started on anticoagulation with Eliquis and on Cardizem for rate control.  Echocardiogram was done which showed ejection fraction of 65%.  Patient's symptoms of chest pain resolved.    Interval Problem Update  Atrial flutter - rate 60-63  Hypertension - sbp   Diabetes - -135  Syncope - found in bathroom, sitting on the toilet with altered mental status after he had a bowel movement, rapid response called, patient brought to bed and became more alert and oriented right away   CKD - crea went up to 1.7    1/09  Evaluated in his inpatient room, heart rate in 60s, no episodes of syncope or dizziness with walking or using the restroom.  Heart rate A-fib a flutter on monitor, no chest pain or dyspnea.  MILTON appears to have stabilized, serum creatinine peaked at 1.7, will follow-up BMP tomorrow.  Check bladder scan and monitor urine output.  Negative stress test this admission.    1/10  Doing well today afebrile, HR 61-70 RR 18 SpO2 92-93% on room air. Discussed with nursing to ambulate patient and see if that induces tachycardia. Will increase dilt to his home dose tomorrow.  Discussed with cardiology, they will arrange an outpatient EP consultation for the patient for this upcoming week given stability on PO medication.   Discussed with daughter and tried to call assisted living/SNF he is a resident of at the request of his daughter, left a message regarding plan and attempted to answer their concerns. Renal function improved.      Updates given and plan of care discussed with patient's daughter.  CODE STATUS clarified and verified DNR/DNI.    I have discussed this patient's plan of care and  discharge plan at IDT rounds today with Case Management, Nursing, Nursing leadership, and other members of the IDT team.    Consultants/Specialty  None    Code Status  DNAR/DNI    Disposition  Medically Cleared  I have placed the appropriate orders for post-discharge needs.    Review of Systems  Review of Systems   Constitutional:  Negative for chills, diaphoresis, fever and malaise/fatigue.   HENT:  Negative for congestion, hearing loss and sore throat.    Eyes:  Negative for blurred vision.   Respiratory:  Negative for cough, shortness of breath and wheezing.    Cardiovascular:  Negative for chest pain, palpitations and leg swelling.   Gastrointestinal:  Negative for abdominal pain, diarrhea, heartburn, nausea and vomiting.   Genitourinary:  Negative for dysuria, flank pain and hematuria.   Musculoskeletal:  Negative for back pain, joint pain, myalgias and neck pain.   Skin:  Negative for rash.   Neurological:  Positive for dizziness and weakness. Negative for sensory change, speech change, focal weakness and headaches.   Psychiatric/Behavioral:  The patient is not nervous/anxious.         Physical Exam  Temp:  [36.7 °C (98 °F)-36.8 °C (98.2 °F)] 36.7 °C (98.1 °F)  Pulse:  [61-70] 61  Resp:  [18] 18  BP: (124-148)/(59-76) 146/65  SpO2:  [92 %-93 %] 93 %    Physical Exam  Vitals and nursing note reviewed.   Constitutional:       General: He is not in acute distress.     Appearance: He is not ill-appearing or toxic-appearing.   HENT:      Head: Normocephalic and atraumatic.      Nose: No congestion.      Mouth/Throat:      Mouth: Mucous membranes are moist.   Eyes:      General: No scleral icterus.     Extraocular Movements: Extraocular movements intact.      Conjunctiva/sclera: Conjunctivae normal.   Cardiovascular:      Rate and Rhythm: Normal rate. Rhythm irregular.   Pulmonary:      Effort: Pulmonary effort is normal. No respiratory distress.      Breath sounds: Normal breath sounds.   Abdominal:      General:  There is no distension.      Palpations: Abdomen is soft.      Tenderness: There is no abdominal tenderness. There is no guarding or rebound.   Musculoskeletal:      Cervical back: No tenderness.      Right lower leg: No edema.      Left lower leg: No edema.   Skin:     General: Skin is warm and dry.      Findings: No erythema.   Neurological:      General: No focal deficit present.      Mental Status: He is alert. He is disoriented.      Cranial Nerves: No cranial nerve deficit.      Sensory: No sensory deficit.      Motor: No weakness.      Comments: Oriented to person and place   Psychiatric:         Mood and Affect: Mood normal.         Speech: Speech is delayed.         Behavior: Behavior normal.         Cognition and Memory: He exhibits impaired recent memory.         Fluids    Intake/Output Summary (Last 24 hours) at 1/11/2024 0430  Last data filed at 1/11/2024 0249  Gross per 24 hour   Intake 980 ml   Output 300 ml   Net 680 ml         Laboratory  Recent Labs     01/08/24  0916 01/09/24  0915   WBC 11.0* 9.4   RBC 5.33 5.19   HEMOGLOBIN 16.0 15.4   HEMATOCRIT 46.9 46.0   MCV 88.0 88.6   MCH 30.0 29.7   MCHC 34.1 33.5   RDW 47.5 48.0   PLATELETCT 321 276   MPV 10.9 10.9       Recent Labs     01/08/24  0916 01/09/24  0915 01/10/24  0956   SODIUM 139 138 138   POTASSIUM 4.0 4.4 4.3   CHLORIDE 108 106 107   CO2 16* 19* 18*   GLUCOSE 147* 171* 250*   BUN 32* 36* 36*   CREATININE 1.71* 1.72* 1.56*   CALCIUM 9.3 9.5 9.1                     Imaging  EC-ECHOCARDIOGRAM COMPLETE W/ CONT   Final Result      NM-CARDIAC STRESS TEST   Final Result      DX-CHEST-PORTABLE (1 VIEW)   Final Result      Cardiomegaly.      No definite acute abnormality. See details above.           Assessment/Plan  * Coronary artery disease of bypass graft of native heart with stable angina pectoris (HCC)- (present on admission)  Assessment & Plan  Lipitor, Imdur, ASA    Syncope and collapse  Assessment & Plan  Probably vasovagal  Transfer to  Telemetry  IVF hydration  cc only    Hypomagnesemia- (present on admission)  Assessment & Plan  IV Mg 2 g  Follow level    Stage 3a chronic kidney disease (HCC)- (present on admission)  Assessment & Plan  Follow bmp  IVF hydration  cc only    Atrial flutter (HCC)- (present on admission)  Assessment & Plan  Diltiazem, Eliquis  May need to consult cardiology -possible cardioversion    Acquired hypothyroidism- (present on admission)  Assessment & Plan  Levothyroxine    COPD (chronic obstructive pulmonary disease) (HCC)- (present on admission)  Assessment & Plan  Spiriva  RT protocol    Cognitive impairment- (present on admission)  Assessment & Plan  Namenda   Avoid Benzodiazepines, Anticholinergics, Opiates  Frequent orientation  Update Board daily  Open window blinds during the day  Avoid naps during the day  Family at bedside whenever possible  Ensure adequate sleep at night - use Melatonin preferably  Avoid early morning labs  Avoid vital signs during sleep  Ambulate if possible  Provide adequate pain control  Monitor for urinary retention  Prevent and manage constipation  Discontinue IVs, Catheters, Tubes, Lines, Cardiac monitors, SCDs if possible     Primary hypertension- (present on admission)  Assessment & Plan  Diltiazem, Lisinopril, Norvasc    Type 2 diabetes mellitus with kidney complication, with long-term current use of insulin (HCC)- (present on admission)  Assessment & Plan  SSI, Lantus         VTE prophylaxis:    therapeutic anticoagulation with eliquis 5 mg BID      I have performed a physical exam and reviewed and updated ROS and Plan today (1/10/2024). In review of yesterday's note (1/09/2024), there are no changes except as documented above.  Total time spent 53 minutes. I spent greater than 50% of the time for patient care, counseling, and coordination on this date, including unit/floor time, and face-to-face time with the patient as per interval events, my own review of patient's  imaging and lab analysis and developing my assessment and plan above.

## 2024-01-11 NOTE — PROGRESS NOTES
Assumed care on patient post bedside report. Alert and in no apparent distress. On RA. Verbalized being comfortable and able to turn independently. Vss.    Tele on and heart rhythm and rate checked on monitor.    Safety precautions in place are side rails up x 2, bed to lowest level, alarms on. Appropriate to use call light for needs and assistance and placed within reach. Will round hourly and as needed.

## 2024-01-11 NOTE — CARE PLAN
The patient is Stable - Low risk of patient condition declining or worsening    Shift Goals  Clinical Goals: monitor vs and renal function  Patient Goals: go home  Family Goals: jefferson      Problem: Pain - Standard  Goal: Alleviation of pain or a reduction in pain to the patient’s comfort goal  Outcome: Progressing   Patient has denied any pain during shift.

## 2024-01-11 NOTE — DISCHARGE PLANNING
Case Management Discharge Planning    Admission Date: 1/4/2024  GMLOS:    ALOS: 0    6-Clicks ADL Score: 23  6-Clicks Mobility Score: 22      Anticipated Discharge Dispo: Discharge Disposition: D/T to SNF with Medicare cert in anticipation of skilled care (03)    DME Needed: No    Action(s) Taken: Updated Provider/Nurse on Discharge Plan    Escalations Completed: None    Medically Clear: Yes    Next Steps: Discussed again in IDT rounds. Pt cleared for discharge. Called [facility] again and left VM message again. Asked DPA to send [facility] referral.    Barriers to Discharge: Pending Placement    Is the patient up for discharge tomorrow: No

## 2024-01-11 NOTE — CARE PLAN
The patient is Stable - Low risk of patient condition declining or worsening    Shift Goals  Clinical Goals: vss, maintain safety  Patient Goals: rest  Family Goals: jefferson    Progress made toward(s) clinical / shift goals:      Problem: Knowledge Deficit - Standard  Goal: Patient and family/care givers will demonstrate understanding of plan of care, disease process/condition, diagnostic tests and medications  Description: Target End Date:  1-3 days or as soon as patient condition allows    Document in Patient Education    1.  Patient and family/caregiver oriented to unit, equipment, visitation policy and means for communicating concern  2.  Complete/review Learning Assessment  3.  Assess knowledge level of disease process/condition, treatment plan, diagnostic tests and medications  4.  Explain disease process/condition, treatment plan, diagnostic tests and medications  Outcome: Progressing     Problem: Skin Integrity  Goal: Skin integrity is maintained or improved  Description: Target End Date:  Prior to discharge or change in level of care    Document interventions on Skin Risk/[place] flowsheet groups and corresponding LDA    1.  Assess and monitor skin integrity, appearance and/or temperature  2.  Assess risk factors for impaired skin integrity and/or pressures ulcers  3.  Implement precautions to protect skin integrity in collaboration with interdisciplinary team  4.  Implement pressure ulcer prevention protocol if at risk for skin breakdown  5.  Confirm wound care consult if at risk for skin breakdown  6.  Ensure patient use of pressure relieving devices  (Low air loss bed, waffle overlay, heel protectors, ROHO cushion, etc)  Outcome: Progressing     Problem: Pain - Standard  Goal: Alleviation of pain or a reduction in pain to the patient’s comfort goal  Description: Target End Date:  Prior to discharge or change in level of care    Document on Vitals flowsheet    1.  Document pain using the appropriate pain scale  per order or unit policy  2.  Educate and implement non-pharmacologic comfort measures (i.e. relaxation, distraction, massage, cold/heat therapy, etc.)  3.  Pain management medications as ordered  4.  Reassess pain after pain med administration per policy  5.  If opiods administered assess patient's response to pain medication is appropriate per POSS sedation scale  6.  Follow pain management plan developed in collaboration with patient and interdisciplinary team (including palliative care or pain specialists if applicable)  Outcome: Progressing     Problem: Fall Risk  Goal: Patient will remain free from falls  Description: Target End Date:  Prior to discharge or change in level of care    Document interventions on the Smart Will Fall Risk Assessment    1.  Assess for fall risk factors  2.  Implement fall precautions  Outcome: Progressing       Patient is not progressing towards the following goals:

## 2024-01-12 LAB — GLUCOSE BLD STRIP.AUTO-MCNC: 145 MG/DL (ref 65–99)

## 2024-01-12 PROCEDURE — 700102 HCHG RX REV CODE 250 W/ 637 OVERRIDE(OP): Performed by: STUDENT IN AN ORGANIZED HEALTH CARE EDUCATION/TRAINING PROGRAM

## 2024-01-12 PROCEDURE — G0378 HOSPITAL OBSERVATION PER HR: HCPCS

## 2024-01-12 PROCEDURE — A9270 NON-COVERED ITEM OR SERVICE: HCPCS | Performed by: STUDENT IN AN ORGANIZED HEALTH CARE EDUCATION/TRAINING PROGRAM

## 2024-01-12 PROCEDURE — 82962 GLUCOSE BLOOD TEST: CPT

## 2024-01-12 PROCEDURE — 700102 HCHG RX REV CODE 250 W/ 637 OVERRIDE(OP): Performed by: FAMILY MEDICINE

## 2024-01-12 PROCEDURE — A9270 NON-COVERED ITEM OR SERVICE: HCPCS | Performed by: HOSPITALIST

## 2024-01-12 PROCEDURE — A9270 NON-COVERED ITEM OR SERVICE: HCPCS | Performed by: FAMILY MEDICINE

## 2024-01-12 PROCEDURE — 99232 SBSQ HOSP IP/OBS MODERATE 35: CPT | Performed by: STUDENT IN AN ORGANIZED HEALTH CARE EDUCATION/TRAINING PROGRAM

## 2024-01-12 PROCEDURE — 700102 HCHG RX REV CODE 250 W/ 637 OVERRIDE(OP): Performed by: HOSPITALIST

## 2024-01-12 RX ORDER — AMLODIPINE BESYLATE 10 MG/1
10 TABLET ORAL DAILY
Qty: 30 TABLET | Refills: 0 | Status: SHIPPED | OUTPATIENT
Start: 2024-01-13

## 2024-01-12 RX ORDER — APIXABAN 5 MG/1
5 TABLET, FILM COATED ORAL 2 TIMES DAILY
Qty: 60 TABLET | Refills: 3 | Status: ACTIVE | OUTPATIENT
Start: 2024-01-12

## 2024-01-12 RX ADMIN — DILTIAZEM HYDROCHLORIDE 240 MG: 240 CAPSULE, COATED, EXTENDED RELEASE ORAL at 20:25

## 2024-01-12 RX ADMIN — MEMANTINE HYDROCHLORIDE 5 MG: 10 TABLET ORAL at 18:23

## 2024-01-12 RX ADMIN — ASPIRIN 81 MG: 81 TABLET, COATED ORAL at 04:24

## 2024-01-12 RX ADMIN — MEMANTINE HYDROCHLORIDE 5 MG: 10 TABLET ORAL at 04:23

## 2024-01-12 RX ADMIN — GABAPENTIN 800 MG: 400 CAPSULE ORAL at 04:24

## 2024-01-12 RX ADMIN — LISINOPRIL 20 MG: 20 TABLET ORAL at 04:24

## 2024-01-12 RX ADMIN — ESCITALOPRAM OXALATE 10 MG: 10 TABLET ORAL at 04:24

## 2024-01-12 RX ADMIN — APIXABAN 5 MG: 5 TABLET, FILM COATED ORAL at 18:23

## 2024-01-12 RX ADMIN — AMLODIPINE BESYLATE 10 MG: 10 TABLET ORAL at 04:24

## 2024-01-12 RX ADMIN — ATORVASTATIN CALCIUM 20 MG: 20 TABLET, FILM COATED ORAL at 04:24

## 2024-01-12 RX ADMIN — GABAPENTIN 800 MG: 400 CAPSULE ORAL at 18:22

## 2024-01-12 RX ADMIN — APIXABAN 5 MG: 5 TABLET, FILM COATED ORAL at 04:24

## 2024-01-12 RX ADMIN — TIOTROPIUM BROMIDE INHALATION SPRAY 5 MCG: 3.12 SPRAY, METERED RESPIRATORY (INHALATION) at 04:29

## 2024-01-12 RX ADMIN — LEVOTHYROXINE SODIUM 112 MCG: 0.11 TABLET ORAL at 04:24

## 2024-01-12 RX ADMIN — ISOSORBIDE MONONITRATE 60 MG: 60 TABLET, EXTENDED RELEASE ORAL at 04:23

## 2024-01-12 ASSESSMENT — ENCOUNTER SYMPTOMS
SHORTNESS OF BREATH: 0
FLANK PAIN: 0
WEAKNESS: 1
FOCAL WEAKNESS: 0
PALPITATIONS: 0
NAUSEA: 0
WHEEZING: 0
DIAPHORESIS: 0
DIARRHEA: 0
HEARTBURN: 0
ABDOMINAL PAIN: 0
HEADACHES: 0
VOMITING: 0
MYALGIAS: 0
BACK PAIN: 0
NECK PAIN: 0
SORE THROAT: 0
DIZZINESS: 1
FEVER: 0
SPEECH CHANGE: 0
COUGH: 0
CHILLS: 0
NERVOUS/ANXIOUS: 0
SENSORY CHANGE: 0
BLURRED VISION: 0

## 2024-01-12 ASSESSMENT — FIBROSIS 4 INDEX: FIB4 SCORE: 0.9

## 2024-01-12 ASSESSMENT — PAIN DESCRIPTION - PAIN TYPE: TYPE: ACUTE PAIN

## 2024-01-12 NOTE — PROGRESS NOTES
[facility] Medicine Daily Progress Note    Date of Service  1/11/2024    Chief Complaint  Sulaiman Ceja is a 73 y.o. male admitted 1/4/2024 with chest pain    Hospital Course  Admitted with chest pain, EKG showed Atrial flutter, troponins were elevated.  Nuclear stress test was negative. For the Atrial flutter, patient was started on anticoagulation with Eliquis and on Cardizem for rate control.  Echocardiogram was done which showed ejection fraction of 65%.  Patient's symptoms of chest pain resolved.    Interval Problem Update  Atrial flutter - rate 60-63  Hypertension - sbp   Diabetes - -135  Syncope - found in bathroom, sitting on the toilet with altered mental status after he had a bowel movement, rapid response called, patient brought to bed and became more alert and oriented right away   CKD - crea went up to 1.7    1/09  Evaluated in his inpatient room, heart rate in 60s, no episodes of syncope or dizziness with walking or using the restroom.  Heart rate A-fib a flutter on monitor, no chest pain or dyspnea.  MILTON appears to have stabilized, serum creatinine peaked at 1.7, will follow-up BMP tomorrow.  Check bladder scan and monitor urine output.  Negative stress test this admission.    1/10  Doing well today afebrile, HR 61-70 RR 18 SpO2 92-93% on room air. Discussed with nursing to ambulate patient and see if that induces tachycardia. Will increase dilt to his home dose tomorrow.  Discussed with cardiology, they will arrange an outpatient EP consultation for the patient for this upcoming week given stability on PO medication.   Discussed with daughter and tried to call assisted living/SNF he is a resident of at the request of his daughter, left a message regarding plan and attempted to answer their concerns. Renal function improved.      1/11  Patient feels well, he is up and walking around with staff, HR staying stable. Normal HR upper 50's-60's per daughter and today 57-63. No dyspnea, no chest  pain. Scr stable GFR in baseline range. Will follow with EP as outpatient for consideration of ablation.   Medically cleared for discharge.    I have discussed this patient's plan of care and discharge plan at IDT rounds today with Case Management, Nursing, Nursing leadership, and other members of the IDT team.    Consultants/Specialty  None    Code Status  DNAR/DNI    Disposition  The patient is medically cleared for discharge to home or a post-acute facility.  Anticipate discharge to: skilled nursing facility    I have placed the appropriate orders for post-discharge needs.    Review of Systems  Review of Systems   Constitutional:  Negative for chills, diaphoresis, fever and malaise/fatigue.   HENT:  Negative for congestion, hearing loss and sore throat.    Eyes:  Negative for blurred vision.   Respiratory:  Negative for cough, shortness of breath and wheezing.    Cardiovascular:  Negative for chest pain, palpitations and leg swelling.   Gastrointestinal:  Negative for abdominal pain, diarrhea, heartburn, nausea and vomiting.   Genitourinary:  Negative for dysuria, flank pain and hematuria.   Musculoskeletal:  Negative for back pain, joint pain, myalgias and neck pain.   Skin:  Negative for rash.   Neurological:  Positive for dizziness and weakness. Negative for sensory change, speech change, focal weakness and headaches.   Psychiatric/Behavioral:  The patient is not nervous/anxious.         Physical Exam  Temp:  [36.7 °C (98.1 °F)-37 °C (98.6 °F)] 36.9 °C (98.4 °F)  Pulse:  [57-63] 57  Resp:  [17-20] 18  BP: (119-152)/(64-79) 119/64  SpO2:  [94 %-98 %] 98 %    Physical Exam  Vitals and nursing note reviewed.   Constitutional:       General: He is not in acute distress.     Appearance: He is not ill-appearing or toxic-appearing.   HENT:      Head: Normocephalic and atraumatic.      Nose: No congestion.      Mouth/Throat:      Mouth: Mucous membranes are moist.   Eyes:      General: No scleral icterus.      Extraocular Movements: Extraocular movements intact.      Conjunctiva/sclera: Conjunctivae normal.   Cardiovascular:      Rate and Rhythm: Normal rate. Rhythm irregular.   Pulmonary:      Effort: Pulmonary effort is normal. No respiratory distress.      Breath sounds: Normal breath sounds. No wheezing.   Abdominal:      General: There is no distension.      Palpations: Abdomen is soft.      Tenderness: There is no abdominal tenderness. There is no guarding or rebound.   Musculoskeletal:      Cervical back: Neck supple. No tenderness.      Right lower leg: No edema.      Left lower leg: No edema.   Skin:     General: Skin is warm and dry.      Capillary Refill: Capillary refill takes less than 2 seconds.      Findings: No erythema.   Neurological:      General: No focal deficit present.      Mental Status: He is alert. He is disoriented.      Cranial Nerves: No cranial nerve deficit.      Sensory: No sensory deficit.      Motor: No weakness.      Comments: Oriented to person and place   Psychiatric:         Mood and Affect: Mood normal.         Speech: Speech is delayed.         Behavior: Behavior normal.         Cognition and Memory: He exhibits impaired recent memory.         Fluids    Intake/Output Summary (Last 24 hours) at 1/12/2024 0537  Last data filed at 1/12/2024 0400  Gross per 24 hour   Intake 820 ml   Output 400 ml   Net 420 ml         Laboratory  Recent Labs     01/09/24  0915   WBC 9.4   RBC 5.19   HEMOGLOBIN 15.4   HEMATOCRIT 46.0   MCV 88.6   MCH 29.7   MCHC 33.5   RDW 48.0   PLATELETCT 276   MPV 10.9       Recent Labs     01/09/24  0915 01/10/24  0956 01/11/24  0811   SODIUM 138 138 138   POTASSIUM 4.4 4.3 4.3   CHLORIDE 106 107 110   CO2 19* 18* 16*   GLUCOSE 171* 250* 151*   BUN 36* 36* 37*   CREATININE 1.72* 1.56* 1.58*   CALCIUM 9.5 9.1 9.2                     Imaging  EC-ECHOCARDIOGRAM COMPLETE W/ CONT   Final Result      NM-CARDIAC STRESS TEST   Final Result      DX-CHEST-PORTABLE (1 VIEW)    Final Result      Cardiomegaly.      No definite acute abnormality. See details above.           Assessment/Plan  * Coronary artery disease of bypass graft of native heart with stable angina pectoris (HCC)- (present on admission)  Assessment & Plan  Lipitor, Imdur, ASA    Syncope and collapse  Assessment & Plan  Probably vasovagal  Transfer to Telemetry  IVF hydration  cc only    Hypomagnesemia- (present on admission)  Assessment & Plan  IV Mg 2 g  Follow level    Stage 3a chronic kidney disease (HCC)- (present on admission)  Assessment & Plan  Follow bmp  IVF hydration  cc only    Atrial flutter (HCC)- (present on admission)  Assessment & Plan  Diltiazem, Eliquis  May need to consult cardiology -possible cardioversion    Acquired hypothyroidism- (present on admission)  Assessment & Plan  Levothyroxine    COPD (chronic obstructive pulmonary disease) (HCC)- (present on admission)  Assessment & Plan  Spiriva  RT protocol    Cognitive impairment- (present on admission)  Assessment & Plan  Namenda   Avoid Benzodiazepines, Anticholinergics, Opiates  Frequent orientation  Update Board daily  Open window blinds during the day  Avoid naps during the day  Family at bedside whenever possible  Ensure adequate sleep at night - use Melatonin preferably  Avoid early morning labs  Avoid vital signs during sleep  Ambulate if possible  Provide adequate pain control  Monitor for urinary retention  Prevent and manage constipation  Discontinue IVs, Catheters, Tubes, Lines, Cardiac monitors, SCDs if possible     Primary hypertension- (present on admission)  Assessment & Plan  Diltiazem, Lisinopril, Norvasc    Type 2 diabetes mellitus with kidney complication, with long-term current use of insulin ([facility])- (present on admission)  Assessment & Plan  SSI, Lantus         VTE prophylaxis:    therapeutic anticoagulation with eliquis 5 mg BID      I have performed a physical exam and reviewed and updated ROS and Plan today  (1/11/2024). In review of yesterday's note (1/10/2024), there are no changes except as documented above.  Total time spent 45 minutes. I spent greater than 50% of the time for patient care, counseling, and coordination on this date, including unit/floor time, and face-to-face time with the patient as per interval events, my own review of patient's imaging and lab analysis and developing my assessment and plan above.

## 2024-01-12 NOTE — DISCHARGE PLANNING
Case Management Discharge Planning    Admission Date: 1/4/2024  GMLOS:    ALOS: 0    6-Clicks ADL Score: 23  6-Clicks Mobility Score: 22      Anticipated Discharge Dispo: Discharge Disposition: D/T to SNF with Medicare cert in anticipation of skilled care (03)    DME Needed: No    Action(s) Taken: OTHER    LSW placed call the [facility] to discuss to returning. No answer, LSW left VM for call back.     LSW placed call to pt daughter-in-law to discuss the pt DC plan. No answer, LSW left VM for call back.     Escalations Completed: None    Medically Clear: No    Next Steps:  f/u with pt and medical team to discuss dc needs and barriers.     Barriers to Discharge: Medical clearance and Pending Placement

## 2024-01-12 NOTE — DISCHARGE SUMMARY
Discharge Summary    CHIEF COMPLAINT ON ADMISSION  Chief Complaint   Patient presents with    Chest Pain       Reason for Admission  Chest Pain     Admission Date  1/4/2024    CODE STATUS  DNAR/DNI    HPI & HOSPITAL COURSE  Admitted with chest pain, EKG showed Atrial flutter, troponins were elevated.  Nuclear stress test was negative. For the Atrial flutter, patient was started on anticoagulation with Eliquis and on Cardizem for rate control.  Echocardiogram was done which showed ejection fraction of 65%.  Patient's symptoms of chest pain resolved.     Interval Problem Update  Atrial flutter - rate 60-63  Hypertension - sbp   Diabetes - -135  Syncope - found in bathroom, sitting on the toilet with altered mental status after he had a bowel movement, rapid response called, patient brought to bed and became more alert and oriented right away   CKD - crea went up to 1.7     1/09  Evaluated in his inpatient room, heart rate in 60s, no episodes of syncope or dizziness with walking or using the restroom.  Heart rate A-fib a flutter on monitor, no chest pain or dyspnea.  MILTON appears to have stabilized, serum creatinine peaked at 1.7, will follow-up BMP tomorrow.  Check bladder scan and monitor urine output.  Negative stress test this admission.     1/10  Doing well today afebrile, HR 61-70 RR 18 SpO2 92-93% on room air. Discussed with nursing to ambulate patient and see if that induces tachycardia. Will increase dilt to his home dose tomorrow.  Discussed with cardiology, they will arrange an outpatient EP consultation for the patient for this upcoming week given stability on PO medication.   Discussed with daughter and tried to call assisted living/SNF he is a resident of at the request of his daughter, left a message regarding plan and attempted to answer their concerns. Renal function improved.       1/11  Patient feels well, he is up and walking around with staff, HR staying stable. Normal HR upper 50's-60's  per daughter and today 57-63. No dyspnea, no chest pain. Scr stable GFR in baseline range. Will follow with EP as outpatient for consideration of ablation.   Medically cleared for discharge.     1/12 vitals and labs stable for discharge     Therefore, he is discharged in good and stable condition to skilled nursing facility.    The patient met 2-midnight criteria for an inpatient stay at the time of discharge.    Discharge Date  1/12/24    FOLLOW UP ITEMS POST DISCHARGE  PCP, cardiology, EP    DISCHARGE DIAGNOSES  Principal Problem:    Coronary artery disease of bypass graft of native heart with stable angina pectoris (HCC) (POA: Yes)  Active Problems:    Type 2 diabetes mellitus with kidney complication, with long-term current use of insulin (HCC) (POA: Yes)    HLD (hyperlipidemia) (POA: Yes)    Primary hypertension (POA: Yes)    Cognitive impairment (POA: Yes)    COPD (chronic obstructive pulmonary disease) (HCC) (POA: Yes)    Acquired hypothyroidism (POA: Yes)    Atrial flutter (HCC) (POA: Yes)    Stage 3a chronic kidney disease (HCC) (POA: Yes)    Hypomagnesemia (POA: Yes)    Syncope and collapse (POA: No)  Resolved Problems:    * No resolved hospital problems. *      FOLLOW UP  Future Appointments   Date Time Provider Department Center   1/18/2024  2:00 PM MATTHEW Mancini None     No follow-up provider specified.    MEDICATIONS ON DISCHARGE     Medication List        START taking these medications        Instructions   amLODIPine 10 MG Tabs  Start taking on: January 13, 2024  Commonly known as: Norvasc   Take 1 Tablet by mouth every day.  Dose: 10 mg            CHANGE how you take these medications        Instructions   apixaban 2.5mg Tabs  What changed: how much to take  Commonly known as: Eliquis   Take 2 Tablets by mouth 2 times a day.  Dose: 5 mg            CONTINUE taking these medications        Instructions   acetaminophen 325 MG Tabs  Commonly known as: Tylenol   Take 650 mg by mouth every 8  hours as needed. Indications: Fever, Pain  Dose: 650 mg     atorvastatin 20 MG Tabs  Commonly known as: Lipitor   Take 20 mg by mouth every day.  Dose: 20 mg     Calcium Carbonate 600 MG Tabs   Take 600 mg by mouth 2 times a day.  Dose: 600 mg     dilTIAZem  MG Cp24  Commonly known as: Cardizem CD   Take 240 mg by mouth every day.  Dose: 240 mg     docusate sodium 100 MG Caps  Commonly known as: Colace   Take 100 mg by mouth every day.  Dose: 100 mg     escitalopram 10 MG Tabs  Commonly known as: Lexapro   Take 10 mg by mouth every day.  Dose: 10 mg     gabapentin 800 MG tablet  Commonly known as: Neurontin   Take 800 mg by mouth 2 times a day.  Dose: 800 mg     Incruse Ellipta 62.5 MCG/ACT Aepb inhaler  Generic drug: umeclidinium bromide   Inhale 1 Puff every day.  Dose: 1 Puff     insulin detemir 100 UNIT/ML Soln  Commonly known as: Levemir   Inject 15-50 Units under the skin 2 times a day. 50 units every morning  15 units every night  Dose: 15-50 Units     insulin regular 100 Unit/mL Soln  Commonly known as: Humulin R   Inject 3-12 Units under the skin 3 times a day before meals. Sliding Scale   151-200= 3 units  201-250= 5 units  251-300= 8 units  301-350= 10 units  351-400= 12 units  Dose: 3-12 Units     isosorbide mononitrate SR 60 MG Tb24  Commonly known as: Imdur   Take 1 Tablet by mouth every day.  Dose: 60 mg     levalbuterol 45 MCG/ACT inhaler  Commonly known as: Xopenex HFA   Inhale 2 Puffs every 2 hours as needed for Shortness of Breath.  Dose: 2 Puff     levothyroxine 112 MCG Tabs  Commonly known as: Synthroid   Take 1 Tablet by mouth every morning on an empty stomach.  Dose: 112 mcg     lisinopril 20 MG Tabs  Commonly known as: Prinivil   Take 20 mg by mouth every day.  Dose: 20 mg     loperamide 2 MG Caps  Commonly known as: Imodium   Take 2 mg by mouth 4 times a day as needed for Diarrhea.  Dose: 2 mg     melatonin 3 MG Tabs   Take 9 mg by mouth at bedtime.  Dose: 9 mg     Myrbetriq 25 MG  Tb24  Generic drug: mirabegron ER   Take 25 mg by mouth every day.  Dose: 25 mg     Namenda 5 MG Tabs  Generic drug: memantine   Take 5 mg by mouth 2 times a day.  Dose: 5 mg     pantoprazole 40 MG Tbec  Commonly known as: Protonix   Take 40 mg by mouth every day.  Dose: 40 mg     polyethylene glycol/lytes Pack  Commonly known as: Miralax   Take 17 g by mouth 1 time a day as needed. Indications: Constipation  Dose: 17 g     sennosides 8.6 MG Tabs  Commonly known as: Senokot   Take 17.2 mg by mouth 1 time a day as needed. Indications: Constipation  Dose: 17.2 mg     traMADol 50 MG Tabs  Commonly known as: Ultram   Take 50 mg by mouth every 6 hours as needed. Indications: Pain  Dose: 50 mg     vitamin D 2000 UNIT Tabs   Take 4,000 Units by mouth every day.  Dose: 4,000 Units            STOP taking these medications      Debrox 6.5 % Soln  Generic drug: carbamide peroxide              Allergies  Allergies   Allergen Reactions    Cefdinir Unspecified     Patient does not remember       DIET  Orders Placed This Encounter   Procedures    Diet Order Diet: Consistent CHO (Diabetic)     Standing Status:   Standing     Number of Occurrences:   1     Order Specific Question:   Diet:     Answer:   Consistent CHO (Diabetic) [4]       ACTIVITY  As tolerated.  Weight bearing as tolerated    CONSULTATIONS  None    PROCEDURES  Lexiscan, stress test    LABORATORY  Lab Results   Component Value Date    SODIUM 138 01/11/2024    POTASSIUM 4.3 01/11/2024    CHLORIDE 110 01/11/2024    CO2 16 (L) 01/11/2024    GLUCOSE 151 (H) 01/11/2024    BUN 37 (H) 01/11/2024    CREATININE 1.58 (H) 01/11/2024        Lab Results   Component Value Date    WBC 9.4 01/09/2024    HEMOGLOBIN 15.4 01/09/2024    HEMATOCRIT 46.0 01/09/2024    PLATELETCT 276 01/09/2024        Total time of the discharge process exceeds 35 minutes.

## 2024-01-12 NOTE — PROGRESS NOTES
Assumed care on patient post bedside report. Patient in locked bed in lowest position with call light within reach. Patient denies pain. Tele monitor on.

## 2024-01-12 NOTE — PROGRESS NOTES
Received bedside report from night shift RN. Patient awake and alert. Assisted patient to bathroom. Patient stand by assist and uses call light appropriately. Reinforced fall prevention and to call us first.

## 2024-01-12 NOTE — PROGRESS NOTES
Assumed care on patient post bedside report. Sitting on chair watching tv. Denied pain. V/s checked and stable.    Tele on and heart rhythm and rate checked on monitor.    Safety precautions in place are side rails up x 2, bed to lowest level, alarms on. Appropriate to use call light for needs and assistance. Will round hourly and as needed.

## 2024-01-12 NOTE — CARE PLAN
The patient is Stable - Low risk of patient condition declining or worsening    Shift Goals  Clinical Goals: vss, maintain safety  Patient Goals: go home  Family Goals: jefferson    Progress made toward(s) clinical / shift goals:      Problem: Knowledge Deficit - Standard  Goal: Patient and family/care givers will demonstrate understanding of plan of care, disease process/condition, diagnostic tests and medications  Description: Target End Date:  1-3 days or as soon as patient condition allows    Document in Patient Education    1.  Patient and family/caregiver oriented to unit, equipment, visitation policy and means for communicating concern  2.  Complete/review Learning Assessment  3.  Assess knowledge level of disease process/condition, treatment plan, diagnostic tests and medications  4.  Explain disease process/condition, treatment plan, diagnostic tests and medications  Outcome: Progressing     Problem: Skin Integrity  Goal: Skin integrity is maintained or improved  Description: Target End Date:  Prior to discharge or change in level of care    Document interventions on Skin Risk/[place] flowsheet groups and corresponding LDA    1.  Assess and monitor skin integrity, appearance and/or temperature  2.  Assess risk factors for impaired skin integrity and/or pressures ulcers  3.  Implement precautions to protect skin integrity in collaboration with interdisciplinary team  4.  Implement pressure ulcer prevention protocol if at risk for skin breakdown  5.  Confirm wound care consult if at risk for skin breakdown  6.  Ensure patient use of pressure relieving devices  (Low air loss bed, waffle overlay, heel protectors, ROHO cushion, etc)  Outcome: Progressing     Problem: Pain - Standard  Goal: Alleviation of pain or a reduction in pain to the patient’s comfort goal  Description: Target End Date:  Prior to discharge or change in level of care    Document on Vitals flowsheet    1.  Document pain using the appropriate pain  scale per order or unit policy  2.  Educate and implement non-pharmacologic comfort measures (i.e. relaxation, distraction, massage, cold/heat therapy, etc.)  3.  Pain management medications as ordered  4.  Reassess pain after pain med administration per policy  5.  If opiods administered assess patient's response to pain medication is appropriate per POSS sedation scale  6.  Follow pain management plan developed in collaboration with patient and interdisciplinary team (including palliative care or pain specialists if applicable)  Outcome: Progressing     Problem: Fall Risk  Goal: Patient will remain free from falls  Description: Target End Date:  Prior to discharge or change in level of care    Document interventions on the Smart Will Fall Risk Assessment    1.  Assess for fall risk factors  2.  Implement fall precautions  Outcome: Progressing       Patient is not progressing towards the following goals:

## 2024-01-12 NOTE — CARE PLAN
The patient is Stable - Low risk of patient condition declining or worsening    Shift Goals  Clinical Goals: monitor tele  Patient Goals: go home  Family Goals: jefferson    Problem: Knowledge Deficit - Standard  Goal: Patient and family/care givers will demonstrate understanding of plan of care, disease process/condition, diagnostic tests and medications  Outcome: Progressing   Patient verbalizes understanding for medication indications.

## 2024-01-13 LAB — GLUCOSE BLD STRIP.AUTO-MCNC: 115 MG/DL (ref 65–99)

## 2024-01-13 PROCEDURE — 82962 GLUCOSE BLOOD TEST: CPT

## 2024-01-13 PROCEDURE — 700102 HCHG RX REV CODE 250 W/ 637 OVERRIDE(OP): Performed by: FAMILY MEDICINE

## 2024-01-13 PROCEDURE — A9270 NON-COVERED ITEM OR SERVICE: HCPCS | Performed by: FAMILY MEDICINE

## 2024-01-13 PROCEDURE — 700102 HCHG RX REV CODE 250 W/ 637 OVERRIDE(OP): Performed by: STUDENT IN AN ORGANIZED HEALTH CARE EDUCATION/TRAINING PROGRAM

## 2024-01-13 PROCEDURE — 700102 HCHG RX REV CODE 250 W/ 637 OVERRIDE(OP): Performed by: HOSPITALIST

## 2024-01-13 PROCEDURE — G0378 HOSPITAL OBSERVATION PER HR: HCPCS

## 2024-01-13 PROCEDURE — 99231 SBSQ HOSP IP/OBS SF/LOW 25: CPT | Performed by: STUDENT IN AN ORGANIZED HEALTH CARE EDUCATION/TRAINING PROGRAM

## 2024-01-13 PROCEDURE — A9270 NON-COVERED ITEM OR SERVICE: HCPCS | Performed by: STUDENT IN AN ORGANIZED HEALTH CARE EDUCATION/TRAINING PROGRAM

## 2024-01-13 PROCEDURE — A9270 NON-COVERED ITEM OR SERVICE: HCPCS | Performed by: HOSPITALIST

## 2024-01-13 RX ADMIN — MEMANTINE HYDROCHLORIDE 5 MG: 10 TABLET ORAL at 17:56

## 2024-01-13 RX ADMIN — AMLODIPINE BESYLATE 10 MG: 10 TABLET ORAL at 04:20

## 2024-01-13 RX ADMIN — ASPIRIN 81 MG: 81 TABLET, COATED ORAL at 04:29

## 2024-01-13 RX ADMIN — LISINOPRIL 20 MG: 20 TABLET ORAL at 04:20

## 2024-01-13 RX ADMIN — LEVOTHYROXINE SODIUM 112 MCG: 0.11 TABLET ORAL at 04:20

## 2024-01-13 RX ADMIN — TIOTROPIUM BROMIDE INHALATION SPRAY 5 MCG: 3.12 SPRAY, METERED RESPIRATORY (INHALATION) at 04:29

## 2024-01-13 RX ADMIN — ESCITALOPRAM OXALATE 10 MG: 10 TABLET ORAL at 04:20

## 2024-01-13 RX ADMIN — APIXABAN 5 MG: 5 TABLET, FILM COATED ORAL at 04:19

## 2024-01-13 RX ADMIN — DOCUSATE SODIUM 50 MG AND SENNOSIDES 8.6 MG 2 TABLET: 8.6; 5 TABLET, FILM COATED ORAL at 17:56

## 2024-01-13 RX ADMIN — ATORVASTATIN CALCIUM 20 MG: 20 TABLET, FILM COATED ORAL at 04:19

## 2024-01-13 RX ADMIN — APIXABAN 5 MG: 5 TABLET, FILM COATED ORAL at 17:56

## 2024-01-13 RX ADMIN — GABAPENTIN 800 MG: 400 CAPSULE ORAL at 04:19

## 2024-01-13 RX ADMIN — DILTIAZEM HYDROCHLORIDE 240 MG: 240 CAPSULE, COATED, EXTENDED RELEASE ORAL at 20:33

## 2024-01-13 RX ADMIN — GABAPENTIN 800 MG: 400 CAPSULE ORAL at 17:56

## 2024-01-13 RX ADMIN — MEMANTINE HYDROCHLORIDE 5 MG: 10 TABLET ORAL at 04:20

## 2024-01-13 ASSESSMENT — FIBROSIS 4 INDEX: FIB4 SCORE: 0.9

## 2024-01-13 NOTE — DISCHARGE PLANNING
"Case Management Discharge Planning    Admission Date: 1/4/2024  GMLOS:    ALOS: 0    6-Clicks ADL Score: 23  6-Clicks Mobility Score: 22      Anticipated Discharge Dispo: Discharge Disposition: D/T to SNF with Medicare cert in anticipation of skilled care (03)    DME Needed: No    Action(s) Taken: Updated Provider/Nurse on Discharge Plan    Medically Clear: Yes    Next Steps: Called Tricia (patients daughter). Patient family concerned that appointment on 1/18/2024 is a \"meet and greet\" and not the actual procedure. Concerned if patient discharged back to [facility] that it will be hard for transportation and care on the road due to icy and snowy conditions, but they are willing to bring patient back.     When patient daughter called [facility], Tatiana stated they were not going to \"take patient back until his heart issues were taken care of.\" Tatiana stated yesterday, 1/12/2024 that they would be unable to take patient back to appointment this upcoming week (1/18/2024 [facility]).     RNCM spoke to MD. MD explained the importance of consult. RNCM called patient daughter back. Daughter [facility] then the consultation on 1/18/2024.     RNCM called [facility] and spoke to admissions. They do not have a bed available until tomorrow or Monday.     Barriers to Discharge: Pending Placement         "

## 2024-01-13 NOTE — PROGRESS NOTES
Report received, pt care assumed, tele box on. VSS, pt assessment complete. Pt aaox4, no signs of distress noted at this time. POC discussed with pt and verbalizes no questions. Pt denies any additional needs at this time. Bed in lowest position, pt educated on fall risk and verbalized understanding, call light within reach, will continue to monitor.

## 2024-01-13 NOTE — CARE PLAN
Problem: Pain - Standard  Goal: Alleviation of pain or a reduction in pain to the patient’s comfort goal  Description: Target End Date:  Prior to discharge or change in level of care    Document on Vitals flowsheet    1.  Document pain using the appropriate pain scale per order or unit policy  2.  Educate and implement non-pharmacologic comfort measures (i.e. relaxation, distraction, massage, cold/heat therapy, etc.)  3.  Pain management medications as ordered  4.  Reassess pain after pain med administration per policy  5.  If opiods administered assess patient's response to pain medication is appropriate per POSS sedation scale  6.  Follow pain management plan developed in collaboration with patient and interdisciplinary team (including palliative care or pain specialists if applicable)  Outcome: Progressing     Problem: Fall Risk  Goal: Patient will remain free from falls  Description: Target End Date:  Prior to discharge or change in level of care    Document interventions on the Smart Will Fall Risk Assessment    1.  Assess for fall risk factors  2.  Implement fall precautions  Outcome: Progressing   The patient is Watcher - Medium risk of patient condition declining or worsening    Shift Goals  Clinical Goals: hemodynamic stability  Patient Goals: rest  Family Goals: MORGAN    Progress made toward(s) clinical / shift goals:  monitor heart rate and rhythm, pain management per MAR, fall precautions in place, plan and prepare for discharge    Patient is not progressing towards the following goals:

## 2024-01-13 NOTE — CARE PLAN
Problem: Knowledge Deficit - Standard  Goal: Patient and family/care givers will demonstrate understanding of plan of care, disease process/condition, diagnostic tests and medications  Outcome: Progressing     Problem: Pain - Standard  Goal: Alleviation of pain or a reduction in pain to the patient’s comfort goal  Outcome: Progressing     Problem: Fall Risk  Goal: Patient will remain free from falls  Outcome: Progressing   The patient is Stable - Low risk of patient condition declining or worsening    Shift Goals  Clinical Goals: hemodynamic stability  Patient Goals: rest  Family Goals: MORGAN    Progress made toward(s) clinical / shift goals:  pt is stable    Patient is not progressing towards the following goals:

## 2024-01-13 NOTE — PROGRESS NOTES
[facility] Medicine Daily Progress Note    Date of Service  1/12/2024    Chief Complaint  Sulaiman Ceja is a 73 y.o. male admitted 1/4/2024 with chest pain    Hospital Course  Admitted with chest pain, EKG showed Atrial flutter, troponins were elevated.  Nuclear stress test was negative. For the Atrial flutter, patient was started on anticoagulation with Eliquis and on Cardizem for rate control.  Echocardiogram was done which showed ejection fraction of 65%.  Patient's symptoms of chest pain resolved.    Interval Problem Update  Atrial flutter - rate 60-63  Hypertension - sbp   Diabetes - -135  Syncope - found in bathroom, sitting on the toilet with altered mental status after he had a bowel movement, rapid response called, patient brought to bed and became more alert and oriented right away   CKD - crea went up to 1.7    1/09  Evaluated in his inpatient room, heart rate in 60s, no episodes of syncope or dizziness with walking or using the restroom.  Heart rate A-fib a flutter on monitor, no chest pain or dyspnea.  MILTON appears to have stabilized, serum creatinine peaked at 1.7, will follow-up BMP tomorrow.  Check bladder scan and monitor urine output.  Negative stress test this admission.    1/10  Doing well today afebrile, HR 61-70 RR 18 SpO2 92-93% on room air. Discussed with nursing to ambulate patient and see if that induces tachycardia. Will increase dilt to his home dose tomorrow.  Discussed with cardiology, they will arrange an outpatient EP consultation for the patient for this upcoming week given stability on PO medication.   Discussed with daughter and tried to call assisted living/SNF he is a resident of at the request of his daughter, left a message regarding plan and attempted to answer their concerns. Renal function improved.      1/11  Patient feels well, he is up and walking around with staff, HR staying stable. Normal HR upper 50's-60's per daughter and today 57-63. No dyspnea, no chest  pain. Scr stable GFR in baseline range. Will follow with EP as outpatient for consideration of ablation.   Medically cleared for discharge.    1/12  Afebrile, HR 53-62, RR 16-18 SBP  SpO2 94-96%. Tolerating PO intake. Medically cleared for discharge to SNF.    I have discussed this patient's plan of care and discharge plan at IDT rounds today with Case Management, Nursing, Nursing leadership, and other members of the IDT team.    Consultants/Specialty  None    Code Status  DNAR/DNI    Disposition  Medically Cleared  I have placed the appropriate orders for post-discharge needs.    Review of Systems  Review of Systems   Constitutional:  Negative for chills, diaphoresis, fever and malaise/fatigue.   HENT:  Negative for congestion, hearing loss and sore throat.    Eyes:  Negative for blurred vision.   Respiratory:  Negative for cough, shortness of breath and wheezing.    Cardiovascular:  Negative for chest pain, palpitations and leg swelling.   Gastrointestinal:  Negative for abdominal pain, diarrhea, heartburn, nausea and vomiting.   Genitourinary:  Negative for dysuria, flank pain and hematuria.   Musculoskeletal:  Negative for back pain, joint pain, myalgias and neck pain.   Skin:  Negative for rash.   Neurological:  Positive for dizziness and weakness. Negative for sensory change, speech change, focal weakness and headaches.   Psychiatric/Behavioral:  The patient is not nervous/anxious.         Physical Exam  Temp:  [36.6 °C (97.9 °F)-37.1 °C (98.8 °F)] 36.6 °C (97.9 °F)  Pulse:  [57-62] 59  Resp:  [17-18] 18  BP: (104-137)/(60-72) 129/60  SpO2:  [94 %-98 %] 96 %    Physical Exam  Vitals and nursing note reviewed.   Constitutional:       General: He is not in acute distress.     Appearance: He is not ill-appearing or toxic-appearing.   HENT:      Head: Normocephalic and atraumatic.      Nose: No congestion.      Mouth/Throat:      Mouth: Mucous membranes are moist.   Eyes:      General: No scleral icterus.      Extraocular Movements: Extraocular movements intact.      Conjunctiva/sclera: Conjunctivae normal.   Cardiovascular:      Rate and Rhythm: Normal rate. Rhythm irregular.   Pulmonary:      Effort: Pulmonary effort is normal. No respiratory distress.      Breath sounds: Normal breath sounds. No wheezing.   Abdominal:      General: There is no distension.      Palpations: Abdomen is soft.      Tenderness: There is no abdominal tenderness. There is no guarding or rebound.   Musculoskeletal:      Cervical back: Neck supple. No tenderness.      Right lower leg: No edema.      Left lower leg: No edema.   Skin:     General: Skin is warm and dry.      Capillary Refill: Capillary refill takes less than 2 seconds.      Findings: No erythema.   Neurological:      General: No focal deficit present.      Mental Status: He is alert. He is disoriented.      Cranial Nerves: No cranial nerve deficit.      Sensory: No sensory deficit.      Motor: No weakness.      Comments: Oriented to person and place   Psychiatric:         Mood and Affect: Mood normal.         Speech: Speech is delayed.         Behavior: Behavior normal.         Cognition and Memory: He exhibits impaired recent memory.         Fluids    Intake/Output Summary (Last 24 hours) at 1/12/2024 2332  Last data filed at 1/12/2024 1603  Gross per 24 hour   Intake 700 ml   Output 300 ml   Net 400 ml         Laboratory        Recent Labs     01/10/24  0956 01/11/24  0811   SODIUM 138 138   POTASSIUM 4.3 4.3   CHLORIDE 107 110   CO2 18* 16*   GLUCOSE 250* 151*   BUN 36* 37*   CREATININE 1.56* 1.58*   CALCIUM 9.1 9.2                     Imaging  EC-ECHOCARDIOGRAM COMPLETE W/ CONT   Final Result      NM-CARDIAC STRESS TEST   Final Result      DX-CHEST-PORTABLE (1 VIEW)   Final Result      Cardiomegaly.      No definite acute abnormality. See details above.           Assessment/Plan  * Coronary artery disease of bypass graft of native heart with stable angina pectoris (HCC)-  (present on admission)  Assessment & Plan  Lipitor, Imdur, ASA    Syncope and collapse  Assessment & Plan  Probably vasovagal  Transfer to Telemetry  IVF hydration  cc only    Hypomagnesemia- (present on admission)  Assessment & Plan  IV Mg 2 g  Follow level    Stage 3a chronic kidney disease (HCC)- (present on admission)  Assessment & Plan  Follow bmp  IVF hydration  cc only    Atrial flutter (HCC)- (present on admission)  Assessment & Plan  Diltiazem, Eliquis  May need to consult cardiology -possible cardioversion    Acquired hypothyroidism- (present on admission)  Assessment & Plan  Levothyroxine    COPD (chronic obstructive pulmonary disease) (HCC)- (present on admission)  Assessment & Plan  Spiriva  RT protocol    Cognitive impairment- (present on admission)  Assessment & Plan  Namenda   Avoid Benzodiazepines, Anticholinergics, Opiates  Frequent orientation  Update Board daily  Open window blinds during the day  Avoid naps during the day  Family at bedside whenever possible  Ensure adequate sleep at night - use Melatonin preferably  Avoid early morning labs  Avoid vital signs during sleep  Ambulate if possible  Provide adequate pain control  Monitor for urinary retention  Prevent and manage constipation  Discontinue IVs, Catheters, Tubes, Lines, Cardiac monitors, SCDs if possible     Primary hypertension- (present on admission)  Assessment & Plan  Diltiazem, Lisinopril, Norvasc    Type 2 diabetes mellitus with kidney complication, with long-term current use of insulin ([facility])- (present on admission)  Assessment & Plan  SSI, Lantus         VTE prophylaxis:   SCDs/TEDs   therapeutic anticoagulation with eliquis 5 mg BID      I have performed a physical exam and reviewed and updated ROS and Plan today (1/12/2024). In review of yesterday's note (1/11/2024), there are no changes except as documented above.  Total time spent 45 minutes. I spent greater than 50% of the time for patient care, counseling, and  coordination on this date, including unit/floor time, and face-to-face time with the patient as per interval events, my own review of patient's imaging and lab analysis and developing my assessment and plan above.

## 2024-01-14 LAB
APPEARANCE UR: CLEAR
BACTERIA #/AREA URNS HPF: NEGATIVE /HPF
BILIRUB UR QL STRIP.AUTO: NEGATIVE
COLOR UR: ABNORMAL
EPI CELLS #/AREA URNS HPF: NEGATIVE /HPF
GLUCOSE BLD STRIP.AUTO-MCNC: 154 MG/DL (ref 65–99)
GLUCOSE UR STRIP.AUTO-MCNC: NEGATIVE MG/DL
HYALINE CASTS #/AREA URNS LPF: ABNORMAL /LPF
KETONES UR STRIP.AUTO-MCNC: NEGATIVE MG/DL
LEUKOCYTE ESTERASE UR QL STRIP.AUTO: ABNORMAL
MICRO URNS: ABNORMAL
NITRITE UR QL STRIP.AUTO: NEGATIVE
PH UR STRIP.AUTO: 5 [PH] (ref 5–8)
PROT UR QL STRIP: 100 MG/DL
RBC # URNS HPF: ABNORMAL /HPF
RBC UR QL AUTO: ABNORMAL
SP GR UR STRIP.AUTO: 1.01
UROBILINOGEN UR STRIP.AUTO-MCNC: 0.2 MG/DL
WBC #/AREA URNS HPF: ABNORMAL /HPF

## 2024-01-14 PROCEDURE — 99231 SBSQ HOSP IP/OBS SF/LOW 25: CPT | Performed by: STUDENT IN AN ORGANIZED HEALTH CARE EDUCATION/TRAINING PROGRAM

## 2024-01-14 PROCEDURE — 82962 GLUCOSE BLOOD TEST: CPT

## 2024-01-14 PROCEDURE — A9270 NON-COVERED ITEM OR SERVICE: HCPCS | Performed by: STUDENT IN AN ORGANIZED HEALTH CARE EDUCATION/TRAINING PROGRAM

## 2024-01-14 PROCEDURE — G0378 HOSPITAL OBSERVATION PER HR: HCPCS

## 2024-01-14 PROCEDURE — 700102 HCHG RX REV CODE 250 W/ 637 OVERRIDE(OP): Performed by: HOSPITALIST

## 2024-01-14 PROCEDURE — A9270 NON-COVERED ITEM OR SERVICE: HCPCS | Performed by: FAMILY MEDICINE

## 2024-01-14 PROCEDURE — 700102 HCHG RX REV CODE 250 W/ 637 OVERRIDE(OP): Performed by: STUDENT IN AN ORGANIZED HEALTH CARE EDUCATION/TRAINING PROGRAM

## 2024-01-14 PROCEDURE — 81001 URINALYSIS AUTO W/SCOPE: CPT

## 2024-01-14 PROCEDURE — 700102 HCHG RX REV CODE 250 W/ 637 OVERRIDE(OP): Performed by: FAMILY MEDICINE

## 2024-01-14 PROCEDURE — A9270 NON-COVERED ITEM OR SERVICE: HCPCS | Performed by: HOSPITALIST

## 2024-01-14 RX ADMIN — GABAPENTIN 800 MG: 400 CAPSULE ORAL at 06:19

## 2024-01-14 RX ADMIN — MEMANTINE HYDROCHLORIDE 5 MG: 10 TABLET ORAL at 06:18

## 2024-01-14 RX ADMIN — LISINOPRIL 20 MG: 20 TABLET ORAL at 06:19

## 2024-01-14 RX ADMIN — ISOSORBIDE MONONITRATE 60 MG: 60 TABLET, EXTENDED RELEASE ORAL at 06:19

## 2024-01-14 RX ADMIN — ASPIRIN 81 MG: 81 TABLET, COATED ORAL at 06:18

## 2024-01-14 RX ADMIN — ESCITALOPRAM OXALATE 10 MG: 10 TABLET ORAL at 06:18

## 2024-01-14 RX ADMIN — DILTIAZEM HYDROCHLORIDE 240 MG: 240 CAPSULE, COATED, EXTENDED RELEASE ORAL at 21:34

## 2024-01-14 RX ADMIN — APIXABAN 5 MG: 5 TABLET, FILM COATED ORAL at 06:18

## 2024-01-14 RX ADMIN — AMLODIPINE BESYLATE 10 MG: 10 TABLET ORAL at 06:18

## 2024-01-14 RX ADMIN — ATORVASTATIN CALCIUM 20 MG: 20 TABLET, FILM COATED ORAL at 06:18

## 2024-01-14 RX ADMIN — GABAPENTIN 800 MG: 400 CAPSULE ORAL at 16:24

## 2024-01-14 RX ADMIN — TIOTROPIUM BROMIDE INHALATION SPRAY 5 MCG: 3.12 SPRAY, METERED RESPIRATORY (INHALATION) at 06:30

## 2024-01-14 RX ADMIN — APIXABAN 5 MG: 5 TABLET, FILM COATED ORAL at 16:24

## 2024-01-14 RX ADMIN — DOCUSATE SODIUM 50 MG AND SENNOSIDES 8.6 MG 2 TABLET: 8.6; 5 TABLET, FILM COATED ORAL at 06:19

## 2024-01-14 RX ADMIN — LEVOTHYROXINE SODIUM 112 MCG: 0.11 TABLET ORAL at 06:19

## 2024-01-14 RX ADMIN — MEMANTINE HYDROCHLORIDE 5 MG: 10 TABLET ORAL at 16:24

## 2024-01-14 ASSESSMENT — ENCOUNTER SYMPTOMS
HEADACHES: 0
SENSORY CHANGE: 0
SHORTNESS OF BREATH: 0
NAUSEA: 0
FEVER: 0
BACK PAIN: 0
CHILLS: 0
HEARTBURN: 0
PALPITATIONS: 0
DIAPHORESIS: 0
MYALGIAS: 0
DIARRHEA: 0
WEAKNESS: 1
NERVOUS/ANXIOUS: 0
FLANK PAIN: 0
NECK PAIN: 0
SPEECH CHANGE: 0
SORE THROAT: 0
FOCAL WEAKNESS: 0
DIZZINESS: 1
BLURRED VISION: 0
COUGH: 0
ABDOMINAL PAIN: 0
VOMITING: 0
WHEEZING: 0

## 2024-01-14 ASSESSMENT — PAIN DESCRIPTION - PAIN TYPE
TYPE: ACUTE PAIN
TYPE: ACUTE PAIN

## 2024-01-14 NOTE — PROGRESS NOTES
Bedside report received from night RN; assumed pt care. Pt assessment complete. Pt is alert and oriented x4. Reviewed plan of care with pt. Pt has no concerns at this time. Chart and labs reviewed. Bed in lowest position, and 2 side rails up. Pt educated on call light; call light with in reach. Pt pending placement.

## 2024-01-14 NOTE — PROGRESS NOTES
[facility] Medicine Daily Progress Note    Date of Service  1/13/2024    Chief Complaint  Sulaiman Ceja is a 73 y.o. male admitted 1/4/2024 with chest pain    Hospital Course  Admitted with chest pain, EKG showed Atrial flutter, troponins were elevated.  Nuclear stress test was negative. For the Atrial flutter, patient was started on anticoagulation with Eliquis and on Cardizem for rate control.  Echocardiogram was done which showed ejection fraction of 65%.  Patient's symptoms of chest pain resolved.    Interval Problem Update  Atrial flutter - rate 60-63  Hypertension - sbp   Diabetes - -135  Syncope - found in bathroom, sitting on the toilet with altered mental status after he had a bowel movement, rapid response called, patient brought to bed and became more alert and oriented right away   CKD - crea went up to 1.7    1/09  Evaluated in his inpatient room, heart rate in 60s, no episodes of syncope or dizziness with walking or using the restroom.  Heart rate A-fib a flutter on monitor, no chest pain or dyspnea.  MILTON appears to have stabilized, serum creatinine peaked at 1.7, will follow-up BMP tomorrow.  Check bladder scan and monitor urine output.  Negative stress test this admission.    1/10  Doing well today afebrile, HR 61-70 RR 18 SpO2 92-93% on room air. Discussed with nursing to ambulate patient and see if that induces tachycardia. Will increase dilt to his home dose tomorrow.  Discussed with cardiology, they will arrange an outpatient EP consultation for the patient for this upcoming week given stability on PO medication.   Discussed with daughter and tried to call assisted living/SNF he is a resident of at the request of his daughter, left a message regarding plan and attempted to answer their concerns. Renal function improved.      1/11  Patient feels well, he is up and walking around with staff, HR staying stable. Normal HR upper 50's-60's per daughter and today 57-63. No dyspnea, no chest  pain. Scr stable GFR in baseline range. Will follow with EP as outpatient for consideration of ablation.   Medically cleared for discharge.    1/12  Afebrile, HR 53-62, RR 16-18 SBP  SpO2 94-96%. Tolerating PO intake. Medically cleared for discharge to SNF.    1/13  Afebrile, HR 52-61 RR 17-18 -157 SpO2 94-95% on room air. Tolerating PO intake. Discharge planning ongoing for SNF, I believe they have a bed for him tomorrow. No tachycardia.    I have discussed this patient's plan of care and discharge plan at IDT rounds today with Case Management, Nursing, Nursing leadership, and other members of the IDT team.    Consultants/Specialty  None    Code Status  DNAR/DNI    Disposition  Medically Cleared  I have placed the appropriate orders for post-discharge needs.    Review of Systems  Review of Systems   Constitutional:  Negative for chills, diaphoresis, fever and malaise/fatigue.   HENT:  Negative for congestion, hearing loss and sore throat.    Eyes:  Negative for blurred vision.   Respiratory:  Negative for cough, shortness of breath and wheezing.    Cardiovascular:  Negative for chest pain, palpitations and leg swelling.   Gastrointestinal:  Negative for abdominal pain, diarrhea, heartburn, nausea and vomiting.   Genitourinary:  Negative for dysuria, flank pain and hematuria.   Musculoskeletal:  Negative for back pain, joint pain, myalgias and neck pain.   Skin:  Negative for rash.   Neurological:  Positive for dizziness and weakness. Negative for sensory change, speech change, focal weakness and headaches.   Psychiatric/Behavioral:  The patient is not nervous/anxious.         Physical Exam  Temp:  [36.2 °C (97.2 °F)-36.6 °C (97.9 °F)] 36.6 °C (97.9 °F)  Pulse:  [52-61] 60  Resp:  [17-18] 18  BP: (110-157)/(43-98) 130/68  SpO2:  [94 %-95 %] 94 %    Physical Exam  Vitals and nursing note reviewed.   Constitutional:       General: He is not in acute distress.     Appearance: He is not ill-appearing or  toxic-appearing.   HENT:      Head: Normocephalic and atraumatic.      Nose: No congestion.      Mouth/Throat:      Mouth: Mucous membranes are moist.   Eyes:      General: No scleral icterus.     Extraocular Movements: Extraocular movements intact.      Conjunctiva/sclera: Conjunctivae normal.   Cardiovascular:      Rate and Rhythm: Normal rate. Rhythm irregular.   Pulmonary:      Effort: Pulmonary effort is normal. No respiratory distress.      Breath sounds: No wheezing, rhonchi or rales.   Abdominal:      General: There is no distension.      Palpations: Abdomen is soft.      Tenderness: There is no abdominal tenderness. There is no guarding or rebound.   Musculoskeletal:      Cervical back: Neck supple. No tenderness.      Right lower leg: No edema.      Left lower leg: No edema.   Skin:     General: Skin is warm and dry.      Capillary Refill: Capillary refill takes less than 2 seconds.      Findings: No erythema.   Neurological:      General: No focal deficit present.      Mental Status: He is alert. He is disoriented.      Cranial Nerves: No cranial nerve deficit.      Sensory: No sensory deficit.      Motor: No weakness.      Comments: Oriented to person and place   Psychiatric:         Mood and Affect: Mood normal.         Speech: Speech is delayed.         Behavior: Behavior normal.         Cognition and Memory: He exhibits impaired recent memory.         Fluids    Intake/Output Summary (Last 24 hours) at 1/14/2024 0517  Last data filed at 1/13/2024 1534  Gross per 24 hour   Intake 590 ml   Output 0 ml   Net 590 ml         Laboratory        Recent Labs     01/11/24  0811   SODIUM 138   POTASSIUM 4.3   CHLORIDE 110   CO2 16*   GLUCOSE 151*   BUN 37*   CREATININE 1.58*   CALCIUM 9.2                     Imaging  EC-ECHOCARDIOGRAM COMPLETE W/ CONT   Final Result      NM-CARDIAC STRESS TEST   Final Result      DX-CHEST-PORTABLE (1 VIEW)   Final Result      Cardiomegaly.      No definite acute abnormality. See  details above.           Assessment/Plan  * Coronary artery disease of bypass graft of native heart with stable angina pectoris (HCC)- (present on admission)  Assessment & Plan  Lipitor, Imdur, ASA    Syncope and collapse  Assessment & Plan  Probably vasovagal  Transfer to Telemetry  IVF hydration  cc only    Hypomagnesemia- (present on admission)  Assessment & Plan  IV Mg 2 g  Follow level    Stage 3a chronic kidney disease (HCC)- (present on admission)  Assessment & Plan  Follow bmp  IVF hydration  cc only    Atrial flutter (HCC)- (present on admission)  Assessment & Plan  Diltiazem, Eliquis  May need to consult cardiology -possible cardioversion    Acquired hypothyroidism- (present on admission)  Assessment & Plan  Levothyroxine    COPD (chronic obstructive pulmonary disease) (HCC)- (present on admission)  Assessment & Plan  Spiriva  RT protocol    Cognitive impairment- (present on admission)  Assessment & Plan  Namenda   Avoid Benzodiazepines, Anticholinergics, Opiates  Frequent orientation  Update Board daily  Open window blinds during the day  Avoid naps during the day  Family at bedside whenever possible  Ensure adequate sleep at night - use Melatonin preferably  Avoid early morning labs  Avoid vital signs during sleep  Ambulate if possible  Provide adequate pain control  Monitor for urinary retention  Prevent and manage constipation  Discontinue IVs, Catheters, Tubes, Lines, Cardiac monitors, SCDs if possible     Primary hypertension- (present on admission)  Assessment & Plan  Diltiazem, Lisinopril, Norvasc    Type 2 diabetes mellitus with kidney complication, with long-term current use of insulin ([facility])- (present on admission)  Assessment & Plan  SSI, Lantus         VTE prophylaxis:    therapeutic anticoagulation with eliquis 5 mg BID      I have performed a physical exam and reviewed and updated ROS and Plan today (1/13/2024). In review of yesterday's note (1/12/2024), there are no changes  except as documented above.  Total time spent 30 minutes. I spent greater than 50% of the time for patient care, counseling, and coordination on this date, including unit/floor time, and face-to-face time with the patient as per interval events, my own review of patient's imaging and lab analysis and developing my assessment and plan above.

## 2024-01-14 NOTE — PROGRESS NOTES
Bedside report received from day RN; assumed pt care. Pt assessment complete. Pt AxO4. Reviewed plan of care with pt. Pt medical. Chart and labs reviewed. Bed in lowest position, and 2 side rails up. Pt educated on call light; call light with in reach.

## 2024-01-14 NOTE — DISCHARGE PLANNING
Case Management Discharge Planning    Admission Date: 1/4/2024  GMLOS:    ALOS: 0    6-Clicks ADL Score: 23  6-Clicks Mobility Score: 22      Anticipated Discharge Dispo: Discharge Disposition: D/T to SNF with Medicare cert in anticipation of skilled care (03)    DME Needed: No    Action(s) Taken: Updated Provider/Nurse on Discharge Plan    Pt discussed in 0830 rounds. Pt is medically cleared for SNF.   LSW completed chart review, pt has [facility] accepting. LSW left VM for [facility].     Escalations Completed: None    Medically Clear: Yes    Next Steps: LSW to follow for bed    Barriers to Discharge: Pending Placement    Is the patient up for discharge tomorrow: Yes    Is transport arranged for discharge disposition: No

## 2024-01-14 NOTE — CARE PLAN
The patient is Stable - Low risk of patient condition declining or worsening    Shift Goals  Clinical Goals: placement  Patient Goals: rest  Family Goals: MORGAN    Progress made toward(s) clinical / shift goals:    Problem: Knowledge Deficit - Standard  Goal: Patient and family/care givers will demonstrate understanding of plan of care, disease process/condition, diagnostic tests and medications  Description: Target End Date:  1-3 days or as soon as patient condition allows    Document in Patient Education    1.  Patient and family/caregiver oriented to unit, equipment, visitation policy and means for communicating concern  2.  Complete/review Learning Assessment  3.  Assess knowledge level of disease process/condition, treatment plan, diagnostic tests and medications  4.  Explain disease process/condition, treatment plan, diagnostic tests and medications  Outcome: Progressing       Problem: Skin Integrity  Goal: Skin integrity is maintained or improved  Description: Target End Date:  Prior to discharge or change in level of care    Document interventions on Skin Risk/[place] flowsheet groups and corresponding LDA    1.  Assess and monitor skin integrity, appearance and/or temperature  2.  Assess risk factors for impaired skin integrity and/or pressures ulcers  3.  Implement precautions to protect skin integrity in collaboration with interdisciplinary team  4.  Implement pressure ulcer prevention protocol if at risk for skin breakdown  5.  Confirm wound care consult if at risk for skin breakdown  6.  Ensure patient use of pressure relieving devices  (Low air loss bed, waffle overlay, heel protectors, ROHO cushion, etc)  Outcome: Progressing     Patient is not progressing towards the following goals:      Problem: Discharge Barriers/Planning- pt pending placement    Goal: Patient's continuum of care needs are met  Description: Target End Date:  Prior to discharge or change in level of care    1.  Identify potential  discharge barriers on admission and throughout hospitalization  2.  Collaborate with Case Management, , Clinical Educators, Navigators and others on the transitional care team to meet discharge needs  3.  Involve patient/family/caregivers in setting and prioritizing goals for hospitalization and discharge  4.  Ensure Flu vaccinations are addressed  5.  Inquire if patient is interested in the Meds to Bed program  6.  Ensure patient and family/caregiver are able to demonstrate use of equipment as prescribed  7.  Ensure patient and family/caregiver can verbalize understanding of patient education  8.  Explain discharge instructions and medication reconciliation to patient and family/caregiver  Outcome: Not Met

## 2024-01-14 NOTE — CARE PLAN
The patient is Stable - Low risk of patient condition declining or worsening    Shift Goals  Clinical Goals: D/C to SNF tomorrow  Patient Goals: Rest  Family Goals: MORGAN    Progress made toward(s) clinical / shift goals:    Problem: Knowledge Deficit - Standard  Goal: Patient and family/care givers will demonstrate understanding of plan of care, disease process/condition, diagnostic tests and medications  Outcome: Progressing     Problem: Skin Integrity  Goal: Skin integrity is maintained or improved  Outcome: Progressing     Problem: Pain - Standard  Goal: Alleviation of pain or a reduction in pain to the patient’s comfort goal  Outcome: Progressing     Problem: Fall Risk  Goal: Patient will remain free from falls  Outcome: Met       Patient is not progressing towards the following goals:    N/A

## 2024-01-15 LAB
ANION GAP SERPL CALC-SCNC: 12 MMOL/L (ref 7–16)
BASOPHILS # BLD AUTO: 0.8 % (ref 0–1.8)
BASOPHILS # BLD: 0.07 K/UL (ref 0–0.12)
BUN SERPL-MCNC: 44 MG/DL (ref 8–22)
CALCIUM SERPL-MCNC: 9.1 MG/DL (ref 8.5–10.5)
CHLORIDE SERPL-SCNC: 107 MMOL/L (ref 96–112)
CO2 SERPL-SCNC: 20 MMOL/L (ref 20–33)
CREAT SERPL-MCNC: 2.01 MG/DL (ref 0.5–1.4)
EOSINOPHIL # BLD AUTO: 0.24 K/UL (ref 0–0.51)
EOSINOPHIL NFR BLD: 2.6 % (ref 0–6.9)
ERYTHROCYTE [DISTWIDTH] IN BLOOD BY AUTOMATED COUNT: 48.7 FL (ref 35.9–50)
GFR SERPLBLD CREATININE-BSD FMLA CKD-EPI: 34 ML/MIN/1.73 M 2
GLUCOSE BLD STRIP.AUTO-MCNC: 147 MG/DL (ref 65–99)
GLUCOSE BLD STRIP.AUTO-MCNC: 234 MG/DL (ref 65–99)
GLUCOSE BLD STRIP.AUTO-MCNC: 265 MG/DL (ref 65–99)
GLUCOSE BLD STRIP.AUTO-MCNC: 79 MG/DL (ref 65–99)
GLUCOSE SERPL-MCNC: 123 MG/DL (ref 65–99)
HCT VFR BLD AUTO: 44.5 % (ref 42–52)
HGB BLD-MCNC: 15 G/DL (ref 14–18)
IMM GRANULOCYTES # BLD AUTO: 0.02 K/UL (ref 0–0.11)
IMM GRANULOCYTES NFR BLD AUTO: 0.2 % (ref 0–0.9)
LYMPHOCYTES # BLD AUTO: 1.4 K/UL (ref 1–4.8)
LYMPHOCYTES NFR BLD: 15.3 % (ref 22–41)
MCH RBC QN AUTO: 29.8 PG (ref 27–33)
MCHC RBC AUTO-ENTMCNC: 33.7 G/DL (ref 32.3–36.5)
MCV RBC AUTO: 88.3 FL (ref 81.4–97.8)
MONOCYTES # BLD AUTO: 0.79 K/UL (ref 0–0.85)
MONOCYTES NFR BLD AUTO: 8.6 % (ref 0–13.4)
NEUTROPHILS # BLD AUTO: 6.62 K/UL (ref 1.82–7.42)
NEUTROPHILS NFR BLD: 72.5 % (ref 44–72)
NRBC # BLD AUTO: 0 K/UL
NRBC BLD-RTO: 0 /100 WBC (ref 0–0.2)
PLATELET # BLD AUTO: 291 K/UL (ref 164–446)
PMV BLD AUTO: 11.5 FL (ref 9–12.9)
POTASSIUM SERPL-SCNC: 4.5 MMOL/L (ref 3.6–5.5)
RBC # BLD AUTO: 5.04 M/UL (ref 4.7–6.1)
SODIUM SERPL-SCNC: 139 MMOL/L (ref 135–145)
WBC # BLD AUTO: 9.1 K/UL (ref 4.8–10.8)

## 2024-01-15 PROCEDURE — 99231 SBSQ HOSP IP/OBS SF/LOW 25: CPT | Performed by: STUDENT IN AN ORGANIZED HEALTH CARE EDUCATION/TRAINING PROGRAM

## 2024-01-15 PROCEDURE — A9270 NON-COVERED ITEM OR SERVICE: HCPCS | Performed by: FAMILY MEDICINE

## 2024-01-15 PROCEDURE — 700102 HCHG RX REV CODE 250 W/ 637 OVERRIDE(OP): Performed by: HOSPITALIST

## 2024-01-15 PROCEDURE — 36415 COLL VENOUS BLD VENIPUNCTURE: CPT

## 2024-01-15 PROCEDURE — 700102 HCHG RX REV CODE 250 W/ 637 OVERRIDE(OP): Performed by: FAMILY MEDICINE

## 2024-01-15 PROCEDURE — G0378 HOSPITAL OBSERVATION PER HR: HCPCS

## 2024-01-15 PROCEDURE — 51798 US URINE CAPACITY MEASURE: CPT

## 2024-01-15 PROCEDURE — A9270 NON-COVERED ITEM OR SERVICE: HCPCS | Performed by: STUDENT IN AN ORGANIZED HEALTH CARE EDUCATION/TRAINING PROGRAM

## 2024-01-15 PROCEDURE — A9270 NON-COVERED ITEM OR SERVICE: HCPCS | Performed by: HOSPITALIST

## 2024-01-15 PROCEDURE — 82962 GLUCOSE BLOOD TEST: CPT | Mod: 91

## 2024-01-15 PROCEDURE — 700102 HCHG RX REV CODE 250 W/ 637 OVERRIDE(OP): Performed by: STUDENT IN AN ORGANIZED HEALTH CARE EDUCATION/TRAINING PROGRAM

## 2024-01-15 PROCEDURE — 97162 PT EVAL MOD COMPLEX 30 MIN: CPT

## 2024-01-15 PROCEDURE — 80048 BASIC METABOLIC PNL TOTAL CA: CPT

## 2024-01-15 PROCEDURE — 85025 COMPLETE CBC W/AUTO DIFF WBC: CPT

## 2024-01-15 PROCEDURE — 97165 OT EVAL LOW COMPLEX 30 MIN: CPT

## 2024-01-15 RX ORDER — DEXTROSE MONOHYDRATE 25 G/50ML
25 INJECTION, SOLUTION INTRAVENOUS
Status: DISCONTINUED | OUTPATIENT
Start: 2024-01-15 | End: 2024-01-22 | Stop reason: HOSPADM

## 2024-01-15 RX ADMIN — ISOSORBIDE MONONITRATE 60 MG: 60 TABLET, EXTENDED RELEASE ORAL at 05:17

## 2024-01-15 RX ADMIN — APIXABAN 5 MG: 5 TABLET, FILM COATED ORAL at 16:46

## 2024-01-15 RX ADMIN — INSULIN HUMAN 3 UNITS: 100 INJECTION, SOLUTION PARENTERAL at 20:38

## 2024-01-15 RX ADMIN — GABAPENTIN 800 MG: 400 CAPSULE ORAL at 16:46

## 2024-01-15 RX ADMIN — ESCITALOPRAM OXALATE 10 MG: 10 TABLET ORAL at 05:17

## 2024-01-15 RX ADMIN — GABAPENTIN 800 MG: 400 CAPSULE ORAL at 05:17

## 2024-01-15 RX ADMIN — AMLODIPINE BESYLATE 10 MG: 10 TABLET ORAL at 05:17

## 2024-01-15 RX ADMIN — DILTIAZEM HYDROCHLORIDE 240 MG: 240 CAPSULE, COATED, EXTENDED RELEASE ORAL at 20:33

## 2024-01-15 RX ADMIN — DOCUSATE SODIUM 50 MG AND SENNOSIDES 8.6 MG 2 TABLET: 8.6; 5 TABLET, FILM COATED ORAL at 16:46

## 2024-01-15 RX ADMIN — MEMANTINE HYDROCHLORIDE 5 MG: 10 TABLET ORAL at 16:47

## 2024-01-15 RX ADMIN — TIOTROPIUM BROMIDE INHALATION SPRAY 5 MCG: 3.12 SPRAY, METERED RESPIRATORY (INHALATION) at 06:17

## 2024-01-15 RX ADMIN — LISINOPRIL 20 MG: 20 TABLET ORAL at 05:17

## 2024-01-15 RX ADMIN — APIXABAN 5 MG: 5 TABLET, FILM COATED ORAL at 05:17

## 2024-01-15 RX ADMIN — DOCUSATE SODIUM 50 MG AND SENNOSIDES 8.6 MG 2 TABLET: 8.6; 5 TABLET, FILM COATED ORAL at 05:17

## 2024-01-15 RX ADMIN — ASPIRIN 81 MG: 81 TABLET, COATED ORAL at 05:15

## 2024-01-15 RX ADMIN — ATORVASTATIN CALCIUM 20 MG: 20 TABLET, FILM COATED ORAL at 05:17

## 2024-01-15 RX ADMIN — LEVOTHYROXINE SODIUM 112 MCG: 0.11 TABLET ORAL at 05:17

## 2024-01-15 RX ADMIN — INSULIN HUMAN 2 UNITS: 100 INJECTION, SOLUTION PARENTERAL at 16:47

## 2024-01-15 RX ADMIN — MEMANTINE HYDROCHLORIDE 5 MG: 10 TABLET ORAL at 05:15

## 2024-01-15 ASSESSMENT — ENCOUNTER SYMPTOMS
HEARTBURN: 0
CHILLS: 0
SPEECH CHANGE: 0
NAUSEA: 0
WEAKNESS: 1
SORE THROAT: 0
FLANK PAIN: 0
VOMITING: 0
BACK PAIN: 0
ABDOMINAL PAIN: 0
PALPITATIONS: 0
NERVOUS/ANXIOUS: 0
BLURRED VISION: 0
SENSORY CHANGE: 0
DIAPHORESIS: 0
MYALGIAS: 0
FEVER: 0
HEADACHES: 0
WHEEZING: 0
DIZZINESS: 1
COUGH: 0
FOCAL WEAKNESS: 0
NECK PAIN: 0
DIARRHEA: 0
SHORTNESS OF BREATH: 0

## 2024-01-15 ASSESSMENT — COGNITIVE AND FUNCTIONAL STATUS - GENERAL
DAILY ACTIVITIY SCORE: 19
HELP NEEDED FOR BATHING: A LITTLE
PERSONAL GROOMING: A LITTLE
SUGGESTED CMS G CODE MODIFIER DAILY ACTIVITY: CK
SUGGESTED CMS G CODE MODIFIER MOBILITY: CH
TOILETING: A LITTLE
MOBILITY SCORE: 24
DRESSING REGULAR LOWER BODY CLOTHING: A LITTLE
DRESSING REGULAR UPPER BODY CLOTHING: A LITTLE

## 2024-01-15 ASSESSMENT — GAIT ASSESSMENTS
GAIT LEVEL OF ASSIST: SUPERVISED
DISTANCE (FEET): 400

## 2024-01-15 ASSESSMENT — FIBROSIS 4 INDEX: FIB4 SCORE: 0.9

## 2024-01-15 ASSESSMENT — ACTIVITIES OF DAILY LIVING (ADL): TOILETING: INDEPENDENT

## 2024-01-15 ASSESSMENT — PAIN DESCRIPTION - PAIN TYPE
TYPE: ACUTE PAIN
TYPE: ACUTE PAIN

## 2024-01-15 NOTE — THERAPY
"Physical Therapy   Initial Evaluation     Patient Name: Sulaiman Ceja  Age:  73 y.o., Sex:  male  Medical Record #: [id number]  Today's Date: 1/15/2024     Precautions  Precautions: Fall Risk  Comments: pt with significant memory issues.    Assessment  Patient is 73 y.o. male admitted with chest pain now resolved, with past medical history of insulin-dependent diabetes mellitus, hypertension, coronary artery bypass graft sometime in the 1990s with no subsequent interventions that has been admitted this hospital multiple times because of chest pain, has been medically managed in past. .  Additional factors influencing patient status / progress : today pt appears to be close to baseline function, supervised for OOB, transfers and ambulation, with no c/o CP. PT does show memory issues, repeating storied told just 5 minutes before and reports that the reason he is living where he is right now is for the medication assist as \"I will forget to take my medications.\". See below for details. .      Plan         DC Equipment Recommendations: None  Discharge Recommendations: Anticipate that the patient will have no further physical therapy needs after discharge from the hospital. Patient will not be actively followed for physical therapy services at this time, however may be seen if requested by physician for 1 more visit within 30 days to address any discharge or equipment needs.        Objective       01/15/24 1251   Charge Group   PT Evaluation PT Evaluation Mod   Total Time Spent   PT Total Time Yes   PT Evaluation Time Spent (Mins) 20   PT Total Time Spent (Calculated) 20   Initial Contact Note    Initial Contact Note Order Received and Verified, Evaluation Only - Patient Does Not Require Further Acute Physical Therapy at this Time.  However, May Benefit from Post Acute Therapy for Higher Level Functional Deficits.   Precautions   Precautions Fall Risk   Comments pt with significant memory issues.   Vitals   O2 Delivery " Device None - Room Air   Pain 0 - 10 Group   Therapist Pain Assessment During Activity;Nurse Notified  (hips painful by end of 400 feet of ambulation, reports chronic and feels he needs to walk more often.)   Prior Living Situation   Prior Services Other (Comments)   Housing / Facility Long Term Care Facility  (SNF?)   Steps Into Home 0   Steps In Home 0   Equipment Owned None   Lives with - Patient's Self Care Capacity Other (Comments)   Comments pt reports that the reason he lives in the facility is for memory issues, that he will forget to take his meds.   Prior Level of Functional Mobility   Bed Mobility Independent   Transfer Status Independent   Ambulation Independent   Ambulation Distance household distance   Assistive Devices Used None   History of Falls   History of Falls   (denies falls)   Cognition    Cognition / Consciousness X   Orientation Level Not Oriented to Month   Level of Consciousness Alert   Comments oriented to year, location ([facility] [place]. Does not know the month and repeats stories just told 5 minutes previously.   Active ROM Lower Body    Active ROM Lower Body  WDL   Strength Lower Body   Lower Body Strength  WDL   Sensation Lower Body   Comments denies n/t   Balance Assessment   Sitting Balance (Static) Good   Sitting Balance (Dynamic) Good   Standing Balance (Static) Fair   Standing Balance (Dynamic) Fair   Weight Shift Sitting Good   Weight Shift Standing Good   Comments with no AD   Bed Mobility    Supine to Sit Supervised   Sit to Supine Supervised   Scooting Supervised   Gait Analysis   Gait Level Of Assist Supervised   Assistive Device None   Distance (Feet) 400   # of Times Distance was Traveled 1  (hips sore by end, needed seated rest.)   Weight Bearing Status no restrictions   Functional Mobility   Sit to Stand Supervised   Bed, Chair, Wheelchair Transfer Supervised   Transfer Method Stand Pivot   How much difficulty does the patient currently have...   Turning over in  bed (including adjusting bedclothes, sheets and blankets)? 4   Sitting down on and standing up from a chair with arms (e.g., wheelchair, bedside commode, etc.) 4   Moving from lying on back to sitting on the side of the bed? 4   How much help from another person does the patient currently need...   Moving to and from a bed to a chair (including a wheelchair)? 4   Need to walk in a hospital room? 4   Climbing 3-5 steps with a railing? 4   6 clicks Mobility Score 24   Activity Tolerance   Standing 7 min   Education Group   Education Provided Role of Physical Therapist   Role of Physical Therapist Patient Response Patient;Acceptance;Explanation;Verbal Demonstration   Anticipated Discharge Equipment and Recommendations   DC Equipment Recommendations None   Discharge Recommendations Anticipate that the patient will have no further physical therapy needs after discharge from the hospital   Interdisciplinary Plan of Care Collaboration   IDT Collaboration with  Nursing   Patient Position at End of Therapy Seated;Call Light within Reach;Tray Table within Reach;Phone within Reach;Chair Alarm On   Collaboration Comments nsg updated   Session Information   Date / Session Number  1/15-1x only, dc needs only

## 2024-01-15 NOTE — DISCHARGE PLANNING
0818  Agency/Facility Name: [facility]   Spoke To: Jairo   Outcome: DPA called on bed availability per Jairo pt need to be switched to inpatient status.     YOLIS Black notified.

## 2024-01-15 NOTE — CARE PLAN
The patient is Stable - Low risk of patient condition declining or worsening    Shift Goals  Clinical Goals: placement,  Patient Goals: hematuria source  Family Goals: MORGAN    Progress made toward(s) clinical / shift goals:    Problem: Knowledge Deficit - Standard  Goal: Patient and family/care givers will demonstrate understanding of plan of care, disease process/condition, diagnostic tests and medications  Description: Target End Date:  1-3 days or as soon as patient condition allows    Document in Patient Education    1.  Patient and family/caregiver oriented to unit, equipment, visitation policy and means for communicating concern  2.  Complete/review Learning Assessment  3.  Assess knowledge level of disease process/condition, treatment plan, diagnostic tests and medications  4.  Explain disease process/condition, treatment plan, diagnostic tests and medications  Outcome: Progressing       Patient is not progressing towards the following goals:      Problem: Discharge Barriers/Planning- pt pending placement   Goal: Patient's continuum of care needs are met  Description: Target End Date:  Prior to discharge or change in level of care    1.  Identify potential discharge barriers on admission and throughout hospitalization  2.  Collaborate with Case Management, , Clinical Educators, Navigators and others on the transitional care team to meet discharge needs  3.  Involve patient/family/caregivers in setting and prioritizing goals for hospitalization and discharge  4.  Ensure Flu vaccinations are addressed  5.  Inquire if patient is interested in the Meds to Bed program  6.  Ensure patient and family/caregiver are able to demonstrate use of equipment as prescribed  7.  Ensure patient and family/caregiver can verbalize understanding of patient education  8.  Explain discharge instructions and medication reconciliation to patient and family/caregiver  Outcome: Not Met

## 2024-01-15 NOTE — THERAPY
"Occupational Therapy   Initial Evaluation     Patient Name: Sulaiman Ceja  Age:  73 y.o., Sex:  male  Medical Record #: [id number]  Today's Date: 1/15/2024     Precautions  Precautions: Fall Risk  Comments: pt with significant memory issues.    Assessment    Patient is 73 y.o. male admitted with chest pain that is now resolved. Other pertinent medical history includes DM, HTN, CABG, HLD, cognitive impairement, and COPD. Pt seen for OT evaluation. Pt adjusted socks with supv and demonstrated ability to reach face with B hands w/ supv. Pt was admitted from a long term care facility that provided assist for IADLs. Pt reported good family support. Pt current functional performance limited by impaired activity tolerance, impaired strength, and impaired cognition. Pt will benefit from skilled OT while admitted to acute care.     Plan    Occupational Therapy Initial Treatment Plan   Treatment Interventions: Self Care / Activities of Daily Living, Adaptive Equipment, Therapeutic Activity, Therapeutic Exercises, Neuro Re-Education / Balance  Treatment Frequency: 3 Times per Week  Duration: Until Therapy Goals Met    DC Equipment Recommendations: None  Discharge Recommendations: Other - (Supportive environment for safe IADL completion)     Subjective    \"I need help with my medications, but I might have a system in place.\"     Objective     01/15/24 1253   Prior Living Situation   Prior Services   (support for IADLs)   Housing / Facility Long Term Care Facility   Steps Into Home 0   Steps In Home 0   Bathroom Set up Walk In Shower;Built-In Shower Chair   Equipment Owned None   Lives with - Patient's Self Care Capacity Attendant / Paid Care Giver   Comments Pt was unclear what type of facility he was staying in prior. Reported he lives in a facility that provides assist for IADLs due to cognition/memory difficulty. Per RN, pt unable to return to prior long term care facility.   Prior Level of ADL Function   Self Feeding " Independent   Grooming / Hygiene Independent   Bathing Independent   Dressing Independent   Toileting Independent   Prior Level of IADL Function   Medication Management Requires Assist   Laundry Requires Assist   Finances Requires Assist   Home Management Requires Assist   Shopping Requires Assist   Prior Level Of Mobility Independent Without Device in Community;Independent Without Device in Home   Driving / Transportation Relatives / Others Provide Transportation   History of Falls   History of Falls   (denied history of falls)   Precautions   Precautions Fall Risk   Vitals   Pulse 86   Blood Pressure  (!) 121/98   O2 Delivery Device None - Room Air   Pain   Pain Scales 0 to 10 Scale    Pain 0 - 10 Group   Therapist Pain Assessment During Activity;Nurse Notified  (pain to hips after walking in [facility])   Cognition    Cognition / Consciousness X   Orientation Level Not Oriented to Month   Level of Consciousness Alert   New Learning Impaired   Attention Impaired   Comments Pleasant and cooperative, receptive to education, reported memory difficulties at baseline   Passive ROM Upper Body   Passive ROM Upper Body WDL   Active ROM Upper Body   Active ROM Upper Body  WDL   Strength Upper Body   Upper Body Strength  WDL   Sensation Upper Body   Upper Extremity Sensation  X   Comments history of carpal tunnel B UE, worse to L UE   Upper Body Muscle Tone   Upper Body Muscle Tone  WDL   Coordination Upper Body   Comments WFL, not formally assessed   Balance Assessment   Sitting Balance (Static) Good   Sitting Balance (Dynamic) Good   Standing Balance (Static) Fair   Standing Balance (Dynamic) Fair   Weight Shift Sitting Good   Weight Shift Standing Good   Comments no AD, no LOB   Bed Mobility    Supine to Sit Supervised   Sit to Supine   (up to chair post)   Scooting Supervised   Rolling Supervised   Comments HOB flat, no use of rails   ADL Assessment   Lower Body Dressing Standby Assist  (adjust socks)   Functional Mobility    Sit to Stand Supervised   Bed, Chair, Wheelchair Transfer Supervised   Toilet Transfers   (declined due to hip pain after walking in the [facility] with PT)   Transfer Method Stand Step   Mobility EOB>hallway>chair   Comments w/ no AD   Visual Perception   Visual Perception  Not Tested   Activity Tolerance   Sitting in Chair up to chair post   Sitting Edge of Bed <5 min   Standing <10 min   Comments limited by pain   Patient / Family Goals   Patient / Family Goal #1 to go home   Short Term Goals   Short Term Goal # 1 Pt will perform toilet transfer w/ supv   Short Term Goal # 2 Pt will perform toilet hygiene w/ supv   Short Term Goal # 3 Pt will perform standing g/h w/ supv   Education Group   Education Provided Role of Occupational Therapist   Role of Occupational Therapist Patient Response Patient;Acceptance;Explanation;Verbal Demonstration   Occupational Therapy Initial Treatment Plan    Treatment Interventions Self Care / Activities of Daily Living;Adaptive Equipment;Therapeutic Activity;Therapeutic Exercises;Neuro Re-Education / Balance   Treatment Frequency 3 Times per Week   Duration Until Therapy Goals Met   Problem List   Problem List Decreased Active Daily Living Skills;Decreased Homemaking Skills;Decreased Functional Mobility;Decreased Activity Tolerance;Impaired Cognitive Function

## 2024-01-15 NOTE — DISCHARGE PLANNING
Case Management Discharge Planning    Admission Date: 1/4/2024  GMLOS:    ALOS: 0    6-Clicks ADL Score: 23  6-Clicks Mobility Score: 22      Anticipated Discharge Dispo: Discharge Disposition: D/T to SNF with Medicare cert in anticipation of skilled care (03)    DME Needed: No    Action(s) Taken: Discussed pt during morning rounds. Per attending, pt is medically cleared. To go to SNF. SW to f/u with [facility].    Per dcp note, pt needs three midnights of inpatient status to be accepted by [facility]. SW notified medical team.    Discussed pt during IDT rounds. Per attending, pt is still medically cleared. SW to f/u with pt's daughter-in-law and [facility].     SW called pt's daughter-in-law, Tricia, who states she has not been in contact with [facility]. Per Tricia, prior CM stated pt going to [facility] would assist with pt's 01/18/2024 consultation. Per pt's daughter, [facility] will not be providing transportation for pt's appointment on 01/18/2024, are requesting pt's daughter-in-law to transport pt. Per Tricia, she cannot take this week off work to transport pt, also reports the weather is a barrier. SW asked if pt's son can transport him to consultation as that appears to be the barrier. Per Tricia, pt's son has own medical conditions and cannot transport pt to and from appointment. SW attempted to contact [facility]. Left a VM with admission's coordinator for a call back.    SARAH escalated this to leadership.     Escalations Completed: None    Medically Clear: Yes    Next Steps: F/U with medical team to discuss discharge needs and barriers.    Barriers to Discharge: Pending Placement and three midnights.    Is the patient up for discharge tomorrow: No

## 2024-01-15 NOTE — PROGRESS NOTES
[facility] Medicine Daily Progress Note    Date of Service  1/14/2024    Chief Complaint  Sulaiman Ceja is a 73 y.o. male admitted 1/4/2024 with chest pain    Hospital Course  Admitted with chest pain, EKG showed Atrial flutter, troponins were elevated.  Nuclear stress test was negative. For the Atrial flutter, patient was started on anticoagulation with Eliquis and on Cardizem for rate control.  Echocardiogram was done which showed ejection fraction of 65%.  Patient's symptoms of chest pain resolved.    Interval Problem Update  Atrial flutter - rate 60-63  Hypertension - sbp   Diabetes - -135  Syncope - found in bathroom, sitting on the toilet with altered mental status after he had a bowel movement, rapid response called, patient brought to bed and became more alert and oriented right away   CKD - crea went up to 1.7    1/09  Evaluated in his inpatient room, heart rate in 60s, no episodes of syncope or dizziness with walking or using the restroom.  Heart rate A-fib a flutter on monitor, no chest pain or dyspnea.  MILTON appears to have stabilized, serum creatinine peaked at 1.7, will follow-up BMP tomorrow.  Check bladder scan and monitor urine output.  Negative stress test this admission.    1/10  Doing well today afebrile, HR 61-70 RR 18 SpO2 92-93% on room air. Discussed with nursing to ambulate patient and see if that induces tachycardia. Will increase dilt to his home dose tomorrow.  Discussed with cardiology, they will arrange an outpatient EP consultation for the patient for this upcoming week given stability on PO medication.   Discussed with daughter and tried to call assisted living/SNF he is a resident of at the request of his daughter, left a message regarding plan and attempted to answer their concerns. Renal function improved.      1/11  Patient feels well, he is up and walking around with staff, HR staying stable. Normal HR upper 50's-60's per daughter and today 57-63. No dyspnea, no chest  pain. Scr stable GFR in baseline range. Will follow with EP as outpatient for consideration of ablation.   Medically cleared for discharge.    1/12  Afebrile, HR 53-62, RR 16-18 SBP  SpO2 94-96%. Tolerating PO intake. Medically cleared for discharge to SNF.    1/13  Afebrile, HR 52-61 RR 17-18 -157 SpO2 94-95% on room air. Tolerating PO intake. Discharge planning ongoing for SNF, I believe they have a bed for him tomorrow. No tachycardia.    1/14  Afebrile, HR 58-63 RR normal -135 93-96% on RA.  Medically cleared for discharge to SNF. Tolerating PO intake. No complaints.    I have discussed this patient's plan of care and discharge plan at IDT rounds today with Case Management, Nursing, Nursing leadership, and other members of the IDT team.    Consultants/Specialty  None    Code Status  DNAR/DNI    Disposition  Medically Cleared  I have placed the appropriate orders for post-discharge needs.    Review of Systems  Review of Systems   Constitutional:  Negative for chills, diaphoresis, fever and malaise/fatigue.   HENT:  Negative for congestion, hearing loss and sore throat.    Eyes:  Negative for blurred vision.   Respiratory:  Negative for cough, shortness of breath and wheezing.    Cardiovascular:  Negative for chest pain, palpitations and leg swelling.   Gastrointestinal:  Negative for abdominal pain, diarrhea, heartburn, nausea and vomiting.   Genitourinary:  Negative for dysuria, flank pain and hematuria.   Musculoskeletal:  Negative for back pain, joint pain, myalgias and neck pain.   Skin:  Negative for rash.   Neurological:  Positive for dizziness and weakness. Negative for sensory change, speech change, focal weakness and headaches.   Psychiatric/Behavioral:  The patient is not nervous/anxious.         Physical Exam  Temp:  [36.7 °C (98.1 °F)-37.1 °C (98.8 °F)] 37 °C (98.6 °F)  Pulse:  [58-63] 58  Resp:  [16-18] 16  BP: (112-135)/(60-70) 113/63  SpO2:  [93 %-96 %] 93 %    Physical  Exam  Vitals and nursing note reviewed.   Constitutional:       General: He is not in acute distress.     Appearance: He is not ill-appearing or toxic-appearing.   HENT:      Head: Normocephalic and atraumatic.      Nose: No congestion.      Mouth/Throat:      Mouth: Mucous membranes are moist.   Eyes:      General: No scleral icterus.     Extraocular Movements: Extraocular movements intact.      Conjunctiva/sclera: Conjunctivae normal.   Cardiovascular:      Rate and Rhythm: Normal rate. Rhythm irregular.   Pulmonary:      Effort: Pulmonary effort is normal. No respiratory distress.      Breath sounds: No wheezing, rhonchi or rales.   Abdominal:      General: There is no distension.      Palpations: Abdomen is soft.      Tenderness: There is no abdominal tenderness. There is no guarding or rebound.   Musculoskeletal:      Cervical back: Normal range of motion and neck supple. No rigidity or tenderness.      Right lower leg: No edema.      Left lower leg: No edema.   Skin:     General: Skin is warm and dry.      Capillary Refill: Capillary refill takes less than 2 seconds.      Coloration: Skin is not jaundiced.      Findings: No erythema.   Neurological:      General: No focal deficit present.      Mental Status: He is alert. He is disoriented.      Cranial Nerves: No cranial nerve deficit.      Sensory: No sensory deficit.      Motor: No weakness.      Comments: Oriented to person and place   Psychiatric:         Mood and Affect: Mood normal.         Speech: Speech is delayed.         Behavior: Behavior normal.         Cognition and Memory: He exhibits impaired recent memory.         Fluids    Intake/Output Summary (Last 24 hours) at 1/15/2024 0540  Last data filed at 1/14/2024 1535  Gross per 24 hour   Intake 618 ml   Output 0 ml   Net 618 ml         Laboratory                            Imaging  EC-ECHOCARDIOGRAM COMPLETE W/ CONT   Final Result      NM-CARDIAC STRESS TEST   Final Result      DX-CHEST-PORTABLE (1  VIEW)   Final Result      Cardiomegaly.      No definite acute abnormality. See details above.           Assessment/Plan  * Coronary artery disease of bypass graft of native heart with stable angina pectoris (HCC)- (present on admission)  Assessment & Plan  Lipitor, Imdur, ASA    Syncope and collapse  Assessment & Plan  Probably vasovagal  Transfer to Telemetry  IVF hydration  cc only    Hypomagnesemia- (present on admission)  Assessment & Plan  IV Mg 2 g  Follow level    Stage 3a chronic kidney disease (HCC)- (present on admission)  Assessment & Plan  Follow bmp  IVF hydration  cc only    Atrial flutter (HCC)- (present on admission)  Assessment & Plan  Diltiazem, Eliquis  May need to consult cardiology -possible cardioversion    Acquired hypothyroidism- (present on admission)  Assessment & Plan  Levothyroxine    COPD (chronic obstructive pulmonary disease) (HCC)- (present on admission)  Assessment & Plan  Spiriva  RT protocol    Cognitive impairment- (present on admission)  Assessment & Plan  Namenda   Avoid Benzodiazepines, Anticholinergics, Opiates  Frequent orientation  Update Board daily  Open window blinds during the day  Avoid naps during the day  Family at bedside whenever possible  Ensure adequate sleep at night - use Melatonin preferably  Avoid early morning labs  Avoid vital signs during sleep  Ambulate if possible  Provide adequate pain control  Monitor for urinary retention  Prevent and manage constipation  Discontinue IVs, Catheters, Tubes, Lines, Cardiac monitors, SCDs if possible     Primary hypertension- (present on admission)  Assessment & Plan  Diltiazem, Lisinopril, Norvasc    Type 2 diabetes mellitus with kidney complication, with long-term current use of insulin ([facility])- (present on admission)  Assessment & Plan  SSI, Lantus         VTE prophylaxis:    therapeutic anticoagulation with eliquis 5 mg BID      I have performed a physical exam and reviewed and updated ROS and Plan today  (1/14/2024). In review of yesterday's note (1/13/2024), there are no changes except as documented above.  Total time spent 30 minutes. I spent greater than 50% of the time for patient care, counseling, and coordination on this date, including unit/floor time, and face-to-face time with the patient as per interval events, my own review of patient's imaging and lab analysis and developing my assessment and plan above.

## 2024-01-15 NOTE — PROGRESS NOTES
Bedside report received from night RN; assumed pt care. Pt assessment complete. Pt resting in bed, pt stated he had blood in his urine last night and is concern about infection, MD notified. Reviewed plan of care with pt. Pt has no other concerns at this time. Chart and labs reviewed. Bed in lowest position, and 2 side rails up. Pt educated on call light; call light with in reach.

## 2024-01-15 NOTE — CARE PLAN
The patient is Stable - Low risk of patient condition declining or worsening    Shift Goals  Clinical Goals: SNF placement  Patient Goals: Rest, bloody urine  Family Goals: MORGAN    Progress made toward(s) clinical / shift goals:    Problem: Knowledge Deficit - Standard  Goal: Patient and family/care givers will demonstrate understanding of plan of care, disease process/condition, diagnostic tests and medications  Outcome: Progressing     Problem: Skin Integrity  Goal: Skin integrity is maintained or improved  Outcome: Progressing     Problem: Pain - Standard  Goal: Alleviation of pain or a reduction in pain to the patient’s comfort goal  Outcome: Progressing     Problem: Discharge Barriers/Planning  Goal: Patient's continuum of care needs are met  Outcome: Progressing       Patient is not progressing towards the following goals:    N/A

## 2024-01-16 PROBLEM — N17.9 ACUTE KIDNEY INJURY SUPERIMPOSED ON CHRONIC KIDNEY DISEASE (HCC): Status: ACTIVE | Noted: 2024-01-16

## 2024-01-16 PROBLEM — N18.9 ACUTE KIDNEY INJURY SUPERIMPOSED ON CHRONIC KIDNEY DISEASE (HCC): Status: ACTIVE | Noted: 2024-01-16

## 2024-01-16 LAB
ANION GAP SERPL CALC-SCNC: 12 MMOL/L (ref 7–16)
BUN SERPL-MCNC: 46 MG/DL (ref 8–22)
CALCIUM SERPL-MCNC: 8.8 MG/DL (ref 8.5–10.5)
CHLORIDE SERPL-SCNC: 109 MMOL/L (ref 96–112)
CO2 SERPL-SCNC: 16 MMOL/L (ref 20–33)
CREAT SERPL-MCNC: 1.66 MG/DL (ref 0.5–1.4)
GFR SERPLBLD CREATININE-BSD FMLA CKD-EPI: 43 ML/MIN/1.73 M 2
GLUCOSE BLD STRIP.AUTO-MCNC: 112 MG/DL (ref 65–99)
GLUCOSE BLD STRIP.AUTO-MCNC: 178 MG/DL (ref 65–99)
GLUCOSE BLD STRIP.AUTO-MCNC: 182 MG/DL (ref 65–99)
GLUCOSE BLD STRIP.AUTO-MCNC: 207 MG/DL (ref 65–99)
GLUCOSE BLD STRIP.AUTO-MCNC: 61 MG/DL (ref 65–99)
GLUCOSE BLD STRIP.AUTO-MCNC: 96 MG/DL (ref 65–99)
GLUCOSE SERPL-MCNC: 116 MG/DL (ref 65–99)
POTASSIUM SERPL-SCNC: 4.6 MMOL/L (ref 3.6–5.5)
SODIUM SERPL-SCNC: 137 MMOL/L (ref 135–145)

## 2024-01-16 PROCEDURE — 51798 US URINE CAPACITY MEASURE: CPT

## 2024-01-16 PROCEDURE — 82962 GLUCOSE BLOOD TEST: CPT | Mod: 91

## 2024-01-16 PROCEDURE — 36415 COLL VENOUS BLD VENIPUNCTURE: CPT

## 2024-01-16 PROCEDURE — A9270 NON-COVERED ITEM OR SERVICE: HCPCS | Performed by: HOSPITALIST

## 2024-01-16 PROCEDURE — 700105 HCHG RX REV CODE 258: Performed by: STUDENT IN AN ORGANIZED HEALTH CARE EDUCATION/TRAINING PROGRAM

## 2024-01-16 PROCEDURE — G0378 HOSPITAL OBSERVATION PER HR: HCPCS

## 2024-01-16 PROCEDURE — A9270 NON-COVERED ITEM OR SERVICE: HCPCS | Performed by: FAMILY MEDICINE

## 2024-01-16 PROCEDURE — A9270 NON-COVERED ITEM OR SERVICE: HCPCS | Performed by: STUDENT IN AN ORGANIZED HEALTH CARE EDUCATION/TRAINING PROGRAM

## 2024-01-16 PROCEDURE — 99232 SBSQ HOSP IP/OBS MODERATE 35: CPT | Performed by: INTERNAL MEDICINE

## 2024-01-16 PROCEDURE — 80048 BASIC METABOLIC PNL TOTAL CA: CPT

## 2024-01-16 PROCEDURE — 700102 HCHG RX REV CODE 250 W/ 637 OVERRIDE(OP): Performed by: HOSPITALIST

## 2024-01-16 PROCEDURE — 700102 HCHG RX REV CODE 250 W/ 637 OVERRIDE(OP): Performed by: STUDENT IN AN ORGANIZED HEALTH CARE EDUCATION/TRAINING PROGRAM

## 2024-01-16 PROCEDURE — 700102 HCHG RX REV CODE 250 W/ 637 OVERRIDE(OP): Performed by: FAMILY MEDICINE

## 2024-01-16 RX ORDER — SODIUM CHLORIDE, SODIUM LACTATE, POTASSIUM CHLORIDE, AND CALCIUM CHLORIDE .6; .31; .03; .02 G/100ML; G/100ML; G/100ML; G/100ML
500 INJECTION, SOLUTION INTRAVENOUS ONCE
Status: COMPLETED | OUTPATIENT
Start: 2024-01-16 | End: 2024-01-16

## 2024-01-16 RX ADMIN — MEMANTINE HYDROCHLORIDE 5 MG: 10 TABLET ORAL at 17:14

## 2024-01-16 RX ADMIN — ASPIRIN 81 MG: 81 TABLET, COATED ORAL at 05:08

## 2024-01-16 RX ADMIN — APIXABAN 5 MG: 5 TABLET, FILM COATED ORAL at 17:14

## 2024-01-16 RX ADMIN — ISOSORBIDE MONONITRATE 60 MG: 60 TABLET, EXTENDED RELEASE ORAL at 05:11

## 2024-01-16 RX ADMIN — DILTIAZEM HYDROCHLORIDE 240 MG: 240 CAPSULE, COATED, EXTENDED RELEASE ORAL at 21:25

## 2024-01-16 RX ADMIN — MEMANTINE HYDROCHLORIDE 5 MG: 10 TABLET ORAL at 05:07

## 2024-01-16 RX ADMIN — DOCUSATE SODIUM 50 MG AND SENNOSIDES 8.6 MG 2 TABLET: 8.6; 5 TABLET, FILM COATED ORAL at 17:14

## 2024-01-16 RX ADMIN — APIXABAN 5 MG: 5 TABLET, FILM COATED ORAL at 05:07

## 2024-01-16 RX ADMIN — SODIUM CHLORIDE, POTASSIUM CHLORIDE, SODIUM LACTATE AND CALCIUM CHLORIDE 500 ML: 600; 310; 30; 20 INJECTION, SOLUTION INTRAVENOUS at 06:34

## 2024-01-16 RX ADMIN — ESCITALOPRAM OXALATE 10 MG: 10 TABLET ORAL at 05:08

## 2024-01-16 RX ADMIN — INSULIN HUMAN 1 UNITS: 100 INJECTION, SOLUTION PARENTERAL at 17:14

## 2024-01-16 RX ADMIN — INSULIN HUMAN 2 UNITS: 100 INJECTION, SOLUTION PARENTERAL at 21:27

## 2024-01-16 RX ADMIN — TIOTROPIUM BROMIDE INHALATION SPRAY 5 MCG: 3.12 SPRAY, METERED RESPIRATORY (INHALATION) at 05:08

## 2024-01-16 RX ADMIN — INSULIN HUMAN 1 UNITS: 100 INJECTION, SOLUTION PARENTERAL at 12:00

## 2024-01-16 RX ADMIN — LEVOTHYROXINE SODIUM 112 MCG: 0.11 TABLET ORAL at 05:07

## 2024-01-16 RX ADMIN — GABAPENTIN 800 MG: 400 CAPSULE ORAL at 05:07

## 2024-01-16 RX ADMIN — ATORVASTATIN CALCIUM 20 MG: 20 TABLET, FILM COATED ORAL at 05:08

## 2024-01-16 RX ADMIN — GABAPENTIN 800 MG: 400 CAPSULE ORAL at 17:14

## 2024-01-16 ASSESSMENT — ENCOUNTER SYMPTOMS
VOMITING: 0
DIAPHORESIS: 0
SHORTNESS OF BREATH: 0
FLANK PAIN: 0
NERVOUS/ANXIOUS: 0
FEVER: 0
HEADACHES: 0
DIARRHEA: 0
HEARTBURN: 0
SPEECH CHANGE: 0
WHEEZING: 0
COUGH: 0
SENSORY CHANGE: 0
PALPITATIONS: 0
ABDOMINAL PAIN: 0
DIZZINESS: 1
WEAKNESS: 1
NAUSEA: 0
BACK PAIN: 0
FOCAL WEAKNESS: 0
NECK PAIN: 0
CHILLS: 0
BLURRED VISION: 0
MYALGIAS: 0
SORE THROAT: 0

## 2024-01-16 ASSESSMENT — PAIN DESCRIPTION - PAIN TYPE: TYPE: ACUTE PAIN

## 2024-01-16 ASSESSMENT — FIBROSIS 4 INDEX: FIB4 SCORE: 0.85

## 2024-01-16 NOTE — PROGRESS NOTES
Bedside report received from night RN; assumed pt care. Pt assessment complete. Pt resting in bed with no concerns at this time. Reviewed plan of care with pt. LR bolus complete. Chart and labs reviewed. Bed in lowest position, and 2 side rails up. Pt educated on call light; call light with in reach.

## 2024-01-16 NOTE — CARE PLAN
The patient is Stable - Low risk of patient condition declining or worsening    Shift Goals  Clinical Goals: SNF placement  Patient Goals: D/C, rest  Family Goals: MORGAN    Progress made toward(s) clinical / shift goals:    Problem: Knowledge Deficit - Standard  Goal: Patient and family/care givers will demonstrate understanding of plan of care, disease process/condition, diagnostic tests and medications  Outcome: Progressing     Problem: Skin Integrity  Goal: Skin integrity is maintained or improved  Outcome: Progressing     Problem: Pain - Standard  Goal: Alleviation of pain or a reduction in pain to the patient’s comfort goal  Outcome: Progressing       Patient is not progressing towards the following goals:    N/A

## 2024-01-16 NOTE — DISCHARGE PLANNING
Case Management Discharge Planning    Admission Date: 1/4/2024  GMLOS:    ALOS: 0    6-Clicks ADL Score: 19  6-Clicks Mobility Score: 24    Anticipated Discharge Dispo: Discharge Disposition: D/T to SNF with Medicare cert in anticipation of skilled care (03)    DME Needed: No    Action(s) Taken: Discussed pt during morning rounds. Pt is still medically cleared. SARAH notified medical team that inpatient status is needed for pt to be accepted by [facility]. SARAH to f/u with UR to find out if pt was ever inpatient status.     SARAH chart reviewed, could not confirm if pt ever met inpatient status. SARAH called UR RN regarding clarification. Per UR RN, pt has been observation since his admission and currently does not meet inpatient status. SARAH to f/u with leadership.    SARAH called [facility] to discuss potential to take pt back as he does not meet inpatient status during this admit. Could not get a hold of coordinator. Left a VM. SARAH notified leadership.    SARAH received a call back from Adela, admission's coordinator for [facility]; ext [phone]. Per Adela, she is requesting clinicals to be sent to [phone]. Pt may potentially be transferring back to [facility]. Adela aware pt has f/u on 01/18/2024. SARAH sent over clinical information. SARAH to f/u.    @ 1000: SARAH called [facility] regarding if clinicals were received. Left a VM with Adela.    @ 1200: SARAH called admission's coordinator regarding confirmation to accept pt back. SARAH let a VM. SARAH to f/u.    @ 1351: SARAH received a call back from Adela with [facility]. Per Adela, they have received the clinicals. They are willing to accept pt back but want to know if consultation can be done virtually. SARAH asked Charge RN who will escalate to leadership. Awaiting response. Adela also wants to know if an actual appointment can be made prior to dc. SARAH to f/u. Adela provided direct number of [phone].     Discussed pt during afternoon rounds. Per medical team,  [facility] can set up transport for pt's EP consultation on 01/18/2024 to and from SNF. SW to reach out to leadership to clarify who sets up ride.     SW reached out to leadership to clarify. Per leadership, this SW is to obtain quotes for transport. SW attempted to call Adela with [facility] to notify of plan. Left a VM on direct number provided. SW sent Ride Line form to obtain quotes for transport needed for pt. Plan is for pt to return to [facility] tomorrow and have pre-scheduled transport set for Thursday 01/18/2024 to and from [facility] for pt's EP consultation.     Escalations Completed: None    Medically Clear: Yes    Next Steps: F/U with leadership for dcp assistance.    Barriers to Discharge: Pending Placement

## 2024-01-16 NOTE — CARE PLAN
The patient is Stable - Low risk of patient condition declining or worsening    Shift Goals  Clinical Goals: placement, trend CMP, bladder scans, encourage urination  Patient Goals: rest  Family Goals: MORGAN    Progress made toward(s) clinical / shift goals:    Problem: Hemodynamics  Goal: Patient's hemodynamics, fluid balance and neurologic status will be stable or improve- kidney func. Back to baseline   Description: Target End Date:  Prior to discharge or change in level of care    Document on Assessment and I/O flowsheet templates    1.  Monitor vital signs, pulse oximetry and cardiac monitor per provider order and/or policy  2.  Maintain blood pressure per provider order  3.  Hemodynamic monitoring per provider order  4.  Manage IV fluids and IV infusions  5.  Monitor intake and output  6.  Daily weights per unit policy or provider order  7.  Assess peripheral pulses and capillary refill  8.  Assess color and body temperature  9.  Position patient for maximum circulation/cardiac output  10. Monitor for signs/symptoms of excessive bleeding  11. Assess mental status, restlessness and changes in level of consciousness  12. Monitor temperature and report fever or hypothermia to provider immediately. Consideration of targeted temperature management.  Outcome: Met       Patient is not progressing towards the following goals:      Problem: Discharge Barriers/Planning- pt still pending placement at this time   Goal: Patient's continuum of care needs are met  Description: Target End Date:  Prior to discharge or change in level of care    1.  Identify potential discharge barriers on admission and throughout hospitalization  2.  Collaborate with Case Management, , Clinical Educators, Navigators and others on the transitional care team to meet discharge needs  3.  Involve patient/family/caregivers in setting and prioritizing goals for hospitalization and discharge  4.  Ensure Flu vaccinations are addressed  5.   Inquire if patient is interested in the Meds to Bed program  6.  Ensure patient and family/caregiver are able to demonstrate use of equipment as prescribed  7.  Ensure patient and family/caregiver can verbalize understanding of patient education  8.  Explain discharge instructions and medication reconciliation to patient and family/caregiver  Outcome: Not Met

## 2024-01-16 NOTE — PROGRESS NOTES
Hospital Medicine Daily Progress Note    Date of Service  1/15/2024    Chief Complaint  Sulaiman Ceja is a 73 y.o. male admitted 1/4/2024 with chest pain    Hospital Course  Admitted with chest pain, EKG showed Atrial flutter, troponins were elevated.  Nuclear stress test was negative. For the Atrial flutter, patient was started on anticoagulation with Eliquis and on Cardizem for rate control.  Echocardiogram was done which showed ejection fraction of 65%.  Patient's symptoms of chest pain resolved.    Atrial flutter - rate controlled  Syncope 1/8 - found in bathroom, sitting on the toilet with altered mental status after he had a bowel movement, rapid response called, patient brought to bed and became more alert and oriented right away   MILTON on CKD - improved  Heart rate A-fib a flutter on monitor, no chest pain or dyspnea.  Negative stress test this admission.  Ambulating without tachycardia.   Discussed with cardiology, they will arrange an outpatient EP consultation for the patient for this upcoming week given stability on PO medication.   Discussed with daughter and tried to call assisted living/SNF he is a resident of at the request of his daughter, left a message regarding plan and attempted to answer their concerns.  Normal HR upper 50's-60's per daughter.  Tolerating PO intake. Medically cleared for discharge to SNF.    Interval Problem Update  1/15  Has been cleared for DC to SNF, developed mild hematuria, UA not infectious, no clots or obstruction. No pain. He was concerned he had an infection due to the blood. He has an internal device he uses to help urinate. Bladder scan ordered to be sure no retention, his renal function fluctuates Scr baseline looks to be 1.4-6, today was 2. Has been up and down this admission, h/o hydronephrosis so if retention noted would get US.  Encouraged PO intake, held ACEi, may need small bolus, recheck in AM.   Medically cleared for discharge to SNF. Tolerating PO intake.  No complaints.  1/16:Patient seen and examined, afebrile, resting in bed, no overnight events, needs placement   I have discussed this patient's plan of care and discharge plan at IDT rounds today with Case Management, Nursing, Nursing leadership, and other members of the IDT team.    Consultants/Specialty  None    Code Status  DNAR/DNI    Disposition  The patient is medically cleared for discharge to home or a post-acute facility.      I have placed the appropriate orders for post-discharge needs.    Review of Systems  Review of Systems   Constitutional:  Negative for chills, diaphoresis, fever and malaise/fatigue.   HENT:  Negative for congestion, hearing loss and sore throat.    Eyes:  Negative for blurred vision.   Respiratory:  Negative for cough, shortness of breath and wheezing.    Cardiovascular:  Negative for chest pain, palpitations and leg swelling.   Gastrointestinal:  Negative for abdominal pain, diarrhea, heartburn, nausea and vomiting.   Genitourinary:  Negative for dysuria, flank pain and hematuria.   Musculoskeletal:  Negative for back pain, joint pain, myalgias and neck pain.   Skin:  Negative for rash.   Neurological:  Positive for dizziness and weakness. Negative for sensory change, speech change, focal weakness and headaches.   Psychiatric/Behavioral:  The patient is not nervous/anxious.         Physical Exam  Temp:  [36.7 °C (98.1 °F)-37 °C (98.6 °F)] 36.7 °C (98.1 °F)  Pulse:  [50-63] 63  Resp:  [16-18] 18  BP: (107-149)/(58-69) 149/68  SpO2:  [91 %-96 %] 93 %    Physical Exam  Vitals and nursing note reviewed.   Constitutional:       General: He is not in acute distress.     Appearance: He is not ill-appearing or toxic-appearing.   HENT:      Head: Normocephalic and atraumatic.      Nose: No congestion.      Mouth/Throat:      Mouth: Mucous membranes are moist.   Eyes:      General: No scleral icterus.     Extraocular Movements: Extraocular movements intact.      Conjunctiva/sclera:  Conjunctivae normal.   Cardiovascular:      Rate and Rhythm: Normal rate. Rhythm irregular.   Pulmonary:      Effort: Pulmonary effort is normal. No respiratory distress.      Breath sounds: No wheezing, rhonchi or rales.   Abdominal:      General: There is no distension.      Palpations: Abdomen is soft.      Tenderness: There is no abdominal tenderness. There is no guarding or rebound.   Musculoskeletal:      Cervical back: Normal range of motion and neck supple. No rigidity or tenderness.      Right lower leg: No edema.      Left lower leg: No edema.   Skin:     General: Skin is warm and dry.      Capillary Refill: Capillary refill takes less than 2 seconds.      Coloration: Skin is not jaundiced.      Findings: No erythema.   Neurological:      General: No focal deficit present.      Mental Status: He is alert. He is disoriented.      Cranial Nerves: No cranial nerve deficit.      Sensory: No sensory deficit.      Motor: No weakness.      Comments: Oriented to person and place   Psychiatric:         Mood and Affect: Mood normal.         Speech: Speech is delayed.         Behavior: Behavior normal.         Cognition and Memory: He exhibits impaired recent memory.         Fluids    Intake/Output Summary (Last 24 hours) at 1/16/2024 1543  Last data filed at 1/16/2024 0738  Gross per 24 hour   Intake 500 ml   Output 0 ml   Net 500 ml         Laboratory  Recent Labs     01/15/24  1304   WBC 9.1   RBC 5.04   HEMOGLOBIN 15.0   HEMATOCRIT 44.5   MCV 88.3   MCH 29.8   MCHC 33.7   RDW 48.7   PLATELETCT 291   MPV 11.5       Recent Labs     01/15/24  1304 01/16/24  0933   SODIUM 139 137   POTASSIUM 4.5 4.6   CHLORIDE 107 109   CO2 20 16*   GLUCOSE 123* 116*   BUN 44* 46*   CREATININE 2.01* 1.66*   CALCIUM 9.1 8.8                     Imaging  EC-ECHOCARDIOGRAM COMPLETE W/ CONT   Final Result      NM-CARDIAC STRESS TEST   Final Result      DX-CHEST-PORTABLE (1 VIEW)   Final Result      Cardiomegaly.      No definite acute  abnormality. See details above.           Assessment/Plan  * Coronary artery disease of bypass graft of native heart with stable angina pectoris (HCC)- (present on admission)  Assessment & Plan  Lipitor, Imdur, ASA    Acute kidney injury superimposed on chronic kidney disease (HCC)  Assessment & Plan  Has been cleared for DC to SNF, developed mild hematuria, UA not infectious, no clots or obstruction. No pain. He was concerned he had an infection due to the blood.   He has an internal device he uses to help urinate. Bladder scan ordered to be sure no retention, his renal function fluctuates Scr baseline looks to be 1.4-6, today was 2. Has been up and down this admission, h/o hydronephrosis so if retention noted would get US.  Encouraged PO intake, held ACEi, may need small bolus, recheck in AM Encouraged PO intake     Syncope and collapse  Assessment & Plan  Probably vasovagal  Transfer to Telemetry  IVF hydration  cc only    Hypomagnesemia- (present on admission)  Assessment & Plan  IV Mg 2 g  Follow level    Stage 3a chronic kidney disease (HCC)- (present on admission)  Assessment & Plan  Follow bmp  IVF hydration  cc only    Atrial flutter (HCC)- (present on admission)  Assessment & Plan  Diltiazem, Eliquis  May need to consult cardiology -possible cardioversion    1/15 Discussed with cardiology, they will arrange an outpatient EP consultation for the patient for this upcoming week given stability on PO medication.    Acquired hypothyroidism- (present on admission)  Assessment & Plan  Levothyroxine    COPD (chronic obstructive pulmonary disease) (HCC)- (present on admission)  Assessment & Plan  Spiriva  RT protocol    Cognitive impairment- (present on admission)  Assessment & Plan  Namenda   Avoid Benzodiazepines, Anticholinergics, Opiates  Frequent orientation  Update Board daily  Open window blinds during the day  Avoid naps during the day  Family at bedside whenever possible  Ensure adequate sleep  at night - use Melatonin preferably  Avoid early morning labs  Avoid vital signs during sleep  Ambulate if possible  Provide adequate pain control  Monitor for urinary retention  Prevent and manage constipation  Discontinue IVs, Catheters, Tubes, Lines, Cardiac monitors, SCDs if possible     Primary hypertension- (present on admission)  Assessment & Plan  Diltiazem, Lisinopril, Norvasc    Type 2 diabetes mellitus with kidney complication, with long-term current use of insulin (HCC)- (present on admission)  Assessment & Plan  SSI, Lantus         VTE prophylaxis:     I have performed a physical exam and reviewed and updated ROS and Plan today (1/15/2024). In review of yesterday's note (1/14/2024), there are no changes except as documented above.

## 2024-01-16 NOTE — PROGRESS NOTES
Hospital Medicine Daily Progress Note    Date of Service  1/15/2024    Chief Complaint  Sulaiman Ceja is a 73 y.o. male admitted 1/4/2024 with chest pain    Hospital Course  Admitted with chest pain, EKG showed Atrial flutter, troponins were elevated.  Nuclear stress test was negative. For the Atrial flutter, patient was started on anticoagulation with Eliquis and on Cardizem for rate control.  Echocardiogram was done which showed ejection fraction of 65%.  Patient's symptoms of chest pain resolved.    Atrial flutter - rate controlled  Syncope 1/8 - found in bathroom, sitting on the toilet with altered mental status after he had a bowel movement, rapid response called, patient brought to bed and became more alert and oriented right away   MILTON on CKD - improved  Heart rate A-fib a flutter on monitor, no chest pain or dyspnea.  Negative stress test this admission.  Ambulating without tachycardia.   Discussed with cardiology, they will arrange an outpatient EP consultation for the patient for this upcoming week given stability on PO medication.   Discussed with daughter and tried to call assisted living/SNF he is a resident of at the request of his daughter, left a message regarding plan and attempted to answer their concerns.  Normal HR upper 50's-60's per daughter.  Tolerating PO intake. Medically cleared for discharge to SNF.    Interval Problem Update  1/15  Has been cleared for DC to SNF, developed mild hematuria, UA not infectious, no clots or obstruction. No pain. He was concerned he had an infection due to the blood. He has an internal device he uses to help urinate. Bladder scan ordered to be sure no retention, his renal function fluctuates Scr baseline looks to be 1.4-6, today was 2. Has been up and down this admission, h/o hydronephrosis so if retention noted would get US.  Encouraged PO intake, held ACEi, may need small bolus, recheck in AM.     Medically cleared for discharge to SNF. Tolerating PO intake.  No complaints.    I have discussed this patient's plan of care and discharge plan at IDT rounds today with Case Management, Nursing, Nursing leadership, and other members of the IDT team.    Consultants/Specialty  None    Code Status  DNAR/DNI    Disposition  Medically Cleared  I have placed the appropriate orders for post-discharge needs.    Review of Systems  Review of Systems   Constitutional:  Negative for chills, diaphoresis, fever and malaise/fatigue.   HENT:  Negative for congestion, hearing loss and sore throat.    Eyes:  Negative for blurred vision.   Respiratory:  Negative for cough, shortness of breath and wheezing.    Cardiovascular:  Negative for chest pain, palpitations and leg swelling.   Gastrointestinal:  Negative for abdominal pain, diarrhea, heartburn, nausea and vomiting.   Genitourinary:  Negative for dysuria, flank pain and hematuria.   Musculoskeletal:  Negative for back pain, joint pain, myalgias and neck pain.   Skin:  Negative for rash.   Neurological:  Positive for dizziness and weakness. Negative for sensory change, speech change, focal weakness and headaches.   Psychiatric/Behavioral:  The patient is not nervous/anxious.         Physical Exam  Temp:  [36.4 °C (97.5 °F)-37 °C (98.6 °F)] 37 °C (98.6 °F)  Pulse:  [58-86] 58  Resp:  [16-18] 18  BP: (100-135)/(58-98) 107/58  SpO2:  [91 %-96 %] 96 %    Physical Exam  Vitals and nursing note reviewed.   Constitutional:       General: He is not in acute distress.     Appearance: He is not ill-appearing or toxic-appearing.   HENT:      Head: Normocephalic and atraumatic.      Nose: No congestion.      Mouth/Throat:      Mouth: Mucous membranes are moist.   Eyes:      General: No scleral icterus.     Extraocular Movements: Extraocular movements intact.      Conjunctiva/sclera: Conjunctivae normal.   Cardiovascular:      Rate and Rhythm: Normal rate. Rhythm irregular.   Pulmonary:      Effort: Pulmonary effort is normal. No respiratory distress.       Breath sounds: No wheezing, rhonchi or rales.   Abdominal:      General: There is no distension.      Palpations: Abdomen is soft.      Tenderness: There is no abdominal tenderness. There is no guarding or rebound.   Musculoskeletal:      Cervical back: Normal range of motion and neck supple. No rigidity or tenderness.      Right lower leg: No edema.      Left lower leg: No edema.   Skin:     General: Skin is warm and dry.      Capillary Refill: Capillary refill takes less than 2 seconds.      Coloration: Skin is not jaundiced.      Findings: No erythema.   Neurological:      General: No focal deficit present.      Mental Status: He is alert. He is disoriented.      Cranial Nerves: No cranial nerve deficit.      Sensory: No sensory deficit.      Motor: No weakness.      Comments: Oriented to person and place   Psychiatric:         Mood and Affect: Mood normal.         Speech: Speech is delayed.         Behavior: Behavior normal.         Cognition and Memory: He exhibits impaired recent memory.         Fluids    Intake/Output Summary (Last 24 hours) at 1/16/2024 0545  Last data filed at 1/16/2024 0400  Gross per 24 hour   Intake 712 ml   Output 0 ml   Net 712 ml         Laboratory  Recent Labs     01/15/24  1304   WBC 9.1   RBC 5.04   HEMOGLOBIN 15.0   HEMATOCRIT 44.5   MCV 88.3   MCH 29.8   MCHC 33.7   RDW 48.7   PLATELETCT 291   MPV 11.5       Recent Labs     01/15/24  1304   SODIUM 139   POTASSIUM 4.5   CHLORIDE 107   CO2 20   GLUCOSE 123*   BUN 44*   CREATININE 2.01*   CALCIUM 9.1                     Imaging  EC-ECHOCARDIOGRAM COMPLETE W/ CONT   Final Result      NM-CARDIAC STRESS TEST   Final Result      DX-CHEST-PORTABLE (1 VIEW)   Final Result      Cardiomegaly.      No definite acute abnormality. See details above.           Assessment/Plan  * Coronary artery disease of bypass graft of native heart with stable angina pectoris (HCC)- (present on admission)  Assessment & Plan  Lipitor, Imdur, ASA    Acute  kidney injury superimposed on chronic kidney disease (HCC)  Assessment & Plan  Has been cleared for DC to SNF, developed mild hematuria, UA not infectious, no clots or obstruction. No pain. He was concerned he had an infection due to the blood.   He has an internal device he uses to help urinate. Bladder scan ordered to be sure no retention, his renal function fluctuates Scr baseline looks to be 1.4-6, today was 2. Has been up and down this admission, h/o hydronephrosis so if retention noted would get US.  Encouraged PO intake, held ACEi, may need small bolus, recheck in AM Encouraged PO intake     Syncope and collapse  Assessment & Plan  Probably vasovagal  Transfer to Telemetry  IVF hydration  cc only    Hypomagnesemia- (present on admission)  Assessment & Plan  IV Mg 2 g  Follow level    Stage 3a chronic kidney disease (HCC)- (present on admission)  Assessment & Plan  Follow bmp  IVF hydration  cc only    Atrial flutter (HCC)- (present on admission)  Assessment & Plan  Diltiazem, Eliquis  May need to consult cardiology -possible cardioversion    1/15 Discussed with cardiology, they will arrange an outpatient EP consultation for the patient for this upcoming week given stability on PO medication.    Acquired hypothyroidism- (present on admission)  Assessment & Plan  Levothyroxine    COPD (chronic obstructive pulmonary disease) (HCC)- (present on admission)  Assessment & Plan  Spiriva  RT protocol    Cognitive impairment- (present on admission)  Assessment & Plan  Namenda   Avoid Benzodiazepines, Anticholinergics, Opiates  Frequent orientation  Update Board daily  Open window blinds during the day  Avoid naps during the day  Family at bedside whenever possible  Ensure adequate sleep at night - use Melatonin preferably  Avoid early morning labs  Avoid vital signs during sleep  Ambulate if possible  Provide adequate pain control  Monitor for urinary retention  Prevent and manage constipation  Discontinue  IVs, Catheters, Tubes, Lines, Cardiac monitors, SCDs if possible     Primary hypertension- (present on admission)  Assessment & Plan  Diltiazem, Lisinopril, Norvasc    Type 2 diabetes mellitus with kidney complication, with long-term current use of insulin (HCC)- (present on admission)  Assessment & Plan  SSI, Lantus         VTE prophylaxis:    therapeutic anticoagulation with eliquis 5 mg BID      I have performed a physical exam and reviewed and updated ROS and Plan today (1/15/2024). In review of yesterday's note (1/14/2024), there are no changes except as documented above.  Total time spent 30 minutes. I spent greater than 50% of the time for patient care, counseling, and coordination on this date, including unit/floor time, and face-to-face time with the patient as per interval events, my own review of patient's imaging and lab analysis and developing my assessment and plan above.

## 2024-01-16 NOTE — ASSESSMENT & PLAN NOTE
Has been cleared for DC to SNF, developed mild hematuria, UA not infectious, no clots or obstruction. No pain. He was concerned he had an infection due to the blood.   He has an internal device he uses to help urinate. Bladder scan ordered to be sure no retention, his renal function fluctuates Scr baseline looks to be 1.4-6, today was 2. Has been up and down this admission, h/o hydronephrosis so if retention noted would get US.    Renal function has been improving.  I resume lisinopril due to elevated blood pressure.

## 2024-01-17 LAB
ANION GAP SERPL CALC-SCNC: 11 MMOL/L (ref 7–16)
BUN SERPL-MCNC: 40 MG/DL (ref 8–22)
CALCIUM SERPL-MCNC: 8.9 MG/DL (ref 8.5–10.5)
CHLORIDE SERPL-SCNC: 112 MMOL/L (ref 96–112)
CO2 SERPL-SCNC: 16 MMOL/L (ref 20–33)
CREAT SERPL-MCNC: 1.53 MG/DL (ref 0.5–1.4)
ERYTHROCYTE [DISTWIDTH] IN BLOOD BY AUTOMATED COUNT: 47.5 FL (ref 35.9–50)
GFR SERPLBLD CREATININE-BSD FMLA CKD-EPI: 48 ML/MIN/1.73 M 2
GLUCOSE BLD STRIP.AUTO-MCNC: 161 MG/DL (ref 65–99)
GLUCOSE BLD STRIP.AUTO-MCNC: 189 MG/DL (ref 65–99)
GLUCOSE BLD STRIP.AUTO-MCNC: 209 MG/DL (ref 65–99)
GLUCOSE BLD STRIP.AUTO-MCNC: 234 MG/DL (ref 65–99)
GLUCOSE BLD STRIP.AUTO-MCNC: 46 MG/DL (ref 65–99)
GLUCOSE BLD STRIP.AUTO-MCNC: 60 MG/DL (ref 65–99)
GLUCOSE BLD STRIP.AUTO-MCNC: 98 MG/DL (ref 65–99)
GLUCOSE SERPL-MCNC: 96 MG/DL (ref 65–99)
HCT VFR BLD AUTO: 40.4 % (ref 42–52)
HGB BLD-MCNC: 13.7 G/DL (ref 14–18)
MCH RBC QN AUTO: 29.8 PG (ref 27–33)
MCHC RBC AUTO-ENTMCNC: 33.9 G/DL (ref 32.3–36.5)
MCV RBC AUTO: 87.8 FL (ref 81.4–97.8)
PLATELET # BLD AUTO: 243 K/UL (ref 164–446)
PMV BLD AUTO: 11.6 FL (ref 9–12.9)
POTASSIUM SERPL-SCNC: 4.4 MMOL/L (ref 3.6–5.5)
RBC # BLD AUTO: 4.6 M/UL (ref 4.7–6.1)
SODIUM SERPL-SCNC: 139 MMOL/L (ref 135–145)
WBC # BLD AUTO: 7.3 K/UL (ref 4.8–10.8)

## 2024-01-17 PROCEDURE — 700102 HCHG RX REV CODE 250 W/ 637 OVERRIDE(OP): Performed by: STUDENT IN AN ORGANIZED HEALTH CARE EDUCATION/TRAINING PROGRAM

## 2024-01-17 PROCEDURE — 700102 HCHG RX REV CODE 250 W/ 637 OVERRIDE(OP): Performed by: FAMILY MEDICINE

## 2024-01-17 PROCEDURE — A9270 NON-COVERED ITEM OR SERVICE: HCPCS | Performed by: HOSPITALIST

## 2024-01-17 PROCEDURE — 85027 COMPLETE CBC AUTOMATED: CPT

## 2024-01-17 PROCEDURE — 36415 COLL VENOUS BLD VENIPUNCTURE: CPT

## 2024-01-17 PROCEDURE — 80048 BASIC METABOLIC PNL TOTAL CA: CPT

## 2024-01-17 PROCEDURE — A9270 NON-COVERED ITEM OR SERVICE: HCPCS | Performed by: STUDENT IN AN ORGANIZED HEALTH CARE EDUCATION/TRAINING PROGRAM

## 2024-01-17 PROCEDURE — G0378 HOSPITAL OBSERVATION PER HR: HCPCS

## 2024-01-17 PROCEDURE — 51798 US URINE CAPACITY MEASURE: CPT

## 2024-01-17 PROCEDURE — 99232 SBSQ HOSP IP/OBS MODERATE 35: CPT | Performed by: INTERNAL MEDICINE

## 2024-01-17 PROCEDURE — A9270 NON-COVERED ITEM OR SERVICE: HCPCS | Performed by: FAMILY MEDICINE

## 2024-01-17 PROCEDURE — 82962 GLUCOSE BLOOD TEST: CPT

## 2024-01-17 PROCEDURE — 700102 HCHG RX REV CODE 250 W/ 637 OVERRIDE(OP): Performed by: HOSPITALIST

## 2024-01-17 PROCEDURE — 700101 HCHG RX REV CODE 250: Performed by: STUDENT IN AN ORGANIZED HEALTH CARE EDUCATION/TRAINING PROGRAM

## 2024-01-17 RX ADMIN — ESCITALOPRAM OXALATE 10 MG: 10 TABLET ORAL at 05:34

## 2024-01-17 RX ADMIN — ASPIRIN 81 MG: 81 TABLET, COATED ORAL at 05:34

## 2024-01-17 RX ADMIN — ISOSORBIDE MONONITRATE 60 MG: 60 TABLET, EXTENDED RELEASE ORAL at 05:34

## 2024-01-17 RX ADMIN — MEMANTINE HYDROCHLORIDE 5 MG: 10 TABLET ORAL at 05:34

## 2024-01-17 RX ADMIN — DEXTROSE MONOHYDRATE 25 G: 25 INJECTION, SOLUTION INTRAVENOUS at 01:52

## 2024-01-17 RX ADMIN — ATORVASTATIN CALCIUM 20 MG: 20 TABLET, FILM COATED ORAL at 05:34

## 2024-01-17 RX ADMIN — GABAPENTIN 800 MG: 400 CAPSULE ORAL at 05:34

## 2024-01-17 RX ADMIN — MEMANTINE HYDROCHLORIDE 5 MG: 10 TABLET ORAL at 17:15

## 2024-01-17 RX ADMIN — TIOTROPIUM BROMIDE INHALATION SPRAY 5 MCG: 3.12 SPRAY, METERED RESPIRATORY (INHALATION) at 05:34

## 2024-01-17 RX ADMIN — INSULIN HUMAN 1 UNITS: 100 INJECTION, SOLUTION PARENTERAL at 17:15

## 2024-01-17 RX ADMIN — APIXABAN 5 MG: 5 TABLET, FILM COATED ORAL at 17:15

## 2024-01-17 RX ADMIN — LEVOTHYROXINE SODIUM 112 MCG: 0.11 TABLET ORAL at 05:34

## 2024-01-17 RX ADMIN — DOCUSATE SODIUM 50 MG AND SENNOSIDES 8.6 MG 2 TABLET: 8.6; 5 TABLET, FILM COATED ORAL at 05:34

## 2024-01-17 RX ADMIN — GABAPENTIN 800 MG: 400 CAPSULE ORAL at 17:14

## 2024-01-17 RX ADMIN — INSULIN HUMAN 2 UNITS: 100 INJECTION, SOLUTION PARENTERAL at 21:48

## 2024-01-17 RX ADMIN — APIXABAN 5 MG: 5 TABLET, FILM COATED ORAL at 05:34

## 2024-01-17 RX ADMIN — INSULIN GLARGINE-YFGN 10 UNITS: 100 INJECTION, SOLUTION SUBCUTANEOUS at 11:33

## 2024-01-17 RX ADMIN — AMLODIPINE BESYLATE 10 MG: 10 TABLET ORAL at 05:34

## 2024-01-17 RX ADMIN — DILTIAZEM HYDROCHLORIDE 240 MG: 240 CAPSULE, COATED, EXTENDED RELEASE ORAL at 21:43

## 2024-01-17 ASSESSMENT — ENCOUNTER SYMPTOMS
FLANK PAIN: 0
WEAKNESS: 1
DIAPHORESIS: 0
BLURRED VISION: 0
MYALGIAS: 0
DIZZINESS: 1
SORE THROAT: 0
NERVOUS/ANXIOUS: 0
ABDOMINAL PAIN: 0
HEADACHES: 0
COUGH: 0
VOMITING: 0
WHEEZING: 0
SHORTNESS OF BREATH: 0
SPEECH CHANGE: 0
BACK PAIN: 0
NAUSEA: 0
FOCAL WEAKNESS: 0
PALPITATIONS: 0
CHILLS: 0
DIARRHEA: 0
FEVER: 0
HEARTBURN: 0
SENSORY CHANGE: 0
NECK PAIN: 0

## 2024-01-17 ASSESSMENT — FIBROSIS 4 INDEX: FIB4 SCORE: 0.85

## 2024-01-17 ASSESSMENT — PAIN DESCRIPTION - PAIN TYPE: TYPE: ACUTE PAIN

## 2024-01-17 NOTE — PROGRESS NOTES
Hospital Medicine Daily Progress Note    Date of Service  1/15/2024    Chief Complaint  Sulaiman Ceja is a 73 y.o. male admitted 1/4/2024 with chest pain    Hospital Course  Admitted with chest pain, EKG showed Atrial flutter, troponins were elevated.  Nuclear stress test was negative. For the Atrial flutter, patient was started on anticoagulation with Eliquis and on Cardizem for rate control.  Echocardiogram was done which showed ejection fraction of 65%.  Patient's symptoms of chest pain resolved.    Atrial flutter - rate controlled  Syncope 1/8 - found in bathroom, sitting on the toilet with altered mental status after he had a bowel movement, rapid response called, patient brought to bed and became more alert and oriented right away   MILTON on CKD - improved  Heart rate A-fib a flutter on monitor, no chest pain or dyspnea.  Negative stress test this admission.  Ambulating without tachycardia.   Discussed with cardiology, they will arrange an outpatient EP consultation for the patient for this upcoming week given stability on PO medication.   Discussed with daughter and tried to call assisted living/SNF he is a resident of at the request of his daughter, left a message regarding plan and attempted to answer their concerns.  Normal HR upper 50's-60's per daughter.  Tolerating PO intake. Medically cleared for discharge to SNF.    Interval Problem Update  1/15  Has been cleared for DC to SNF, developed mild hematuria, UA not infectious, no clots or obstruction. No pain. He was concerned he had an infection due to the blood. He has an internal device he uses to help urinate. Bladder scan ordered to be sure no retention, his renal function fluctuates Scr baseline looks to be 1.4-6, today was 2. Has been up and down this admission, h/o hydronephrosis so if retention noted would get US.  Encouraged PO intake, held ACEi, may need small bolus, recheck in AM.   Medically cleared for discharge to SNF. Tolerating PO intake.  No complaints.  1/16:Patient seen and examined, afebrile, resting in bed, no overnight events, needs placement   1/17: Patient seen and examined, resting in bed, afebrile, having hypoglycemic episodes so will decrease his lantus dose to 10 units for now   Close monitoring his blood sugars and adjust as needed    I have discussed this patient's plan of care and discharge plan at IDT rounds today with Case Management, Nursing, Nursing leadership, and other members of the IDT team.    Consultants/Specialty  None    Code Status  DNAR/DNI    Disposition  Medically Cleared  I have placed the appropriate orders for post-discharge needs.    Review of Systems  Review of Systems   Constitutional:  Negative for chills, diaphoresis, fever and malaise/fatigue.   HENT:  Negative for congestion, hearing loss and sore throat.    Eyes:  Negative for blurred vision.   Respiratory:  Negative for cough, shortness of breath and wheezing.    Cardiovascular:  Negative for chest pain, palpitations and leg swelling.   Gastrointestinal:  Negative for abdominal pain, diarrhea, heartburn, nausea and vomiting.   Genitourinary:  Negative for dysuria, flank pain and hematuria.   Musculoskeletal:  Negative for back pain, joint pain, myalgias and neck pain.   Skin:  Negative for rash.   Neurological:  Positive for dizziness and weakness. Negative for sensory change, speech change, focal weakness and headaches.   Psychiatric/Behavioral:  The patient is not nervous/anxious.         Physical Exam  Temp:  [36.3 °C (97.3 °F)-36.9 °C (98.4 °F)] 36.3 °C (97.3 °F)  Pulse:  [59-69] 60  Resp:  [18-19] 18  BP: (128-162)/(52-76) 128/52  SpO2:  [91 %-93 %] 92 %    Physical Exam  Vitals and nursing note reviewed.   Constitutional:       General: He is not in acute distress.     Appearance: He is not ill-appearing or toxic-appearing.   HENT:      Head: Normocephalic and atraumatic.      Nose: No congestion.      Mouth/Throat:      Mouth: Mucous membranes are  moist.   Eyes:      General: No scleral icterus.     Extraocular Movements: Extraocular movements intact.      Conjunctiva/sclera: Conjunctivae normal.   Cardiovascular:      Rate and Rhythm: Normal rate. Rhythm irregular.   Pulmonary:      Effort: Pulmonary effort is normal. No respiratory distress.      Breath sounds: No wheezing, rhonchi or rales.   Abdominal:      General: There is no distension.      Palpations: Abdomen is soft.      Tenderness: There is no abdominal tenderness. There is no guarding or rebound.   Musculoskeletal:      Cervical back: Normal range of motion and neck supple. No rigidity or tenderness.      Right lower leg: No edema.      Left lower leg: No edema.   Skin:     General: Skin is warm and dry.      Capillary Refill: Capillary refill takes less than 2 seconds.      Coloration: Skin is not jaundiced.      Findings: No erythema.   Neurological:      General: No focal deficit present.      Mental Status: He is alert. He is disoriented.      Cranial Nerves: No cranial nerve deficit.      Sensory: No sensory deficit.      Motor: No weakness.      Comments: Oriented to person and place   Psychiatric:         Mood and Affect: Mood normal.         Speech: Speech is delayed.         Behavior: Behavior normal.         Cognition and Memory: He exhibits impaired recent memory.         Fluids    Intake/Output Summary (Last 24 hours) at 1/17/2024 1345  Last data filed at 1/17/2024 0431  Gross per 24 hour   Intake 754 ml   Output 200 ml   Net 554 ml         Laboratory  Recent Labs     01/15/24  1304 01/17/24  0840   WBC 9.1 7.3   RBC 5.04 4.60*   HEMOGLOBIN 15.0 13.7*   HEMATOCRIT 44.5 40.4*   MCV 88.3 87.8   MCH 29.8 29.8   MCHC 33.7 33.9   RDW 48.7 47.5   PLATELETCT 291 243   MPV 11.5 11.6       Recent Labs     01/15/24  1304 01/16/24  0933 01/17/24  0840   SODIUM 139 137 139   POTASSIUM 4.5 4.6 4.4   CHLORIDE 107 109 112   CO2 20 16* 16*   GLUCOSE 123* 116* 96   BUN 44* 46* 40*   CREATININE 2.01*  1.66* 1.53*   CALCIUM 9.1 8.8 8.9                     Imaging  EC-ECHOCARDIOGRAM COMPLETE W/ CONT   Final Result      NM-CARDIAC STRESS TEST   Final Result      DX-CHEST-PORTABLE (1 VIEW)   Final Result      Cardiomegaly.      No definite acute abnormality. See details above.           Assessment/Plan  * Coronary artery disease of bypass graft of native heart with stable angina pectoris (HCC)- (present on admission)  Assessment & Plan  Lipitor, Imdur, ASA    Acute kidney injury superimposed on chronic kidney disease (HCC)  Assessment & Plan  Has been cleared for DC to SNF, developed mild hematuria, UA not infectious, no clots or obstruction. No pain. He was concerned he had an infection due to the blood.   He has an internal device he uses to help urinate. Bladder scan ordered to be sure no retention, his renal function fluctuates Scr baseline looks to be 1.4-6, today was 2. Has been up and down this admission, h/o hydronephrosis so if retention noted would get US.  Encouraged PO intake, held ACEi, may need small bolus, recheck in AM Encouraged PO intake     Syncope and collapse  Assessment & Plan  Probably vasovagal  Transfer to Telemetry  IVF hydration  cc only    Hypomagnesemia- (present on admission)  Assessment & Plan  IV Mg 2 g  Follow level    Stage 3a chronic kidney disease (HCC)- (present on admission)  Assessment & Plan  Follow bmp  IVF hydration  cc only    Atrial flutter (HCC)- (present on admission)  Assessment & Plan  Diltiazem, Eliquis  May need to consult cardiology -possible cardioversion    1/15 Discussed with cardiology, they will arrange an outpatient EP consultation for the patient for this upcoming week given stability on PO medication.    Acquired hypothyroidism- (present on admission)  Assessment & Plan  Levothyroxine    COPD (chronic obstructive pulmonary disease) (HCC)- (present on admission)  Assessment & Plan  Spiriva  RT protocol    Cognitive impairment- (present on  admission)  Assessment & Plan  Namenda   Avoid Benzodiazepines, Anticholinergics, Opiates  Frequent orientation  Update Board daily  Open window blinds during the day  Avoid naps during the day  Family at bedside whenever possible  Ensure adequate sleep at night - use Melatonin preferably  Avoid early morning labs  Avoid vital signs during sleep  Ambulate if possible  Provide adequate pain control  Monitor for urinary retention  Prevent and manage constipation  Discontinue IVs, Catheters, Tubes, Lines, Cardiac monitors, SCDs if possible     Primary hypertension- (present on admission)  Assessment & Plan  Diltiazem, Lisinopril, Norvasc    Type 2 diabetes mellitus with kidney complication, with long-term current use of insulin (HCC)- (present on admission)  Assessment & Plan  SSI, Lantus         VTE prophylaxis:     I have performed a physical exam and reviewed and updated ROS and Plan today (1/15/2024). In review of yesterday's note (1/14/2024), there are no changes except as documented above.

## 2024-01-17 NOTE — DISCHARGE PLANNING
Case Management Discharge Planning    Admission Date: 1/4/2024  GMLOS:    ALOS: 0    6-Clicks ADL Score: 19  6-Clicks Mobility Score: 24      Anticipated Discharge Dispo: Discharge Disposition: D/T to SNF with Medicare cert in anticipation of skilled care (03)    DME Needed: No    Action(s) Taken:     @ 0748: SARAH called [facility] admission's coordinator to confirm if pt can return today if transport for EP consult for tomorrow can be pre-arranged. Left a VM. SW to f/u.    @ 0754: SARAH received a voalte message from [facility] [employer] regarding pt's transport needs. SW to verify if pt needs wheelchair or gurney transport. SW reached out to pt's bedside nurse to confirm; per BSN, pt is appropriate for wheelchair. SW to reach out to leadership once quotes are received for [facility] transport.     @ 1026: SARAH reached out to [facility] regarding any updates on quotes; awaiting response. SARAH attempted to call [facility] admission's coordinator to confirm if pt can certainly be accepted today. Left another VM. Notified leadership.    @ 1119: SARAH received update on dcp for pt. Per leadership, pt's EP consultation will be held on 01/18/24 at 0920 in [place]. SARAH is to set up 1100 transport to [facility], after EP consultation is complete. SARAH notified [facility]. SARAH faxed PCS form to [facility]. Pending confirmation.     @ 1147: SARAH received a phone call from Adela with [facility]. SARAH notified Adela of new dcp of having pt go to EP consult and then return to SNF. Adela is agreeable and is aware of transport time. Pending confirmation. SARAH to call Adela when transport is confirmed. Adela provided nursing report number: [phone]. SARAH to fax dc summary to [phone] when appropriate.    Medical team has been notified.    @ 0060: SARAH notified Adela at [facility] about transport time.    Escalations Completed: None    Medically Clear: Yes    Next Steps: F/U with [facility], [facility] regarding quotes, and  leadership regarding ASF if possible.    Barriers to Discharge: Transportation and [facility] placement confirmed.

## 2024-01-17 NOTE — PROGRESS NOTES
"HYPOGLYCEMIA INTERVENTION    0120 Notified by patient he was feeling \"low and lightheaded.\" Checked his blood sugar. FSBG 46    0125  Patient given 8 oz of Fruit Juice.    0138  APRN notified.    0139  Rechecked FSBG 60.    0152  D50W administered.    0217  Rechecked FSBG 209.  "

## 2024-01-17 NOTE — DISCHARGE PLANNING
DC Transport Scheduled    Transport Company Scheduled:  [facility]  Spoke with Aleta at [facility] to schedule transport.    Scheduled Date: 1/18/2024  Scheduled Time: 1100    Destination: [facility] at [address] [place] [address]     Notified care team of scheduled transport via Voalte.     If there are any changes needed to the DC transportation scheduled, please contact [facility] at ext. [phone] between the hours of 5122-5885 Mon-Fri. If outside those hours, contact the ED Case Manager at ext. [phone].

## 2024-01-17 NOTE — CARE PLAN
The patient is Stable - Low risk of patient condition declining or worsening    Shift Goals  Clinical Goals: bladder scans, encourage urination  Patient Goals: comfort, rest  Family Goals: jefferson    Progress made toward(s) clinical / shift goals:    Problem: Knowledge Deficit - Standard  Goal: Patient and family/care givers will demonstrate understanding of plan of care, disease process/condition, diagnostic tests and medications  Outcome: Progressing     Problem: Skin Integrity  Goal: Skin integrity is maintained or improved  Outcome: Progressing     Problem: Pain - Standard  Goal: Alleviation of pain or a reduction in pain to the patient’s comfort goal  Outcome: Progressing     Problem: Discharge Barriers/Planning  Goal: Patient's continuum of care needs are met  Outcome: Progressing       Patient is not progressing towards the following goals:

## 2024-01-17 NOTE — CARE PLAN
The patient is Stable - Low risk of patient condition declining or worsening    Shift Goals  Clinical Goals: monitor glucose, placement  Patient Goals: discharge back to [place]  Family Goals: MORGAN    Progress made toward(s) clinical / shift goals:    Problem: Discharge Barriers/Planning- plan to d/c to am  Goal: Patient's continuum of care needs are met  Description: Target End Date:  Prior to discharge or change in level of care    1.  Identify potential discharge barriers on admission and throughout hospitalization  2.  Collaborate with Case Management, , Clinical Educators, Navigators and others on the transitional care team to meet discharge needs  3.  Involve patient/family/caregivers in setting and prioritizing goals for hospitalization and discharge  4.  Ensure Flu vaccinations are addressed  5.  Inquire if patient is interested in the Meds to Bed program  6.  Ensure patient and family/caregiver are able to demonstrate use of equipment as prescribed  7.  Ensure patient and family/caregiver can verbalize understanding of patient education  8.  Explain discharge instructions and medication reconciliation to patient and family/caregiver  Outcome: Progressing       Problem: Hemodynamics- glucose remaining WNL   Goal: Patient's hemodynamics, fluid balance and neurologic status will be stable or improve  Description: Target End Date:  Prior to discharge or change in level of care    Document on Assessment and I/O flowsheet templates    1.  Monitor vital signs, pulse oximetry and cardiac monitor per provider order and/or policy  2.  Maintain blood pressure per provider order  3.  Hemodynamic monitoring per provider order  4.  Manage IV fluids and IV infusions  5.  Monitor intake and output  6.  Daily weights per unit policy or provider order  7.  Assess peripheral pulses and capillary refill  8.  Assess color and body temperature  9.  Position patient for maximum circulation/cardiac output  10.  Monitor for signs/symptoms of excessive bleeding  11. Assess mental status, restlessness and changes in level of consciousness  12. Monitor temperature and report fever or hypothermia to provider immediately. Consideration of targeted temperature management.  Outcome: Progressing     Patient is not progressing towards the following goals: n/a

## 2024-01-17 NOTE — PROGRESS NOTES
Bedside report received from night RN; assumed pt care. Pt assessment complete. Pt has no concerns at this time. Reviewed plan of care with pt. Pt pending placement. Chart and labs reviewed. Bed in lowest position, and 2 side rails up. Pt educated on call light; call light with in reach. Verbal order to hold long-acting insulin per Joey MARTINEZ.

## 2024-01-17 NOTE — PROGRESS NOTES
Bedside report received and patient care assumed. Pt is resting in bed, A&O4, with no complaints of pain, and is on RA. Medical. All fall precautions are in place, belongings at bedside table.  Pt was updated on POC, no questions or concerns. Pt educated on use of call light for assistance.

## 2024-01-18 ENCOUNTER — OFFICE VISIT (OUTPATIENT)
Dept: CARDIOLOGY | Facility: MEDICAL CENTER | Age: 74
DRG: 243 | End: 2024-01-18
Attending: INTERNAL MEDICINE
Payer: MEDICARE

## 2024-01-18 VITALS
RESPIRATION RATE: 16 BRPM | HEIGHT: 69 IN | WEIGHT: 186 LBS | OXYGEN SATURATION: 97 % | SYSTOLIC BLOOD PRESSURE: 118 MMHG | HEART RATE: 63 BPM | DIASTOLIC BLOOD PRESSURE: 52 MMHG | BODY MASS INDEX: 27.55 KG/M2

## 2024-01-18 DIAGNOSIS — I48.4 ATYPICAL ATRIAL FLUTTER (HCC): ICD-10-CM

## 2024-01-18 LAB
GLUCOSE BLD STRIP.AUTO-MCNC: 184 MG/DL (ref 65–99)
GLUCOSE BLD STRIP.AUTO-MCNC: 191 MG/DL (ref 65–99)
GLUCOSE BLD STRIP.AUTO-MCNC: 202 MG/DL (ref 65–99)
GLUCOSE BLD STRIP.AUTO-MCNC: 99 MG/DL (ref 65–99)

## 2024-01-18 PROCEDURE — 3078F DIAST BP <80 MM HG: CPT | Performed by: INTERNAL MEDICINE

## 2024-01-18 PROCEDURE — A9270 NON-COVERED ITEM OR SERVICE: HCPCS | Performed by: FAMILY MEDICINE

## 2024-01-18 PROCEDURE — 99999 PR NO CHARGE: CPT | Performed by: INTERNAL MEDICINE

## 2024-01-18 PROCEDURE — 82962 GLUCOSE BLOOD TEST: CPT | Mod: 91

## 2024-01-18 PROCEDURE — A9270 NON-COVERED ITEM OR SERVICE: HCPCS | Performed by: HOSPITALIST

## 2024-01-18 PROCEDURE — 700102 HCHG RX REV CODE 250 W/ 637 OVERRIDE(OP): Performed by: HOSPITALIST

## 2024-01-18 PROCEDURE — A9270 NON-COVERED ITEM OR SERVICE: HCPCS | Performed by: STUDENT IN AN ORGANIZED HEALTH CARE EDUCATION/TRAINING PROGRAM

## 2024-01-18 PROCEDURE — 700102 HCHG RX REV CODE 250 W/ 637 OVERRIDE(OP): Performed by: STUDENT IN AN ORGANIZED HEALTH CARE EDUCATION/TRAINING PROGRAM

## 2024-01-18 PROCEDURE — 93005 ELECTROCARDIOGRAM TRACING: CPT | Performed by: INTERNAL MEDICINE

## 2024-01-18 PROCEDURE — 700102 HCHG RX REV CODE 250 W/ 637 OVERRIDE(OP): Performed by: FAMILY MEDICINE

## 2024-01-18 PROCEDURE — 99233 SBSQ HOSP IP/OBS HIGH 50: CPT | Performed by: INTERNAL MEDICINE

## 2024-01-18 PROCEDURE — 3074F SYST BP LT 130 MM HG: CPT | Performed by: INTERNAL MEDICINE

## 2024-01-18 PROCEDURE — G0378 HOSPITAL OBSERVATION PER HR: HCPCS

## 2024-01-18 RX ADMIN — ISOSORBIDE MONONITRATE 60 MG: 60 TABLET, EXTENDED RELEASE ORAL at 05:16

## 2024-01-18 RX ADMIN — INSULIN HUMAN 2 UNITS: 100 INJECTION, SOLUTION PARENTERAL at 16:52

## 2024-01-18 RX ADMIN — DOCUSATE SODIUM 50 MG AND SENNOSIDES 8.6 MG 2 TABLET: 8.6; 5 TABLET, FILM COATED ORAL at 17:06

## 2024-01-18 RX ADMIN — GABAPENTIN 800 MG: 400 CAPSULE ORAL at 17:06

## 2024-01-18 RX ADMIN — TIOTROPIUM BROMIDE INHALATION SPRAY 5 MCG: 3.12 SPRAY, METERED RESPIRATORY (INHALATION) at 05:17

## 2024-01-18 RX ADMIN — GABAPENTIN 800 MG: 400 CAPSULE ORAL at 05:16

## 2024-01-18 RX ADMIN — APIXABAN 5 MG: 5 TABLET, FILM COATED ORAL at 05:16

## 2024-01-18 RX ADMIN — INSULIN GLARGINE-YFGN 10 UNITS: 100 INJECTION, SOLUTION SUBCUTANEOUS at 08:42

## 2024-01-18 RX ADMIN — ASPIRIN 81 MG: 81 TABLET, COATED ORAL at 05:16

## 2024-01-18 RX ADMIN — INSULIN HUMAN 1 UNITS: 100 INJECTION, SOLUTION PARENTERAL at 21:24

## 2024-01-18 RX ADMIN — AMLODIPINE BESYLATE 10 MG: 10 TABLET ORAL at 05:16

## 2024-01-18 RX ADMIN — MEMANTINE HYDROCHLORIDE 5 MG: 10 TABLET ORAL at 17:07

## 2024-01-18 RX ADMIN — DILTIAZEM HYDROCHLORIDE 240 MG: 240 CAPSULE, COATED, EXTENDED RELEASE ORAL at 21:21

## 2024-01-18 RX ADMIN — ATORVASTATIN CALCIUM 20 MG: 20 TABLET, FILM COATED ORAL at 05:16

## 2024-01-18 RX ADMIN — LEVOTHYROXINE SODIUM 112 MCG: 0.11 TABLET ORAL at 05:16

## 2024-01-18 RX ADMIN — MEMANTINE HYDROCHLORIDE 5 MG: 10 TABLET ORAL at 05:16

## 2024-01-18 RX ADMIN — INSULIN HUMAN 1 UNITS: 100 INJECTION, SOLUTION PARENTERAL at 12:23

## 2024-01-18 RX ADMIN — ESCITALOPRAM OXALATE 10 MG: 10 TABLET ORAL at 05:16

## 2024-01-18 ASSESSMENT — ENCOUNTER SYMPTOMS
MYALGIAS: 0
NERVOUS/ANXIOUS: 0
BACK PAIN: 0
SHORTNESS OF BREATH: 0
DIAPHORESIS: 0
SPEECH CHANGE: 0
CHILLS: 0
FEVER: 0
VOMITING: 0
HEARTBURN: 0
WHEEZING: 0
SENSORY CHANGE: 0
NECK PAIN: 0
BLURRED VISION: 0
FLANK PAIN: 0
DIZZINESS: 1
ABDOMINAL PAIN: 0
HEADACHES: 0
FOCAL WEAKNESS: 0
PALPITATIONS: 0
DIARRHEA: 0
SORE THROAT: 0
NAUSEA: 0
WEAKNESS: 1
COUGH: 0

## 2024-01-18 ASSESSMENT — FIBROSIS 4 INDEX
FIB4 SCORE: 1.02
FIB4 SCORE: 1.02

## 2024-01-18 ASSESSMENT — PAIN DESCRIPTION - PAIN TYPE: TYPE: ACUTE PAIN

## 2024-01-18 NOTE — DISCHARGE PLANNING
PT transport has been put on will call with [facility]. [facility] transport for today has been cancelled.  [facility]

## 2024-01-18 NOTE — DISCHARGE PLANNING
Case Management Discharge Planning    Admission Date: 1/4/2024  GMLOS:    ALOS: 0    6-Clicks ADL Score: 19  6-Clicks Mobility Score: 24      Anticipated Discharge Dispo: Discharge Disposition: D/T to SNF with Medicare cert in anticipation of skilled care (03)    DME Needed: No    Action(s) Taken:     @ 0814: SARAH met with pt at bedside to obtain consent to transport. SARAH called pt's POA/daughter-in-law, with the permission of the pt, to notify of transport time. Pt has EP consult at 0920 today. Pt to transport to [facility] at 1100.    @ 0825: Per [facility], pt is still set for 1100. [facility] to update if there are any delays.     @ 0948: SARAH asked M.D. for dc summary. Pending dc summary.    @ 1010: SARAH notified by medical team that pt will not be dc'ing today. Pending inpatient procedure to be scheduled. SARAH notified [facility] to place transport on will call with [facility]. SW to f/u for updates. SARAH called [facility] to update about pt not transferring today. Left VM.    @ 4774: SARAH received a call from [facility]. Per Adela, pt can be accepted over the weekend. [facility] is to call Adela at [phone] to coordinate once [facility] is medically cleared[facility] will need [facility].    Escalations Completed: None    Medically Clear: Yes    Next Steps: F/U with medical team to discuss discharge needs and barriers.    Barriers to Discharge: None; has a f/u with EP consult at 9:20 AM today.    Is the patient up for discharge tomorrow: No, today.

## 2024-01-18 NOTE — PROGRESS NOTES
Brief EP note:    Pt taken to outpt appt with outpt cardiology while pt  admitted inpt.  Appt was canceled, however, prep ecg showed atrial flutter, rate controled.  Pt was redirected back to inpatient service.     Per Dr Guthrie's recommendations, pt has been scheduled for atrial flutter ablation tomorrow afternoon with mayra/anesthesia.  ILR to be implanted post ablation secondary to history of SVR.     Pt is currently medical., in office ecg reviewed.   Vital signs are stable.     I have discussed procedure with Mr Ceja to include procedural details, associated risks.  Discussed plan with pt's son, Laci Ceja over the phone and questions answered.  He is agreeable to plan at this time.  Laci prefers his wife Tricia Ceja to be contacted via phone post procedurally.     NPO for planned ablation/ILR implant tomorrow afternoon.   Continue anticoagulation with Eliquis 5 mg PO BID, no hold prior to procedure.   Post procedurally, will need tele monitoring.     Dr Guthrie's formal consult to follow tomorrow AM.  Please call with any questions.

## 2024-01-18 NOTE — PROGRESS NOTES
Patient was brought for outpatient cardiology visit in error while still being hospitalized inpatient. Encounter will be cancelled and redirected to inpatient service.    Chuy Guthrie MD

## 2024-01-18 NOTE — CARE PLAN
The patient is Stable - Low risk of patient condition declining or worsening    Shift Goals  Clinical Goals: Monitor glucose  Patient Goals: rest, discharge  Family Goals: jefferson    Progress made toward(s) clinical / shift goals:    Problem: Knowledge Deficit - Standard  Goal: Patient and family/care givers will demonstrate understanding of plan of care, disease process/condition, diagnostic tests and medications  Outcome: Progressing     Problem: Skin Integrity  Goal: Skin integrity is maintained or improved  Outcome: Progressing     Problem: Pain - Standard  Goal: Alleviation of pain or a reduction in pain to the patient’s comfort goal  Outcome: Progressing     Problem: Discharge Barriers/Planning  Goal: Patient's continuum of care needs are met  Outcome: Progressing     Problem: Hemodynamics  Goal: Patient's hemodynamics, fluid balance and neurologic status will be stable or improve  Outcome: Progressing       Patient is not progressing towards the following goals:

## 2024-01-18 NOTE — PROGRESS NOTES
Bedside report received and patient care assumed. Pt is resting in bed, A&O4, with 3/10 pain, and is on RA. Medical. All fall precautions are in place, belongings at bedside table.  Pt was updated on POC, no questions or concerns. Pt educated on use of call light for assistance.

## 2024-01-18 NOTE — PROGRESS NOTES
Report received from Noc RN. Assume care. Pt resting comfortably  AAOx4.  Assessment completed. VSS. Denies pain, fully ambulatory, able to wiggle toes and dorsi/plantar flex feet, good CMS and pulses to herrera LE, denies numbness and tingling. Pt was update for the care for the day. White board updated, All question answered. Pt has call light within reach,  bed is in the lowest position. Pt has no other needs at this time.     0820 Pt to be DC via EMS to [facility]. At 1100  0915 Pt off the unit with CCT for OP Physiology appointment.

## 2024-01-18 NOTE — PROGRESS NOTES
Hospital Medicine Daily Progress Note    Date of Service  1/15/2024    Chief Complaint  Sulaiman Ceja is a 73 y.o. male admitted 1/4/2024 with chest pain    Hospital Course  Admitted with chest pain, EKG showed Atrial flutter, troponins were elevated.  Nuclear stress test was negative. For the Atrial flutter, patient was started on anticoagulation with Eliquis and on Cardizem for rate control.  Echocardiogram was done which showed ejection fraction of 65%.  Patient's symptoms of chest pain resolved.    Atrial flutter - rate controlled  Syncope 1/8 - found in bathroom, sitting on the toilet with altered mental status after he had a bowel movement, rapid response called, patient brought to bed and became more alert and oriented right away   MILTON on CKD - improved  Heart rate A-fib a flutter on monitor, no chest pain or dyspnea.  Negative stress test this admission.  Ambulating without tachycardia.   Discussed with cardiology, they will arrange an outpatient EP consultation for the patient for this upcoming week given stability on PO medication.   Discussed with daughter and tried to call assisted living/SNF he is a resident of at the request of his daughter, left a message regarding plan and attempted to answer their concerns.  Normal HR upper 50's-60's per daughter.  Tolerating PO intake. Medically cleared for discharge to SNF.    Interval Problem Update  1/15  Has been cleared for DC to SNF, developed mild hematuria, UA not infectious, no clots or obstruction. No pain. He was concerned he had an infection due to the blood. He has an internal device he uses to help urinate. Bladder scan ordered to be sure no retention, his renal function fluctuates Scr baseline looks to be 1.4-6, today was 2. Has been up and down this admission, h/o hydronephrosis so if retention noted would get US.  Encouraged PO intake, held ACEi, may need small bolus, recheck in AM.   Medically cleared for discharge to SNF. Tolerating PO intake.  No complaints.  1/16:Patient seen and examined, afebrile, resting in bed, no overnight events, needs placement   1/17: Patient seen and examined, resting in bed, afebrile, having hypoglycemic episodes so will decrease his lantus dose to 10 units for now   Close monitoring his blood sugars and adjust as needed    1/18 Patient seen and examined he was taken to outpt appt with outpt cardiology while pt admitted inpatient today  Appt was canceled, however, prep ecg showed atrial flutter, rate controled.  Pt was redirected back to inpatient service.I did discuss case with  Dr Guthrie and he will arrange for ablation possible tomorrow   Appreciate rec.   I have discussed this patient's plan of care and discharge plan at IDT rounds today with Case Management, Nursing, Nursing leadership, and other members of the IDT team.    Consultants/Specialty  None    Code Status  DNAR/DNI    Disposition  Medically Cleared  I have placed the appropriate orders for post-discharge needs.    Review of Systems  Review of Systems   Constitutional:  Negative for chills, diaphoresis, fever and malaise/fatigue.   HENT:  Negative for congestion, hearing loss and sore throat.    Eyes:  Negative for blurred vision.   Respiratory:  Negative for cough, shortness of breath and wheezing.    Cardiovascular:  Negative for chest pain, palpitations and leg swelling.   Gastrointestinal:  Negative for abdominal pain, diarrhea, heartburn, nausea and vomiting.   Genitourinary:  Negative for dysuria, flank pain and hematuria.   Musculoskeletal:  Negative for back pain, joint pain, myalgias and neck pain.   Skin:  Negative for rash.   Neurological:  Positive for dizziness and weakness. Negative for sensory change, speech change, focal weakness and headaches.   Psychiatric/Behavioral:  The patient is not nervous/anxious.         Physical Exam  Temp:  [36.8 °C (98.2 °F)-37.5 °C (99.5 °F)] 36.9 °C (98.4 °F)  Pulse:  [58-81] 63  Resp:  [16-20] 16  BP:  (118-157)/(52-77) 118/52  SpO2:  [92 %-97 %] 97 %    Physical Exam  Vitals and nursing note reviewed.   Constitutional:       General: He is not in acute distress.     Appearance: He is not ill-appearing or toxic-appearing.   HENT:      Head: Normocephalic and atraumatic.      Nose: No congestion.      Mouth/Throat:      Mouth: Mucous membranes are moist.   Eyes:      General: No scleral icterus.     Extraocular Movements: Extraocular movements intact.      Conjunctiva/sclera: Conjunctivae normal.   Cardiovascular:      Rate and Rhythm: Normal rate. Rhythm irregular.   Pulmonary:      Effort: Pulmonary effort is normal. No respiratory distress.      Breath sounds: No wheezing, rhonchi or rales.   Abdominal:      General: There is no distension.      Palpations: Abdomen is soft.      Tenderness: There is no abdominal tenderness. There is no guarding or rebound.   Musculoskeletal:      Cervical back: Normal range of motion and neck supple. No rigidity or tenderness.      Right lower leg: No edema.      Left lower leg: No edema.   Skin:     General: Skin is warm and dry.      Capillary Refill: Capillary refill takes less than 2 seconds.      Coloration: Skin is not jaundiced.      Findings: No erythema.   Neurological:      General: No focal deficit present.      Mental Status: He is alert. He is disoriented.      Cranial Nerves: No cranial nerve deficit.      Sensory: No sensory deficit.      Motor: No weakness.      Comments: Oriented to person and place   Psychiatric:         Mood and Affect: Mood normal.         Speech: Speech is delayed.         Behavior: Behavior normal.         Cognition and Memory: He exhibits impaired recent memory.         Fluids    Intake/Output Summary (Last 24 hours) at 1/18/2024 1534  Last data filed at 1/18/2024 0911  Gross per 24 hour   Intake 640 ml   Output --   Net 640 ml         Laboratory  Recent Labs     01/17/24  0840   WBC 7.3   RBC 4.60*   HEMOGLOBIN 13.7*   HEMATOCRIT 40.4*    MCV 87.8   MCH 29.8   MCHC 33.9   RDW 47.5   PLATELETCT 243   MPV 11.6       Recent Labs     01/16/24  0933 01/17/24  0840   SODIUM 137 139   POTASSIUM 4.6 4.4   CHLORIDE 109 112   CO2 16* 16*   GLUCOSE 116* 96   BUN 46* 40*   CREATININE 1.66* 1.53*   CALCIUM 8.8 8.9                     Imaging  EC-ECHOCARDIOGRAM COMPLETE W/ CONT   Final Result      NM-CARDIAC STRESS TEST   Final Result      DX-CHEST-PORTABLE (1 VIEW)   Final Result      Cardiomegaly.      No definite acute abnormality. See details above.      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 No pain. He was concerned he had an infection due to the blood.   He has an internal device he uses to help urinate. Bladder scan ordered to be sure no retention, his renal function fluctuates Scr baseline looks to be 1.4-6, today was 2. Has been up and down this admission, h/o hydronephrosis so if retention noted would get US.  Encouraged PO intake, held ACEi, may need small bolus, recheck in AM Encouraged PO intake     Syncope and collapse  Assessment & Plan  Probably vasovagal  Transfer to Telemetry  IVF hydration  cc only    Hypomagnesemia- (present on admission)  Assessment & Plan  IV Mg 2 g  Follow level    Stage 3a chronic kidney disease (HCC)- (present on admission)  Assessment & Plan  Follow bmp  IVF hydration  cc only    Atrial flutter (HCC)- (present on admission)  Assessment & Plan  Diltiazem, Eliquis  May need to consult cardiology -possible cardioversion    1/15 Discussed with  cardiology, they will arrange an outpatient EP consultation for the patient for this upcoming week given stability on PO medication.    Acquired hypothyroidism- (present on admission)  Assessment & Plan  Levothyroxine    COPD (chronic obstructive pulmonary disease) (HCC)- (present on admission)  Assessment & Plan  Spiriva  RT protocol    Cognitive impairment- (present on admission)  Assessment & Plan  Namenda   Avoid Benzodiazepines, Anticholinergics, Opiates  Frequent orientation  Update Board daily  Open window blinds during the day  Avoid naps during the day  Family at bedside whenever possible  Ensure adequate sleep at night - use Melatonin preferably  Avoid early morning labs  Avoid vital signs during sleep  Ambulate if possible  Provide adequate pain control  Monitor for urinary retention  Prevent and manage constipation  Discontinue IVs, Catheters, Tubes, Lines, Cardiac monitors, SCDs if possible     Primary hypertension- (present on admission)  Assessment & Plan  Diltiazem, Lisinopril, Norvasc    Type 2 diabetes mellitus with kidney complication, with long-term current use of insulin (HCC)- (present on admission)  Assessment & Plan  SSI, Lantus         VTE prophylaxis:  eliquis     Greater than 51 minutes spent prepping to see patient (e.g. review of tests) obtaining and/or reviewing separately obtained history. Performing a medically appropriate examination and/ evaluation.  Counseling and educating the patient/family/caregiver.  Ordering medications, tests, or procedures.  Referring and communicating with other health care professionals.  Documenting clinical information in EPIC.  Independently interpreting results and communicating results to patient/family/caregiver.  Care coordination.      I have performed a physical exam and reviewed and updated ROS and Plan today (1/15/2024). In review of yesterday's note (1/14/2024), there are no changes except as documented above.

## 2024-01-19 ENCOUNTER — APPOINTMENT (OUTPATIENT)
Dept: CARDIOLOGY | Facility: MEDICAL CENTER | Age: 74
DRG: 243 | End: 2024-01-19
Attending: NURSE PRACTITIONER
Payer: MEDICARE

## 2024-01-19 ENCOUNTER — ANESTHESIA (OUTPATIENT)
Dept: CARDIOLOGY | Facility: MEDICAL CENTER | Age: 74
DRG: 243 | End: 2024-01-19
Payer: MEDICARE

## 2024-01-19 ENCOUNTER — ANESTHESIA EVENT (OUTPATIENT)
Dept: CARDIOLOGY | Facility: MEDICAL CENTER | Age: 74
DRG: 243 | End: 2024-01-19
Payer: MEDICARE

## 2024-01-19 LAB
ANION GAP SERPL CALC-SCNC: 13 MMOL/L (ref 7–16)
BUN SERPL-MCNC: 34 MG/DL (ref 8–22)
CALCIUM SERPL-MCNC: 8.8 MG/DL (ref 8.5–10.5)
CHLORIDE SERPL-SCNC: 111 MMOL/L (ref 96–112)
CO2 SERPL-SCNC: 16 MMOL/L (ref 20–33)
CREAT SERPL-MCNC: 1.52 MG/DL (ref 0.5–1.4)
EKG IMPRESSION: NORMAL
ERYTHROCYTE [DISTWIDTH] IN BLOOD BY AUTOMATED COUNT: 47.3 FL (ref 35.9–50)
GFR SERPLBLD CREATININE-BSD FMLA CKD-EPI: 48 ML/MIN/1.73 M 2
GLUCOSE BLD STRIP.AUTO-MCNC: 129 MG/DL (ref 65–99)
GLUCOSE BLD STRIP.AUTO-MCNC: 140 MG/DL (ref 65–99)
GLUCOSE SERPL-MCNC: 107 MG/DL (ref 65–99)
HCT VFR BLD AUTO: 39.9 % (ref 42–52)
HGB BLD-MCNC: 13.9 G/DL (ref 14–18)
MCH RBC QN AUTO: 30.1 PG (ref 27–33)
MCHC RBC AUTO-ENTMCNC: 34.8 G/DL (ref 32.3–36.5)
MCV RBC AUTO: 86.4 FL (ref 81.4–97.8)
PLATELET # BLD AUTO: 234 K/UL (ref 164–446)
PMV BLD AUTO: 11.5 FL (ref 9–12.9)
POTASSIUM SERPL-SCNC: 4 MMOL/L (ref 3.6–5.5)
RBC # BLD AUTO: 4.62 M/UL (ref 4.7–6.1)
SODIUM SERPL-SCNC: 140 MMOL/L (ref 135–145)
WBC # BLD AUTO: 7.8 K/UL (ref 4.8–10.8)

## 2024-01-19 PROCEDURE — 93662 INTRACARDIAC ECG (ICE): CPT | Mod: 26 | Performed by: INTERNAL MEDICINE

## 2024-01-19 PROCEDURE — 700102 HCHG RX REV CODE 250 W/ 637 OVERRIDE(OP): Performed by: STUDENT IN AN ORGANIZED HEALTH CARE EDUCATION/TRAINING PROGRAM

## 2024-01-19 PROCEDURE — 93005 ELECTROCARDIOGRAM TRACING: CPT | Performed by: INTERNAL MEDICINE

## 2024-01-19 PROCEDURE — 80048 BASIC METABOLIC PNL TOTAL CA: CPT

## 2024-01-19 PROCEDURE — 700102 HCHG RX REV CODE 250 W/ 637 OVERRIDE(OP): Performed by: HOSPITALIST

## 2024-01-19 PROCEDURE — 93010 ELECTROCARDIOGRAM REPORT: CPT | Performed by: INTERNAL MEDICINE

## 2024-01-19 PROCEDURE — 93653 COMPRE EP EVAL TX SVT: CPT | Performed by: INTERNAL MEDICINE

## 2024-01-19 PROCEDURE — 5A1223Z PERFORMANCE OF CARDIAC PACING, CONTINUOUS: ICD-10-PCS | Performed by: INTERNAL MEDICINE

## 2024-01-19 PROCEDURE — 700101 HCHG RX REV CODE 250: Performed by: ANESTHESIOLOGY

## 2024-01-19 PROCEDURE — A9270 NON-COVERED ITEM OR SERVICE: HCPCS | Performed by: HOSPITALIST

## 2024-01-19 PROCEDURE — 02583ZZ DESTRUCTION OF CONDUCTION MECHANISM, PERCUTANEOUS APPROACH: ICD-10-PCS | Performed by: INTERNAL MEDICINE

## 2024-01-19 PROCEDURE — A9270 NON-COVERED ITEM OR SERVICE: HCPCS | Performed by: FAMILY MEDICINE

## 2024-01-19 PROCEDURE — 700105 HCHG RX REV CODE 258: Performed by: ANESTHESIOLOGY

## 2024-01-19 PROCEDURE — 4A023FZ MEASUREMENT OF CARDIAC RHYTHM, PERCUTANEOUS APPROACH: ICD-10-PCS | Performed by: INTERNAL MEDICINE

## 2024-01-19 PROCEDURE — 770000 HCHG ROOM/CARE - INTERMEDIATE ICU *

## 2024-01-19 PROCEDURE — 700111 HCHG RX REV CODE 636 W/ 250 OVERRIDE (IP): Mod: JG

## 2024-01-19 PROCEDURE — 4A0234Z MEASUREMENT OF CARDIAC ELECTRICAL ACTIVITY, PERCUTANEOUS APPROACH: ICD-10-PCS | Performed by: INTERNAL MEDICINE

## 2024-01-19 PROCEDURE — 93325 DOPPLER ECHO COLOR FLOW MAPG: CPT

## 2024-01-19 PROCEDURE — 99223 1ST HOSP IP/OBS HIGH 75: CPT | Mod: 57 | Performed by: INTERNAL MEDICINE

## 2024-01-19 PROCEDURE — 82962 GLUCOSE BLOOD TEST: CPT

## 2024-01-19 PROCEDURE — 700101 HCHG RX REV CODE 250

## 2024-01-19 PROCEDURE — 33210 INSERT ELECTRD/PM CATH SNGL: CPT

## 2024-01-19 PROCEDURE — 99233 SBSQ HOSP IP/OBS HIGH 50: CPT | Performed by: INTERNAL MEDICINE

## 2024-01-19 PROCEDURE — C1730 CATH, EP, 19 OR FEW ELECT: HCPCS

## 2024-01-19 PROCEDURE — 700111 HCHG RX REV CODE 636 W/ 250 OVERRIDE (IP): Mod: JZ | Performed by: ANESTHESIOLOGY

## 2024-01-19 PROCEDURE — 700102 HCHG RX REV CODE 250 W/ 637 OVERRIDE(OP): Performed by: FAMILY MEDICINE

## 2024-01-19 PROCEDURE — 02K83ZZ MAP CONDUCTION MECHANISM, PERCUTANEOUS APPROACH: ICD-10-PCS | Performed by: INTERNAL MEDICINE

## 2024-01-19 PROCEDURE — 85027 COMPLETE CBC AUTOMATED: CPT

## 2024-01-19 PROCEDURE — 93662 INTRACARDIAC ECG (ICE): CPT

## 2024-01-19 RX ORDER — NEOSTIGMINE METHYLSULFATE 1 MG/ML
INJECTION, SOLUTION INTRAVENOUS PRN
Status: DISCONTINUED | OUTPATIENT
Start: 2024-01-19 | End: 2024-01-19 | Stop reason: SURG

## 2024-01-19 RX ORDER — OXYCODONE HCL 5 MG/5 ML
10 SOLUTION, ORAL ORAL
Status: DISCONTINUED | OUTPATIENT
Start: 2024-01-19 | End: 2024-01-19

## 2024-01-19 RX ORDER — DOPAMINE HYDROCHLORIDE 160 MG/100ML
0-20 INJECTION, SOLUTION INTRAVENOUS CONTINUOUS
Status: CANCELLED | OUTPATIENT
Start: 2024-01-19

## 2024-01-19 RX ORDER — OXYCODONE HCL 5 MG/5 ML
5 SOLUTION, ORAL ORAL
Status: DISCONTINUED | OUTPATIENT
Start: 2024-01-19 | End: 2024-01-19

## 2024-01-19 RX ORDER — DIPHENHYDRAMINE HYDROCHLORIDE 50 MG/ML
12.5 INJECTION INTRAMUSCULAR; INTRAVENOUS
Status: DISCONTINUED | OUTPATIENT
Start: 2024-01-19 | End: 2024-01-19

## 2024-01-19 RX ORDER — LABETALOL HYDROCHLORIDE 5 MG/ML
INJECTION, SOLUTION INTRAVENOUS PRN
Status: DISCONTINUED | OUTPATIENT
Start: 2024-01-19 | End: 2024-01-19 | Stop reason: SURG

## 2024-01-19 RX ORDER — LABETALOL HYDROCHLORIDE 5 MG/ML
5 INJECTION, SOLUTION INTRAVENOUS
Status: DISCONTINUED | OUTPATIENT
Start: 2024-01-19 | End: 2024-01-19

## 2024-01-19 RX ORDER — BUPIVACAINE HYDROCHLORIDE 5 MG/ML
INJECTION, SOLUTION EPIDURAL; INTRACAUDAL
Status: COMPLETED
Start: 2024-01-19 | End: 2024-01-19

## 2024-01-19 RX ORDER — ONDANSETRON 2 MG/ML
4 INJECTION INTRAMUSCULAR; INTRAVENOUS EVERY 6 HOURS PRN
Status: DISCONTINUED | OUTPATIENT
Start: 2024-01-19 | End: 2024-01-22 | Stop reason: HOSPADM

## 2024-01-19 RX ORDER — LIDOCAINE HYDROCHLORIDE 20 MG/ML
INJECTION, SOLUTION INFILTRATION; PERINEURAL
Status: COMPLETED
Start: 2024-01-19 | End: 2024-01-19

## 2024-01-19 RX ORDER — METOCLOPRAMIDE HYDROCHLORIDE 5 MG/ML
INJECTION INTRAMUSCULAR; INTRAVENOUS PRN
Status: DISCONTINUED | OUTPATIENT
Start: 2024-01-19 | End: 2024-01-19 | Stop reason: SURG

## 2024-01-19 RX ORDER — HYDROMORPHONE HYDROCHLORIDE 1 MG/ML
0.2 INJECTION, SOLUTION INTRAMUSCULAR; INTRAVENOUS; SUBCUTANEOUS
Status: DISCONTINUED | OUTPATIENT
Start: 2024-01-19 | End: 2024-01-19

## 2024-01-19 RX ORDER — DOPAMINE HYDROCHLORIDE 80 MG/100ML
INJECTION, SOLUTION INTRAVENOUS
Status: DISCONTINUED | OUTPATIENT
Start: 2024-01-19 | End: 2024-01-19 | Stop reason: SURG

## 2024-01-19 RX ORDER — HEPARIN SODIUM 200 [USP'U]/100ML
INJECTION, SOLUTION INTRAVENOUS
Status: COMPLETED
Start: 2024-01-19 | End: 2024-01-19

## 2024-01-19 RX ORDER — DOPAMINE HYDROCHLORIDE 160 MG/100ML
INJECTION, SOLUTION INTRAVENOUS
Status: DISPENSED
Start: 2024-01-19 | End: 2024-01-20

## 2024-01-19 RX ORDER — SODIUM CHLORIDE, SODIUM LACTATE, POTASSIUM CHLORIDE, CALCIUM CHLORIDE 600; 310; 30; 20 MG/100ML; MG/100ML; MG/100ML; MG/100ML
INJECTION, SOLUTION INTRAVENOUS
Status: DISCONTINUED | OUTPATIENT
Start: 2024-01-19 | End: 2024-01-19 | Stop reason: SURG

## 2024-01-19 RX ORDER — HYDROMORPHONE HYDROCHLORIDE 1 MG/ML
0.4 INJECTION, SOLUTION INTRAMUSCULAR; INTRAVENOUS; SUBCUTANEOUS
Status: DISCONTINUED | OUTPATIENT
Start: 2024-01-19 | End: 2024-01-19

## 2024-01-19 RX ORDER — HYDROMORPHONE HYDROCHLORIDE 1 MG/ML
0.1 INJECTION, SOLUTION INTRAMUSCULAR; INTRAVENOUS; SUBCUTANEOUS
Status: DISCONTINUED | OUTPATIENT
Start: 2024-01-19 | End: 2024-01-19

## 2024-01-19 RX ORDER — ONDANSETRON 2 MG/ML
4 INJECTION INTRAMUSCULAR; INTRAVENOUS
Status: DISCONTINUED | OUTPATIENT
Start: 2024-01-19 | End: 2024-01-19

## 2024-01-19 RX ORDER — ACETAMINOPHEN 500 MG
1000 TABLET ORAL EVERY 4 HOURS PRN
Status: DISCONTINUED | OUTPATIENT
Start: 2024-01-19 | End: 2024-01-19

## 2024-01-19 RX ORDER — LABETALOL HYDROCHLORIDE 5 MG/ML
10 INJECTION, SOLUTION INTRAVENOUS EVERY 4 HOURS PRN
Status: DISCONTINUED | OUTPATIENT
Start: 2024-01-19 | End: 2024-01-20

## 2024-01-19 RX ORDER — ONDANSETRON 2 MG/ML
INJECTION INTRAMUSCULAR; INTRAVENOUS PRN
Status: DISCONTINUED | OUTPATIENT
Start: 2024-01-19 | End: 2024-01-19 | Stop reason: SURG

## 2024-01-19 RX ORDER — METOPROLOL TARTRATE 1 MG/ML
1 INJECTION, SOLUTION INTRAVENOUS
Status: DISCONTINUED | OUTPATIENT
Start: 2024-01-19 | End: 2024-01-19

## 2024-01-19 RX ORDER — HEPARIN SODIUM 1000 [USP'U]/ML
INJECTION, SOLUTION INTRAVENOUS; SUBCUTANEOUS
Status: COMPLETED
Start: 2024-01-19 | End: 2024-01-19

## 2024-01-19 RX ORDER — HALOPERIDOL 5 MG/ML
1 INJECTION INTRAMUSCULAR
Status: DISCONTINUED | OUTPATIENT
Start: 2024-01-19 | End: 2024-01-19

## 2024-01-19 RX ORDER — EPHEDRINE SULFATE 50 MG/ML
5 INJECTION, SOLUTION INTRAVENOUS
Status: DISCONTINUED | OUTPATIENT
Start: 2024-01-19 | End: 2024-01-19

## 2024-01-19 RX ORDER — SODIUM CHLORIDE, SODIUM LACTATE, POTASSIUM CHLORIDE, CALCIUM CHLORIDE 600; 310; 30; 20 MG/100ML; MG/100ML; MG/100ML; MG/100ML
INJECTION, SOLUTION INTRAVENOUS CONTINUOUS
Status: DISCONTINUED | OUTPATIENT
Start: 2024-01-19 | End: 2024-01-19

## 2024-01-19 RX ADMIN — DOCUSATE SODIUM 50 MG AND SENNOSIDES 8.6 MG 2 TABLET: 8.6; 5 TABLET, FILM COATED ORAL at 05:10

## 2024-01-19 RX ADMIN — LABETALOL HYDROCHLORIDE 10 MG: 5 INJECTION, SOLUTION INTRAVENOUS at 17:39

## 2024-01-19 RX ADMIN — ATORVASTATIN CALCIUM 20 MG: 20 TABLET, FILM COATED ORAL at 05:12

## 2024-01-19 RX ADMIN — SODIUM CHLORIDE, POTASSIUM CHLORIDE, SODIUM LACTATE AND CALCIUM CHLORIDE: 600; 310; 30; 20 INJECTION, SOLUTION INTRAVENOUS at 16:17

## 2024-01-19 RX ADMIN — MEMANTINE HYDROCHLORIDE 5 MG: 10 TABLET ORAL at 05:12

## 2024-01-19 RX ADMIN — GLYCOPYRROLATE 1 MG: 0.2 INJECTION INTRAMUSCULAR; INTRAVENOUS at 17:24

## 2024-01-19 RX ADMIN — ASPIRIN 81 MG: 81 TABLET, COATED ORAL at 05:11

## 2024-01-19 RX ADMIN — GABAPENTIN 800 MG: 400 CAPSULE ORAL at 05:11

## 2024-01-19 RX ADMIN — ONDANSETRON 4 MG: 2 INJECTION INTRAMUSCULAR; INTRAVENOUS at 16:59

## 2024-01-19 RX ADMIN — GABAPENTIN 800 MG: 400 CAPSULE ORAL at 20:21

## 2024-01-19 RX ADMIN — ROCURONIUM BROMIDE 50 MG: 10 INJECTION, SOLUTION INTRAVENOUS at 16:30

## 2024-01-19 RX ADMIN — PROPOFOL 130 MG: 10 INJECTION, EMULSION INTRAVENOUS at 16:30

## 2024-01-19 RX ADMIN — APIXABAN 5 MG: 5 TABLET, FILM COATED ORAL at 05:11

## 2024-01-19 RX ADMIN — BUPIVACAINE HYDROCHLORIDE: 5 INJECTION, SOLUTION EPIDURAL; INTRACAUDAL at 17:21

## 2024-01-19 RX ADMIN — AMLODIPINE BESYLATE 10 MG: 10 TABLET ORAL at 05:11

## 2024-01-19 RX ADMIN — FENTANYL CITRATE 250 MCG: 50 INJECTION, SOLUTION INTRAMUSCULAR; INTRAVENOUS at 16:30

## 2024-01-19 RX ADMIN — LIDOCAINE HYDROCHLORIDE: 20 INJECTION, SOLUTION INFILTRATION; PERINEURAL at 17:21

## 2024-01-19 RX ADMIN — ISOSORBIDE MONONITRATE 60 MG: 60 TABLET, EXTENDED RELEASE ORAL at 05:10

## 2024-01-19 RX ADMIN — METOCLOPRAMIDE 10 MG: 5 INJECTION, SOLUTION INTRAMUSCULAR; INTRAVENOUS at 17:00

## 2024-01-19 RX ADMIN — ACETAMINOPHEN 650 MG: 325 TABLET, FILM COATED ORAL at 18:24

## 2024-01-19 RX ADMIN — ROCURONIUM BROMIDE 10 MG: 10 INJECTION, SOLUTION INTRAVENOUS at 16:55

## 2024-01-19 RX ADMIN — NEOSTIGMINE METHYLSULFATE 5 MG: 1 INJECTION INTRAVENOUS at 17:24

## 2024-01-19 RX ADMIN — HEPARIN SODIUM 2000 UNITS: 200 INJECTION, SOLUTION INTRAVENOUS at 16:15

## 2024-01-19 RX ADMIN — LEVOTHYROXINE SODIUM 112 MCG: 0.11 TABLET ORAL at 05:11

## 2024-01-19 RX ADMIN — APIXABAN 5 MG: 5 TABLET, FILM COATED ORAL at 20:20

## 2024-01-19 RX ADMIN — ESCITALOPRAM OXALATE 10 MG: 10 TABLET ORAL at 05:11

## 2024-01-19 RX ADMIN — MEMANTINE HYDROCHLORIDE 5 MG: 10 TABLET ORAL at 20:21

## 2024-01-19 RX ADMIN — INSULIN HUMAN 2 UNITS: 100 INJECTION, SOLUTION PARENTERAL at 23:08

## 2024-01-19 RX ADMIN — DOPAMINE HYDROCHLORIDE 2.5 MCG/KG/MIN: 80 INJECTION, SOLUTION INTRAVENOUS at 17:08

## 2024-01-19 RX ADMIN — TIOTROPIUM BROMIDE INHALATION SPRAY 5 MCG: 3.12 SPRAY, METERED RESPIRATORY (INHALATION) at 05:10

## 2024-01-19 ASSESSMENT — ENCOUNTER SYMPTOMS
NAUSEA: 0
NECK PAIN: 0
ABDOMINAL PAIN: 0
HEARTBURN: 0
SORE THROAT: 0
DIZZINESS: 1
DIARRHEA: 0
WHEEZING: 0
PALPITATIONS: 0
HEADACHES: 0
FOCAL WEAKNESS: 0
VOMITING: 0
BACK PAIN: 0
DIAPHORESIS: 0
MYALGIAS: 0
CHILLS: 0
SHORTNESS OF BREATH: 0
COUGH: 0
WEAKNESS: 1
BLURRED VISION: 0
SENSORY CHANGE: 0
FLANK PAIN: 0
NERVOUS/ANXIOUS: 0
SPEECH CHANGE: 0
FEVER: 0

## 2024-01-19 ASSESSMENT — FIBROSIS 4 INDEX: FIB4 SCORE: 1.06

## 2024-01-19 ASSESSMENT — PAIN DESCRIPTION - PAIN TYPE
TYPE: ACUTE PAIN

## 2024-01-19 ASSESSMENT — PAIN SCALES - GENERAL: PAIN_LEVEL: 0

## 2024-01-19 NOTE — CONSULTS
EP Consult Note    DOS: 1/19/2024    Consulting physician: Mayela Agarwal    Chief complaint/Reason for consult: AFL    HPI:  Pt is a 74 yo M. History of CAD s/p prior CABG. Lives in [facility]. Presented with chest discomfort found to be in typical atrial flutter. R/o'd for MI. Initially with syncopal episode, setting was when he was in the bathroom, unclear whether there was significant enrique. Without negro agents here with intermittent SVR suggestive of higher grade AV block. No acute complaints today.    Yesterday was wheeled over to the outpatient clinic to see me for flutter. I sent him back to the floor as he was still admitted. I discussed potential flutter ablation + possible ILR insertion given syncope and concerns for AV block.    ROS (+ highlighted in red):  Constitutional: Fevers/chills/fatigue/weightloss  HEENT: Blurry vision/eye pain/sore throat/hearing loss  Respiratory: Shortness of breath/cough  Cardiovascular: Chest pain/palpitations/edema/orthopnea/syncope  GI: Nausea/vomitting/diarrhea  MSK: Arthralgias/myagias/muscle weakness  Skin: Rash/sores  Neurological: Numbness/tremors/vertigo  Endocrine: Excessive thirst/polyuria/cold intolerance/heat intolerance  Psych: Depression/anxiety    Past Medical History:   Diagnosis Date    Anginal syndrome (HCC)     CAD (coronary artery disease)     CABG 90's    Cataract     Diabetes (HCC)     Hypertension     Renal disorder        History reviewed. No pertinent surgical history.    Social History     Socioeconomic History    Marital status: Single     Spouse name: Not on file    Number of children: Not on file    Years of education: Not on file    Highest education level: Not on file   Occupational History    Not on file   Tobacco Use    Smoking status: Former     Types: Cigarettes     Start date: 3/1/1996     Passive exposure: Past    Smokeless tobacco: Never   Vaping Use    Vaping Use: Never used   Substance and Sexual Activity    Alcohol use: Never    Drug  use: Never    Sexual activity: Not on file   Other Topics Concern    Not on file   Social History Narrative    Not on file     Social Determinants of Health     Financial Resource Strain: Not on file   Food Insecurity: Not on file   Transportation Needs: Not on file   Physical Activity: Not on file   Stress: Not on file   Social Connections: Not on file   Intimate Partner Violence: Not on file   Housing Stability: Not on file       Family History   Problem Relation Age of Onset    No Known Problems Mother     No Known Problems Father        Allergies   Allergen Reactions    Cefdinir Unspecified     Patient does not remember       Current Facility-Administered Medications   Medication Dose Route Frequency Provider Last Rate Last Admin    insulin GLARGINE (Lantus,Semglee) injection  10 Units Subcutaneous QAM INSULIN Mayela Agarwal M.D.   10 Units at 01/18/24 0842    insulin regular (HumuLIN R,NovoLIN R) injection  1-6 Units Subcutaneous 4X/DAY DAVID Gutiérrez M.D.   1 Units at 01/18/24 2124    And    dextrose 50% (D50W) injection 25 g  25 g Intravenous Q15 MIN PRN Shmuel Gutiérrez M.D.   25 g at 01/17/24 0152    dilTIAZem CD (Cardizem CD) capsule 240 mg  240 mg Oral Nightly Shmuel Gutiérrez M.D.   240 mg at 01/18/24 2121    aspirin EC tablet 81 mg  81 mg Oral DAILY Todd Porras M.D.   81 mg at 01/19/24 0511    tiotropium (Spiriva Respimat) 2.5 mcg/Act inhalation spray 5 mcg  5 mcg Inhalation DAILY Todd Porras M.D.   5 mcg at 01/19/24 0510    aminophylline injection 100 mg  100 mg Intravenous Once PRN Otto Trotter M.D.        apixaban (Eliquis) tablet 5 mg  5 mg Oral BID Rafi Art M.D.   5 mg at 01/19/24 0511    atorvastatin (Lipitor) tablet 20 mg  20 mg Oral DAILY Rafi Art M.D.   20 mg at 01/19/24 0512    escitalopram (Lexapro) tablet 10 mg  10 mg Oral DAILY Rafi Art M.D.   10 mg at 01/19/24 0511    gabapentin (Neurontin) capsule 800 mg  800 mg Oral BID Rafi Art M.D.   800 mg  at 01/19/24 0511    isosorbide mononitrate SR (Imdur) tablet 60 mg  60 mg Oral DAILY Rafi Art M.D.   60 mg at 01/19/24 0510    levothyroxine (Synthroid) tablet 112 mcg  112 mcg Oral AM ES Rafi Art M.D.   112 mcg at 01/19/24 0511    [Held by provider] lisinopril (Prinivil) tablet 20 mg  20 mg Oral DAILY Rafi Art M.D.   20 mg at 01/15/24 0517    memantine (Namenda) tablet 5 mg  5 mg Oral BID Rafi Art M.D.   5 mg at 01/19/24 0512    senna-docusate (Pericolace Or Senokot S) 8.6-50 MG per tablet 2 Tablet  2 Tablet Oral BID Rafi Art M.D.   2 Tablet at 01/19/24 0510    And    polyethylene glycol/lytes (Miralax) Packet 1 Packet  1 Packet Oral QDAY PRN Rafi Art M.D.        And    magnesium hydroxide (Milk Of Magnesia) suspension 30 mL  30 mL Oral QDAY PRN Rafi Art M.D.        And    bisacodyl (Dulcolax) suppository 10 mg  10 mg Rectal QDAY PRN Rafi Art M.D.        acetaminophen (Tylenol) tablet 650 mg  650 mg Oral Q6HRS PRN Rafi Art M.D.   650 mg at 01/05/24 0158    amLODIPine (Norvasc) tablet 10 mg  10 mg Oral Q DAY Rafi Art M.D.   10 mg at 01/19/24 0511       Physical Exam:  Vitals:    01/18/24 1939 01/18/24 2353 01/19/24 0405 01/19/24 0742   BP: (!) 159/80 (!) 145/56 135/67 118/59   Pulse: 60 60 60 (!) 57   Resp: 18 18 18 18   Temp: 36.9 °C (98.4 °F) 36.8 °C (98.2 °F) 36.7 °C (98.1 °F) 36.6 °C (97.9 °F)   TempSrc: Temporal Temporal Temporal Temporal   SpO2: 91% 94% 95% 94%   Weight:       Height:         General appearance: NAD, conversant   Eyes: anicteric sclerae, moist conjunctivae; no lid-lag; PERRLA  HENT: Atraumatic; oropharynx clear with moist mucous membranes and no mucosal ulcerations; normal hard and soft palate  Neck: Trachea midline; FROM, supple, no thyromegaly or lymphadenopathy  Lungs: CTA, with normal respiratory effort and no intercostal retractions  CV: RRR, no MRGs, no JVD   Abdomen: Soft, non-tender; no masses or HSM  Extremities: No  peripheral edema or extremity lymphadenopathy  Skin: Normal temperature, turgor and texture; no rash, ulcers or subcutaneous nodules  Psych: Appropriate affect, alert and oriented to person, place and time    Data:  Labs reviewed  Cr 1.5    Prior echo/stress results reviewed:  LVEF 65%    CXR interpreted by me:  WNL    EKG interpreted by me:   AFL    Impression/Plan:  1) AFL with SVR  2) Syncope  3) CAD s/p CABG    -Looks to be typical AFL  -Risks/benefits of AFL ablation and potential ILR insertion discussed  -All questions answered and he is agreeable to proceed  -Keep NPO    Chuy Guthrie MD

## 2024-01-19 NOTE — PROGRESS NOTES
Hospital Medicine Daily Progress Note    Date of Service  1/15/2024    Chief Complaint  Sulaiman Ceja is a 73 y.o. male admitted 1/4/2024 with chest pain    Hospital Course  Admitted with chest pain, EKG showed Atrial flutter, troponins were elevated.  Nuclear stress test was negative. For the Atrial flutter, patient was started on anticoagulation with Eliquis and on Cardizem for rate control.  Echocardiogram was done which showed ejection fraction of 65%.  Patient's symptoms of chest pain resolved.    Atrial flutter - rate controlled  Syncope 1/8 - found in bathroom, sitting on the toilet with altered mental status after he had a bowel movement, rapid response called, patient brought to bed and became more alert and oriented right away   MILTON on CKD - improved  Heart rate A-fib a flutter on monitor, no chest pain or dyspnea.  Negative stress test this admission.  Ambulating without tachycardia.   Discussed with cardiology, they will arrange an outpatient EP consultation for the patient for this upcoming week given stability on PO medication.   Discussed with daughter and tried to call assisted living/SNF he is a resident of at the request of his daughter, left a message regarding plan and attempted to answer their concerns.  Normal HR upper 50's-60's per daughter.  Tolerating PO intake. Medically cleared for discharge to SNF.    Interval Problem Update  1/15  Has been cleared for DC to SNF, developed mild hematuria, UA not infectious, no clots or obstruction. No pain. He was concerned he had an infection due to the blood. He has an internal device he uses to help urinate. Bladder scan ordered to be sure no retention, his renal function fluctuates Scr baseline looks to be 1.4-6, today was 2. Has been up and down this admission, h/o hydronephrosis so if retention noted would get US.  Encouraged PO intake, held ACEi, may need small bolus, recheck in AM.   Medically cleared for discharge to SNF. Tolerating PO intake.  No complaints.  1/16:Patient seen and examined, afebrile, resting in bed, no overnight events, needs placement   1/17: Patient seen and examined, resting in bed, afebrile, having hypoglycemic episodes so will decrease his lantus dose to 10 units for now   Close monitoring his blood sugars and adjust as needed    1/18 Patient seen and examined he was taken to outpt appt with outpt cardiology while pt admitted inpatient today  Appt was canceled, however, prep ecg showed atrial flutter, rate controled.  Pt was redirected back to inpatient service.I did discuss case with  Dr Guthrie and he will arrange for ablation possible tomorrow   Appreciate rec.   1/19: Patient seen and examined, resting in bed, cardiology following appreciate rec.  Going for ablation  today   Close monitoring on tele   I have discussed this patient's plan of care and discharge plan at IDT rounds today with Case Management, Nursing, Nursing leadership, and other members of the IDT team.    Consultants/Specialty  None    Code Status  DNAR/DNI    Disposition  Medically Cleared  I have placed the appropriate orders for post-discharge needs.    Review of Systems  Review of Systems   Constitutional:  Negative for chills, diaphoresis, fever and malaise/fatigue.   HENT:  Negative for congestion, hearing loss and sore throat.    Eyes:  Negative for blurred vision.   Respiratory:  Negative for cough, shortness of breath and wheezing.    Cardiovascular:  Negative for chest pain, palpitations and leg swelling.   Gastrointestinal:  Negative for abdominal pain, diarrhea, heartburn, nausea and vomiting.   Genitourinary:  Negative for dysuria, flank pain and hematuria.   Musculoskeletal:  Negative for back pain, joint pain, myalgias and neck pain.   Skin:  Negative for rash.   Neurological:  Positive for dizziness and weakness. Negative for sensory change, speech change, focal weakness and headaches.   Psychiatric/Behavioral:  The patient is not nervous/anxious.          Physical Exam  Temp:  [36.6 °C (97.9 °F)-37 °C (98.6 °F)] 37 °C (98.6 °F)  Pulse:  [57-62] 61  Resp:  [16-18] 16  BP: (118-159)/(56-80) 139/60  SpO2:  [91 %-97 %] 97 %    Physical Exam  Vitals and nursing note reviewed.   Constitutional:       General: He is not in acute distress.     Appearance: He is not ill-appearing or toxic-appearing.   HENT:      Head: Normocephalic and atraumatic.      Nose: No congestion.      Mouth/Throat:      Mouth: Mucous membranes are moist.   Eyes:      General: No scleral icterus.     Extraocular Movements: Extraocular movements intact.      Conjunctiva/sclera: Conjunctivae normal.   Cardiovascular:      Rate and Rhythm: Normal rate. Rhythm irregular.   Pulmonary:      Effort: Pulmonary effort is normal. No respiratory distress.      Breath sounds: No wheezing, rhonchi or rales.   Abdominal:      General: There is no distension.      Palpations: Abdomen is soft.      Tenderness: There is no abdominal tenderness. There is no guarding or rebound.   Musculoskeletal:      Cervical back: Normal range of motion and neck supple. No rigidity or tenderness.      Right lower leg: No edema.      Left lower leg: No edema.   Skin:     General: Skin is warm and dry.      Capillary Refill: Capillary refill takes less than 2 seconds.      Coloration: Skin is not jaundiced.      Findings: No erythema.   Neurological:      General: No focal deficit present.      Mental Status: He is alert. He is disoriented.      Cranial Nerves: No cranial nerve deficit.      Sensory: No sensory deficit.      Motor: No weakness.      Comments: Oriented to person and place   Psychiatric:         Mood and Affect: Mood normal.         Speech: Speech is delayed.         Behavior: Behavior normal.         Cognition and Memory: He exhibits impaired recent memory.         Fluids    Intake/Output Summary (Last 24 hours) at 1/19/2024 1536  Last data filed at 1/19/2024 1200  Gross per 24 hour   Intake 350 ml   Output 0 ml    Net 350 ml       Laboratory  Recent Labs     01/17/24  0840 01/19/24  0242   WBC 7.3 7.8   RBC 4.60* 4.62*   HEMOGLOBIN 13.7* 13.9*   HEMATOCRIT 40.4* 39.9*   MCV 87.8 86.4   MCH 29.8 30.1   MCHC 33.9 34.8   RDW 47.5 47.3   PLATELETCT 243 234   MPV 11.6 11.5     Recent Labs     01/17/24  0840 01/19/24  0242   SODIUM 139 140   POTASSIUM 4.4 4.0   CHLORIDE 112 111   CO2 16* 16*   GLUCOSE 96 107*   BUN 40* 34*   CREATININE 1.53* 1.52*   CALCIUM 8.9 8.8                   Imaging  EC-ECHOCARDIOGRAM COMPLETE W/ CONT   Final Result      NM-CARDIAC STRESS TEST   Final Result      DX-CHEST-PORTABLE (1 VIEW)   Final Result      Cardiomegaly.      No definite acute abnormality. See details above.      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 No pain. He was concerned he had an infection due to the blood.   He has an internal device he uses to help urinate. Bladder scan ordered to be sure no retention, his renal function fluctuates Scr baseline looks to be 1.4-6, today was 2. Has been up and down this admission, h/o hydronephrosis so if retention noted would get US.  Encouraged PO intake, held ACEi, may need small bolus, recheck in AM Encouraged PO intake     Syncope and collapse  Assessment & Plan  Probably vasovagal  Transfer to Telemetry  IVF hydration  cc only    Hypomagnesemia- (present on admission)  Assessment & Plan  IV Mg 2 g  Follow level    Stage 3a chronic kidney disease (HCC)- (present on admission)  Assessment & Plan  Follow bmp  IVF hydration   cc only    Atrial flutter (HCC)- (present on admission)  Assessment & Plan  Diltiazem, Eliquis  May need to consult cardiology -possible cardioversion    1/15 Discussed with cardiology, they will arrange an outpatient EP consultation for the patient for this upcoming week given stability on PO medication.    Acquired hypothyroidism- (present on admission)  Assessment & Plan  Levothyroxine    COPD (chronic obstructive pulmonary disease) (HCC)- (present on admission)  Assessment & Plan  Spiriva  RT protocol    Cognitive impairment- (present on admission)  Assessment & Plan  Namenda   Avoid Benzodiazepines, Anticholinergics, Opiates  Frequent orientation  Update Board daily  Open window blinds during the day  Avoid naps during the day  Family at bedside whenever possible  Ensure adequate sleep at night - use Melatonin preferably  Avoid early morning labs  Avoid vital signs during sleep  Ambulate if possible  Provide adequate pain control  Monitor for urinary retention  Prevent and manage constipation  Discontinue IVs, Catheters, Tubes, Lines, Cardiac monitors, SCDs if possible     Primary hypertension- (present on admission)  Assessment & Plan  Diltiazem, Lisinopril, Norvasc    Type 2 diabetes mellitus with kidney complication, with long-term current use of insulin (HCC)- (present on admission)  Assessment & Plan  SSI, Lantus       Greater than 51 minutes spent prepping to see patient (e.g. review of tests) obtaining and/or reviewing separately obtained history. Performing a medically appropriate examination and/ evaluation.  Counseling and educating the patient/family/caregiver.  Ordering medications, tests, or procedures.  Referring and communicating with other health care professionals.  Documenting clinical information in EPIC.  Independently interpreting results and communicating results to patient/family/caregiver.  Care coordination.     VTE prophylaxis:  eliquis       I have performed a physical exam and  reviewed and updated ROS and Plan today (1/15/2024). In review of yesterday's note (1/14/2024), there are no changes except as documented above.

## 2024-01-19 NOTE — DISCHARGE PLANNING
Per rounds- Patient will be medically cleared to transfer to [facility] tomorrow (1/20). RNCM spoke with [facility] ([phone]) to confirm. RNCM has asked that [facility]  time for 9 AM by Lorena Porter of ride line. Transport packet started, placed on  desk.    15:33- RNJANE spoke with dtr-in-lawTricia #[phone] per patient request to advise.

## 2024-01-19 NOTE — CARE PLAN
The patient is Stable - Low risk of patient condition declining or worsening    Shift Goals  Clinical Goals: VSS, Hemodynamic stability  Patient Goals: Sleep, discharge  Family Goals: jefferson    Progress made toward(s) clinical / shift goals:    Problem: Knowledge Deficit - Standard  Goal: Patient and family/care givers will demonstrate understanding of plan of care, disease process/condition, diagnostic tests and medications  Outcome: Progressing     Problem: Skin Integrity  Goal: Skin integrity is maintained or improved  Outcome: Progressing     Problem: Pain - Standard  Goal: Alleviation of pain or a reduction in pain to the patient’s comfort goal  Outcome: Progressing     Problem: Discharge Barriers/Planning  Goal: Patient's continuum of care needs are met  Outcome: Progressing     Problem: Hemodynamics  Goal: Patient's hemodynamics, fluid balance and neurologic status will be stable or improve  Outcome: Progressing       Patient is not progressing towards the following goals:

## 2024-01-19 NOTE — ANESTHESIA PREPROCEDURE EVALUATION
Date/Time: 01/19/24 1500    Scheduled providers: Chuy Guthrie M.D.; Elmer Allen M.D.    Procedure: CL-EP ABLATION ATRIAL FLUTTER    Diagnosis:             Chest pain [R07.9]      Chronic HFrEF (heart failure with reduced ejection fraction) (HCC) [I50.22]      Syncope and collapse [R55]            Chest pain [R07.9]      Chronic HFrEF (heart failure with reduced ejection fraction) (HCC) [I50.22]      Syncope and collapse [R55]    Indications: See Assoicated Dx    Location: [facility] - Cath Lab - [facility]            Relevant Problems   PULMONARY   (positive) COPD (chronic obstructive pulmonary disease) (HCC)      CARDIAC   (positive) Atrial flutter (HCC)   (positive) Coronary artery disease   (positive) Coronary artery disease of bypass graft of native heart with stable angina pectoris (HCC)   (positive) Hypertensive emergency   (positive) NSTEMI (non-ST elevated myocardial infarction) (HCC)   (positive) Primary hypertension         (positive) Acute kidney injury superimposed on chronic kidney disease (HCC)   (positive) CKD (chronic kidney disease) stage 3, GFR 30-59 ml/min (HCC)   (positive) Stage 3a chronic kidney disease (HCC)      ENDO   (positive) Acquired hypothyroidism   (positive) Type 2 diabetes mellitus with kidney complication, with long-term current use of insulin (HCC)       Physical Exam    Airway   Mallampati: II  TM distance: >3 FB  Neck ROM: full       Cardiovascular - normal exam  Rhythm: regular  Rate: normal  (-) murmur     Dental - normal exam           Pulmonary - normal exam  Breath sounds clear to auscultation     Abdominal    Neurological - normal exam                   Anesthesia Plan    ASA 3       Plan - general       Airway plan will be ETT    (Ajit;  recetn echo 65% ef;; hx of low EF CHF)      Induction: intravenous    Postoperative Plan: Postoperative administration of opioids is intended.    Pertinent diagnostic labs and testing reviewed    Informed  Consent:    Anesthetic plan and risks discussed with patient.    Use of blood products discussed with: patient whom consented to blood products.

## 2024-01-19 NOTE — CARE PLAN
The patient is Stable - Low risk of patient condition declining or worsening    Shift Goals  Clinical Goals: DC today at 1100  Patient Goals: go home  Family Goals: jefferson    Progress made toward(s) clinical / shift goals:  No, Pt was no DC today, instead Sulaiman will have an ablation for his A-Flutter tomorrow 1/18    Patient is not progressing towards the following goals:

## 2024-01-20 ENCOUNTER — APPOINTMENT (OUTPATIENT)
Dept: RADIOLOGY | Facility: MEDICAL CENTER | Age: 74
DRG: 243 | End: 2024-01-20
Attending: INTERNAL MEDICINE
Payer: MEDICARE

## 2024-01-20 ENCOUNTER — APPOINTMENT (OUTPATIENT)
Dept: CARDIOLOGY | Facility: MEDICAL CENTER | Age: 74
DRG: 243 | End: 2024-01-20
Attending: NURSE PRACTITIONER
Payer: MEDICARE

## 2024-01-20 PROBLEM — I49.5 SICK SINUS SYNDROME (HCC): Status: ACTIVE | Noted: 2024-01-20

## 2024-01-20 LAB
ANION GAP SERPL CALC-SCNC: 10 MMOL/L (ref 7–16)
BUN SERPL-MCNC: 34 MG/DL (ref 8–22)
CALCIUM SERPL-MCNC: 8.3 MG/DL (ref 8.5–10.5)
CHLORIDE SERPL-SCNC: 108 MMOL/L (ref 96–112)
CO2 SERPL-SCNC: 18 MMOL/L (ref 20–33)
CREAT SERPL-MCNC: 1.39 MG/DL (ref 0.5–1.4)
EKG IMPRESSION: NORMAL
ERYTHROCYTE [DISTWIDTH] IN BLOOD BY AUTOMATED COUNT: 47.1 FL (ref 35.9–50)
GFR SERPLBLD CREATININE-BSD FMLA CKD-EPI: 53 ML/MIN/1.73 M 2
GLUCOSE BLD STRIP.AUTO-MCNC: 127 MG/DL (ref 65–99)
GLUCOSE BLD STRIP.AUTO-MCNC: 239 MG/DL (ref 65–99)
GLUCOSE BLD STRIP.AUTO-MCNC: 259 MG/DL (ref 65–99)
GLUCOSE BLD STRIP.AUTO-MCNC: 271 MG/DL (ref 65–99)
GLUCOSE BLD STRIP.AUTO-MCNC: 294 MG/DL (ref 65–99)
GLUCOSE SERPL-MCNC: 191 MG/DL (ref 65–99)
HCT VFR BLD AUTO: 36.4 % (ref 42–52)
HGB BLD-MCNC: 12.2 G/DL (ref 14–18)
MCH RBC QN AUTO: 29.5 PG (ref 27–33)
MCHC RBC AUTO-ENTMCNC: 33.5 G/DL (ref 32.3–36.5)
MCV RBC AUTO: 88.1 FL (ref 81.4–97.8)
PLATELET # BLD AUTO: 214 K/UL (ref 164–446)
PMV BLD AUTO: 11.6 FL (ref 9–12.9)
POTASSIUM SERPL-SCNC: 4.2 MMOL/L (ref 3.6–5.5)
RBC # BLD AUTO: 4.13 M/UL (ref 4.7–6.1)
SODIUM SERPL-SCNC: 136 MMOL/L (ref 135–145)
WBC # BLD AUTO: 8.5 K/UL (ref 4.8–10.8)

## 2024-01-20 PROCEDURE — 02H63JZ INSERTION OF PACEMAKER LEAD INTO RIGHT ATRIUM, PERCUTANEOUS APPROACH: ICD-10-PCS | Performed by: INTERNAL MEDICINE

## 2024-01-20 PROCEDURE — 93005 ELECTROCARDIOGRAM TRACING: CPT | Performed by: INTERNAL MEDICINE

## 2024-01-20 PROCEDURE — 71045 X-RAY EXAM CHEST 1 VIEW: CPT

## 2024-01-20 PROCEDURE — 700101 HCHG RX REV CODE 250

## 2024-01-20 PROCEDURE — 700102 HCHG RX REV CODE 250 W/ 637 OVERRIDE(OP): Performed by: HOSPITALIST

## 2024-01-20 PROCEDURE — 93010 ELECTROCARDIOGRAM REPORT: CPT | Performed by: INTERNAL MEDICINE

## 2024-01-20 PROCEDURE — 770020 HCHG ROOM/CARE - TELE (206)

## 2024-01-20 PROCEDURE — A9270 NON-COVERED ITEM OR SERVICE: HCPCS | Performed by: STUDENT IN AN ORGANIZED HEALTH CARE EDUCATION/TRAINING PROGRAM

## 2024-01-20 PROCEDURE — 700105 HCHG RX REV CODE 258: Performed by: INTERNAL MEDICINE

## 2024-01-20 PROCEDURE — 99233 SBSQ HOSP IP/OBS HIGH 50: CPT | Performed by: INTERNAL MEDICINE

## 2024-01-20 PROCEDURE — 700111 HCHG RX REV CODE 636 W/ 250 OVERRIDE (IP): Performed by: INTERNAL MEDICINE

## 2024-01-20 PROCEDURE — 33208 INSRT HEART PM ATRIAL & VENT: CPT | Mod: KX | Performed by: INTERNAL MEDICINE

## 2024-01-20 PROCEDURE — A9270 NON-COVERED ITEM OR SERVICE: HCPCS | Performed by: FAMILY MEDICINE

## 2024-01-20 PROCEDURE — 99153 MOD SED SAME PHYS/QHP EA: CPT

## 2024-01-20 PROCEDURE — 700111 HCHG RX REV CODE 636 W/ 250 OVERRIDE (IP): Mod: JG | Performed by: INTERNAL MEDICINE

## 2024-01-20 PROCEDURE — 700102 HCHG RX REV CODE 250 W/ 637 OVERRIDE(OP): Performed by: STUDENT IN AN ORGANIZED HEALTH CARE EDUCATION/TRAINING PROGRAM

## 2024-01-20 PROCEDURE — 700102 HCHG RX REV CODE 250 W/ 637 OVERRIDE(OP): Performed by: INTERNAL MEDICINE

## 2024-01-20 PROCEDURE — 80048 BASIC METABOLIC PNL TOTAL CA: CPT

## 2024-01-20 PROCEDURE — 700102 HCHG RX REV CODE 250 W/ 637 OVERRIDE(OP): Performed by: FAMILY MEDICINE

## 2024-01-20 PROCEDURE — 82962 GLUCOSE BLOOD TEST: CPT | Mod: 91

## 2024-01-20 PROCEDURE — 02HK3JZ INSERTION OF PACEMAKER LEAD INTO RIGHT VENTRICLE, PERCUTANEOUS APPROACH: ICD-10-PCS | Performed by: INTERNAL MEDICINE

## 2024-01-20 PROCEDURE — 700111 HCHG RX REV CODE 636 W/ 250 OVERRIDE (IP)

## 2024-01-20 PROCEDURE — A9270 NON-COVERED ITEM OR SERVICE: HCPCS | Performed by: HOSPITALIST

## 2024-01-20 PROCEDURE — 700111 HCHG RX REV CODE 636 W/ 250 OVERRIDE (IP): Performed by: STUDENT IN AN ORGANIZED HEALTH CARE EDUCATION/TRAINING PROGRAM

## 2024-01-20 PROCEDURE — 85027 COMPLETE CBC AUTOMATED: CPT

## 2024-01-20 PROCEDURE — 0JH606Z INSERTION OF PACEMAKER, DUAL CHAMBER INTO CHEST SUBCUTANEOUS TISSUE AND FASCIA, OPEN APPROACH: ICD-10-PCS | Performed by: INTERNAL MEDICINE

## 2024-01-20 RX ORDER — ACETAMINOPHEN 325 MG/1
650 TABLET ORAL EVERY 4 HOURS PRN
Status: DISCONTINUED | OUTPATIENT
Start: 2024-01-20 | End: 2024-01-20

## 2024-01-20 RX ORDER — ONDANSETRON 2 MG/ML
4 INJECTION INTRAMUSCULAR; INTRAVENOUS EVERY 6 HOURS PRN
Status: DISCONTINUED | OUTPATIENT
Start: 2024-01-20 | End: 2024-01-20

## 2024-01-20 RX ORDER — OXYCODONE HYDROCHLORIDE 5 MG/1
5 TABLET ORAL
Status: DISCONTINUED | OUTPATIENT
Start: 2024-01-20 | End: 2024-01-22 | Stop reason: HOSPADM

## 2024-01-20 RX ORDER — MIDAZOLAM HYDROCHLORIDE 1 MG/ML
INJECTION INTRAMUSCULAR; INTRAVENOUS
Status: COMPLETED
Start: 2024-01-20 | End: 2024-01-20

## 2024-01-20 RX ORDER — HYDROXYZINE HYDROCHLORIDE 25 MG/1
25 TABLET, FILM COATED ORAL 3 TIMES DAILY
Status: DISCONTINUED | OUTPATIENT
Start: 2024-01-20 | End: 2024-01-20

## 2024-01-20 RX ORDER — HYDRALAZINE HYDROCHLORIDE 20 MG/ML
20 INJECTION INTRAMUSCULAR; INTRAVENOUS ONCE
Status: COMPLETED | OUTPATIENT
Start: 2024-01-20 | End: 2024-01-20

## 2024-01-20 RX ORDER — HYDRALAZINE HYDROCHLORIDE 20 MG/ML
10 INJECTION INTRAMUSCULAR; INTRAVENOUS EVERY 4 HOURS PRN
Status: DISCONTINUED | OUTPATIENT
Start: 2024-01-20 | End: 2024-01-20

## 2024-01-20 RX ORDER — CEFAZOLIN SODIUM 1 G/3ML
INJECTION, POWDER, FOR SOLUTION INTRAMUSCULAR; INTRAVENOUS
Status: COMPLETED
Start: 2024-01-20 | End: 2024-01-20

## 2024-01-20 RX ORDER — HYDRALAZINE HYDROCHLORIDE 20 MG/ML
10 INJECTION INTRAMUSCULAR; INTRAVENOUS EVERY 6 HOURS PRN
Status: DISCONTINUED | OUTPATIENT
Start: 2024-01-20 | End: 2024-01-20

## 2024-01-20 RX ORDER — ACETAMINOPHEN 325 MG/1
650 TABLET ORAL EVERY 6 HOURS PRN
Status: DISCONTINUED | OUTPATIENT
Start: 2024-01-26 | End: 2024-01-22 | Stop reason: HOSPADM

## 2024-01-20 RX ORDER — MORPHINE SULFATE 4 MG/ML
2 INJECTION INTRAVENOUS ONCE
Status: DISCONTINUED | OUTPATIENT
Start: 2024-01-20 | End: 2024-01-21

## 2024-01-20 RX ORDER — HYDROMORPHONE HYDROCHLORIDE 1 MG/ML
0.5 INJECTION, SOLUTION INTRAMUSCULAR; INTRAVENOUS; SUBCUTANEOUS
Status: DISCONTINUED | OUTPATIENT
Start: 2024-01-20 | End: 2024-01-22 | Stop reason: HOSPADM

## 2024-01-20 RX ORDER — ACETAMINOPHEN 325 MG/1
650 TABLET ORAL EVERY 6 HOURS
Status: DISCONTINUED | OUTPATIENT
Start: 2024-01-21 | End: 2024-01-22 | Stop reason: HOSPADM

## 2024-01-20 RX ORDER — BUPIVACAINE HYDROCHLORIDE 2.5 MG/ML
INJECTION, SOLUTION EPIDURAL; INFILTRATION; INTRACAUDAL
Status: COMPLETED
Start: 2024-01-20 | End: 2024-01-20

## 2024-01-20 RX ORDER — LABETALOL HYDROCHLORIDE 5 MG/ML
10 INJECTION, SOLUTION INTRAVENOUS EVERY 4 HOURS PRN
Status: DISCONTINUED | OUTPATIENT
Start: 2024-01-20 | End: 2024-01-20

## 2024-01-20 RX ORDER — HYDRALAZINE HYDROCHLORIDE 25 MG/1
25 TABLET, FILM COATED ORAL EVERY 8 HOURS
Status: DISCONTINUED | OUTPATIENT
Start: 2024-01-20 | End: 2024-01-22 | Stop reason: HOSPADM

## 2024-01-20 RX ORDER — OXYCODONE HYDROCHLORIDE 10 MG/1
10 TABLET ORAL
Status: DISCONTINUED | OUTPATIENT
Start: 2024-01-20 | End: 2024-01-22 | Stop reason: HOSPADM

## 2024-01-20 RX ORDER — LIDOCAINE HYDROCHLORIDE 20 MG/ML
INJECTION, SOLUTION INFILTRATION; PERINEURAL
Status: COMPLETED
Start: 2024-01-20 | End: 2024-01-20

## 2024-01-20 RX ADMIN — MEMANTINE HYDROCHLORIDE 5 MG: 10 TABLET ORAL at 18:18

## 2024-01-20 RX ADMIN — ATORVASTATIN CALCIUM 20 MG: 20 TABLET, FILM COATED ORAL at 05:15

## 2024-01-20 RX ADMIN — DOCUSATE SODIUM 50 MG AND SENNOSIDES 8.6 MG 2 TABLET: 8.6; 5 TABLET, FILM COATED ORAL at 18:18

## 2024-01-20 RX ADMIN — APIXABAN 5 MG: 5 TABLET, FILM COATED ORAL at 05:16

## 2024-01-20 RX ADMIN — CEFAZOLIN 3000 MG: 1 INJECTION, POWDER, FOR SOLUTION INTRAMUSCULAR; INTRAVENOUS at 08:28

## 2024-01-20 RX ADMIN — HYDRALAZINE HYDROCHLORIDE 20 MG: 20 INJECTION, SOLUTION INTRAMUSCULAR; INTRAVENOUS at 21:04

## 2024-01-20 RX ADMIN — CEFAZOLIN 2 G: 2 INJECTION, POWDER, FOR SOLUTION INTRAMUSCULAR; INTRAVENOUS at 14:43

## 2024-01-20 RX ADMIN — LIDOCAINE HYDROCHLORIDE: 20 INJECTION, SOLUTION INFILTRATION; PERINEURAL at 08:28

## 2024-01-20 RX ADMIN — INSULIN HUMAN 3 UNITS: 100 INJECTION, SOLUTION PARENTERAL at 15:00

## 2024-01-20 RX ADMIN — LABETALOL HYDROCHLORIDE 10 MG: 5 INJECTION, SOLUTION INTRAVENOUS at 18:25

## 2024-01-20 RX ADMIN — HYDRALAZINE HYDROCHLORIDE 25 MG: 25 TABLET ORAL at 19:51

## 2024-01-20 RX ADMIN — INSULIN HUMAN 3 UNITS: 100 INJECTION, SOLUTION PARENTERAL at 18:31

## 2024-01-20 RX ADMIN — CEFAZOLIN 2 G: 2 INJECTION, POWDER, FOR SOLUTION INTRAMUSCULAR; INTRAVENOUS at 22:00

## 2024-01-20 RX ADMIN — ASPIRIN 81 MG: 81 TABLET, COATED ORAL at 05:15

## 2024-01-20 RX ADMIN — MEMANTINE HYDROCHLORIDE 5 MG: 10 TABLET ORAL at 05:15

## 2024-01-20 RX ADMIN — ESCITALOPRAM OXALATE 10 MG: 10 TABLET ORAL at 05:15

## 2024-01-20 RX ADMIN — APIXABAN 5 MG: 5 TABLET, FILM COATED ORAL at 18:18

## 2024-01-20 RX ADMIN — LEVOTHYROXINE SODIUM 112 MCG: 0.11 TABLET ORAL at 05:15

## 2024-01-20 RX ADMIN — LABETALOL HYDROCHLORIDE 10 MG: 5 INJECTION, SOLUTION INTRAVENOUS at 12:34

## 2024-01-20 RX ADMIN — BUPIVACAINE HYDROCHLORIDE: 2.5 INJECTION, SOLUTION EPIDURAL; INFILTRATION; INTRACAUDAL at 08:28

## 2024-01-20 RX ADMIN — INSULIN HUMAN 3 UNITS: 100 INJECTION, SOLUTION PARENTERAL at 20:55

## 2024-01-20 RX ADMIN — INSULIN GLARGINE-YFGN 10 UNITS: 100 INJECTION, SOLUTION SUBCUTANEOUS at 11:03

## 2024-01-20 RX ADMIN — FENTANYL CITRATE 100 MCG: 50 INJECTION, SOLUTION INTRAMUSCULAR; INTRAVENOUS at 09:31

## 2024-01-20 RX ADMIN — MIDAZOLAM 2 MG: 1 INJECTION INTRAMUSCULAR; INTRAVENOUS at 09:31

## 2024-01-20 RX ADMIN — DOCUSATE SODIUM 50 MG AND SENNOSIDES 8.6 MG 2 TABLET: 8.6; 5 TABLET, FILM COATED ORAL at 05:14

## 2024-01-20 RX ADMIN — GABAPENTIN 800 MG: 400 CAPSULE ORAL at 18:18

## 2024-01-20 RX ADMIN — GABAPENTIN 800 MG: 400 CAPSULE ORAL at 05:15

## 2024-01-20 RX ADMIN — TIOTROPIUM BROMIDE INHALATION SPRAY 5 MCG: 3.12 SPRAY, METERED RESPIRATORY (INHALATION) at 11:13

## 2024-01-20 RX ADMIN — AMLODIPINE BESYLATE 10 MG: 10 TABLET ORAL at 05:15

## 2024-01-20 ASSESSMENT — ENCOUNTER SYMPTOMS
NAUSEA: 0
PHOTOPHOBIA: 0
COUGH: 0
WEIGHT LOSS: 0
SPUTUM PRODUCTION: 0
HALLUCINATIONS: 0
DOUBLE VISION: 0
SPEECH CHANGE: 0
DIARRHEA: 0
ORTHOPNEA: 0
SENSORY CHANGE: 0
WEAKNESS: 1
TINGLING: 0
MYALGIAS: 0
HEADACHES: 0
TREMORS: 0
PALPITATIONS: 0
SHORTNESS OF BREATH: 0
BACK PAIN: 0
ABDOMINAL PAIN: 0
EYE PAIN: 0
FEVER: 0
DIZZINESS: 0
CHILLS: 0
BLURRED VISION: 0
VOMITING: 0
FOCAL WEAKNESS: 0
NECK PAIN: 0
CONSTIPATION: 0

## 2024-01-20 ASSESSMENT — LIFESTYLE VARIABLES: SUBSTANCE_ABUSE: 0

## 2024-01-20 ASSESSMENT — PAIN DESCRIPTION - PAIN TYPE
TYPE: ACUTE PAIN

## 2024-01-20 NOTE — DISCHARGE PLANNING
Per Lorena Urbina RN on T7 patient is headed to IMCU and LDT with [facility] for tomorrow AM should be cancelled. [facility] called and LDT cancelled for 01/19/2024.

## 2024-01-20 NOTE — PROGRESS NOTES
1015: Bedside report received from LORI Huang. Pt back to floor from cath lab. Chest site and Right groin site assessed with LORI Huang. Pt is 100% A paced at 60 on monitor. Pt is A&OX4, denies any chest pain or pain anywhere else. Educated pt regarding restrictions on left arm and need for bedrest for two hours. Pt agreeable and verbalized understanding.

## 2024-01-20 NOTE — CARE PLAN
The patient is Watcher - Medium risk of patient condition declining or worsening  Problem: Knowledge Deficit - Standard  Goal: Patient and family/care givers will demonstrate understanding of plan of care, disease process/condition, diagnostic tests and medications  Outcome: Progressing     Problem: Skin Integrity  Goal: Skin integrity is maintained or improved  Outcome: Progressing     Problem: Pain - Standard  Goal: Alleviation of pain or a reduction in pain to the patient’s comfort goal  Outcome: Progressing     Problem: Hemodynamics  Goal: Patient's hemodynamics, fluid balance and neurologic status will be stable or improve  Outcome: Progressing       Shift Goals  Clinical Goals: Hemodynamically stable, monitor BP, and monitor for neuro changes  Patient Goals: sleep  Family Goals: jefferson    Progress made toward(s) clinical / shift goals:

## 2024-01-20 NOTE — PROGRESS NOTES
4 Eyes Skin Assessment Completed by LORI Estrada and LORI Cordoba.    Head WDL  Ears WDL  Nose WDL  Mouth WDL  Neck WDL  Breast/Chest WDL  Shoulder Blades WDL  Spine WDL  (R) Arm/Elbow/Hand Bruising  (L) Arm/Elbow/Hand Bruising  Abdomen WDL  Groin Incision pacer wires and sheath  Scrotum/Coccyx/Buttocks Redness and Blanching  (R) Leg WDL  (L) Leg WDL  (R) Heel/Foot/Toe WDL  (L) Heel/Foot/Toe WDL          Devices In Places ECG, Blood Pressure Cuff, Pulse Ox, Pacer, Condom Cath, and Oxy Mask      Interventions In Place Pillows, Q2 Turns, and Low Air Loss Mattress    Possible Skin Injury No    Pictures Uploaded Into Epic No, needs to be completed  Wound Consult Placed N/A  RN Wound Prevention Protocol Ordered No

## 2024-01-20 NOTE — OP REPORT
[facility]     Procedure(s) Performed:   Supraventricular tachycardia ablation (atrial flutter ablation) and electrophysiology Study  ICE during diagnostics and intervention  Temporary pacing catheter    Indication(s):  Typical atrial flutter  Sinus node arrest    : Chuy Guthrie M.D.    Assistant(s): None    Anesthesia: General anesthesia with Dr. Allen    Specimen(s) Removed: None    Estimated Blood Loss:  20cc    Complications:  None    Description of Procedure:    After informed written consent, the patient was brought to the EP lab in the fasting, non-sedated state. The patient was prepped and draped in the usual sterile fashion. Femoral venous access was obtained using the modified Seldinger technique. In the right femoral vein, 3 sheaths (8,7,10 Fr) were inserted over 0.035” guidewires. A deflectable decapolar catheter was advanced to the CS position. An intracardiac echo catheter was inserted into the RA. ICE was used to visualize the CTI, catheter contact, pericardial effusion and thrombus formation. Baseline rhythm atrial flutter  msec with 4:1 AV conduction. One 8.5 Fr saline irrigated ablation catheter with 3.5mm distal electrode and location sensor for the CARTO system ([employer]) was inserted into an 8Fr sheath (RAMP) for electroanatomical 3D mapping and radiofrequency ablation along the CTI at 40W power. Terminating the atrial flutter. Block along the CTI verified with differential pacing and trans-isthmus time > 190 msec pacing from the septum. Post termination patient in sinus arrest with junctional escape rhythm CL ~1800 msec. No sinus node recovery with low dose dopamine gtt. We proceeded to insert a temporary balloon tipped pacing catheter into the RV. This was left at 70 cm. Threshold 2.5 mA. Settings at VVI 60. At the end of the procedure, the other catheter and sheaths were removed, and hemostasis was achieved by manual compression and Vascade MVP closure device.  Following recovery from anesthesia, the patient was transferred to the PACU in good condition.    Baseline Rhythm:   Atrial flutter  msec    Intervals:  HV 63 msec  AVBCL 440 msec.     Arrhythmias:  1. Sustained typical atrial flutter,  ms with 4:1 AV conduction.    Mapping:  Electroanatomical 3D (CARTO FAM) map of CS and right atrium around the cavotricuspid isthmus was created during CS pacing.    Ablation:  Radiofrequency energy was applied using 3.5 mmsaline irrigated catheter, power 40 W, tota l14 RF applications, RF time 417 seconds to create a cavotricuspid isthmus line between the tricuspid annulus and Eustachian ridge/inferior vena cava to produce bidirectional isthmus conduction block.    Post Ablation Testin. No inducible atrial flutter with CS pacing post ablation.  2. Trans-isthmus conduction time pacing from proximal  msec.  3. Trans-isthmus conduction time pacing from lateral to the isthmus line 190 msec.    Fluoroscopy Time: 3.3 minutes    Impressions/Plan:   1. Typical right atrial flutter.  2. Successful catheter mediated ablation of right atrial flutter.  3. Sinus arrest and insertion of temporary RV balloon tipped pacing catheter  4. Transfer to [facility] bed, stop diltiazem, if sinus function has not recovered, PPM.

## 2024-01-20 NOTE — OP REPORT
PROCEDURE PERFORMED: Permanent Pacemaker Implantation    DATE OF SERVICE: 1/20/2024    : Chuy Guthrie MD    ASSISTANT: None    ANESTHESIA: Local and moderate sedation    EBL: 30 cc    SPECIMENS: None    INDICATION(S):  Sick sinus syndrome  AFL  Mobitz I AV block    DESCRIPTION OF PROCEDURE:  After informed written consent, the patient was brought to the electrophysiology lab in the fasting, unsedated state. The patient was prepped and draped in the usual sterile fashion. The procedure was performed under moderate sedation with local anesthetic. A left upper extremity venogram was performed, demonstrating a patent subclavian vein. A left infraclavicular incision was made with a scalpel and the pectoral device pocket was created using a combination of blunt dissection and electrocautery. The modified Seldinger technique was used to gain access to the left axillary vein. A peel-away hemostasis sheath was placed in the vein. Under fluoroscopic guidance, the pacemaker leads were introduced into the heart. The ventricular lead was advanced to the RVOT and then lowered into position at the RV apex. The atrial lead was positioned on R atrial appendage. The leads were tested and had satisfactory sensing and pacing parameters. High output ventricular pacing did not produce extracardiac stimulation. The leads were sutured to the underlying pectoral muscle with interrupted silk over a silastic suture sleeve. The device pocket was irrigated with antibiotic solution, inspected, and no bleeding was seen. The leads were connected to the pacemaker pulse generator and the device was inserted into the pocket. The wound was closed with three layers of absorbable sutures. The temporary pacing catheter was removed under fluroscopy. 7 Fr access sheath in the R femoral vein closed with Vascade MVP closure device. Following recovery from sedation, the patient was transferred to a monitored bed in good condition.     IMPLANTED DEVICE  INFORMATION:  Pulse generator is a MDT model W1DR)1  Serial # [id number]    LEAD INFORMATION:  1)Right atrial lead is a MDT model #5076-52 , serial #[id number] ,P wave 0 millivolts, threshold 0.75 Volts at 0.4 milliseconds, pacing impedance 0.4 Ohms.    2)Right ventricular lead is a MDT model #5076-58 , serial #[id number] ,R wave 0 millivolts, threshold 1.0 Volts at 0.4 milliseconds, pacing impedance 608 Ohms.    DEVICE PROGRAMMING:  DDDR 60 -120 ppm    FLUOROSCOPY TIME: 3 minutes    IMPRESSIONS:  1. Successful dual chamber pacemaker implantation    RECOMMENDATIONS:  1. Transfer to monitored bed, bed rest 2 hours  2. Chest x-ray  3. Device interrogation prior to hospital discharge  4. Followup in device clinic

## 2024-01-20 NOTE — DISCHARGE PLANNING
Patient transferred to [facility] ( 637) [facility] packet given to current CM. Per note dated 1/19 @ 17:44 by ER KERRI RUBI has been cancelled.

## 2024-01-20 NOTE — ANESTHESIA PROCEDURE NOTES
Airway    Date/Time: 1/19/2024 4:33 PM    Performed by: Elmer Allen M.D.  Authorized by: Elmer Allen M.D.    Location:  OR  Urgency:  Elective  Indications for Airway Management:  Anesthesia      Spontaneous Ventilation: absent    Sedation Level:  Deep  Preoxygenated: Yes    Patient Position:  Sniffing  Mask Difficulty Assessment:  1 - vent by mask  Final Airway Type:  Endotracheal airway  Final Endotracheal Airway:  ETT  Cuffed: Yes    Technique Used for Successful ETT Placement:  Direct laryngoscopy    Insertion Site:  Oral  Blade Type:  Ford  Laryngoscope Blade/Videolaryngoscope Blade Size:  2  ETT Size (mm):  7.0  Measured from:  Teeth  ETT to Teeth (cm):  22  Placement Verified by: auscultation and capnometry    Cormack-Lehane Classification:  Grade IIa - partial view of glottis  Number of Attempts at Approach:  1

## 2024-01-20 NOTE — ANESTHESIA POSTPROCEDURE EVALUATION
Patient: Sulaiman Ceja    Procedure Summary       Date: 01/19/24 Room / Location: [facility] Cath Lab [facility]    Anesthesia Start: 1617 Anesthesia Stop: 1808    Procedure: CL-EP ABLATION ATRIAL FLUTTER Diagnosis:             Chest pain      Chronic HFrEF (heart failure with reduced ejection fraction) (HCC)      Syncope and collapse            Chest pain      Chronic HFrEF (heart failure with reduced ejection fraction) (LT[facility], located within [facility])      Syncope and collapse      (See Assoicated Dx)    Scheduled Providers: Chuy Guthrie M.D.; Elmer Allen M.D. Responsible Provider: Elmer Allen M.D.    Anesthesia Type: general ASA Status: 3            Final Anesthesia Type: general  Last vitals  BP   Blood Pressure : 139/60    Temp   37 °C (98.6 °F)    Pulse   61   Resp   16    SpO2   97 %      Anesthesia Post Evaluation    Patient location during evaluation: PACU  Patient participation: complete - patient participated  Level of consciousness: awake and alert  Pain score: 0    Airway patency: patent  Anesthetic complications: no  Cardiovascular status: hemodynamically stable  Respiratory status: acceptable  Hydration status: euvolemic  Comments: Recovered for 30 in cath lab. ,. Otr AWAKE AND STABLE. Awaiting step down bed. Cath lab nurse will monitor and transport to step down.      PONV: none          No notable events documented.     Nurse Pain Score: 0 (NPRS)

## 2024-01-20 NOTE — PROGRESS NOTES
Monitor summary    100% paced  Rate: 60   Output:22  Sensitivity: 2    Pt had several episode of mechanical capture issues with rates in 20s to 40s. Pacer setting adjusted to achieve 100% pacing rate of 60.

## 2024-01-20 NOTE — ANESTHESIA TIME REPORT
Anesthesia Start and Stop Event Times       Date Time Event    1/19/2024 1059 Ready for Procedure     1617 Anesthesia Start     1808 Anesthesia Stop          Responsible Staff  01/19/24      Name Role Begin End    Elmer Allen M.D. Anesth 1617 1808          Overtime Reason:  no overtime (within assigned shift)    Comments:

## 2024-01-20 NOTE — PROGRESS NOTES
L Large arm sling w shoulder immobilizer fit to pt. Pt tolerating dme well at this time    Contact traction for any questions or concerns regarding this DME.

## 2024-01-20 NOTE — PROGRESS NOTES
IMCU bed requested by Dr. Guthrie for temp pacer.  Patient underwent aflutter ablation today, but didn't have sinus activity so a temp pacer was placed.  IMCU order placed. I notified Dr. Agarwal.  Hospitalist service to continue care.  Don't hesitate to consult critical care if consultation is needed.

## 2024-01-20 NOTE — ANESTHESIA PROCEDURE NOTES
PRATIMA    Date/Time: 1/19/2024 4:36 PM    Performed by: Elmer Allen M.D.  Authorized by: Elmer Allen M.D.    Start Time:1/19/2024 4:36 PM  Preanesthetic Checklist: patient identified, IV checked, site marked, risks and benefits discussed, surgical consent, monitors and equipment checked, pre-op evaluation and timeout performed    Indication for PRATIMA: diagnostic   Patient Location: OR  Intubated: Yes  Bite Block: Yes  Heart Visualized: Yes  Insertion: easy    **See FULL PRATIMA report in patient's chart via CV Synapse**

## 2024-01-20 NOTE — PROGRESS NOTES
0713: While verifying pacer length and sheath site with night shift RN, this RN noticed that the pacer length was a little below 60 cm, when it is supposed to be at 70 cm. Dr. Guthrie notified. Order placed to put pt NPO for pacemaker insertion by Dr. Guthrie. Pt NPO since midnight and has only had sips with meds.    0730: Update LEE ANN Jain

## 2024-01-20 NOTE — PROGRESS NOTES
1418: Contacted LEE ANN Jain regarding pts pacer site. There is a small amount of blood in pt dressing that was present before but has now spread out a little. There is no bruising around the site. Unable to tell if there is actual swelling underneath the site because of dressing in place. Per LEE ANN Jain, to just monitor the site.

## 2024-01-20 NOTE — PROGRESS NOTES
1810: Report received from LORI Asher.    1845: Pt to T637 from Cathlab. Pt came with temporary transvenous pacer. Pacer settings: HR-60, Output-5, Sensitivity-2 verified with LORI Asher at bedside. Site assessed with LORI Asher. Site CDI. Pulse 2+. Pt is 100% Paced at 60.     1850: [facility]. Verified Pacer settings with LORI Estrada. LORI Estrada contacteed EP Cardiology to verify pacer settings.

## 2024-01-20 NOTE — PROGRESS NOTES
2026: Charge RN aware of mechanical capture issues rate in the 30s.   2030: Pacer setting increased to output of 20 to achieve 100% pacing rate of 60. VSS, BP stable. Pt asymptomatic.   2052: On call cardiology, , notified of pt HR decreasing into the 30s and 40s occasionally into the 20s related to failure of capture, HR not sustaining, pt asymptomatic, and BP stable.  2059:  notified of pacer setting increased to 22 output to achieve 100% capture pacing rate of 60.     Pacer paused for 4 seconds to check underlying rhythm, underlying rhythm showed no sinus beats. Per MD bradycardia episodes okay, continue to monitor BP and LOC, current pacer setting okay.   Current pacer setting-  Rate: 60  Output: 22   Sensitivity: 2.5

## 2024-01-21 ENCOUNTER — APPOINTMENT (OUTPATIENT)
Dept: RADIOLOGY | Facility: MEDICAL CENTER | Age: 74
DRG: 243 | End: 2024-01-21
Attending: NURSE PRACTITIONER
Payer: MEDICARE

## 2024-01-21 LAB
ALBUMIN SERPL BCP-MCNC: 3.6 G/DL (ref 3.2–4.9)
ALBUMIN/GLOB SERPL: 1.2 G/DL
ALP SERPL-CCNC: 89 U/L (ref 30–99)
ALT SERPL-CCNC: 8 U/L (ref 2–50)
ANION GAP SERPL CALC-SCNC: 12 MMOL/L (ref 7–16)
AST SERPL-CCNC: 19 U/L (ref 12–45)
BILIRUB SERPL-MCNC: 0.3 MG/DL (ref 0.1–1.5)
BUN SERPL-MCNC: 33 MG/DL (ref 8–22)
CALCIUM ALBUM COR SERPL-MCNC: 9.1 MG/DL (ref 8.5–10.5)
CALCIUM SERPL-MCNC: 8.8 MG/DL (ref 8.5–10.5)
CHLORIDE SERPL-SCNC: 107 MMOL/L (ref 96–112)
CO2 SERPL-SCNC: 17 MMOL/L (ref 20–33)
CREAT SERPL-MCNC: 1.42 MG/DL (ref 0.5–1.4)
EKG IMPRESSION: NORMAL
ERYTHROCYTE [DISTWIDTH] IN BLOOD BY AUTOMATED COUNT: 47.4 FL (ref 35.9–50)
GFR SERPLBLD CREATININE-BSD FMLA CKD-EPI: 52 ML/MIN/1.73 M 2
GLOBULIN SER CALC-MCNC: 2.9 G/DL (ref 1.9–3.5)
GLUCOSE BLD STRIP.AUTO-MCNC: 140 MG/DL (ref 65–99)
GLUCOSE BLD STRIP.AUTO-MCNC: 147 MG/DL (ref 65–99)
GLUCOSE BLD STRIP.AUTO-MCNC: 171 MG/DL (ref 65–99)
GLUCOSE BLD STRIP.AUTO-MCNC: 214 MG/DL (ref 65–99)
GLUCOSE SERPL-MCNC: 221 MG/DL (ref 65–99)
HCT VFR BLD AUTO: 40.1 % (ref 42–52)
HGB BLD-MCNC: 13.7 G/DL (ref 14–18)
MAGNESIUM SERPL-MCNC: 1.8 MG/DL (ref 1.5–2.5)
MCH RBC QN AUTO: 30 PG (ref 27–33)
MCHC RBC AUTO-ENTMCNC: 34.2 G/DL (ref 32.3–36.5)
MCV RBC AUTO: 87.7 FL (ref 81.4–97.8)
PHOSPHATE SERPL-MCNC: 2.3 MG/DL (ref 2.5–4.5)
PLATELET # BLD AUTO: 210 K/UL (ref 164–446)
PMV BLD AUTO: 11.5 FL (ref 9–12.9)
POTASSIUM SERPL-SCNC: 4.2 MMOL/L (ref 3.6–5.5)
PROT SERPL-MCNC: 6.5 G/DL (ref 6–8.2)
RBC # BLD AUTO: 4.57 M/UL (ref 4.7–6.1)
SODIUM SERPL-SCNC: 136 MMOL/L (ref 135–145)
WBC # BLD AUTO: 11.8 K/UL (ref 4.8–10.8)

## 2024-01-21 PROCEDURE — 71045 X-RAY EXAM CHEST 1 VIEW: CPT

## 2024-01-21 PROCEDURE — A9270 NON-COVERED ITEM OR SERVICE: HCPCS | Performed by: STUDENT IN AN ORGANIZED HEALTH CARE EDUCATION/TRAINING PROGRAM

## 2024-01-21 PROCEDURE — 93005 ELECTROCARDIOGRAM TRACING: CPT | Performed by: NURSE PRACTITIONER

## 2024-01-21 PROCEDURE — 700111 HCHG RX REV CODE 636 W/ 250 OVERRIDE (IP): Mod: JZ,JG | Performed by: NURSE PRACTITIONER

## 2024-01-21 PROCEDURE — A9270 NON-COVERED ITEM OR SERVICE: HCPCS | Performed by: HOSPITALIST

## 2024-01-21 PROCEDURE — 85027 COMPLETE CBC AUTOMATED: CPT

## 2024-01-21 PROCEDURE — 700102 HCHG RX REV CODE 250 W/ 637 OVERRIDE(OP): Performed by: STUDENT IN AN ORGANIZED HEALTH CARE EDUCATION/TRAINING PROGRAM

## 2024-01-21 PROCEDURE — 700102 HCHG RX REV CODE 250 W/ 637 OVERRIDE(OP): Performed by: FAMILY MEDICINE

## 2024-01-21 PROCEDURE — 82962 GLUCOSE BLOOD TEST: CPT | Mod: 91

## 2024-01-21 PROCEDURE — 99233 SBSQ HOSP IP/OBS HIGH 50: CPT | Performed by: INTERNAL MEDICINE

## 2024-01-21 PROCEDURE — 700102 HCHG RX REV CODE 250 W/ 637 OVERRIDE(OP): Performed by: HOSPITALIST

## 2024-01-21 PROCEDURE — 93010 ELECTROCARDIOGRAM REPORT: CPT | Performed by: INTERNAL MEDICINE

## 2024-01-21 PROCEDURE — 770020 HCHG ROOM/CARE - TELE (206)

## 2024-01-21 PROCEDURE — 83735 ASSAY OF MAGNESIUM: CPT

## 2024-01-21 PROCEDURE — 84100 ASSAY OF PHOSPHORUS: CPT

## 2024-01-21 PROCEDURE — A9270 NON-COVERED ITEM OR SERVICE: HCPCS | Performed by: FAMILY MEDICINE

## 2024-01-21 PROCEDURE — 80053 COMPREHEN METABOLIC PANEL: CPT

## 2024-01-21 PROCEDURE — 700102 HCHG RX REV CODE 250 W/ 637 OVERRIDE(OP): Performed by: NURSE PRACTITIONER

## 2024-01-21 PROCEDURE — A9270 NON-COVERED ITEM OR SERVICE: HCPCS | Performed by: NURSE PRACTITIONER

## 2024-01-21 RX ORDER — MORPHINE SULFATE 4 MG/ML
2 INJECTION INTRAVENOUS ONCE
Status: COMPLETED | OUTPATIENT
Start: 2024-01-21 | End: 2024-01-21

## 2024-01-21 RX ORDER — ATORVASTATIN CALCIUM 20 MG/1
20 TABLET, FILM COATED ORAL EVERY EVENING
Status: DISCONTINUED | OUTPATIENT
Start: 2024-01-21 | End: 2024-01-22 | Stop reason: HOSPADM

## 2024-01-21 RX ORDER — HYDRALAZINE HYDROCHLORIDE 20 MG/ML
10 INJECTION INTRAMUSCULAR; INTRAVENOUS ONCE
Status: COMPLETED | OUTPATIENT
Start: 2024-01-21 | End: 2024-01-21

## 2024-01-21 RX ORDER — MORPHINE SULFATE 4 MG/ML
4 INJECTION INTRAVENOUS ONCE
Status: DISCONTINUED | OUTPATIENT
Start: 2024-01-21 | End: 2024-01-21

## 2024-01-21 RX ADMIN — ATORVASTATIN CALCIUM 20 MG: 20 TABLET, FILM COATED ORAL at 17:38

## 2024-01-21 RX ADMIN — MEMANTINE HYDROCHLORIDE 5 MG: 10 TABLET ORAL at 05:08

## 2024-01-21 RX ADMIN — AMLODIPINE BESYLATE 10 MG: 10 TABLET ORAL at 05:09

## 2024-01-21 RX ADMIN — INSULIN HUMAN 2 UNITS: 100 INJECTION, SOLUTION PARENTERAL at 12:33

## 2024-01-21 RX ADMIN — ASPIRIN 81 MG: 81 TABLET, COATED ORAL at 05:07

## 2024-01-21 RX ADMIN — HYDRALAZINE HYDROCHLORIDE 10 MG: 20 INJECTION, SOLUTION INTRAMUSCULAR; INTRAVENOUS at 01:43

## 2024-01-21 RX ADMIN — HYDRALAZINE HYDROCHLORIDE 25 MG: 25 TABLET ORAL at 05:08

## 2024-01-21 RX ADMIN — LEVOTHYROXINE SODIUM 112 MCG: 0.11 TABLET ORAL at 05:08

## 2024-01-21 RX ADMIN — ISOSORBIDE MONONITRATE 60 MG: 60 TABLET, EXTENDED RELEASE ORAL at 05:09

## 2024-01-21 RX ADMIN — HYDRALAZINE HYDROCHLORIDE 25 MG: 25 TABLET ORAL at 23:15

## 2024-01-21 RX ADMIN — APIXABAN 5 MG: 5 TABLET, FILM COATED ORAL at 17:39

## 2024-01-21 RX ADMIN — ACETAMINOPHEN 650 MG: 325 TABLET, FILM COATED ORAL at 12:33

## 2024-01-21 RX ADMIN — ACETAMINOPHEN 650 MG: 325 TABLET, FILM COATED ORAL at 23:15

## 2024-01-21 RX ADMIN — MEMANTINE HYDROCHLORIDE 5 MG: 10 TABLET ORAL at 17:38

## 2024-01-21 RX ADMIN — HYDRALAZINE HYDROCHLORIDE 25 MG: 25 TABLET ORAL at 14:31

## 2024-01-21 RX ADMIN — TIOTROPIUM BROMIDE INHALATION SPRAY 5 MCG: 3.12 SPRAY, METERED RESPIRATORY (INHALATION) at 05:54

## 2024-01-21 RX ADMIN — ACETAMINOPHEN 650 MG: 325 TABLET, FILM COATED ORAL at 17:39

## 2024-01-21 RX ADMIN — GABAPENTIN 800 MG: 400 CAPSULE ORAL at 05:08

## 2024-01-21 RX ADMIN — INSULIN HUMAN 1 UNITS: 100 INJECTION, SOLUTION PARENTERAL at 08:22

## 2024-01-21 RX ADMIN — ACETAMINOPHEN 650 MG: 325 TABLET, FILM COATED ORAL at 05:09

## 2024-01-21 RX ADMIN — GABAPENTIN 800 MG: 400 CAPSULE ORAL at 17:38

## 2024-01-21 RX ADMIN — APIXABAN 5 MG: 5 TABLET, FILM COATED ORAL at 05:09

## 2024-01-21 RX ADMIN — MORPHINE SULFATE 2 MG: 4 INJECTION, SOLUTION INTRAMUSCULAR; INTRAVENOUS at 02:43

## 2024-01-21 RX ADMIN — LISINOPRIL 20 MG: 20 TABLET ORAL at 05:10

## 2024-01-21 RX ADMIN — DOCUSATE SODIUM 50 MG AND SENNOSIDES 8.6 MG 2 TABLET: 8.6; 5 TABLET, FILM COATED ORAL at 17:38

## 2024-01-21 RX ADMIN — INSULIN GLARGINE-YFGN 10 UNITS: 100 INJECTION, SOLUTION SUBCUTANEOUS at 08:22

## 2024-01-21 RX ADMIN — ESCITALOPRAM OXALATE 10 MG: 10 TABLET ORAL at 05:09

## 2024-01-21 RX ADMIN — DOCUSATE SODIUM 50 MG AND SENNOSIDES 8.6 MG 2 TABLET: 8.6; 5 TABLET, FILM COATED ORAL at 05:07

## 2024-01-21 ASSESSMENT — ENCOUNTER SYMPTOMS
BACK PAIN: 0
NAUSEA: 0
SENSORY CHANGE: 0
BLURRED VISION: 0
PALPITATIONS: 0
NECK PAIN: 0
TINGLING: 0
EYE PAIN: 0
WEIGHT LOSS: 0
DOUBLE VISION: 0
PHOTOPHOBIA: 0
HEADACHES: 0
MYALGIAS: 0
VOMITING: 0
HALLUCINATIONS: 0
CONFUSION: 1
DIARRHEA: 0
CONSTIPATION: 0
CHEST TIGHTNESS: 0
SPEECH CHANGE: 0
FEVER: 0
FOCAL WEAKNESS: 0
SPUTUM PRODUCTION: 0
WEAKNESS: 1
SHORTNESS OF BREATH: 0
TREMORS: 0
ABDOMINAL PAIN: 0
COUGH: 0
DIZZINESS: 0
CHILLS: 0
ORTHOPNEA: 0

## 2024-01-21 ASSESSMENT — FIBROSIS 4 INDEX
FIB4 SCORE: 1.16
FIB4 SCORE: 1.16

## 2024-01-21 ASSESSMENT — PAIN DESCRIPTION - PAIN TYPE: TYPE: ACUTE PAIN

## 2024-01-21 ASSESSMENT — LIFESTYLE VARIABLES: SUBSTANCE_ABUSE: 0

## 2024-01-21 NOTE — CARE PLAN
The patient is Watcher - Medium risk of patient condition declining or worsening    Shift Goals  Clinical Goals: hemodynamic stability, pacemaker placement  Patient Goals: rest  Family Goals: updates    Progress made toward(s) clinical / shift goals:       Problem: Knowledge Deficit - Standard  Goal: Patient and family/care givers will demonstrate understanding of plan of care, disease process/condition, diagnostic tests and medications  Outcome: Progressing     Problem: Skin Integrity  Goal: Skin integrity is maintained or improved  Outcome: Progressing     Problem: Pain - Standard  Goal: Alleviation of pain or a reduction in pain to the patient’s comfort goal  Outcome: Progressing     Problem: Hemodynamics  Goal: Patient's hemodynamics, fluid balance and neurologic status will be stable or improve  Outcome: Progressing

## 2024-01-21 NOTE — PROGRESS NOTES
"Hospital Medicine Daily Progress Note    Date of Service  1/15/2024    Chief Complaint  Sulaiman Ceja is a 73 y.o. male admitted 1/4/2024 with chest pain    Hospital Course    As per Dr. Agarwal note  \"Admitted with chest pain, EKG showed Atrial flutter, troponins were elevated.  Nuclear stress test was negative. For the Atrial flutter, patient was started on anticoagulation with Eliquis and on Cardizem for rate control.  Echocardiogram was done which showed ejection fraction of 65%.  Patient's symptoms of chest pain resolved.    Atrial flutter - rate controlled  Syncope 1/8 - found in bathroom, sitting on the toilet with altered mental status after he had a bowel movement, rapid response called, patient brought to bed and became more alert and oriented right away   MILTON on CKD - improved  Heart rate A-fib a flutter on monitor, no chest pain or dyspnea.  Negative stress test this admission.  Ambulating without tachycardia.   Discussed with cardiology, they will arrange an outpatient EP consultation for the patient for this upcoming week given stability on PO medication.   Discussed with daughter and tried to call assisted living/SNF he is a resident of at the request of his daughter, left a message regarding plan and attempted to answer their concerns.  Normal HR upper 50's-60's per daughter.  Tolerating PO intake. Medically cleared for discharge to SNF.    1/15  Has been cleared for DC to SNF, developed mild hematuria, UA not infectious, no clots or obstruction. No pain. He was concerned he had an infection due to the blood. He has an internal device he uses to help urinate. Bladder scan ordered to be sure no retention, his renal function fluctuates Scr baseline looks to be 1.4-6, today was 2. Has been up and down this admission, h/o hydronephrosis so if retention noted would get US.  Encouraged PO intake, held ACEi, may need small bolus, recheck in AM.   Medically cleared for discharge to SNF. Tolerating PO intake. No " "complaints.  1/16:Patient seen and examined, afebrile, resting in bed, no overnight events, needs placement   1/17: Patient seen and examined, resting in bed, afebrile, having hypoglycemic episodes so will decrease his lantus dose to 10 units for now   Close monitoring his blood sugars and adjust as needed    1/18 Patient seen and examined he was taken to outpt appt with outpt cardiology while pt admitted inpatient today  Appt was canceled, however, prep ecg showed atrial flutter, rate controled.  Pt was redirected back to inpatient service.I did discuss case with  Dr Guthrie and he will arrange for ablation possible tomorrow   Appreciate rec.   1/19: Patient seen and examined, resting in bed, cardiology following appreciate rec.  Going for ablation  today   Close monitoring on tele \"    Patient transferred to [facility] for temporary pacer.  Patient remained permanent pacemaker placement on January 20, 2024 by cardiology.    Interval Problem Update    I evaluated and examined him at the bedside.  He underwent pacemaker placement.  I discussed plan of care with him.  His blood pressure has been running high.  Now renal function has improved I resume her is lisinopril.  I discussed plan of care with him and answered all his questions.    I have discussed this patient's plan of care and discharge plan at IDT rounds today with Case Management, Nursing, Nursing leadership, and other members of the IDT team.    Consultants/Specialty  None    Code Status  Full Code    Disposition  Medically Cleared  I have placed the appropriate orders for post-discharge needs.    Review of Systems  Review of Systems   Constitutional:  Positive for malaise/fatigue. Negative for chills, fever and weight loss.   HENT:  Negative for hearing loss and tinnitus.    Eyes:  Negative for blurred vision, double vision, photophobia and pain.   Respiratory:  Negative for cough, sputum production and shortness of breath.    Cardiovascular:  Negative for chest " pain, palpitations, orthopnea and leg swelling.   Gastrointestinal:  Negative for abdominal pain, constipation, diarrhea, nausea and vomiting.   Genitourinary:  Negative for dysuria, frequency and urgency.   Musculoskeletal:  Negative for back pain, joint pain, myalgias and neck pain.   Skin:  Negative for rash.   Neurological:  Positive for weakness. Negative for dizziness, tingling, tremors, sensory change, speech change, focal weakness and headaches.   Psychiatric/Behavioral:  Negative for hallucinations and substance abuse.    All other systems reviewed and are negative.       Physical Exam  Temp:  [36.2 °C (97.2 °F)-36.5 °C (97.7 °F)] 36.3 °C (97.4 °F)  Pulse:  [41-62] 60  Resp:  [13-30] 16  BP: (108-180)/(53-85) 180/83  SpO2:  [91 %-99 %] 95 %    Physical Exam  Vitals reviewed.   Constitutional:       General: He is not in acute distress.  HENT:      Head: Normocephalic and atraumatic. No contusion.      Nose: Nose normal.      Mouth/Throat:      Pharynx: No oropharyngeal exudate.   Eyes:      Pupils: Pupils are equal, round, and reactive to light.   Cardiovascular:      Rate and Rhythm: Normal rate and regular rhythm.      Comments: Pacemaker anterior chest wall  Pulmonary:      Effort: Pulmonary effort is normal.      Breath sounds: No wheezing or rhonchi.   Abdominal:      General: Bowel sounds are normal.      Palpations: Abdomen is soft.   Musculoskeletal:         General: Normal range of motion.      Cervical back: No rigidity. No muscular tenderness.   Skin:     General: Skin is warm and dry.      Coloration: Skin is not jaundiced.   Neurological:      General: No focal deficit present.      Mental Status: He is alert and oriented to person, place, and time.      Cranial Nerves: No cranial nerve deficit.      Comments: He is following commands and moving all his extremities   Psychiatric:         Mood and Affect: Mood normal.         Fluids    Intake/Output Summary (Last 24 hours) at 1/20/2024  1907  Last data filed at 1/20/2024 1700  Gross per 24 hour   Intake 980 ml   Output 1300 ml   Net -320 ml         Laboratory  Recent Labs     01/19/24  0242 01/20/24  0243   WBC 7.8 8.5   RBC 4.62* 4.13*   HEMOGLOBIN 13.9* 12.2*   HEMATOCRIT 39.9* 36.4*   MCV 86.4 88.1   MCH 30.1 29.5   MCHC 34.8 33.5   RDW 47.3 47.1   PLATELETCT 234 214   MPV 11.5 11.6       Recent Labs     01/19/24  0242 01/20/24  0243   SODIUM 140 136   POTASSIUM 4.0 4.2   CHLORIDE 111 108   CO2 16* 18*   GLUCOSE 107* 191*   BUN 34* 34*   CREATININE 1.52* 1.39   CALCIUM 8.8 8.3*                     Imaging  DX-CHEST-PORTABLE (1 VIEW)   Final Result      Pacer leads overlie the right atrium and ventricle. No pneumothorax.      EC-PRATIMA W/O CONT   Final Result      EC-ECHOCARDIOGRAM COMPLETE W/ CONT   Final Result      NM-CARDIAC STRESS TEST   Final Result      DX-CHEST-PORTABLE (1 VIEW)   Final Result      Cardiomegaly.      No definite acute abnormality. See details above.      CL-EP ABLATION ATRIAL FLUTTER    (Results Pending)   CL-PERMANENT PACEMAKER INSERTION    (Results Pending)        Assessment/Plan  * Coronary artery disease of bypass graft of native heart with stable angina pectoris (HCC)- (present on admission)  Assessment & Plan  Lipitor, Imdur, ASA    Sick sinus syndrome (HCC)  Assessment & Plan  Patient was on temporary pacer this morning and later this morning he underwent permanent pacemaker placement.  Continue monitor closely in IMCU.      Acute kidney injury superimposed on chronic kidney disease (HCC)  Assessment & Plan  Has been cleared for DC to SNF, developed mild hematuria, UA not infectious, no clots or obstruction. No pain. He was concerned he had an infection due to the blood.   He has an internal device he uses to help urinate. Bladder scan ordered to be sure no retention, his renal function fluctuates Scr baseline looks to be 1.4-6, today was 2. Has been up and down this admission, h/o hydronephrosis so if retention noted  would get US.    Renal function has been improving.  I resume lisinopril due to elevated blood pressure.      Syncope and collapse  Assessment & Plan  Continue to monitor closely in IMCU.  Status post pacemaker placement.    Hypomagnesemia- (present on admission)  Assessment & Plan  Continue to monitor and replace as needed.    Stage 3a chronic kidney disease (HCC)- (present on admission)  Assessment & Plan  Renal function has significantly improved  I resume lisinopril.    Atrial flutter (HCC)- (present on admission)  Assessment & Plan  Diltiazem, Eliquis  May need to consult cardiology -possible cardioversion    1/15 Discussed with cardiology, they will arrange an outpatient EP consultation for the patient for this upcoming week given stability on PO medication.    01/20/24 s/p pacemaker placement.    Acquired hypothyroidism- (present on admission)  Assessment & Plan  Levothyroxine    COPD (chronic obstructive pulmonary disease) ([facility])- (present on admission)  Assessment & Plan  Spiriva  RT protocol    Cognitive impairment- (present on admission)  Assessment & Plan  Namenda   Avoid Benzodiazepines, Anticholinergics, Opiates  Frequent orientation  Update Board daily  Open window blinds during the day  Avoid naps during the day  Family at bedside whenever possible  Ensure adequate sleep at night - use Melatonin preferably  Avoid early morning labs  Avoid vital signs during sleep  Ambulate if possible  Provide adequate pain control  Monitor for urinary retention  Prevent and manage constipation  Discontinue IVs, Catheters, Tubes, Lines, Cardiac monitors, SCDs if possible     Primary hypertension- (present on admission)  Assessment & Plan  Diltiazem, Lisinopril, Norvasc  Continue IV hydralazine as needed with parameters.    Type 2 diabetes mellitus with kidney complication, with long-term current use of insulin ([facility])- (present on admission)  Assessment & Plan  I am continue insulin glargine and insulin sliding scale  with hypoglycemia protocol.      I discussed plan of care during multidisciplinary rounds regarding patient's current medical condition and plan of care.      >51 minutes total time spent in records review, lab/radiology assessment, patient history and assessment, and directing plan of care with high level medical decision making complexity. See orders.    VTE prophylaxis:  eliquis       I have performed a physical exam and reviewed and updated ROS and Plan today (1/15/2024). In review of yesterday's note (1/14/2024), there are no changes except as documented above.

## 2024-01-21 NOTE — DISCHARGE INSTRUCTIONS
Pacemaker Discharge Instructions/[facility]  1.  No showers for one week; may take sponge bath.  Keep dressing dry & in place until seen at for you follow up visit at the cardiology office.   Report s/s of infection such as warmth/redness/drainage/swelling at site or fever/chills.  2.  No lifting over 10 lbs for six weeks.  3.  Do not raise affected arm above shoulder level or behind head for six weeks.  4.  Avoid excessive pushing, pulling, or twisting for six weeks.  5.  No driving for the first week.  6.  Call our office ([phone]) if you notice any increased swelling, redness, warmth, or drainage at the implant site.  7.  Call our office if you develop fever > 101F or uncontrolled pain.  8.  No MRI's for 6 weeks if you have a MRI compatible device.  9.  No dental work or cleanings for 3 months.  10.  May remove arm sling after one day, but please wear if you have trouble remembering to keep your arm down.  11. Do not place cell phones or mobile devices directly over implanted device.   13. Patient has a remote monitor that is connected to PPM.  When he arrives at facility, the remote monitor needs to be placed by his bedside and plugged in.  It is to remain plugged in at bedside at all times, as remote monitor will download Pacemaker data at night to [facility].     PACEMAKER / AICD    DISCHARGE INSTRUCTIONS      WOUND CARE:    No Showers for one week, you may take a sponge bath.  Keep your dressing dry.  No submerging in bath tub, hot tub, swimming, etc. for six weeks.  Leave your dressing in place until your follow up appointment.    No lifting over 5-10 pounds for six weeks; this applies to affected side only.  Do not raise affected arm above shoulder level for 4-6 weeks.  Avoid excessive pushing, pulling, or twisting for six weeks; this applies to affected side only.  Call your doctor’s office if you notice any increased swelling, redness, or any drainage at the incision site.  Also  notify your doctor if you develop a fever.  The Contact Center is open Monday through Friday 7AM to 5PM and may speak to a nurse at [phone], or toll free at [phone].  Seven to ten days after implant, your cardiologist’s office will schedule a visit for you with a nurse to check your incision site.  The nurse will remove your original dressing at this time.  If you have an AICD, you will be enrolled in an AICD clinic.  For AICD’s - you should be checked every three months or as determined by your cardiologist.    Do not drive until you have been cleared to do so by your cardiologist.  Call [phone] if you develop problems with breathing or chest pain.        FOR PROBLEMS CALL  AT: [phone]

## 2024-01-21 NOTE — PROGRESS NOTES
"Cardiology Follow Up Progress Note    Date of Service  1/21/2024    Attending Physician  Mayela Agarwal M.D.    Chief Complaint   AFL    HPI  Sulaiman Ceja is a 73 y.o. male admitted 1/4/2024 with per Dr. Guthrie, \"History of CAD s/p prior CABG. Lives in [facility]. Presented with chest discomfort found to be in typical atrial flutter. R/o'd for MI. Initially with syncopal episode, setting was when he was in the bathroom, unclear whether there was significant enrique. Without negro agents here with intermittent SVR suggestive of higher grade AV block. No acute complaints today.     Yesterday was wheeled over to the outpatient clinic to see me for flutter. I sent him back to the floor as he was still admitted. I discussed potential flutter ablation + possible ILR insertion given syncope and concerns for AV block.\"    Interim Events  1/19/2024: AFL ablation with Dr. Guthrie. Sinus Node arrest. Temporary PPM placed  1/20/2024: PPM placed  1/21/2024: Non-cardiac chest pain overnight. Tele monitoring personally interpreted by me shows 100% paced. VSS; 4L; Daily weight 85.2 kg; - 1.4 L UOP yesterday. Labs reviewed, notable for GFR 52; Crt 1.42.     Review of Systems  Review of Systems   Respiratory:  Negative for chest tightness and shortness of breath.    Cardiovascular:  Negative for chest pain, palpitations and leg swelling.   Psychiatric/Behavioral:  Positive for confusion.        Vital signs in last 24 hours  Temp:  [36.2 °C (97.2 °F)-37.3 °C (99.1 °F)] 36.6 °C (97.9 °F)  Pulse:  [55-89] 70  Resp:  [15-25] 15  BP: (130-195)/(55-85) 130/55  SpO2:  [91 %-99 %] 92 %    Physical Exam  Physical Exam  Vitals and nursing note reviewed.   Constitutional:       General: He is not in acute distress.  Cardiovascular:      Rate and Rhythm: Normal rate and regular rhythm.      Pulses: Normal pulses.      Heart sounds: Normal heart sounds.      Comments: PPM site dressed. Old drainage. CDI, no erythema or swelling  Pulmonary:      Effort: " "Pulmonary effort is normal. No respiratory distress.   Abdominal:      General: There is distension.      Palpations: Abdomen is soft.   Musculoskeletal:         General: No swelling.      Cervical back: Normal range of motion.      Right lower leg: No edema.      Left lower leg: No edema.   Skin:     General: Skin is warm and dry.   Neurological:      General: No focal deficit present.      Mental Status: He is alert and oriented to person, place, and time. Mental status is at baseline.   Psychiatric:         Mood and Affect: Mood normal.         Behavior: Behavior normal.         Lab Review  Lab Results   Component Value Date/Time    WBC 11.8 (H) 01/21/2024 05:52 AM    RBC 4.57 (L) 01/21/2024 05:52 AM    HEMOGLOBIN 13.7 (L) 01/21/2024 05:52 AM    HEMATOCRIT 40.1 (L) 01/21/2024 05:52 AM    MCV 87.7 01/21/2024 05:52 AM    MCH 30.0 01/21/2024 05:52 AM    MCHC 34.2 01/21/2024 05:52 AM    MPV 11.5 01/21/2024 05:52 AM      Lab Results   Component Value Date/Time    SODIUM 136 01/21/2024 05:52 AM    POTASSIUM 4.2 01/21/2024 05:52 AM    CHLORIDE 107 01/21/2024 05:52 AM    CO2 17 (L) 01/21/2024 05:52 AM    GLUCOSE 221 (H) 01/21/2024 05:52 AM    BUN 33 (H) 01/21/2024 05:52 AM    CREATININE 1.42 (H) 01/21/2024 05:52 AM      Lab Results   Component Value Date/Time    ASTSGOT 19 01/21/2024 05:52 AM    ALTSGPT 8 01/21/2024 05:52 AM     Lab Results   Component Value Date/Time    CHOLSTRLTOT 152 09/29/2022 02:26 AM    LDL 87 09/29/2022 02:26 AM    HDL 35 (A) 09/29/2022 02:26 AM    TRIGLYCERIDE 148 09/29/2022 02:26 AM    TROPONINT 128 (H) 01/08/2024 09:16 AM       No results for input(s): \"NTPROBNP\" in the last 72 hours.    Cardiac Imaging and Procedures Review  AFL Ablation (1/19/2024):  Impressions/Plan:   1. Typical right atrial flutter.  2. Successful catheter mediated ablation of right atrial flutter.  3. Sinus arrest and insertion of temporary RV balloon tipped pacing catheter  4. Transfer to [facility] bed, stop diltiazem, if " sinus function has not recovered, PPM.      Imaging  Chest X-Ray (1/21/2024):  Cardiac silhouette is normal in size. Cardiac pacer noted. Previous CABG. No airspace infiltrate, pleural effusion, or pneumothorax.      Assessment/Plan  1. S/P Permanent Pacemaker  - S/P successful dual-chamber pacemaker implantation with Dr. Guthrie on 1/21/2024 for Sick Sinus syndrome  - CXR shows no late signs of PTX, leads appear to be in correct placement.   - EKG and telemetry monitor shows paced rhythm, no acute findings.   - Device interrogation today showed normal sensing and function.  - Left upper pacemaker site CDI, uncomplicated.   - Patient has ambulated in hallway without difficulties.  - On OAC with eliquis, continue  - Continue home antihypertensive regimen  - Discussed pacemaker discharge education and care with patient at bedside per below. All pt questions/concerns were answered, patient verbalized understanding.   - Patient will follow up with pacemaker clinic in 1 week for pacemaker check.     Pacemaker Discharge Instructions/[facility]  1.  No showers for one week; may take sponge bath.  Keep dressing dry & in place until seen at for you follow up visit at the cardiology office.   Report s/s of infection such as warmth/redness/drainage/swelling at site or fever/chills.  2.  No lifting over 10 lbs for six weeks.  3.  Do not raise affected arm above shoulder level or behind head for six weeks.  4.  Avoid excessive pushing, pulling, or twisting for six weeks.  5.  No driving for the first week.  6.  Call our office ([phone]) if you notice any increased swelling, redness, warmth, or drainage at the implant site.  7.  Call our office if you develop fever > 101F or uncontrolled pain.  8.  No MRI's for 6 weeks if you have a MRI compatible device.  9.  No dental work or cleanings for 3 months.  10.  May remove arm sling after one day, but please wear if you have trouble remembering to keep your arm down.  11. Do  not place cell phones or mobile devices directly over implanted device.   12. Your follow-up appointments will be done at approximately 1 week, 6 weeks, then every 3-4 months.    Patient will follow up in 1 week at our pacemaker clinic for pacemaker check or sooner if needed. Patient instructed to contact the office with any questions or concerns. Thank you for allowing me to participate in the care of this patient. Cardiology will sign off on this patient.     No future appointments.    Please contact me with any questions.    Please see Dr. Mc's attestation for further details and MDM.       JUVENTINO Connors, HF-CERT   [facility]  [phone]'

## 2024-01-21 NOTE — DISCHARGE PLANNING
Per rounds- Patient will be medically cleared to transfer to [facility] tomorrow (1/22 Monday). RNCM has reached out to [facility] to see if they can take him. Waiting for reply.    15:30-Per [facility]- Patient can return to facility. Please fax IP report and D/C summary to fax # [phone] prior to departure. Requested [facility] time is 9:00 AM. Please call Adela at # [phone] once patient is on their way. Waiting for confirmation of [facility] time.    16:10- Transport packet started. Report left for on- coming . [facility] transport set and confirmed for 9 AM. Transport packet placed on  desk.

## 2024-01-21 NOTE — PROGRESS NOTES
"Hospital Medicine Daily Progress Note    Date of Service  1/15/2024    Chief Complaint  Sulaiman Ceja is a 73 y.o. male admitted 1/4/2024 with chest pain    Hospital Course    As per Dr. Agarwal note  \"Admitted with chest pain, EKG showed Atrial flutter, troponins were elevated.  Nuclear stress test was negative. For the Atrial flutter, patient was started on anticoagulation with Eliquis and on Cardizem for rate control.  Echocardiogram was done which showed ejection fraction of 65%.  Patient's symptoms of chest pain resolved.    Atrial flutter - rate controlled  Syncope 1/8 - found in bathroom, sitting on the toilet with altered mental status after he had a bowel movement, rapid response called, patient brought to bed and became more alert and oriented right away   MILTON on CKD - improved  Heart rate A-fib a flutter on monitor, no chest pain or dyspnea.  Negative stress test this admission.  Ambulating without tachycardia.   Discussed with cardiology, they will arrange an outpatient EP consultation for the patient for this upcoming week given stability on PO medication.   Discussed with daughter and tried to call assisted living/SNF he is a resident of at the request of his daughter, left a message regarding plan and attempted to answer their concerns.  Normal HR upper 50's-60's per daughter.  Tolerating PO intake. Medically cleared for discharge to SNF.    1/15  Has been cleared for DC to SNF, developed mild hematuria, UA not infectious, no clots or obstruction. No pain. He was concerned he had an infection due to the blood. He has an internal device he uses to help urinate. Bladder scan ordered to be sure no retention, his renal function fluctuates Scr baseline looks to be 1.4-6, today was 2. Has been up and down this admission, h/o hydronephrosis so if retention noted would get US.  Encouraged PO intake, held ACEi, may need small bolus, recheck in AM.   Medically cleared for discharge to SNF. Tolerating PO intake. No " "complaints.  1/16:Patient seen and examined, afebrile, resting in bed, no overnight events, needs placement   1/17: Patient seen and examined, resting in bed, afebrile, having hypoglycemic episodes so will decrease his lantus dose to 10 units for now   Close monitoring his blood sugars and adjust as needed    1/18 Patient seen and examined he was taken to outpt appt with outpt cardiology while pt admitted inpatient today  Appt was canceled, however, prep ecg showed atrial flutter, rate controled.  Pt was redirected back to inpatient service.I did discuss case with  Dr Guthrie and he will arrange for ablation possible tomorrow   Appreciate rec.   1/19: Patient seen and examined, resting in bed, cardiology following appreciate rec.  Going for ablation  today   Close monitoring on tele \"    Patient transferred to [facility] for temporary pacer.  Patient remained permanent pacemaker placement on January 20, 2024 by cardiology.    Interval Problem Update  Patient seen and examined, afebrile, resting in bed, cardiology following case discussed appreciate rec.  S/p pacemaker yesterday   Close monitoring on tele     I have discussed this patient's plan of care and discharge plan at IDT rounds today with Case Management, Nursing, Nursing leadership, and other members of the IDT team.    Consultants/Specialty  Cardiology     Code Status  Full Code    Disposition  Medically Cleared  I have placed the appropriate orders for post-discharge needs.    Review of Systems  Review of Systems   Constitutional:  Positive for malaise/fatigue. Negative for chills, fever and weight loss.   HENT:  Negative for hearing loss and tinnitus.    Eyes:  Negative for blurred vision, double vision, photophobia and pain.   Respiratory:  Negative for cough, sputum production and shortness of breath.    Cardiovascular:  Negative for chest pain, palpitations, orthopnea and leg swelling.   Gastrointestinal:  Negative for abdominal pain, constipation, diarrhea, " nausea and vomiting.   Genitourinary:  Negative for dysuria, frequency and urgency.   Musculoskeletal:  Negative for back pain, joint pain, myalgias and neck pain.   Skin:  Negative for rash.   Neurological:  Positive for weakness. Negative for dizziness, tingling, tremors, sensory change, speech change, focal weakness and headaches.   Psychiatric/Behavioral:  Negative for hallucinations and substance abuse.    All other systems reviewed and are negative.       Physical Exam  Temp:  [36.3 °C (97.4 °F)-37.3 °C (99.1 °F)] 36.5 °C (97.7 °F)  Pulse:  [55-89] 60  Resp:  [15-25] 17  BP: (123-195)/(55-84) 125/60  SpO2:  [91 %-99 %] 93 %    Physical Exam  Vitals reviewed.   Constitutional:       General: He is not in acute distress.  HENT:      Head: Normocephalic and atraumatic. No contusion.      Nose: Nose normal.      Mouth/Throat:      Pharynx: No oropharyngeal exudate.   Eyes:      Pupils: Pupils are equal, round, and reactive to light.   Cardiovascular:      Rate and Rhythm: Normal rate and regular rhythm.      Comments: Pacemaker anterior chest wall  Pulmonary:      Effort: Pulmonary effort is normal.      Breath sounds: No wheezing or rhonchi.   Abdominal:      General: Bowel sounds are normal.      Palpations: Abdomen is soft.   Musculoskeletal:         General: Normal range of motion.      Cervical back: No rigidity. No muscular tenderness.   Skin:     General: Skin is warm and dry.      Coloration: Skin is not jaundiced.   Neurological:      General: No focal deficit present.      Mental Status: He is alert and oriented to person, place, and time.      Cranial Nerves: No cranial nerve deficit.      Comments: He is following commands and moving all his extremities   Psychiatric:         Mood and Affect: Mood normal.         Fluids    Intake/Output Summary (Last 24 hours) at 1/21/2024 1547  Last data filed at 1/21/2024 0000  Gross per 24 hour   Intake 240 ml   Output 1450 ml   Net -1210 ml       Laboratory  Recent  Labs     01/19/24  0242 01/20/24  0243 01/21/24  0552   WBC 7.8 8.5 11.8*   RBC 4.62* 4.13* 4.57*   HEMOGLOBIN 13.9* 12.2* 13.7*   HEMATOCRIT 39.9* 36.4* 40.1*   MCV 86.4 88.1 87.7   MCH 30.1 29.5 30.0   MCHC 34.8 33.5 34.2   RDW 47.3 47.1 47.4   PLATELETCT 234 214 210   MPV 11.5 11.6 11.5     Recent Labs     01/19/24  0242 01/20/24  0243 01/21/24  0552   SODIUM 140 136 136   POTASSIUM 4.0 4.2 4.2   CHLORIDE 111 108 107   CO2 16* 18* 17*   GLUCOSE 107* 191* 221*   BUN 34* 34* 33*   CREATININE 1.52* 1.39 1.42*   CALCIUM 8.8 8.3* 8.8                   Imaging  DX-CHEST-PORTABLE (1 VIEW)   Final Result      No acute process.      DX-CHEST-PORTABLE (1 VIEW)   Final Result      Pacer leads overlie the right atrium and ventricle. No pneumothorax.      EC-PRATIMA W/O CONT   Final Result      EC-ECHOCARDIOGRAM COMPLETE W/ CONT   Final Result      NM-CARDIAC STRESS TEST   Final Result      DX-CHEST-PORTABLE (1 VIEW)   Final Result      Cardiomegaly.      No definite acute abnormality. See details above.      CL-EP ABLATION ATRIAL FLUTTER    (Results Pending)   CL-PERMANENT PACEMAKER INSERTION    (Results Pending)        Assessment/Plan  * Coronary artery disease of bypass graft of native heart with stable angina pectoris (HCC)- (present on admission)  Assessment & Plan  Lipitor, Imdur, ASA    Sick sinus syndrome ([facility])  Assessment & Plan  S/p pacemaker placement on 1/20   [facility] cardiology rec       Acute kidney injury superimposed on chronic kidney disease (HCC)  Assessment & Plan  Has been cleared for DC to SNF, developed mild hematuria, UA not infectious, no clots or obstruction. No pain. He was concerned he had an infection due to the blood.   He has an internal device he uses to help urinate. Bladder scan ordered to be sure no retention, his renal function fluctuates Scr baseline looks to be 1.4-6, today was 2. Has been up and down this admission, h/o hydronephrosis so if retention noted would get US.    Renal function  has been improving.  I resume lisinopril due to elevated blood pressure.      Syncope and collapse  Assessment & Plan  Continue to monitor closely in IMCU.  Status post pacemaker placement.    Hypomagnesemia- (present on admission)  Assessment & Plan  Continue to monitor and replace as needed.    Stage 3a chronic kidney disease (HCC)- (present on admission)  Assessment & Plan  Renal function has significantly improved  I resume lisinopril.    Atrial flutter (HCC)- (present on admission)  Assessment & Plan  Diltiazem, Eliquis  May need to consult cardiology -possible cardioversion    1/15 Discussed with cardiology, they will arrange an outpatient EP consultation for the patient for this upcoming week given stability on PO medication.    01/20/24 s/p pacemaker placement.    Acquired hypothyroidism- (present on admission)  Assessment & Plan  Levothyroxine    COPD (chronic obstructive pulmonary disease) ([facility])- (present on admission)  Assessment & Plan  Spiriva  RT protocol    Cognitive impairment- (present on admission)  Assessment & Plan  Namenda   Avoid Benzodiazepines, Anticholinergics, Opiates  Frequent orientation  Update Board daily  Open window blinds during the day  Avoid naps during the day  Family at bedside whenever possible  Ensure adequate sleep at night - use Melatonin preferably  Avoid early morning labs  Avoid vital signs during sleep  Ambulate if possible  Provide adequate pain control  Monitor for urinary retention  Prevent and manage constipation  Discontinue IVs, Catheters, Tubes, Lines, Cardiac monitors, SCDs if possible     Primary hypertension- (present on admission)  Assessment & Plan  Diltiazem, Lisinopril, Norvasc  Continue IV hydralazine as needed with parameters.    Type 2 diabetes mellitus with kidney complication, with long-term current use of insulin ([facility])- (present on admission)  Assessment & Plan  I am continue insulin glargine and insulin sliding scale with hypoglycemia protocol.      I  discussed plan of care during multidisciplinary rounds regarding patient's current medical condition and plan of care.    VTE prophylaxis:  eliquis     Greater than 51 minutes spent prepping to see patient (e.g. review of tests) obtaining and/or reviewing separately obtained history. Performing a medically appropriate examination and/ evaluation.  Counseling and educating the patient/family/caregiver.  Ordering medications, tests, or procedures.  Referring and communicating with other health care professionals.  Documenting clinical information in EPIC.  Independently interpreting results and communicating results to patient/family/caregiver.  Care coordination.      I have performed a physical exam and reviewed and updated ROS and Plan today (1/15/2024). In review of yesterday's note (1/14/2024), there are no changes except as documented above.

## 2024-01-21 NOTE — PROGRESS NOTES
HOSPITALIST NOC CROSS COVER    Patient complaining of chest pain. Patient is s/p PM placement 1/20/24  Patient had elevated BP earlier, treated with a dose of IV hydralazine with good improvement.    On my exam, patient appears anxious, tachypneic. He reports mid-sternal chest pain along entire length of sternum with associated sob. Pt reports pain is worse with deep breathing. Pain is also reproducible with tenderness along length of sternum.   Patient had recent negative stress test on 1/9/23  Do not suspect pain to be cardiac in nature.  EKG ordered by RN, paced rhythm 60 bpm, no acute ST-T changes    #Sternal chest pain  Appears to be musculoskeletal   - IV morphine 2 mg x 1  - hold off on anxiolytics for now

## 2024-01-21 NOTE — CARE PLAN
The patient is Stable - Low risk of patient condition declining or worsening    Shift Goals  Clinical Goals: tele monitor, hemodynamically stable  Patient Goals: rest  Family Goals: updates    Progress made toward(s) clinical / shift goals:    Problem: Knowledge Deficit - Standard  Goal: Patient and family/care givers will demonstrate understanding of plan of care, disease process/condition, diagnostic tests and medications  Outcome: Progressing     Problem: Skin Integrity  Goal: Skin integrity is maintained or improved  Outcome: Progressing     Problem: Pain - Standard  Goal: Alleviation of pain or a reduction in pain to the patient’s comfort goal  Outcome: Progressing     Problem: Discharge Barriers/Planning  Goal: Patient's continuum of care needs are met  Outcome: Progressing     Problem: Hemodynamics  Goal: Patient's hemodynamics, fluid balance and neurologic status will be stable or improve  Outcome: Progressing       Patient is not progressing towards the following goals:

## 2024-01-21 NOTE — PROGRESS NOTES
Report given to LORI Wakefield.   Pt transfer to [id number] on monitor with ACLS RN and CCT. LORI Dye assisted with transferring pt new bed. LORI Wakefield notified of pt arrival.

## 2024-01-21 NOTE — DISCHARGE PLANNING
Spoke with Ramón at [facility], he will confirm with his supervisor and return call on time for transport.    Patient to transfer to [facility] 01/22/24 at 0900 via [facility]. JANE Sanchez advised.

## 2024-01-22 VITALS
WEIGHT: 190.92 LBS | SYSTOLIC BLOOD PRESSURE: 110 MMHG | TEMPERATURE: 98.1 F | HEIGHT: 69 IN | BODY MASS INDEX: 28.28 KG/M2 | RESPIRATION RATE: 18 BRPM | OXYGEN SATURATION: 95 % | DIASTOLIC BLOOD PRESSURE: 53 MMHG | HEART RATE: 60 BPM

## 2024-01-22 LAB
ANION GAP SERPL CALC-SCNC: 12 MMOL/L (ref 7–16)
BUN SERPL-MCNC: 40 MG/DL (ref 8–22)
CALCIUM SERPL-MCNC: 8.7 MG/DL (ref 8.5–10.5)
CHLORIDE SERPL-SCNC: 111 MMOL/L (ref 96–112)
CO2 SERPL-SCNC: 17 MMOL/L (ref 20–33)
CREAT SERPL-MCNC: 1.79 MG/DL (ref 0.5–1.4)
ERYTHROCYTE [DISTWIDTH] IN BLOOD BY AUTOMATED COUNT: 47.7 FL (ref 35.9–50)
GFR SERPLBLD CREATININE-BSD FMLA CKD-EPI: 39 ML/MIN/1.73 M 2
GLUCOSE BLD STRIP.AUTO-MCNC: 186 MG/DL (ref 65–99)
GLUCOSE SERPL-MCNC: 144 MG/DL (ref 65–99)
HCT VFR BLD AUTO: 37.8 % (ref 42–52)
HGB BLD-MCNC: 13 G/DL (ref 14–18)
MCH RBC QN AUTO: 30.4 PG (ref 27–33)
MCHC RBC AUTO-ENTMCNC: 34.4 G/DL (ref 32.3–36.5)
MCV RBC AUTO: 88.3 FL (ref 81.4–97.8)
PLATELET # BLD AUTO: 200 K/UL (ref 164–446)
PMV BLD AUTO: 11.7 FL (ref 9–12.9)
POTASSIUM SERPL-SCNC: 3.9 MMOL/L (ref 3.6–5.5)
RBC # BLD AUTO: 4.28 M/UL (ref 4.7–6.1)
SODIUM SERPL-SCNC: 140 MMOL/L (ref 135–145)
WBC # BLD AUTO: 9.2 K/UL (ref 4.8–10.8)

## 2024-01-22 PROCEDURE — A9270 NON-COVERED ITEM OR SERVICE: HCPCS | Performed by: STUDENT IN AN ORGANIZED HEALTH CARE EDUCATION/TRAINING PROGRAM

## 2024-01-22 PROCEDURE — 700102 HCHG RX REV CODE 250 W/ 637 OVERRIDE(OP): Performed by: FAMILY MEDICINE

## 2024-01-22 PROCEDURE — 82962 GLUCOSE BLOOD TEST: CPT

## 2024-01-22 PROCEDURE — 85027 COMPLETE CBC AUTOMATED: CPT

## 2024-01-22 PROCEDURE — 36415 COLL VENOUS BLD VENIPUNCTURE: CPT

## 2024-01-22 PROCEDURE — 99239 HOSP IP/OBS DSCHRG MGMT >30: CPT | Performed by: INTERNAL MEDICINE

## 2024-01-22 PROCEDURE — A9270 NON-COVERED ITEM OR SERVICE: HCPCS | Performed by: FAMILY MEDICINE

## 2024-01-22 PROCEDURE — 700102 HCHG RX REV CODE 250 W/ 637 OVERRIDE(OP): Performed by: STUDENT IN AN ORGANIZED HEALTH CARE EDUCATION/TRAINING PROGRAM

## 2024-01-22 PROCEDURE — 700102 HCHG RX REV CODE 250 W/ 637 OVERRIDE(OP): Performed by: HOSPITALIST

## 2024-01-22 PROCEDURE — 80048 BASIC METABOLIC PNL TOTAL CA: CPT

## 2024-01-22 PROCEDURE — A9270 NON-COVERED ITEM OR SERVICE: HCPCS | Performed by: HOSPITALIST

## 2024-01-22 RX ORDER — HYDRALAZINE HYDROCHLORIDE 25 MG/1
25 TABLET, FILM COATED ORAL EVERY 8 HOURS
Qty: 90 TABLET | Refills: 0 | Status: SHIPPED | OUTPATIENT
Start: 2024-01-22

## 2024-01-22 RX ADMIN — TIOTROPIUM BROMIDE INHALATION SPRAY 5 MCG: 3.12 SPRAY, METERED RESPIRATORY (INHALATION) at 04:55

## 2024-01-22 RX ADMIN — GABAPENTIN 800 MG: 400 CAPSULE ORAL at 04:53

## 2024-01-22 RX ADMIN — ESCITALOPRAM OXALATE 10 MG: 10 TABLET ORAL at 04:52

## 2024-01-22 RX ADMIN — INSULIN HUMAN 1 UNITS: 100 INJECTION, SOLUTION PARENTERAL at 08:30

## 2024-01-22 RX ADMIN — HYDRALAZINE HYDROCHLORIDE 25 MG: 25 TABLET ORAL at 04:52

## 2024-01-22 RX ADMIN — MEMANTINE HYDROCHLORIDE 5 MG: 10 TABLET ORAL at 04:53

## 2024-01-22 RX ADMIN — APIXABAN 5 MG: 5 TABLET, FILM COATED ORAL at 04:53

## 2024-01-22 RX ADMIN — DOCUSATE SODIUM 50 MG AND SENNOSIDES 8.6 MG 2 TABLET: 8.6; 5 TABLET, FILM COATED ORAL at 04:53

## 2024-01-22 RX ADMIN — ISOSORBIDE MONONITRATE 60 MG: 60 TABLET, EXTENDED RELEASE ORAL at 04:52

## 2024-01-22 RX ADMIN — INSULIN GLARGINE-YFGN 10 UNITS: 100 INJECTION, SOLUTION SUBCUTANEOUS at 08:31

## 2024-01-22 RX ADMIN — ACETAMINOPHEN 650 MG: 325 TABLET, FILM COATED ORAL at 04:51

## 2024-01-22 RX ADMIN — LEVOTHYROXINE SODIUM 112 MCG: 0.11 TABLET ORAL at 04:53

## 2024-01-22 RX ADMIN — AMLODIPINE BESYLATE 10 MG: 10 TABLET ORAL at 04:54

## 2024-01-22 RX ADMIN — ASPIRIN 81 MG: 81 TABLET, COATED ORAL at 04:52

## 2024-01-22 RX ADMIN — LISINOPRIL 20 MG: 20 TABLET ORAL at 04:52

## 2024-01-22 ASSESSMENT — COGNITIVE AND FUNCTIONAL STATUS - GENERAL
DAILY ACTIVITIY SCORE: 19
SUGGESTED CMS G CODE MODIFIER DAILY ACTIVITY: CK
DRESSING REGULAR UPPER BODY CLOTHING: A LITTLE
PERSONAL GROOMING: A LITTLE
MOBILITY SCORE: 24
TOILETING: A LITTLE
SUGGESTED CMS G CODE MODIFIER MOBILITY: CH
HELP NEEDED FOR BATHING: A LITTLE
DRESSING REGULAR LOWER BODY CLOTHING: A LITTLE

## 2024-01-22 ASSESSMENT — PAIN DESCRIPTION - PAIN TYPE: TYPE: ACUTE PAIN

## 2024-01-22 ASSESSMENT — FIBROSIS 4 INDEX: FIB4 SCORE: 2.45

## 2024-01-22 NOTE — CARE PLAN
The patient is Stable - Low risk of patient condition declining or worsening    Shift Goals  Clinical Goals: remain hemodynamially stable  Patient Goals: rest  Family Goals: updates    Progress made toward(s) clinical / shift goals:    Problem: Knowledge Deficit - Standard  Goal: Patient and family/care givers will demonstrate understanding of plan of care, disease process/condition, diagnostic tests and medications  Outcome: Progressing     Problem: Skin Integrity  Goal: Skin integrity is maintained or improved  Outcome: Progressing     Problem: Pain - Standard  Goal: Alleviation of pain or a reduction in pain to the patient’s comfort goal  Outcome: Progressing     Problem: Discharge Barriers/Planning  Goal: Patient's continuum of care needs are met  Outcome: Progressing     Problem: Hemodynamics  Goal: Patient's hemodynamics, fluid balance and neurologic status will be stable or improve  Outcome: Progressing       Patient is not progressing towards the following goals:

## 2024-01-22 NOTE — PROGRESS NOTES
Brief EP note:     Pt's discharging to [facility] skilled facility.  Facility is unable to bring pt to follow up appts in [place] and pt's family unable to transport per CM/Charge RN.      Wound check to be completed by local clinic in [facility] in approximately one week.  Dressing should remain on and dry x 1 week.  Remove bandage after one week.      Ok to apply indirect ice pack to site if needed for comfort.     [employer] has set up PPM home monitor.  Needs to go with pt at time of DC.  Remote Monitor should be placed at bedside and plugged in upon arrival to SNF for remote PPM monitoring.

## 2024-01-22 NOTE — PROGRESS NOTES
Monitor Summary  Rhythm/Rate: Paced 59-92 BPM  NE .25  QRS .09  QT .37  Ectopy: none  per strip from monitor room.

## 2024-01-22 NOTE — DISCHARGE SUMMARY
Discharge Summary    CHIEF COMPLAINT ON ADMISSION  Chief Complaint   Patient presents with    Chest Pain       Reason for Admission  Chest Pain    Admission Date  1/4/2024     CODE STATUS  Full Code    HPI & HOSPITAL COURSE  This is a 73 y.o. male who was admitted on 1/4/24 for chest pain EKG showed Atrial flutter, troponins were elevated. Nuclear stress test was negative. For the Atrial flutter, patient was started on anticoagulation with Eliquis and on Cardizem for rate control. Echocardiogram was done which showed ejection fraction of 65%.   Patient has h/o of CKD and DM   Initially plan was for possible ablation outpatient but patient was difficult discharge so cardiology has decided to do the ablation while inpatient.  He had ablation done on 1/119/24 but then had sinus arrest and so had a temporary pacer placed during that procedure which was then switched to a permanent pacemaker which was placed on 1/20/24.  Patient tolerated procedure well  He is now doing better will hold his ACE due to his CKD  He is feeling better and will be discharged to SNF today will need to follow up with cardiology as outpatient     The patient met 2-midnight criteria for an inpatient stay at the time of discharge.      FOLLOW UP ITEMS POST DISCHARGE  Cardiology     DISCHARGE DIAGNOSES  Principal Problem:    Coronary artery disease of bypass graft of native heart with stable angina pectoris (HCC) (POA: Yes)  Active Problems:    Type 2 diabetes mellitus with kidney complication, with long-term current use of insulin (HCC) (POA: Yes)    HLD (hyperlipidemia) (POA: Yes)    Primary hypertension (POA: Yes)    Cognitive impairment (POA: Yes)    COPD (chronic obstructive pulmonary disease) (HCC) (POA: Yes)    Acquired hypothyroidism (POA: Yes)    Atrial flutter (HCC) (POA: Yes)    Stage 3a chronic kidney disease (HCC) (POA: Yes)    Hypomagnesemia (POA: Yes)    Syncope and collapse (POA: No)    Acute kidney injury superimposed on chronic  kidney disease (HCC) (POA: Unknown)    Sick sinus syndrome (HCC) (POA: Unknown)  Resolved Problems:    * No resolved hospital problems. *      FOLLOW UP  Future Appointments   Date Time Provider Department Center   1/30/2024 10:30 AM PACER CHECK-[facility] None     Suhail Aguirre M.D.  [address]  [facility] [id number] [phone]    Follow up in 2 week(s)  please call to schedule a followup appointment for your most recent hospitalization    [facility]  [address] [facility] [address] [phone]          MEDICATIONS ON DISCHARGE     Medication List        START taking these medications        Instructions   amLODIPine 10 MG Tabs  Commonly known as: Norvasc   Take 1 Tablet by mouth every day.  Dose: 10 mg     hydrALAZINE 25 MG Tabs  Commonly known as: Apresoline   Take 1 Tablet by mouth every 8 hours.  Dose: 25 mg            CHANGE how you take these medications        Instructions   Eliquis 5mg Tabs  What changed:   medication strength  how much to take  Generic drug: apixaban   Take 1 Tablet by mouth 2 times a day.  Dose: 5 mg            CONTINUE taking these medications        Instructions   acetaminophen 325 MG Tabs  Commonly known as: Tylenol   Take 650 mg by mouth every 8 hours as needed. Indications: Fever, Pain  Dose: 650 mg     atorvastatin 20 MG Tabs  Commonly known as: Lipitor   Take 20 mg by mouth every day.  Dose: 20 mg     Calcium Carbonate 600 MG Tabs   Take 600 mg by mouth 2 times a day.  Dose: 600 mg     dilTIAZem  MG Cp24  Commonly known as: Cardizem CD   Take 240 mg by mouth every day.  Dose: 240 mg     docusate sodium 100 MG Caps  Commonly known as: Colace   Take 100 mg by mouth every day.  Dose: 100 mg     escitalopram 10 MG Tabs  Commonly known as: Lexapro   Take 10 mg by mouth every day.  Dose: 10 mg     gabapentin 800 MG tablet  Commonly known as: Neurontin   Take 800 mg by mouth 2 times a day.  Dose: 800 mg     Incruse Ellipta 62.5 MCG/ACT Aepb  inhaler  Generic drug: umeclidinium bromide   Inhale 1 Puff every day.  Dose: 1 Puff     insulin detemir 100 UNIT/ML Soln  Commonly known as: Levemir   Inject 15-50 Units under the skin 2 times a day. 50 units every morning  15 units every night  Dose: 15-50 Units     insulin regular 100 Unit/mL Soln  Commonly known as: Humulin R   Inject 3-12 Units under the skin 3 times a day before meals. Sliding Scale   151-200= 3 units  201-250= 5 units  251-300= 8 units  301-350= 10 units  351-400= 12 units  Dose: 3-12 Units     isosorbide mononitrate SR 60 MG Tb24  Commonly known as: Imdur   Take 1 Tablet by mouth every day.  Dose: 60 mg     levalbuterol 45 MCG/ACT inhaler  Commonly known as: Xopenex HFA   Inhale 2 Puffs every 2 hours as needed for Shortness of Breath.  Dose: 2 Puff     levothyroxine 112 MCG Tabs  Commonly known as: Synthroid   Take 1 Tablet by mouth every morning on an empty stomach.  Dose: 112 mcg     loperamide 2 MG Caps  Commonly known as: Imodium   Take 2 mg by mouth 4 times a day as needed for Diarrhea.  Dose: 2 mg     melatonin 3 MG Tabs   Take 9 mg by mouth at bedtime.  Dose: 9 mg     Myrbetriq 25 MG Tb24  Generic drug: mirabegron ER   Take 25 mg by mouth every day.  Dose: 25 mg     Namenda 5 MG Tabs  Generic drug: memantine   Take 5 mg by mouth 2 times a day.  Dose: 5 mg     pantoprazole 40 MG Tbec  Commonly known as: Protonix   Take 40 mg by mouth every day.  Dose: 40 mg     polyethylene glycol/lytes Pack  Commonly known as: Miralax   Take 17 g by mouth 1 time a day as needed. Indications: Constipation  Dose: 17 g     sennosides 8.6 MG Tabs  Commonly known as: Senokot   Take 17.2 mg by mouth 1 time a day as needed. Indications: Constipation  Dose: 17.2 mg     traMADol 50 MG Tabs  Commonly known as: Ultram   Take 50 mg by mouth every 6 hours as needed. Indications: Pain  Dose: 50 mg     vitamin D 2000 UNIT Tabs   Take 4,000 Units by mouth every day.  Dose: 4,000 Units            STOP taking these  medications      Debrox 6.5 % Soln  Generic drug: carbamide peroxide     lisinopril 20 MG Tabs  Commonly known as: Prinivil              Allergies  Allergies   Allergen Reactions    Cefdinir Unspecified     Patient does not remember       DIET  Orders Placed This Encounter   Procedures    Diet Order Diet: Cardiac     Standing Status:   Standing     Number of Occurrences:   1     Order Specific Question:   Diet:     Answer:   Cardiac [6]       ACTIVITY  As tolerated.  Weight bearing as tolerated    LINES, DRAINS, AND WOUNDS  This is an automated list. Peripheral IVs will be removed prior to discharge.  Pacer Wires (Active)   Pacer Wire Status Ventricular wires connected to pacer 01/22/24 0255   Actively Pacing Yes 01/22/24 0255   Pacer Rate 60 01/22/24 0255   Output MA Ventricular 5 01/22/24 0255   Pacer Sensitivity Ventricular 2 01/22/24 0255   Pacer Capture Yes 01/22/24 0255   Pacer Sensing Yes 01/22/24 0255   Pacemaker Battery Changed Every 24 hrs 01/22/24 0255   Pacemaker Mode VVI 01/22/24 0255   Site Assessment Intact 01/22/24 0255   Pacer Wires Secured No 01/20/24 0800   Dressing Status Old drainage;Intact 01/22/24 0255   Dressing Intervention N/A 01/20/24 0800       Venous Sheath Right Femoral (Active)   Specific Qualities Capped 01/22/24 0255   Site Assessment Dry;Clean;Intact 01/22/24 0255   Line Status Intact and in place 01/22/24 0255   Dressing Transparent;Gauze 01/22/24 0255   Dressing Status Clean;Dry;Intact 01/22/24 0255   Dressing Intervention N/A 01/22/24 0200   Color/Movement/Sensation Capillary refill less than 3 sec 01/22/24 0255       Wound 01/20/24 Other (comment)  Chest Upper Left Pacemaker site (Active)   Site Assessment MORGAN 01/21/24 0800   Periwound Assessment Clean;Dry;Intact 01/21/24 0800   Margins MORGAN 01/22/24 0255   Closure Other (Comment) 01/20/24 1015   Drainage Amount Small 01/20/24 2000   Drainage Description Serosanguineous 01/20/24 2000   Dressing Status Old drainage;Intact 01/21/24  0800   Dressing Changed Observed 01/21/24 0800   Dressing Options Dry Gauze 01/21/24 0800   Dressing Change/Treatment Frequency Other (Comments) 01/22/24 0255                  MENTAL STATUS ON TRANSFER             CONSULTATIONS  Cardiology     PROCEDURES  Ablation  Pacemaker placement     LABORATORY  Lab Results   Component Value Date    SODIUM 140 01/22/2024    POTASSIUM 3.9 01/22/2024    CHLORIDE 111 01/22/2024    CO2 17 (L) 01/22/2024    GLUCOSE 144 (H) 01/22/2024    BUN 40 (H) 01/22/2024    CREATININE 1.79 (H) 01/22/2024        Lab Results   Component Value Date    WBC 9.2 01/22/2024    HEMOGLOBIN 13.0 (L) 01/22/2024    HEMATOCRIT 37.8 (L) 01/22/2024    PLATELETCT 200 01/22/2024        Total time of the discharge process exceeds 35 minutes.

## 2024-01-22 NOTE — DISCHARGE PLANNING
Case Management Discharge Planning    Admission Date: 1/4/2024  GMLOS: 2.9  ALOS: 3    6-Clicks ADL Score: 19  6-Clicks Mobility Score: 24      Anticipated Discharge Dispo: Discharge Disposition: D/T to SNF with Medicare cert in anticipation of skilled care (03)    DME Needed: No    Action(s) Taken: Updated Provider/Nurse on Discharge Plan      0800, RN CM spoke to Pt's daughter in law Tricia and she said she is not sure she will be able to take Sulaiman to his pacemaker check appointment on January 30, 2023 since she works. Tricia also added that if Pt's blood sugar can be monitored while on travel and that he be provided his insulin and snacks. RN CM relayed this information to Charge nurse Shaun MORALES    0815, RN CM informed Charge nurse Shaun MORALES and LORI MATTA they said they will inform Cardiology LEE ANN VILLALOBOS about this.    0830, RN CM spoke with Cardiology LEE ANN VILLALOBOS and she was informed that Facility is unable to bring pt to follow up appts in [place] and pt's family unable to transport. Cardiology LEE ANN VILLALOBOS said a PPM home monitor will be set up and facility needs to be informed to plug in. Remote Monitor should be placed at bedside and plugged in upon arrival to SNF for remote PPM monitoring.     0845, RN CM called and left VM to [facility] from [facility] [phone] to inform of transfer and to inform of the  PPM home monitor.    0850, RN CM fax requested IP transfer notes and discharge summary to [facility] fax # [phone].    0900, Vinnie transfer packet given to LORI MATTA    0930, LORI VILLALOBOS confirmed she received transfer packet. She was also provided Eliquis card trial to be placed in packet in case Pt will need it. LORI LARA informed LORI Hernandez that Pt's PPM monitor needs to be plugged once he arrives in [facility] and she was informed to include that information in bedside report.    1040, RN CM called and spoke to [facility] from [facility] [phone] and she was  informed that Pt is in route going to [facility] via [facility].    1043,  RN JANE spoke to Pt's daughter in law Tricia and she was informed Pt is on his way to [facility] and Tricia was informed that Pt has PPM home monitor set up by [employer]. Pt will have no pacemaker check here in [place] not unless something unusual shows. Tricia understood all information provided.    1230, RN Lorena MATTA informed this RN JANE that [facility] did not bring the patient's PPM monitor.     1240, RN JANE called and spoke to LORI De Leon from [facility] and she was informed that [facility] left the patient's PPM monitor here at [facility]. RN CM said Charge nurse Lorena MATTA will mail deliver overnight the PPM monitor. LORI De Leon provided the address [address] [place] [id number]. LORI LARA also informed LORI De Leon that PPM needs to be plugged and near to Pt.         Escalations Completed: None    Medically Clear: No    Next Steps: CM will continue to assist Pt with discharge needs.      Barriers to Discharge: None

## 2024-01-22 NOTE — CARE PLAN
The patient is Stable - Low risk of patient condition declining or worsening    Shift Goals  Clinical Goals: remain hemodynamially stable  Patient Goals: rest  Family Goals: updates    Progress made toward(s) clinical / shift goals:  tele box and q4 vitals in place. Medicating per mar and hour.y rounding.     Patient is not progressing towards the following goals:

## 2024-01-22 NOTE — CARE PLAN
The patient is Stable - Low risk of patient condition declining or worsening    Shift Goals  Clinical Goals: remain hemodynamially stable  Patient Goals: rest  Family Goals: updates    Progress made toward(s) clinical / shift goals:      Patient is not progressing towards the following goals:      Problem: Knowledge Deficit - Standard  Goal: Patient and family/care givers will demonstrate understanding of plan of care, disease process/condition, diagnostic tests and medications  Outcome: Met     Problem: Skin Integrity  Goal: Skin integrity is maintained or improved  Outcome: Met     Problem: Pain - Standard  Goal: Alleviation of pain or a reduction in pain to the patient’s comfort goal  Outcome: Met     Problem: Discharge Barriers/Planning  Goal: Patient's continuum of care needs are met  Outcome: Met     Problem: Hemodynamics  Goal: Patient's hemodynamics, fluid balance and neurologic status will be stable or improve  Outcome: Met

## 2024-01-24 LAB — EKG IMPRESSION: NORMAL

## 2024-01-24 PROCEDURE — 93010 ELECTROCARDIOGRAM REPORT: CPT | Performed by: INTERNAL MEDICINE

## 2024-02-14 ENCOUNTER — TELEPHONE (OUTPATIENT)
Dept: CARDIOLOGY | Facility: MEDICAL CENTER | Age: 74
End: 2024-02-14
Payer: MEDICARE

## 2024-06-07 NOTE — ASSESSMENT & PLAN NOTE
Diabetic diet  Insulin sliding scale  Labs on a.m.   If strep is negative- it's likely a viral illness and will run its course in 7 days or so.   Soft diet, fluids, vaporizer in the bedroom.   Tylenol and ibuprofen for pain, fevers.     If strep is positive, will  need antibiotics.  No longer contagious once on antibiotics x 24 hours.   Dispose of toothbrush 2-3 days into treatment.     Either way, wash your hands well and often, cover your cough, no sharing beverages, food, straws, utensils, chap stick, etc. to prevent spread of illness.